# Patient Record
Sex: FEMALE | Race: BLACK OR AFRICAN AMERICAN | NOT HISPANIC OR LATINO | URBAN - METROPOLITAN AREA
[De-identification: names, ages, dates, MRNs, and addresses within clinical notes are randomized per-mention and may not be internally consistent; named-entity substitution may affect disease eponyms.]

---

## 2017-01-13 ENCOUNTER — OUTPATIENT (OUTPATIENT)
Dept: OUTPATIENT SERVICES | Facility: HOSPITAL | Age: 52
LOS: 1 days | End: 2017-01-13
Payer: COMMERCIAL

## 2017-01-13 DIAGNOSIS — M25.521 PAIN IN RIGHT ELBOW: ICD-10-CM

## 2017-01-13 DIAGNOSIS — Z90.722 ACQUIRED ABSENCE OF OVARIES, BILATERAL: Chronic | ICD-10-CM

## 2017-01-13 DIAGNOSIS — Z51.89 ENCOUNTER FOR OTHER SPECIFIED AFTERCARE: ICD-10-CM

## 2017-01-13 DIAGNOSIS — S93.401D SPRAIN OF UNSPECIFIED LIGAMENT OF RIGHT ANKLE, SUBSEQUENT ENCOUNTER: ICD-10-CM

## 2017-01-24 ENCOUNTER — APPOINTMENT (OUTPATIENT)
Dept: ORTHOPEDIC SURGERY | Facility: CLINIC | Age: 52
End: 2017-01-24

## 2017-01-24 DIAGNOSIS — M54.5 LOW BACK PAIN: ICD-10-CM

## 2017-02-02 ENCOUNTER — APPOINTMENT (OUTPATIENT)
Dept: CARDIOLOGY | Facility: CLINIC | Age: 52
End: 2017-02-02

## 2017-02-06 ENCOUNTER — OTHER (OUTPATIENT)
Age: 52
End: 2017-02-06

## 2017-02-06 ENCOUNTER — APPOINTMENT (OUTPATIENT)
Dept: PULMONOLOGY | Facility: CLINIC | Age: 52
End: 2017-02-06

## 2017-02-06 VITALS — OXYGEN SATURATION: 99 % | HEART RATE: 84 BPM | SYSTOLIC BLOOD PRESSURE: 130 MMHG | DIASTOLIC BLOOD PRESSURE: 88 MMHG

## 2017-02-06 DIAGNOSIS — Z01.811 ENCOUNTER FOR PREPROCEDURAL RESPIRATORY EXAMINATION: ICD-10-CM

## 2017-02-06 RX ORDER — SULFAMETHOXAZOLE AND TRIMETHOPRIM 800; 160 MG/1; MG/1
800-160 TABLET ORAL
Qty: 28 | Refills: 0 | Status: DISCONTINUED | COMMUNITY
Start: 2016-11-15 | End: 2017-02-06

## 2017-02-06 RX ORDER — OXYCODONE AND ACETAMINOPHEN 5; 325 MG/1; MG/1
5-325 TABLET ORAL EVERY 6 HOURS
Qty: 8 | Refills: 0 | Status: DISCONTINUED | COMMUNITY
Start: 2016-12-22 | End: 2017-02-06

## 2017-02-06 RX ORDER — BACITRACIN 500 [USP'U]/G
500 OINTMENT OPHTHALMIC
Qty: 4 | Refills: 0 | Status: DISCONTINUED | COMMUNITY
Start: 2017-01-09 | End: 2017-02-06

## 2017-02-06 RX ORDER — BACITRACIN ZINC 500 [USP'U]/G
500 OINTMENT TOPICAL
Qty: 28 | Refills: 0 | Status: DISCONTINUED | COMMUNITY
Start: 2016-11-15 | End: 2017-02-06

## 2017-02-06 RX ORDER — AMITRIPTYLINE HYDROCHLORIDE 10 MG/1
10 TABLET, FILM COATED ORAL
Qty: 30 | Refills: 0 | Status: DISCONTINUED | COMMUNITY
Start: 2016-10-07 | End: 2017-02-06

## 2017-02-06 RX ORDER — DILTIAZEM HYDROCHLORIDE 240 MG/1
240 CAPSULE, EXTENDED RELEASE ORAL
Qty: 30 | Refills: 0 | Status: DISCONTINUED | COMMUNITY
Start: 2016-06-10 | End: 2017-02-06

## 2017-02-14 ENCOUNTER — APPOINTMENT (OUTPATIENT)
Dept: PULMONOLOGY | Facility: CLINIC | Age: 52
End: 2017-02-14

## 2017-03-02 ENCOUNTER — LABORATORY RESULT (OUTPATIENT)
Age: 52
End: 2017-03-02

## 2017-03-06 ENCOUNTER — APPOINTMENT (OUTPATIENT)
Dept: MRI IMAGING | Facility: CLINIC | Age: 52
End: 2017-03-06

## 2017-03-06 ENCOUNTER — OUTPATIENT (OUTPATIENT)
Dept: OUTPATIENT SERVICES | Facility: HOSPITAL | Age: 52
LOS: 1 days | End: 2017-03-06
Payer: MEDICAID

## 2017-03-06 DIAGNOSIS — Z00.8 ENCOUNTER FOR OTHER GENERAL EXAMINATION: ICD-10-CM

## 2017-03-06 DIAGNOSIS — Z90.722 ACQUIRED ABSENCE OF OVARIES, BILATERAL: Chronic | ICD-10-CM

## 2017-03-06 PROCEDURE — 82565 ASSAY OF CREATININE: CPT

## 2017-03-06 PROCEDURE — 74183 MRI ABD W/O CNTR FLWD CNTR: CPT

## 2017-03-06 PROCEDURE — A9585: CPT

## 2017-03-09 ENCOUNTER — EMERGENCY (EMERGENCY)
Facility: HOSPITAL | Age: 52
LOS: 1 days | Discharge: HOME | End: 2017-03-09
Attending: EMERGENCY MEDICINE | Admitting: EMERGENCY MEDICINE
Payer: COMMERCIAL

## 2017-03-09 VITALS
HEART RATE: 90 BPM | TEMPERATURE: 98 F | SYSTOLIC BLOOD PRESSURE: 134 MMHG | HEIGHT: 66 IN | OXYGEN SATURATION: 98 % | WEIGHT: 190.04 LBS | RESPIRATION RATE: 18 BRPM | DIASTOLIC BLOOD PRESSURE: 83 MMHG

## 2017-03-09 DIAGNOSIS — Z90.722 ACQUIRED ABSENCE OF OVARIES, BILATERAL: Chronic | ICD-10-CM

## 2017-03-09 PROCEDURE — 99283 EMERGENCY DEPT VISIT LOW MDM: CPT | Mod: 25

## 2017-03-09 PROCEDURE — 10061 I&D ABSCESS COMP/MULTIPLE: CPT

## 2017-03-09 RX ORDER — IBUPROFEN 200 MG
1 TABLET ORAL
Qty: 16 | Refills: 0 | OUTPATIENT
Start: 2017-03-09 | End: 2017-03-13

## 2017-03-09 NOTE — ED STATDOCS - ATTENDING CONTRIBUTION TO CARE
I personally saw the patient with the PA, and completed the key components of the history and physical exam. I then discussed the management plan with the PA.   gen n nad resp clear cardiac nml resp clear abd/skin: soft  right inguinal abscess no cellulitius no sigsn nec fasc I and d warm compresses wound check 48 hrs without fail

## 2017-03-09 NOTE — ED STATDOCS - PROGRESS NOTE DETAILS
PA NOTE: Pt seen by intake physician and hpi/orders/plan reviewed. PT presenting to ED with complaints of abscess on groin x 1 week  PE: GEN: Awake, alert,  NAD,  EYES: PERRL  NEURO: AOx3, no focal deficits   Skin: 5cm x 5cm abscess on right groin with fluctuance, 3cm x 3cm tender mass on left thigh, non-fluctuant  PLAN: I&D and antibiotics

## 2017-03-14 ENCOUNTER — APPOINTMENT (OUTPATIENT)
Dept: CARDIOLOGY | Facility: CLINIC | Age: 52
End: 2017-03-14

## 2017-03-24 ENCOUNTER — APPOINTMENT (OUTPATIENT)
Dept: ULTRASOUND IMAGING | Facility: CLINIC | Age: 52
End: 2017-03-24

## 2017-03-28 ENCOUNTER — RX RENEWAL (OUTPATIENT)
Age: 52
End: 2017-03-28

## 2017-04-01 ENCOUNTER — OUTPATIENT (OUTPATIENT)
Dept: OUTPATIENT SERVICES | Facility: HOSPITAL | Age: 52
LOS: 1 days | End: 2017-04-01
Payer: MEDICAID

## 2017-04-01 ENCOUNTER — APPOINTMENT (OUTPATIENT)
Dept: MAMMOGRAPHY | Facility: CLINIC | Age: 52
End: 2017-04-01

## 2017-04-01 ENCOUNTER — APPOINTMENT (OUTPATIENT)
Dept: ULTRASOUND IMAGING | Facility: CLINIC | Age: 52
End: 2017-04-01

## 2017-04-01 DIAGNOSIS — Z90.722 ACQUIRED ABSENCE OF OVARIES, BILATERAL: Chronic | ICD-10-CM

## 2017-04-01 DIAGNOSIS — Z12.31 ENCOUNTER FOR SCREENING MAMMOGRAM FOR MALIGNANT NEOPLASM OF BREAST: ICD-10-CM

## 2017-04-01 PROCEDURE — 77067 SCR MAMMO BI INCL CAD: CPT

## 2017-04-01 PROCEDURE — 76642 ULTRASOUND BREAST LIMITED: CPT

## 2017-04-01 PROCEDURE — 77063 BREAST TOMOSYNTHESIS BI: CPT

## 2017-04-01 PROCEDURE — 77066 DX MAMMO INCL CAD BI: CPT

## 2017-04-01 PROCEDURE — G0279: CPT

## 2017-04-05 ENCOUNTER — RX RENEWAL (OUTPATIENT)
Age: 52
End: 2017-04-05

## 2017-04-28 ENCOUNTER — EMERGENCY (EMERGENCY)
Facility: HOSPITAL | Age: 52
LOS: 1 days | Discharge: HOME | End: 2017-04-28
Attending: EMERGENCY MEDICINE
Payer: COMMERCIAL

## 2017-04-28 VITALS
WEIGHT: 190.04 LBS | TEMPERATURE: 98 F | RESPIRATION RATE: 18 BRPM | HEART RATE: 82 BPM | DIASTOLIC BLOOD PRESSURE: 82 MMHG | OXYGEN SATURATION: 98 % | HEIGHT: 66 IN | SYSTOLIC BLOOD PRESSURE: 138 MMHG

## 2017-04-28 DIAGNOSIS — I10 ESSENTIAL (PRIMARY) HYPERTENSION: ICD-10-CM

## 2017-04-28 DIAGNOSIS — Z90.722 ACQUIRED ABSENCE OF OVARIES, BILATERAL: Chronic | ICD-10-CM

## 2017-04-28 DIAGNOSIS — K21.9 GASTRO-ESOPHAGEAL REFLUX DISEASE WITHOUT ESOPHAGITIS: ICD-10-CM

## 2017-04-28 DIAGNOSIS — M54.6 PAIN IN THORACIC SPINE: ICD-10-CM

## 2017-04-28 DIAGNOSIS — Z98.890 OTHER SPECIFIED POSTPROCEDURAL STATES: ICD-10-CM

## 2017-04-28 PROCEDURE — 71020: CPT | Mod: 26

## 2017-04-28 PROCEDURE — 71046 X-RAY EXAM CHEST 2 VIEWS: CPT

## 2017-04-28 PROCEDURE — 96372 THER/PROPH/DIAG INJ SC/IM: CPT

## 2017-04-28 PROCEDURE — 99284 EMERGENCY DEPT VISIT MOD MDM: CPT

## 2017-04-28 PROCEDURE — 94640 AIRWAY INHALATION TREATMENT: CPT

## 2017-04-28 PROCEDURE — 99284 EMERGENCY DEPT VISIT MOD MDM: CPT | Mod: 25

## 2017-04-28 RX ORDER — KETOROLAC TROMETHAMINE 30 MG/ML
60 SYRINGE (ML) INJECTION ONCE
Qty: 0 | Refills: 0 | Status: DISCONTINUED | OUTPATIENT
Start: 2017-04-28 | End: 2017-04-28

## 2017-04-28 RX ORDER — IPRATROPIUM/ALBUTEROL SULFATE 18-103MCG
3 AEROSOL WITH ADAPTER (GRAM) INHALATION ONCE
Qty: 0 | Refills: 0 | Status: COMPLETED | OUTPATIENT
Start: 2017-04-28 | End: 2017-04-28

## 2017-04-28 RX ORDER — ALBUTEROL 90 UG/1
2.5 AEROSOL, METERED ORAL ONCE
Qty: 0 | Refills: 0 | Status: COMPLETED | OUTPATIENT
Start: 2017-04-28 | End: 2017-04-28

## 2017-04-28 RX ADMIN — ALBUTEROL 2.5 MILLIGRAM(S): 90 AEROSOL, METERED ORAL at 12:54

## 2017-04-28 RX ADMIN — Medication 3 MILLILITER(S): at 12:54

## 2017-04-28 RX ADMIN — Medication 60 MILLIGRAM(S): at 12:53

## 2017-04-28 NOTE — ED STATDOCS - OBJECTIVE STATEMENT
50 y/o female presents to ED c/o gradual onset upper back pain that began this morning. She reports that her pain feels "more internal than a usual backache", and is worsened with positional changes and deep inspiration. Associated SOB. No recent injury or trauma to her upper back. Secondarily, pt also notes generalized abd pain that began PTA, now subsided. Denies CP, fever, vomiting, dysuria, hematuria. Pt was evaluated by her PMD 3 days ago for routine appointment, though she was experiencing productive cough with yellow sputum, sneeze, rhinorrhea, and eye tearing x 2 days prior, Rx Zithromax, though pt still reports continued cough and chest congestion. No back pain at time of PMD visit. +recent sick contact at home with similar symptoms, now resolved. PMHx = HTN, asthma (treated with Asthmanex pump QD). Non-smoker. No further complaints at this time.

## 2017-04-28 NOTE — ED ADULT TRIAGE NOTE - CHIEF COMPLAINT QUOTE
pain in upper back worse with breathing, abd. pain pain in upper back worse with breathing, abd. pain, christina tto pmd 3 days ago was given zithromax for uri . not  better

## 2017-04-28 NOTE — ED ADULT NURSE NOTE - CHIEF COMPLAINT QUOTE
pain in upper back worse with breathing, abd. pain, christina tto pmd 3 days ago was given zithromax for uri . not  better

## 2017-04-28 NOTE — ED STATDOCS - DETAILS:
I, Martir Parrish, performed the initial face to face bedside interview with this patient regarding history of present illness, review of symptoms and relevant past medical, social and family history.  I completed an independent physical examination.  I was the initial provider who evaluated this patient. I have signed out the follow up of any pending tests (i.e. labs, radiological studies) to the ACP.  I have communicated the patient’s plan of care and disposition with the ACP.  The history, relevant review of systems, past medical and surgical history, medical decision making, and physical examination was documented by the scribe in my presence and I attest to the accuracy of the documentation.

## 2017-04-28 NOTE — ED STATDOCS - PMH
Anemia    Ectopic pregnancy    Hypertension    Palpitations    Reflux    Seasonal allergies    SOB (shortness of breath)    Vitamin B 12 deficiency

## 2017-04-28 NOTE — ED STATDOCS - PROGRESS NOTE DETAILS
Pt reports significant relief of presenting symptoms. Pt states that she no longer has pain with breathing. Lungs CTA b/l with no WRR on re-examination. Pt PERC=0. Pt stable for d/c as per intake doc care plan.

## 2017-04-28 NOTE — ED STATDOCS - NS ED MD SCRIBE ATTENDING SCRIBE SECTIONS
REVIEW OF SYSTEMS/HIV/PAST MEDICAL/SURGICAL/SOCIAL HISTORY/VITAL SIGNS( Pullset)/DISPOSITION/PHYSICAL EXAM/HISTORY OF PRESENT ILLNESS

## 2017-05-25 ENCOUNTER — APPOINTMENT (OUTPATIENT)
Dept: CARDIOLOGY | Facility: CLINIC | Age: 52
End: 2017-05-25

## 2017-05-25 ENCOUNTER — NON-APPOINTMENT (OUTPATIENT)
Age: 52
End: 2017-05-25

## 2017-05-25 VITALS
HEIGHT: 66 IN | SYSTOLIC BLOOD PRESSURE: 122 MMHG | HEART RATE: 99 BPM | BODY MASS INDEX: 30.53 KG/M2 | DIASTOLIC BLOOD PRESSURE: 82 MMHG | OXYGEN SATURATION: 98 % | WEIGHT: 190 LBS

## 2017-05-25 DIAGNOSIS — R00.2 PALPITATIONS: ICD-10-CM

## 2017-06-07 ENCOUNTER — APPOINTMENT (OUTPATIENT)
Dept: GASTROENTEROLOGY | Facility: GI CENTER | Age: 52
End: 2017-06-07

## 2017-06-12 ENCOUNTER — APPOINTMENT (OUTPATIENT)
Dept: PULMONOLOGY | Facility: CLINIC | Age: 52
End: 2017-06-12

## 2017-06-16 ENCOUNTER — OTHER (OUTPATIENT)
Age: 52
End: 2017-06-16

## 2017-06-16 ENCOUNTER — APPOINTMENT (OUTPATIENT)
Dept: PULMONOLOGY | Facility: CLINIC | Age: 52
End: 2017-06-16

## 2017-06-21 ENCOUNTER — APPOINTMENT (OUTPATIENT)
Dept: ELECTROPHYSIOLOGY | Facility: CLINIC | Age: 52
End: 2017-06-21

## 2017-06-21 VITALS
HEART RATE: 89 BPM | DIASTOLIC BLOOD PRESSURE: 80 MMHG | OXYGEN SATURATION: 100 % | HEIGHT: 66 IN | BODY MASS INDEX: 29.41 KG/M2 | SYSTOLIC BLOOD PRESSURE: 138 MMHG | WEIGHT: 183 LBS

## 2017-06-27 ENCOUNTER — APPOINTMENT (OUTPATIENT)
Dept: PLASTIC SURGERY | Facility: CLINIC | Age: 52
End: 2017-06-27

## 2017-06-27 VITALS
OXYGEN SATURATION: 98 % | HEART RATE: 85 BPM | BODY MASS INDEX: 29.57 KG/M2 | RESPIRATION RATE: 16 BRPM | WEIGHT: 184 LBS | HEIGHT: 66 IN | DIASTOLIC BLOOD PRESSURE: 83 MMHG | SYSTOLIC BLOOD PRESSURE: 141 MMHG | TEMPERATURE: 97.8 F

## 2017-07-03 ENCOUNTER — APPOINTMENT (OUTPATIENT)
Dept: PULMONOLOGY | Facility: CLINIC | Age: 52
End: 2017-07-03

## 2017-07-03 RX ORDER — POLYETHYLENE GLYCOL 3350, SODIUM CHLORIDE, SODIUM BICARBONATE AND POTASSIUM CHLORIDE WITH LEMON FLAVOR 420; 11.2; 5.72; 1.48 G/4L; G/4L; G/4L; G/4L
420 POWDER, FOR SOLUTION ORAL
Qty: 4000 | Refills: 0 | Status: DISCONTINUED | COMMUNITY
Start: 2017-01-20 | End: 2017-07-03

## 2017-07-03 RX ORDER — DOXYCYCLINE HYCLATE 100 MG/1
100 CAPSULE ORAL
Qty: 20 | Refills: 0 | Status: DISCONTINUED | COMMUNITY
Start: 2017-03-09 | End: 2017-07-03

## 2017-07-03 RX ORDER — HYDROCODONE BITARTRATE AND ACETAMINOPHEN 5; 325 MG/1; MG/1
5-325 TABLET ORAL
Qty: 7 | Refills: 0 | Status: DISCONTINUED | COMMUNITY
Start: 2016-11-21 | End: 2017-07-03

## 2017-07-03 RX ORDER — AMITRIPTYLINE HYDROCHLORIDE 25 MG/1
25 TABLET, FILM COATED ORAL
Qty: 30 | Refills: 0 | Status: DISCONTINUED | COMMUNITY
Start: 2016-12-05 | End: 2017-07-03

## 2017-07-03 RX ORDER — AZITHROMYCIN 250 MG/1
250 TABLET, FILM COATED ORAL
Qty: 6 | Refills: 0 | Status: DISCONTINUED | COMMUNITY
Start: 2017-04-26 | End: 2017-07-03

## 2017-07-03 RX ORDER — CLOTRIMAZOLE AND BETAMETHASONE DIPROPIONATE 10; .5 MG/G; MG/G
1-0.05 CREAM TOPICAL
Qty: 45 | Refills: 0 | Status: DISCONTINUED | COMMUNITY
Start: 2017-02-28 | End: 2017-07-03

## 2017-07-03 RX ORDER — BENZONATATE 100 MG/1
100 CAPSULE ORAL
Qty: 30 | Refills: 0 | Status: DISCONTINUED | COMMUNITY
Start: 2017-04-26 | End: 2017-07-03

## 2017-07-06 PROCEDURE — 97110 THERAPEUTIC EXERCISES: CPT

## 2017-07-06 PROCEDURE — 97010 HOT OR COLD PACKS THERAPY: CPT

## 2017-07-06 PROCEDURE — 97140 MANUAL THERAPY 1/> REGIONS: CPT

## 2017-07-06 PROCEDURE — 97163 PT EVAL HIGH COMPLEX 45 MIN: CPT

## 2017-07-06 PROCEDURE — 97750 PHYSICAL PERFORMANCE TEST: CPT

## 2017-07-10 ENCOUNTER — OUTPATIENT (OUTPATIENT)
Dept: OUTPATIENT SERVICES | Facility: HOSPITAL | Age: 52
LOS: 1 days | End: 2017-07-10
Payer: COMMERCIAL

## 2017-07-10 DIAGNOSIS — Z90.722 ACQUIRED ABSENCE OF OVARIES, BILATERAL: Chronic | ICD-10-CM

## 2017-07-11 ENCOUNTER — OUTPATIENT (OUTPATIENT)
Dept: OUTPATIENT SERVICES | Facility: HOSPITAL | Age: 52
LOS: 1 days | Discharge: HOME | End: 2017-07-11

## 2017-07-11 ENCOUNTER — APPOINTMENT (OUTPATIENT)
Dept: RADIATION ONCOLOGY | Facility: CLINIC | Age: 52
End: 2017-07-11

## 2017-07-11 VITALS
WEIGHT: 190.4 LBS | OXYGEN SATURATION: 91 % | BODY MASS INDEX: 30.6 KG/M2 | HEART RATE: 74 BPM | TEMPERATURE: 98.1 F | RESPIRATION RATE: 16 BRPM | DIASTOLIC BLOOD PRESSURE: 82 MMHG | SYSTOLIC BLOOD PRESSURE: 139 MMHG | HEIGHT: 66 IN

## 2017-07-11 DIAGNOSIS — Z87.09 PERSONAL HISTORY OF OTHER DISEASES OF THE RESPIRATORY SYSTEM: ICD-10-CM

## 2017-07-11 DIAGNOSIS — F41.9 ANXIETY DISORDER, UNSPECIFIED: ICD-10-CM

## 2017-07-11 DIAGNOSIS — Z90.722 ACQUIRED ABSENCE OF OVARIES, BILATERAL: Chronic | ICD-10-CM

## 2017-07-19 DIAGNOSIS — M25.521 PAIN IN RIGHT ELBOW: ICD-10-CM

## 2017-07-19 DIAGNOSIS — S93.401D SPRAIN OF UNSPECIFIED LIGAMENT OF RIGHT ANKLE, SUBSEQUENT ENCOUNTER: ICD-10-CM

## 2017-07-19 DIAGNOSIS — M54.5 LOW BACK PAIN: ICD-10-CM

## 2017-07-25 ENCOUNTER — LABORATORY RESULT (OUTPATIENT)
Age: 52
End: 2017-07-25

## 2017-07-25 ENCOUNTER — APPOINTMENT (OUTPATIENT)
Dept: PLASTIC SURGERY | Facility: CLINIC | Age: 52
End: 2017-07-25

## 2017-07-25 ENCOUNTER — RESULT REVIEW (OUTPATIENT)
Age: 52
End: 2017-07-25

## 2017-07-25 VITALS
TEMPERATURE: 97.6 F | SYSTOLIC BLOOD PRESSURE: 122 MMHG | HEIGHT: 66 IN | WEIGHT: 182.2 LBS | HEART RATE: 92 BPM | DIASTOLIC BLOOD PRESSURE: 81 MMHG | OXYGEN SATURATION: 96 % | BODY MASS INDEX: 29.28 KG/M2 | RESPIRATION RATE: 16 BRPM

## 2017-07-25 VITALS
RESPIRATION RATE: 15 BRPM | WEIGHT: 190 LBS | DIASTOLIC BLOOD PRESSURE: 89 MMHG | HEIGHT: 66 IN | BODY MASS INDEX: 30.53 KG/M2 | OXYGEN SATURATION: 91 % | TEMPERATURE: 97.7 F | SYSTOLIC BLOOD PRESSURE: 143 MMHG | HEART RATE: 81 BPM

## 2017-08-01 ENCOUNTER — APPOINTMENT (OUTPATIENT)
Dept: PULMONOLOGY | Facility: CLINIC | Age: 52
End: 2017-08-01
Payer: MEDICAID

## 2017-08-01 VITALS
HEART RATE: 102 BPM | DIASTOLIC BLOOD PRESSURE: 70 MMHG | HEIGHT: 66 IN | WEIGHT: 180 LBS | BODY MASS INDEX: 28.93 KG/M2 | SYSTOLIC BLOOD PRESSURE: 110 MMHG | OXYGEN SATURATION: 98 %

## 2017-08-01 PROCEDURE — 99214 OFFICE O/P EST MOD 30 MIN: CPT

## 2017-08-01 RX ORDER — OXYCODONE AND ACETAMINOPHEN 10; 325 MG/1; MG/1
10-325 TABLET ORAL EVERY 6 HOURS
Qty: 20 | Refills: 0 | Status: DISCONTINUED | COMMUNITY
Start: 2017-05-23 | End: 2017-08-01

## 2017-08-01 RX ORDER — IBUPROFEN 600 MG/1
600 TABLET, FILM COATED ORAL
Qty: 15 | Refills: 0 | Status: DISCONTINUED | COMMUNITY
Start: 2016-12-22 | End: 2017-08-01

## 2017-08-09 ENCOUNTER — RECORD ABSTRACTING (OUTPATIENT)
Age: 52
End: 2017-08-09

## 2017-08-09 DIAGNOSIS — Z92.89 PERSONAL HISTORY OF OTHER MEDICAL TREATMENT: ICD-10-CM

## 2017-08-21 ENCOUNTER — APPOINTMENT (OUTPATIENT)
Dept: NEUROLOGY | Facility: CLINIC | Age: 52
End: 2017-08-21

## 2017-09-05 ENCOUNTER — APPOINTMENT (OUTPATIENT)
Dept: GASTROENTEROLOGY | Facility: GI CENTER | Age: 52
End: 2017-09-05

## 2017-09-08 ENCOUNTER — APPOINTMENT (OUTPATIENT)
Dept: RADIATION ONCOLOGY | Facility: CLINIC | Age: 52
End: 2017-09-08
Payer: MEDICAID

## 2017-09-08 VITALS
WEIGHT: 183.4 LBS | HEIGHT: 66 IN | DIASTOLIC BLOOD PRESSURE: 86 MMHG | TEMPERATURE: 99 F | BODY MASS INDEX: 29.47 KG/M2 | HEART RATE: 93 BPM | SYSTOLIC BLOOD PRESSURE: 129 MMHG | OXYGEN SATURATION: 92 % | RESPIRATION RATE: 16 BRPM

## 2017-09-08 DIAGNOSIS — L91.0 HYPERTROPHIC SCAR: ICD-10-CM

## 2017-09-08 PROCEDURE — 99024 POSTOP FOLLOW-UP VISIT: CPT

## 2017-09-08 RX ORDER — ATORVASTATIN CALCIUM 10 MG/1
10 TABLET, FILM COATED ORAL
Refills: 0 | Status: DISCONTINUED | COMMUNITY
End: 2017-09-08

## 2017-09-08 RX ORDER — MOMETASONE 50 UG/1
50 SPRAY, METERED NASAL
Refills: 0 | Status: DISCONTINUED | COMMUNITY
End: 2017-09-08

## 2017-09-08 RX ORDER — GABAPENTIN 300 MG/1
300 CAPSULE ORAL
Qty: 60 | Refills: 0 | Status: DISCONTINUED | COMMUNITY
Start: 2017-02-13 | End: 2017-09-08

## 2017-09-08 RX ORDER — IBUPROFEN 800 MG/1
800 TABLET, FILM COATED ORAL
Qty: 12 | Refills: 0 | Status: DISCONTINUED | COMMUNITY
Start: 2017-07-25 | End: 2017-09-08

## 2017-09-08 RX ORDER — MOMETASONE FUROATE 220 UG/1
220 INHALANT RESPIRATORY (INHALATION) DAILY
Qty: 1 | Refills: 3 | Status: DISCONTINUED | COMMUNITY
Start: 2017-03-28 | End: 2017-09-08

## 2017-09-08 RX ORDER — ACETAMINOPHEN AND CODEINE 300; 30 MG/1; MG/1
300-30 TABLET ORAL
Qty: 20 | Refills: 0 | Status: DISCONTINUED | COMMUNITY
Start: 2017-05-18 | End: 2017-09-08

## 2017-09-08 RX ORDER — AMITRIPTYLINE HYDROCHLORIDE 50 MG/1
50 TABLET, FILM COATED ORAL
Qty: 30 | Refills: 0 | Status: DISCONTINUED | COMMUNITY
Start: 2017-05-16 | End: 2017-09-08

## 2017-09-12 ENCOUNTER — APPOINTMENT (OUTPATIENT)
Dept: PLASTIC SURGERY | Facility: CLINIC | Age: 52
End: 2017-09-12

## 2017-09-27 ENCOUNTER — APPOINTMENT (OUTPATIENT)
Dept: ELECTROPHYSIOLOGY | Facility: CLINIC | Age: 52
End: 2017-09-27

## 2017-10-03 ENCOUNTER — APPOINTMENT (OUTPATIENT)
Dept: ULTRASOUND IMAGING | Facility: CLINIC | Age: 52
End: 2017-10-03
Payer: MEDICAID

## 2017-10-03 ENCOUNTER — OUTPATIENT (OUTPATIENT)
Dept: OUTPATIENT SERVICES | Facility: HOSPITAL | Age: 52
LOS: 1 days | End: 2017-10-03
Payer: MEDICAID

## 2017-10-03 DIAGNOSIS — Z00.00 ENCOUNTER FOR GENERAL ADULT MEDICAL EXAMINATION WITHOUT ABNORMAL FINDINGS: ICD-10-CM

## 2017-10-03 DIAGNOSIS — Z90.722 ACQUIRED ABSENCE OF OVARIES, BILATERAL: Chronic | ICD-10-CM

## 2017-10-03 PROCEDURE — 76642 ULTRASOUND BREAST LIMITED: CPT

## 2017-10-03 PROCEDURE — 76642 ULTRASOUND BREAST LIMITED: CPT | Mod: 26,RT

## 2017-10-10 ENCOUNTER — APPOINTMENT (OUTPATIENT)
Dept: PLASTIC SURGERY | Facility: CLINIC | Age: 52
End: 2017-10-10

## 2017-10-29 ENCOUNTER — RX RENEWAL (OUTPATIENT)
Age: 52
End: 2017-10-29

## 2017-11-01 ENCOUNTER — APPOINTMENT (OUTPATIENT)
Dept: GASTROENTEROLOGY | Facility: CLINIC | Age: 52
End: 2017-11-01
Payer: MEDICAID

## 2017-11-01 VITALS
WEIGHT: 183 LBS | BODY MASS INDEX: 29.41 KG/M2 | HEART RATE: 84 BPM | OXYGEN SATURATION: 98 % | RESPIRATION RATE: 16 BRPM | SYSTOLIC BLOOD PRESSURE: 130 MMHG | HEIGHT: 66 IN | DIASTOLIC BLOOD PRESSURE: 84 MMHG

## 2017-11-01 DIAGNOSIS — R93.8 ABNORMAL FINDINGS ON DIAGNOSTIC IMAGING OF OTHER SPECIFIED BODY STRUCTURES: ICD-10-CM

## 2017-11-01 PROCEDURE — 99214 OFFICE O/P EST MOD 30 MIN: CPT

## 2017-11-01 RX ORDER — MOMETASONE FUROATE 220 UG/1
220 INHALANT RESPIRATORY (INHALATION)
Refills: 0 | Status: DISCONTINUED | COMMUNITY

## 2017-11-01 RX ORDER — NABUMETONE 500 MG/1
500 TABLET, FILM COATED ORAL
Refills: 0 | Status: COMPLETED | COMMUNITY
End: 2017-11-01

## 2017-11-03 PROCEDURE — 97140 MANUAL THERAPY 1/> REGIONS: CPT

## 2017-11-03 PROCEDURE — 97110 THERAPEUTIC EXERCISES: CPT

## 2017-11-03 PROCEDURE — 97010 HOT OR COLD PACKS THERAPY: CPT

## 2017-11-16 ENCOUNTER — RX RENEWAL (OUTPATIENT)
Age: 52
End: 2017-11-16

## 2017-12-20 ENCOUNTER — APPOINTMENT (OUTPATIENT)
Dept: ELECTROPHYSIOLOGY | Facility: CLINIC | Age: 52
End: 2017-12-20

## 2018-01-12 ENCOUNTER — APPOINTMENT (OUTPATIENT)
Dept: CARDIOLOGY | Facility: CLINIC | Age: 53
End: 2018-01-12

## 2018-02-06 ENCOUNTER — RX RENEWAL (OUTPATIENT)
Age: 53
End: 2018-02-06

## 2018-03-01 RX ORDER — POLYETHYLENE GLYOCOL 3350, SODIUM CHLORIDE, SODIUM BICARBONATE AND POTASSIUM CHLORIDE 420; 11.2; 5.72; 1.48 G/4L; G/4L; G/4L; G/4L
420 POWDER, FOR SOLUTION NASOGASTRIC; ORAL
Qty: 1 | Refills: 0 | Status: DISCONTINUED | COMMUNITY
Start: 2018-02-06 | End: 2018-03-01

## 2018-03-01 RX ORDER — HYDROCORTISONE 25 MG/G
2.5 CREAM TOPICAL
Qty: 30 | Refills: 5 | Status: DISCONTINUED | COMMUNITY
Start: 2017-11-01 | End: 2018-03-01

## 2018-04-02 ENCOUNTER — APPOINTMENT (OUTPATIENT)
Dept: MAMMOGRAPHY | Facility: CLINIC | Age: 53
End: 2018-04-02
Payer: MEDICAID

## 2018-04-02 ENCOUNTER — OUTPATIENT (OUTPATIENT)
Dept: OUTPATIENT SERVICES | Facility: HOSPITAL | Age: 53
LOS: 1 days | End: 2018-04-02
Payer: MEDICAID

## 2018-04-02 DIAGNOSIS — Z90.722 ACQUIRED ABSENCE OF OVARIES, BILATERAL: Chronic | ICD-10-CM

## 2018-04-02 DIAGNOSIS — Z00.00 ENCOUNTER FOR GENERAL ADULT MEDICAL EXAMINATION WITHOUT ABNORMAL FINDINGS: ICD-10-CM

## 2018-04-02 PROCEDURE — 76641 ULTRASOUND BREAST COMPLETE: CPT | Mod: 26,50

## 2018-04-02 PROCEDURE — 77067 SCR MAMMO BI INCL CAD: CPT

## 2018-04-02 PROCEDURE — 77063 BREAST TOMOSYNTHESIS BI: CPT

## 2018-04-02 PROCEDURE — 77063 BREAST TOMOSYNTHESIS BI: CPT | Mod: 26

## 2018-04-02 PROCEDURE — 77067 SCR MAMMO BI INCL CAD: CPT | Mod: 26

## 2018-04-02 PROCEDURE — 76641 ULTRASOUND BREAST COMPLETE: CPT

## 2018-04-06 LAB
ALBUMIN SERPL ELPH-MCNC: 4 G/DL
ALP BLD-CCNC: 87 U/L
ALT SERPL-CCNC: 37 U/L
ANION GAP SERPL CALC-SCNC: 16 MMOL/L
AST SERPL-CCNC: 25 U/L
BASOPHILS # BLD AUTO: 0.03 K/UL
BASOPHILS NFR BLD AUTO: 0.2 %
BILIRUB SERPL-MCNC: 0.3 MG/DL
BUN SERPL-MCNC: 8 MG/DL
CALCIUM SERPL-MCNC: 9.2 MG/DL
CHLORIDE SERPL-SCNC: 104 MMOL/L
CO2 SERPL-SCNC: 23 MMOL/L
CREAT SERPL-MCNC: 0.57 MG/DL
EOSINOPHIL # BLD AUTO: 0.19 K/UL
EOSINOPHIL NFR BLD AUTO: 1.2 %
GLUCOSE SERPL-MCNC: 109 MG/DL
HCT VFR BLD CALC: 37.9 %
HGB BLD-MCNC: 12.2 G/DL
IMM GRANULOCYTES NFR BLD AUTO: 0.3 %
INR PPP: 0.94 RATIO
LYMPHOCYTES # BLD AUTO: 2.11 K/UL
LYMPHOCYTES NFR BLD AUTO: 13.6 %
MAN DIFF?: NORMAL
MCHC RBC-ENTMCNC: 31.9 PG
MCHC RBC-ENTMCNC: 32.2 GM/DL
MCV RBC AUTO: 99.2 FL
MONOCYTES # BLD AUTO: 0.68 K/UL
MONOCYTES NFR BLD AUTO: 4.4 %
NEUTROPHILS # BLD AUTO: 12.46 K/UL
NEUTROPHILS NFR BLD AUTO: 80.3 %
PLATELET # BLD AUTO: 283 K/UL
POTASSIUM SERPL-SCNC: 3.7 MMOL/L
PROT SERPL-MCNC: 6.9 G/DL
PT BLD: 10.6 SEC
RBC # BLD: 3.82 M/UL
RBC # FLD: 14 %
SODIUM SERPL-SCNC: 143 MMOL/L
WBC # FLD AUTO: 15.52 K/UL

## 2018-04-20 ENCOUNTER — APPOINTMENT (OUTPATIENT)
Dept: PULMONOLOGY | Facility: CLINIC | Age: 53
End: 2018-04-20

## 2018-04-20 RX ORDER — OMEPRAZOLE 40 MG/1
40 CAPSULE, DELAYED RELEASE ORAL
Qty: 90 | Refills: 1 | Status: DISCONTINUED | COMMUNITY
Start: 2017-11-16 | End: 2018-04-20

## 2018-04-20 RX ORDER — OMEPRAZOLE 40 MG/1
40 CAPSULE, DELAYED RELEASE ORAL
Refills: 0 | Status: DISCONTINUED | COMMUNITY
End: 2018-04-20

## 2018-04-20 RX ORDER — HYDROCORTISONE 25 MG/G
2.5 CREAM TOPICAL
Refills: 0 | Status: DISCONTINUED | COMMUNITY
End: 2018-04-20

## 2018-04-25 ENCOUNTER — APPOINTMENT (OUTPATIENT)
Dept: GASTROENTEROLOGY | Facility: GI CENTER | Age: 53
End: 2018-04-25

## 2018-05-10 ENCOUNTER — APPOINTMENT (OUTPATIENT)
Dept: PULMONOLOGY | Facility: CLINIC | Age: 53
End: 2018-05-10
Payer: MEDICAID

## 2018-05-10 VITALS
DIASTOLIC BLOOD PRESSURE: 84 MMHG | SYSTOLIC BLOOD PRESSURE: 120 MMHG | BODY MASS INDEX: 29.21 KG/M2 | WEIGHT: 181 LBS | OXYGEN SATURATION: 99 % | HEART RATE: 92 BPM

## 2018-05-10 PROCEDURE — 99214 OFFICE O/P EST MOD 30 MIN: CPT

## 2018-05-10 RX ORDER — FERROUS SULFATE TAB EC 324 MG (65 MG FE EQUIVALENT) 324 (65 FE) MG
324 (65 FE) TABLET DELAYED RESPONSE ORAL
Qty: 60 | Refills: 0 | Status: DISCONTINUED | COMMUNITY
Start: 2016-11-15 | End: 2018-05-10

## 2018-05-10 RX ORDER — HYDROCODONE BITARTRATE AND ACETAMINOPHEN 5; 325 MG/1; MG/1
5-325 TABLET ORAL
Refills: 0 | Status: DISCONTINUED | COMMUNITY
End: 2018-05-10

## 2018-05-14 ENCOUNTER — EMERGENCY (EMERGENCY)
Facility: HOSPITAL | Age: 53
LOS: 1 days | Discharge: HOME | End: 2018-05-14
Payer: COMMERCIAL

## 2018-05-14 ENCOUNTER — INPATIENT (INPATIENT)
Facility: HOSPITAL | Age: 53
LOS: 0 days | Discharge: HOME | DRG: 313 | End: 2018-05-14
Attending: EMERGENCY MEDICINE | Admitting: EMERGENCY MEDICINE
Payer: COMMERCIAL

## 2018-05-14 VITALS
SYSTOLIC BLOOD PRESSURE: 122 MMHG | HEART RATE: 91 BPM | OXYGEN SATURATION: 96 % | TEMPERATURE: 97 F | HEIGHT: 63 IN | RESPIRATION RATE: 20 BRPM | WEIGHT: 179.9 LBS | DIASTOLIC BLOOD PRESSURE: 80 MMHG

## 2018-05-14 VITALS
OXYGEN SATURATION: 100 % | HEART RATE: 89 BPM | SYSTOLIC BLOOD PRESSURE: 117 MMHG | RESPIRATION RATE: 19 BRPM | DIASTOLIC BLOOD PRESSURE: 62 MMHG

## 2018-05-14 DIAGNOSIS — Z90.722 ACQUIRED ABSENCE OF OVARIES, BILATERAL: Chronic | ICD-10-CM

## 2018-05-14 DIAGNOSIS — I10 ESSENTIAL (PRIMARY) HYPERTENSION: ICD-10-CM

## 2018-05-14 DIAGNOSIS — R07.9 CHEST PAIN, UNSPECIFIED: ICD-10-CM

## 2018-05-14 DIAGNOSIS — R06.02 SHORTNESS OF BREATH: ICD-10-CM

## 2018-05-14 LAB
ALBUMIN SERPL ELPH-MCNC: 3.8 G/DL — SIGNIFICANT CHANGE UP (ref 3.3–5.2)
ALP SERPL-CCNC: 81 U/L — SIGNIFICANT CHANGE UP (ref 40–120)
ALT FLD-CCNC: 51 U/L — HIGH
ANION GAP SERPL CALC-SCNC: 14 MMOL/L — SIGNIFICANT CHANGE UP (ref 5–17)
AST SERPL-CCNC: 38 U/L — HIGH
BASOPHILS # BLD AUTO: 0 K/UL — SIGNIFICANT CHANGE UP (ref 0–0.2)
BASOPHILS NFR BLD AUTO: 0.2 % — SIGNIFICANT CHANGE UP (ref 0–2)
BILIRUB SERPL-MCNC: 0.5 MG/DL — SIGNIFICANT CHANGE UP (ref 0.4–2)
BUN SERPL-MCNC: 4 MG/DL — LOW (ref 8–20)
CALCIUM SERPL-MCNC: 9.1 MG/DL — SIGNIFICANT CHANGE UP (ref 8.6–10.2)
CHLORIDE SERPL-SCNC: 98 MMOL/L — SIGNIFICANT CHANGE UP (ref 98–107)
CK SERPL-CCNC: 33 U/L — SIGNIFICANT CHANGE UP (ref 25–170)
CO2 SERPL-SCNC: 24 MMOL/L — SIGNIFICANT CHANGE UP (ref 22–29)
CREAT SERPL-MCNC: 0.43 MG/DL — LOW (ref 0.5–1.3)
EOSINOPHIL # BLD AUTO: 0.2 K/UL — SIGNIFICANT CHANGE UP (ref 0–0.5)
EOSINOPHIL NFR BLD AUTO: 1.8 % — SIGNIFICANT CHANGE UP (ref 0–6)
GLUCOSE SERPL-MCNC: 100 MG/DL — SIGNIFICANT CHANGE UP (ref 70–115)
HCT VFR BLD CALC: 40.8 % — SIGNIFICANT CHANGE UP (ref 37–47)
HGB BLD-MCNC: 13.4 G/DL — SIGNIFICANT CHANGE UP (ref 12–16)
INR BLD: 1.09 RATIO — SIGNIFICANT CHANGE UP (ref 0.88–1.16)
LYMPHOCYTES # BLD AUTO: 17 % — LOW (ref 20–55)
LYMPHOCYTES # BLD AUTO: 2.2 K/UL — SIGNIFICANT CHANGE UP (ref 1–4.8)
MCHC RBC-ENTMCNC: 31.3 PG — HIGH (ref 27–31)
MCHC RBC-ENTMCNC: 32.8 G/DL — SIGNIFICANT CHANGE UP (ref 32–36)
MCV RBC AUTO: 95.3 FL — SIGNIFICANT CHANGE UP (ref 81–99)
MONOCYTES # BLD AUTO: 0.5 K/UL — SIGNIFICANT CHANGE UP (ref 0–0.8)
MONOCYTES NFR BLD AUTO: 4 % — SIGNIFICANT CHANGE UP (ref 3–10)
NEUTROPHILS # BLD AUTO: 9.7 K/UL — HIGH (ref 1.8–8)
NEUTROPHILS NFR BLD AUTO: 76.8 % — HIGH (ref 37–73)
NT-PROBNP SERPL-SCNC: 23 PG/ML — SIGNIFICANT CHANGE UP (ref 0–300)
PLATELET # BLD AUTO: 310 K/UL — SIGNIFICANT CHANGE UP (ref 150–400)
POTASSIUM SERPL-MCNC: 3.9 MMOL/L — SIGNIFICANT CHANGE UP (ref 3.5–5.3)
POTASSIUM SERPL-SCNC: 3.9 MMOL/L — SIGNIFICANT CHANGE UP (ref 3.5–5.3)
PROT SERPL-MCNC: 7.2 G/DL — SIGNIFICANT CHANGE UP (ref 6.6–8.7)
PROTHROM AB SERPL-ACNC: 12 SEC — SIGNIFICANT CHANGE UP (ref 9.8–12.7)
RBC # BLD: 4.28 M/UL — LOW (ref 4.4–5.2)
RBC # FLD: 13.9 % — SIGNIFICANT CHANGE UP (ref 11–15.6)
SODIUM SERPL-SCNC: 136 MMOL/L — SIGNIFICANT CHANGE UP (ref 135–145)
TROPONIN T SERPL-MCNC: <0.01 NG/ML — SIGNIFICANT CHANGE UP (ref 0–0.06)
TROPONIN T SERPL-MCNC: <0.01 NG/ML — SIGNIFICANT CHANGE UP (ref 0–0.06)
WBC # BLD: 12.6 K/UL — HIGH (ref 4.8–10.8)
WBC # FLD AUTO: 12.6 K/UL — HIGH (ref 4.8–10.8)

## 2018-05-14 PROCEDURE — 80053 COMPREHEN METABOLIC PANEL: CPT

## 2018-05-14 PROCEDURE — 84484 ASSAY OF TROPONIN QUANT: CPT

## 2018-05-14 PROCEDURE — 99283 EMERGENCY DEPT VISIT LOW MDM: CPT | Mod: 25

## 2018-05-14 PROCEDURE — 85610 PROTHROMBIN TIME: CPT

## 2018-05-14 PROCEDURE — 85027 COMPLETE CBC AUTOMATED: CPT

## 2018-05-14 PROCEDURE — 99220: CPT

## 2018-05-14 PROCEDURE — 93005 ELECTROCARDIOGRAM TRACING: CPT

## 2018-05-14 PROCEDURE — 99223 1ST HOSP IP/OBS HIGH 75: CPT

## 2018-05-14 PROCEDURE — 36415 COLL VENOUS BLD VENIPUNCTURE: CPT

## 2018-05-14 PROCEDURE — G0378: CPT

## 2018-05-14 PROCEDURE — 82550 ASSAY OF CK (CPK): CPT

## 2018-05-14 PROCEDURE — 93010 ELECTROCARDIOGRAM REPORT: CPT

## 2018-05-14 PROCEDURE — 71045 X-RAY EXAM CHEST 1 VIEW: CPT

## 2018-05-14 PROCEDURE — 71045 X-RAY EXAM CHEST 1 VIEW: CPT | Mod: 26

## 2018-05-14 PROCEDURE — 83880 ASSAY OF NATRIURETIC PEPTIDE: CPT

## 2018-05-14 RX ORDER — ASPIRIN/CALCIUM CARB/MAGNESIUM 324 MG
325 TABLET ORAL ONCE
Qty: 0 | Refills: 0 | Status: COMPLETED | OUTPATIENT
Start: 2018-05-14 | End: 2018-05-14

## 2018-05-14 RX ORDER — ATORVASTATIN CALCIUM 80 MG/1
1 TABLET, FILM COATED ORAL
Qty: 0 | Refills: 0 | COMMUNITY

## 2018-05-14 RX ORDER — SODIUM CHLORIDE 9 MG/ML
3 INJECTION INTRAMUSCULAR; INTRAVENOUS; SUBCUTANEOUS ONCE
Qty: 0 | Refills: 0 | Status: COMPLETED | OUTPATIENT
Start: 2018-05-14 | End: 2018-05-14

## 2018-05-14 RX ORDER — METHOCARBAMOL 500 MG/1
500 TABLET, FILM COATED ORAL
Qty: 0 | Refills: 0 | COMMUNITY

## 2018-05-14 RX ORDER — HYDROXYZINE HCL 10 MG
1 TABLET ORAL
Qty: 0 | Refills: 0 | COMMUNITY

## 2018-05-14 RX ORDER — METOPROLOL TARTRATE 50 MG
50 TABLET ORAL ONCE
Qty: 0 | Refills: 0 | Status: DISCONTINUED | OUTPATIENT
Start: 2018-05-14 | End: 2018-05-14

## 2018-05-14 RX ORDER — GABAPENTIN 400 MG/1
1 CAPSULE ORAL
Qty: 0 | Refills: 0 | COMMUNITY

## 2018-05-14 RX ORDER — HYDROXYZINE HCL 10 MG
25 TABLET ORAL
Qty: 0 | Refills: 0 | COMMUNITY

## 2018-05-14 RX ORDER — DULOXETINE HYDROCHLORIDE 30 MG/1
30 CAPSULE, DELAYED RELEASE ORAL
Qty: 0 | Refills: 0 | COMMUNITY

## 2018-05-14 RX ADMIN — SODIUM CHLORIDE 3 MILLILITER(S): 9 INJECTION INTRAMUSCULAR; INTRAVENOUS; SUBCUTANEOUS at 13:06

## 2018-05-14 RX ADMIN — Medication 325 MILLIGRAM(S): at 13:12

## 2018-05-14 NOTE — CONSULT NOTE ADULT - SUBJECTIVE AND OBJECTIVE BOX
[place] [facility]                                                        Office: [address] [address]                                                       Telephone: [phone]. Fax:[phone]                                                              CARDIOLOGY CONSULTATION NOTE                                                                                             Consult requested by:  Javid Vallejo MD (ER physician)     Reason for Consultation: chest pain     History obtained by: Patient and medical record     obtained: No    Chief complaint:    Patient is a 52y old  Female who presents with a chief complaint of chest pain     HPI:  This is a 52 year old woman with history of asthma, Hypertension and dyslipidemia and obesity with chest pain . Onset:   1 days;  Type: Pressure;  Intensity: 6/10 ; Duration:   1 days; each episode last:  1 Radiation:   no ,Associated symptoms: Dyspnea.   Patient denies any dyspnea on exertion.       REVIEW OF SYMPTOMS: Cardiovascular:  See HPI. + chest pain,  +  dyspnea,  No syncope,  No palpitations, No dizziness, No Orthopnea,      No Paroxsymal nocturnal dyspnea;  Respiratory:  No Dyspnea, No cough,     Genitourinary:  No dysuria, no hematuria; Gastrointestinal:  No nausea, no vomiting. No diarrhea.  No abdominal pain. No dark color stool, no melena ; Neurological: No headache, no dizziness, no slurred speech;  Psychiatric: No agitation, no anxiety.  ALL OTHER REVIEW OF SYSTEMS ARE NEGATIVE.    ALLERGIES: No Known Allergies    Intolerances          CURRENT MEDICATIONS:         HOME MEDICATIONS:    PAST MEDICAL HISTORY  Reflux  Palpitations  SOB (shortness of breath)  Vitamin B 12 deficiency  Seasonal allergies  Anemia  Ectopic pregnancy  Hypertension      PAST SURGICAL HISTORY  History of bilateral oophorectomies      FAMILY HISTORY:  Family history of CABG  Hypertension  Family history of malignant neoplasm of kidney  CAD (coronary artery disease)      SOCIAL HISTORY:  Denies smoking/alcohol/drugs    Vital Signs Last 24 Hrs  T(C): 36.2 (14 May 2018 11:38), Max: 36.2 (14 May 2018 11:38)  T(F): 97.1 (14 May 2018 11:38), Max: 97.1 (14 May 2018 11:38)  HR: 91 (14 May 2018 11:38) (91 - 91)  BP: 122/80 (14 May 2018 11:38) (122/80 - 122/80)  BP(mean): --  RR: 20 (14 May 2018 11:38) (20 - 20)  SpO2: 96% (14 May 2018 11:38) (96% - 96%)      PHYSICAL EXAM:  Constitutional: Comfortable . No acute distress.   HEENT: Atraumatic and normcephalic , neck is supple . no JVD. No carotid bruit. PEERL   CNS: A&Ox3. No focal deficits. EOMI. Cranial nerves II-IX are intact.   Lymph Nodes: Cervical : Not palpable.  Respiratory: CTAB  Cardiovascular: S1S2 RRR. No murmur/rubs or gallop.  Gastrointestinal: Soft non-tender and non distended . +Bowel sounds. negative Al's sign.  Extremities: No edema.   Psychiatric: Calm . no agitation.  Skin: No skin rash/ulcers visualized to face, hands or feet.    Intake and output:     LABS:                        13.4   12.6  )-----------( 310      ( 14 May 2018 13:10 )             40.8     05-14    136  |  98  |  4.0<L>  ----------------------------<  100  3.9   |  24.0  |  0.43<L>    Ca    9.1      14 May 2018 13:10    TPro  7.2  /  Alb  3.8  /  TBili  0.5  /  DBili  x   /  AST  38<H>  /  ALT  51<H>  /  AlkPhos  81  05-14    CARDIAC MARKERS ( 14 May 2018 13:10 )  x     / <0.01 ng/mL / x     / x     / x        ;p-BNP=Serum Pro-Brain Natriuretic Peptide: 23 pg/mL (05-14 @ 13:10)    PT/INR - ( 14 May 2018 13:10 )   PT: 12.0 sec;   INR: 1.09 ratio               INTERPRETATION OF TELEMETRY: Reviewed by me.   ECG: Reviewed by me.  Sinus rhythm. Non-specific ST- T changes      RADIOLOGY & ADDITIONAL STUDIES:   X-ray:  reviewed by me: unremarkable

## 2018-05-14 NOTE — ED ADULT NURSE NOTE - FAMILY HISTORY
Family history of CABG     Father  Still living? Unknown  CAD (coronary artery disease), Age at diagnosis: Age Unknown  Hypertension, Age at diagnosis: Age Unknown     Mother  Still living? Unknown  CAD (coronary artery disease), Age at diagnosis: Age Unknown  Family history of malignant neoplasm of kidney, Age at diagnosis: Age Unknown  Hypertension, Age at diagnosis: Age Unknown

## 2018-05-14 NOTE — ED PROVIDER NOTE - OBJECTIVE STATEMENT
51 y/o F pt with hx of depression, anxiety, HTN, high cholesterol presents to ED c/o central chest pressure radiating to left arm x 3 days associated with intermittent SOB and palpitations. No alleviating or exacerbating factors. Pt is former smoker.

## 2018-05-14 NOTE — ED CDU PROVIDER INITIAL DAY NOTE - OBJECTIVE STATEMENT
53 y/o F pt with hx of depression, anxiety, HTN, high cholesterol presents to ED c/o central chest pressure radiating to left arm x 3 days associated with intermittent SOB and palpitations. No alleviating or exacerbating factors. Pt is former smoker.

## 2018-05-14 NOTE — ED CDU PROVIDER DISPOSITION NOTE - CLINICAL COURSE
signing out AMA, does not want to wait for stress test, has appt with Remedios in 3 days.    Pt understands and recalls risks of leaving against medical advice, including death and disability from heart attack. Pt states that pt will see PMD. Pt advised to return to the ED if pt feels worse.

## 2018-05-14 NOTE — ED ADULT NURSE REASSESSMENT NOTE - NS ED NURSE REASSESS COMMENT FT1
Pt assessed by cardio, if 2nd trop negative then patient is to complete stress echo. Pt stating she does not want to stay here for a stress test because she is hungry. Md donaldson made aware, pt will sign out AMA after result of 2nd trop.

## 2018-05-14 NOTE — ED PROVIDER NOTE - PROGRESS NOTE DETAILS
seen by Henry cards, will put on obs, plan for stress test this afternoon; may d/c vs admit pending results of stress and 2 sets enzymes

## 2018-05-14 NOTE — ED ADULT NURSE NOTE - CAS ED AMA REASON YN
she is hungry ( food given by md donaldson) does not want to wait for stress test, states she has an appt.  in x 1 week

## 2018-05-14 NOTE — ED ADULT NURSE NOTE - OBJECTIVE STATEMENT
Chest pain x2 days, substernal non radiating tightness and pressure, c/o SOB (states she always gets that), Wears O2 PRN, a&OX3, states she had a previous ER visit thinking she was having a heart attack and turned out to be GERD

## 2018-05-14 NOTE — ED PROVIDER NOTE - CARDIOVASCULAR [+], MLM
CHEST PAIN/PALPITATIONS
I, Jose Elias, performed the initial face to face bedside interview with this patient regarding history of present illness, review of symptoms and relevant past medical, social and family history.  I completed an independent physical examination.  I was the initial provider who evaluated this patient. I have signed out the follow up of any pending tests (i.e. labs, radiological studies) to the ACP.  I have communicated the patient’s plan of care and disposition with the ACP.  The history, relevant review of systems, past medical and surgical history, medical decision making, and physical examination was documented by the scribe in my presence and I attest to the accuracy of the documentation.

## 2018-05-14 NOTE — CONSULT NOTE ADULT - PROBLEM SELECTOR RECOMMENDATION 9
second set of enzymes. If negative, then stress echo,.   . if negative then discharge with outpatient follow up

## 2018-05-17 ENCOUNTER — APPOINTMENT (OUTPATIENT)
Dept: CARDIOLOGY | Facility: CLINIC | Age: 53
End: 2018-05-17
Payer: MEDICAID

## 2018-05-17 ENCOUNTER — NON-APPOINTMENT (OUTPATIENT)
Age: 53
End: 2018-05-17

## 2018-05-17 VITALS
DIASTOLIC BLOOD PRESSURE: 82 MMHG | WEIGHT: 182 LBS | HEART RATE: 93 BPM | OXYGEN SATURATION: 98 % | SYSTOLIC BLOOD PRESSURE: 122 MMHG | BODY MASS INDEX: 29.25 KG/M2 | HEIGHT: 66 IN

## 2018-05-17 PROCEDURE — 93000 ELECTROCARDIOGRAM COMPLETE: CPT

## 2018-05-17 PROCEDURE — 99215 OFFICE O/P EST HI 40 MIN: CPT

## 2018-05-23 PROCEDURE — 93005 ELECTROCARDIOGRAM TRACING: CPT

## 2018-05-23 PROCEDURE — 82550 ASSAY OF CK (CPK): CPT

## 2018-05-23 PROCEDURE — 83880 ASSAY OF NATRIURETIC PEPTIDE: CPT

## 2018-05-23 PROCEDURE — 80053 COMPREHEN METABOLIC PANEL: CPT

## 2018-05-23 PROCEDURE — G0378: CPT

## 2018-05-23 PROCEDURE — 99283 EMERGENCY DEPT VISIT LOW MDM: CPT | Mod: 25

## 2018-05-23 PROCEDURE — 84484 ASSAY OF TROPONIN QUANT: CPT

## 2018-05-23 PROCEDURE — 85027 COMPLETE CBC AUTOMATED: CPT

## 2018-05-23 PROCEDURE — 36415 COLL VENOUS BLD VENIPUNCTURE: CPT

## 2018-05-23 PROCEDURE — 71045 X-RAY EXAM CHEST 1 VIEW: CPT

## 2018-05-23 PROCEDURE — 85610 PROTHROMBIN TIME: CPT

## 2018-06-11 ENCOUNTER — FORM ENCOUNTER (OUTPATIENT)
Age: 53
End: 2018-06-11

## 2018-06-12 ENCOUNTER — OUTPATIENT (OUTPATIENT)
Dept: OUTPATIENT SERVICES | Facility: HOSPITAL | Age: 53
LOS: 1 days | End: 2018-06-12
Payer: COMMERCIAL

## 2018-06-12 DIAGNOSIS — Z90.722 ACQUIRED ABSENCE OF OVARIES, BILATERAL: Chronic | ICD-10-CM

## 2018-06-12 DIAGNOSIS — R06.02 SHORTNESS OF BREATH: ICD-10-CM

## 2018-06-12 PROCEDURE — 71275 CT ANGIOGRAPHY CHEST: CPT | Mod: 26

## 2018-06-12 PROCEDURE — 71275 CT ANGIOGRAPHY CHEST: CPT

## 2018-06-21 ENCOUNTER — APPOINTMENT (OUTPATIENT)
Dept: CARDIOLOGY | Facility: CLINIC | Age: 53
End: 2018-06-21
Payer: MEDICAID

## 2018-06-21 PROCEDURE — 78452 HT MUSCLE IMAGE SPECT MULT: CPT

## 2018-06-21 PROCEDURE — 93306 TTE W/DOPPLER COMPLETE: CPT

## 2018-06-21 PROCEDURE — 93015 CV STRESS TEST SUPVJ I&R: CPT

## 2018-06-21 PROCEDURE — A9500: CPT

## 2018-06-28 ENCOUNTER — APPOINTMENT (OUTPATIENT)
Dept: CARDIOLOGY | Facility: CLINIC | Age: 53
End: 2018-06-28
Payer: MEDICAID

## 2018-06-28 ENCOUNTER — NON-APPOINTMENT (OUTPATIENT)
Age: 53
End: 2018-06-28

## 2018-06-28 VITALS
HEART RATE: 92 BPM | DIASTOLIC BLOOD PRESSURE: 80 MMHG | SYSTOLIC BLOOD PRESSURE: 124 MMHG | HEIGHT: 66 IN | WEIGHT: 186 LBS | BODY MASS INDEX: 29.89 KG/M2 | OXYGEN SATURATION: 99 %

## 2018-06-28 PROCEDURE — 99214 OFFICE O/P EST MOD 30 MIN: CPT

## 2018-07-04 ENCOUNTER — FORM ENCOUNTER (OUTPATIENT)
Age: 53
End: 2018-07-04

## 2018-07-10 ENCOUNTER — APPOINTMENT (OUTPATIENT)
Dept: PULMONOLOGY | Facility: CLINIC | Age: 53
End: 2018-07-10
Payer: MEDICAID

## 2018-07-10 VITALS
HEART RATE: 78 BPM | WEIGHT: 186 LBS | OXYGEN SATURATION: 98 % | SYSTOLIC BLOOD PRESSURE: 130 MMHG | DIASTOLIC BLOOD PRESSURE: 80 MMHG | BODY MASS INDEX: 29.89 KG/M2 | HEIGHT: 66 IN

## 2018-07-10 PROCEDURE — 99214 OFFICE O/P EST MOD 30 MIN: CPT | Mod: 25

## 2018-07-10 PROCEDURE — 85018 HEMOGLOBIN: CPT | Mod: QW

## 2018-07-10 PROCEDURE — 94010 BREATHING CAPACITY TEST: CPT

## 2018-07-10 PROCEDURE — 94729 DIFFUSING CAPACITY: CPT

## 2018-07-10 PROCEDURE — 94727 GAS DIL/WSHOT DETER LNG VOL: CPT

## 2018-07-16 ENCOUNTER — OUTPATIENT (OUTPATIENT)
Dept: OUTPATIENT SERVICES | Facility: HOSPITAL | Age: 53
LOS: 1 days | End: 2018-07-16
Payer: COMMERCIAL

## 2018-07-16 VITALS
DIASTOLIC BLOOD PRESSURE: 90 MMHG | HEART RATE: 78 BPM | TEMPERATURE: 97 F | WEIGHT: 183.2 LBS | HEIGHT: 66 IN | RESPIRATION RATE: 16 BRPM | SYSTOLIC BLOOD PRESSURE: 144 MMHG

## 2018-07-16 DIAGNOSIS — Z29.9 ENCOUNTER FOR PROPHYLACTIC MEASURES, UNSPECIFIED: ICD-10-CM

## 2018-07-16 DIAGNOSIS — Z01.818 ENCOUNTER FOR OTHER PREPROCEDURAL EXAMINATION: ICD-10-CM

## 2018-07-16 DIAGNOSIS — I10 ESSENTIAL (PRIMARY) HYPERTENSION: ICD-10-CM

## 2018-07-16 DIAGNOSIS — Z87.59 PERSONAL HISTORY OF OTHER COMPLICATIONS OF PREGNANCY, CHILDBIRTH AND THE PUERPERIUM: Chronic | ICD-10-CM

## 2018-07-16 DIAGNOSIS — Z90.722 ACQUIRED ABSENCE OF OVARIES, BILATERAL: Chronic | ICD-10-CM

## 2018-07-16 DIAGNOSIS — N93.8 OTHER SPECIFIED ABNORMAL UTERINE AND VAGINAL BLEEDING: ICD-10-CM

## 2018-07-16 DIAGNOSIS — R00.2 PALPITATIONS: ICD-10-CM

## 2018-07-16 DIAGNOSIS — J45.909 UNSPECIFIED ASTHMA, UNCOMPLICATED: ICD-10-CM

## 2018-07-16 LAB
ANION GAP SERPL CALC-SCNC: 15 MMOL/L — SIGNIFICANT CHANGE UP (ref 5–17)
APTT BLD: 30.3 SEC — SIGNIFICANT CHANGE UP (ref 27.5–37.4)
BASOPHILS # BLD AUTO: 0 K/UL — SIGNIFICANT CHANGE UP (ref 0–0.2)
BASOPHILS NFR BLD AUTO: 0.3 % — SIGNIFICANT CHANGE UP (ref 0–2)
BLD GP AB SCN SERPL QL: SIGNIFICANT CHANGE UP
BUN SERPL-MCNC: 8 MG/DL — SIGNIFICANT CHANGE UP (ref 8–20)
CALCIUM SERPL-MCNC: 8.9 MG/DL — SIGNIFICANT CHANGE UP (ref 8.6–10.2)
CHLORIDE SERPL-SCNC: 103 MMOL/L — SIGNIFICANT CHANGE UP (ref 98–107)
CO2 SERPL-SCNC: 21 MMOL/L — LOW (ref 22–29)
CREAT SERPL-MCNC: 0.44 MG/DL — LOW (ref 0.5–1.3)
EOSINOPHIL # BLD AUTO: 0.2 K/UL — SIGNIFICANT CHANGE UP (ref 0–0.5)
EOSINOPHIL NFR BLD AUTO: 1.9 % — SIGNIFICANT CHANGE UP (ref 0–6)
GLUCOSE SERPL-MCNC: 107 MG/DL — SIGNIFICANT CHANGE UP (ref 70–115)
HCT VFR BLD CALC: 37 % — SIGNIFICANT CHANGE UP (ref 37–47)
HGB BLD-MCNC: 11.8 G/DL — LOW (ref 12–16)
INR BLD: 1 RATIO — SIGNIFICANT CHANGE UP (ref 0.88–1.16)
LYMPHOCYTES # BLD AUTO: 1.7 K/UL — SIGNIFICANT CHANGE UP (ref 1–4.8)
LYMPHOCYTES # BLD AUTO: 15.6 % — LOW (ref 20–55)
MCHC RBC-ENTMCNC: 30.5 PG — SIGNIFICANT CHANGE UP (ref 27–31)
MCHC RBC-ENTMCNC: 31.9 G/DL — LOW (ref 32–36)
MCV RBC AUTO: 95.6 FL — SIGNIFICANT CHANGE UP (ref 81–99)
MONOCYTES # BLD AUTO: 0.5 K/UL — SIGNIFICANT CHANGE UP (ref 0–0.8)
MONOCYTES NFR BLD AUTO: 4.3 % — SIGNIFICANT CHANGE UP (ref 3–10)
NEUTROPHILS # BLD AUTO: 8.4 K/UL — HIGH (ref 1.8–8)
NEUTROPHILS NFR BLD AUTO: 77.7 % — HIGH (ref 37–73)
PLATELET # BLD AUTO: 255 K/UL — SIGNIFICANT CHANGE UP (ref 150–400)
POTASSIUM SERPL-MCNC: 3.4 MMOL/L — LOW (ref 3.5–5.3)
POTASSIUM SERPL-SCNC: 3.4 MMOL/L — LOW (ref 3.5–5.3)
PROTHROM AB SERPL-ACNC: 11 SEC — SIGNIFICANT CHANGE UP (ref 9.8–12.7)
RBC # BLD: 3.87 M/UL — LOW (ref 4.4–5.2)
RBC # FLD: 13.7 % — SIGNIFICANT CHANGE UP (ref 11–15.6)
SODIUM SERPL-SCNC: 139 MMOL/L — SIGNIFICANT CHANGE UP (ref 135–145)
TYPE + AB SCN PNL BLD: SIGNIFICANT CHANGE UP
WBC # BLD: 10.9 K/UL — HIGH (ref 4.8–10.8)
WBC # FLD AUTO: 10.9 K/UL — HIGH (ref 4.8–10.8)

## 2018-07-16 PROCEDURE — 80048 BASIC METABOLIC PNL TOTAL CA: CPT

## 2018-07-16 PROCEDURE — G0463: CPT

## 2018-07-16 PROCEDURE — 86901 BLOOD TYPING SEROLOGIC RH(D): CPT

## 2018-07-16 PROCEDURE — 86850 RBC ANTIBODY SCREEN: CPT

## 2018-07-16 PROCEDURE — 85027 COMPLETE CBC AUTOMATED: CPT

## 2018-07-16 PROCEDURE — 85730 THROMBOPLASTIN TIME PARTIAL: CPT

## 2018-07-16 PROCEDURE — 85610 PROTHROMBIN TIME: CPT

## 2018-07-16 PROCEDURE — 86900 BLOOD TYPING SEROLOGIC ABO: CPT

## 2018-07-16 PROCEDURE — 36415 COLL VENOUS BLD VENIPUNCTURE: CPT

## 2018-07-16 RX ORDER — GABAPENTIN 400 MG/1
300 CAPSULE ORAL
Qty: 0 | Refills: 0 | COMMUNITY

## 2018-07-16 RX ORDER — HYDROCORTISONE 1 %
1 OINTMENT (GRAM) TOPICAL
Qty: 0 | Refills: 0 | COMMUNITY

## 2018-07-16 RX ORDER — HYDROXYZINE HCL 10 MG
1 TABLET ORAL
Qty: 0 | Refills: 0 | COMMUNITY

## 2018-07-16 RX ORDER — METHOCARBAMOL 500 MG/1
1 TABLET, FILM COATED ORAL
Qty: 0 | Refills: 0 | COMMUNITY

## 2018-07-16 NOTE — H&P PST ADULT - PSH
History of bilateral oophorectomies History of bilateral oophorectomies    History of ectopic pregnancy

## 2018-07-16 NOTE — H&P PST ADULT - PROBLEM SELECTOR PLAN 1
Robotic assisted total laparoscopic hysterectomy possible bilateral oophorectomy, cystoscopy. Medical and cardiac clearance pending, Pulmonary clearance pending
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
## 2018-07-16 NOTE — H&P PST ADULT - PMH
Anemia    Ectopic pregnancy    Hypertension    Palpitations    Reflux    Seasonal allergies    SOB (shortness of breath)    Vitamin B 12 deficiency Asthma  controlled on meds  Ectopic pregnancy    Hypertension    Palpitations    Reflux    Seasonal allergies    Sleep apnea  does not use the CPAP but use O2 prn  SOB (shortness of breath)    Vitamin B 12 deficiency

## 2018-07-16 NOTE — ASU PATIENT PROFILE, ADULT - PMH
Asthma  controlled on meds  Ectopic pregnancy    Hypertension    Palpitations    Reflux    Seasonal allergies    Sleep apnea  does not use the CPAP but use O2 prn  SOB (shortness of breath)    Vitamin B 12 deficiency

## 2018-07-16 NOTE — H&P PST ADULT - FAMILY HISTORY
Father  Still living? Yes, Estimated age: Age Unknown  CAD (coronary artery disease), Age at diagnosis: Age Unknown     Mother  Still living? Yes, Estimated age: Age Unknown  Family history of malignant neoplasm of kidney, Age at diagnosis: Age Unknown  Hypertension, Age at diagnosis: Age Unknown  Family history of CABG, Age at diagnosis: Age Unknown

## 2018-07-16 NOTE — H&P PST ADULT - ASSESSMENT
medications reviewed, instructions given on what medications to take and what not to take. Asked the patient to take the Blood pressure medication/ heart medication on DOP.   CAPRINI SCORE [CLOT]    AGE RELATED RISK FACTORS                                                       MOBILITY RELATED FACTORS  [ x] Age 41-60 years                                            (1 Point)                  [ ] Bed rest                                                        (1 Point)  [ ] Age: 61-74 years                                           (2 Points)                 [ ] Plaster cast                                                   (2 Points)  [ ] Age= 75 years                                              (3 Points)                 [ ] Bed bound for more than 72 hours                 (2 Points)    DISEASE RELATED RISK FACTORS                                               GENDER SPECIFIC FACTORS  [ ] Edema in the lower extremities                       (1 Point)                  [ ] Pregnancy                                                     (1 Point)  [ ] Varicose veins                                               (1 Point)                  [ ] Post-partum < 6 weeks                                   (1 Point)             [ x] BMI > 25 Kg/m2                                            (1 Point)                  [ ] Hormonal therapy  or oral contraception          (1 Point)                 [ ] Sepsis (in the previous month)                        (1 Point)                  [ ] History of pregnancy complications                 (1 point)  [ ] Pneumonia or serious lung disease                                               [ ] Unexplained or recurrent                     (1 Point)           (in the previous month)                               (1 Point)  [ ] Abnormal pulmonary function test                     (1 Point)                 SURGERY RELATED RISK FACTORS  [ ] Acute myocardial infarction                              (1 Point)                 [ ]  Section                                             (1 Point)  [ ] Congestive heart failure (in the previous month)  (1 Point)               [ ] Minor surgery                                                  (1 Point)   [ ] Inflammatory bowel disease                             (1 Point)                 [ ] Arthroscopic surgery                                        (2 Points)  [ ] Central venous access                                      (2 Points)                [x ] General surgery lasting more than 45 minutes   (2 Points)       [ ] Stroke (in the previous month)                          (5 Points)               [ ] Elective arthroplasty                                         (5 Points)                                                                                                                                               HEMATOLOGY RELATED FACTORS                                                 TRAUMA RELATED RISK FACTORS  [ ] Prior episodes of VTE                                     (3 Points)                 [ ] Fracture of the hip, pelvis, or leg                       (5 Points)  [ ] Positive family history for VTE                         (3 Points)                 [ ] Acute spinal cord injury (in the previous month)  (5 Points)  [ ] Prothrombin 43805 A                                     (3 Points)                 [ ] Paralysis  (less than 1 month)                             (5 Points)  [ ] Factor V Leiden                                             (3 Points)                  [ ] Multiple Trauma within 1 month                        (5 Points)  [ ] Lupus anticoagulants                                     (3 Points)                                                           [ ] Anticardiolipin antibodies                               (3 Points)                                                       [ ] High homocysteine in the blood                      (3 Points)                                             [ ] Other congenital or acquired thrombophilia      (3 Points)                                                [ ] Heparin induced thrombocytopenia                  (3 Points)                                          Total Score [  4        ]

## 2018-07-17 ENCOUNTER — APPOINTMENT (OUTPATIENT)
Dept: CARDIOLOGY | Facility: CLINIC | Age: 53
End: 2018-07-17

## 2018-07-17 VITALS
BODY MASS INDEX: 29.25 KG/M2 | HEART RATE: 86 BPM | DIASTOLIC BLOOD PRESSURE: 88 MMHG | OXYGEN SATURATION: 100 % | HEIGHT: 66 IN | SYSTOLIC BLOOD PRESSURE: 132 MMHG | WEIGHT: 182 LBS

## 2018-07-23 ENCOUNTER — FORM ENCOUNTER (OUTPATIENT)
Age: 53
End: 2018-07-23

## 2018-07-24 ENCOUNTER — INPATIENT (INPATIENT)
Facility: HOSPITAL | Age: 53
LOS: 1 days | Discharge: HOME | DRG: 743 | End: 2018-07-26
Attending: OBSTETRICS & GYNECOLOGY | Admitting: OBSTETRICS & GYNECOLOGY
Payer: COMMERCIAL

## 2018-07-24 ENCOUNTER — RESULT REVIEW (OUTPATIENT)
Age: 53
End: 2018-07-24

## 2018-07-24 VITALS
HEART RATE: 79 BPM | RESPIRATION RATE: 16 BRPM | HEIGHT: 66 IN | TEMPERATURE: 97 F | WEIGHT: 183.2 LBS | OXYGEN SATURATION: 100 % | DIASTOLIC BLOOD PRESSURE: 71 MMHG | SYSTOLIC BLOOD PRESSURE: 126 MMHG

## 2018-07-24 DIAGNOSIS — Z87.59 PERSONAL HISTORY OF OTHER COMPLICATIONS OF PREGNANCY, CHILDBIRTH AND THE PUERPERIUM: Chronic | ICD-10-CM

## 2018-07-24 DIAGNOSIS — Z90.722 ACQUIRED ABSENCE OF OVARIES, BILATERAL: Chronic | ICD-10-CM

## 2018-07-24 DIAGNOSIS — N93.9 ABNORMAL UTERINE AND VAGINAL BLEEDING, UNSPECIFIED: ICD-10-CM

## 2018-07-24 DIAGNOSIS — G47.30 SLEEP APNEA, UNSPECIFIED: ICD-10-CM

## 2018-07-24 DIAGNOSIS — N93.8 OTHER SPECIFIED ABNORMAL UTERINE AND VAGINAL BLEEDING: ICD-10-CM

## 2018-07-24 DIAGNOSIS — J45.909 UNSPECIFIED ASTHMA, UNCOMPLICATED: ICD-10-CM

## 2018-07-24 DIAGNOSIS — I10 ESSENTIAL (PRIMARY) HYPERTENSION: ICD-10-CM

## 2018-07-24 LAB
BLD GP AB SCN SERPL QL: SIGNIFICANT CHANGE UP
GLUCOSE BLDC GLUCOMTR-MCNC: 112 MG/DL — HIGH (ref 70–99)
GLUCOSE BLDC GLUCOMTR-MCNC: 138 MG/DL — HIGH (ref 70–99)
GLUCOSE BLDC GLUCOMTR-MCNC: 139 MG/DL — HIGH (ref 70–99)
POTASSIUM SERPL-MCNC: 3.3 MMOL/L — LOW (ref 3.5–5.3)
POTASSIUM SERPL-SCNC: 3.3 MMOL/L — LOW (ref 3.5–5.3)
TYPE + AB SCN PNL BLD: SIGNIFICANT CHANGE UP

## 2018-07-24 PROCEDURE — 88307 TISSUE EXAM BY PATHOLOGIST: CPT | Mod: 26

## 2018-07-24 RX ORDER — IPRATROPIUM/ALBUTEROL SULFATE 18-103MCG
3 AEROSOL WITH ADAPTER (GRAM) INHALATION EVERY 6 HOURS
Qty: 0 | Refills: 0 | Status: DISCONTINUED | OUTPATIENT
Start: 2018-07-24 | End: 2018-07-26

## 2018-07-24 RX ORDER — DILTIAZEM HCL 120 MG
360 CAPSULE, EXT RELEASE 24 HR ORAL DAILY
Qty: 0 | Refills: 0 | Status: DISCONTINUED | OUTPATIENT
Start: 2018-07-25 | End: 2018-07-26

## 2018-07-24 RX ORDER — DOCUSATE SODIUM 100 MG
100 CAPSULE ORAL THREE TIMES A DAY
Qty: 0 | Refills: 0 | Status: DISCONTINUED | OUTPATIENT
Start: 2018-07-24 | End: 2018-07-26

## 2018-07-24 RX ORDER — ATORVASTATIN CALCIUM 80 MG/1
20 TABLET, FILM COATED ORAL AT BEDTIME
Qty: 0 | Refills: 0 | Status: DISCONTINUED | OUTPATIENT
Start: 2018-07-24 | End: 2018-07-26

## 2018-07-24 RX ORDER — FENTANYL CITRATE 50 UG/ML
25 INJECTION INTRAVENOUS
Qty: 0 | Refills: 0 | Status: DISCONTINUED | OUTPATIENT
Start: 2018-07-24 | End: 2018-07-24

## 2018-07-24 RX ORDER — SODIUM CHLORIDE 9 MG/ML
3 INJECTION INTRAMUSCULAR; INTRAVENOUS; SUBCUTANEOUS ONCE
Qty: 0 | Refills: 0 | Status: DISCONTINUED | OUTPATIENT
Start: 2018-07-24 | End: 2018-07-24

## 2018-07-24 RX ORDER — CEFOTETAN DISODIUM 1 G
2 VIAL (EA) INJECTION ONCE
Qty: 0 | Refills: 0 | Status: COMPLETED | OUTPATIENT
Start: 2018-07-24 | End: 2018-07-24

## 2018-07-24 RX ORDER — DULOXETINE HYDROCHLORIDE 30 MG/1
40 CAPSULE, DELAYED RELEASE ORAL DAILY
Qty: 0 | Refills: 0 | Status: DISCONTINUED | OUTPATIENT
Start: 2018-07-24 | End: 2018-07-26

## 2018-07-24 RX ORDER — SODIUM CHLORIDE 9 MG/ML
1000 INJECTION, SOLUTION INTRAVENOUS
Qty: 0 | Refills: 0 | Status: DISCONTINUED | OUTPATIENT
Start: 2018-07-24 | End: 2018-07-24

## 2018-07-24 RX ORDER — KETOROLAC TROMETHAMINE 30 MG/ML
30 SYRINGE (ML) INJECTION EVERY 6 HOURS
Qty: 0 | Refills: 0 | Status: DISCONTINUED | OUTPATIENT
Start: 2018-07-24 | End: 2018-07-26

## 2018-07-24 RX ORDER — DULOXETINE HYDROCHLORIDE 30 MG/1
20 CAPSULE, DELAYED RELEASE ORAL
Qty: 0 | Refills: 0 | Status: DISCONTINUED | OUTPATIENT
Start: 2018-07-24 | End: 2018-07-24

## 2018-07-24 RX ORDER — OXYCODONE AND ACETAMINOPHEN 5; 325 MG/1; MG/1
2 TABLET ORAL EVERY 4 HOURS
Qty: 0 | Refills: 0 | Status: DISCONTINUED | OUTPATIENT
Start: 2018-07-24 | End: 2018-07-26

## 2018-07-24 RX ORDER — SODIUM CHLORIDE 9 MG/ML
1000 INJECTION, SOLUTION INTRAVENOUS
Qty: 0 | Refills: 0 | Status: DISCONTINUED | OUTPATIENT
Start: 2018-07-24 | End: 2018-07-25

## 2018-07-24 RX ORDER — OXYCODONE AND ACETAMINOPHEN 5; 325 MG/1; MG/1
2 TABLET ORAL EVERY 6 HOURS
Qty: 0 | Refills: 0 | Status: DISCONTINUED | OUTPATIENT
Start: 2018-07-24 | End: 2018-07-24

## 2018-07-24 RX ORDER — ONDANSETRON 8 MG/1
4 TABLET, FILM COATED ORAL ONCE
Qty: 0 | Refills: 0 | Status: DISCONTINUED | OUTPATIENT
Start: 2018-07-24 | End: 2018-07-24

## 2018-07-24 RX ORDER — OXYCODONE AND ACETAMINOPHEN 5; 325 MG/1; MG/1
1 TABLET ORAL EVERY 4 HOURS
Qty: 0 | Refills: 0 | Status: DISCONTINUED | OUTPATIENT
Start: 2018-07-24 | End: 2018-07-26

## 2018-07-24 RX ORDER — DULOXETINE HYDROCHLORIDE 30 MG/1
20 CAPSULE, DELAYED RELEASE ORAL DAILY
Qty: 0 | Refills: 0 | Status: DISCONTINUED | OUTPATIENT
Start: 2018-07-24 | End: 2018-07-24

## 2018-07-24 RX ADMIN — OXYCODONE AND ACETAMINOPHEN 2 TABLET(S): 5; 325 TABLET ORAL at 21:42

## 2018-07-24 RX ADMIN — OXYCODONE AND ACETAMINOPHEN 2 TABLET(S): 5; 325 TABLET ORAL at 16:23

## 2018-07-24 RX ADMIN — FENTANYL CITRATE 25 MICROGRAM(S): 50 INJECTION INTRAVENOUS at 14:15

## 2018-07-24 RX ADMIN — OXYCODONE AND ACETAMINOPHEN 2 TABLET(S): 5; 325 TABLET ORAL at 20:42

## 2018-07-24 RX ADMIN — Medication 100 GRAM(S): at 12:10

## 2018-07-24 RX ADMIN — FENTANYL CITRATE 25 MICROGRAM(S): 50 INJECTION INTRAVENOUS at 14:35

## 2018-07-24 RX ADMIN — Medication 100 MILLIGRAM(S): at 20:43

## 2018-07-24 RX ADMIN — OXYCODONE AND ACETAMINOPHEN 2 TABLET(S): 5; 325 TABLET ORAL at 17:08

## 2018-07-24 RX ADMIN — FENTANYL CITRATE 25 MICROGRAM(S): 50 INJECTION INTRAVENOUS at 15:00

## 2018-07-24 RX ADMIN — FENTANYL CITRATE 25 MICROGRAM(S): 50 INJECTION INTRAVENOUS at 14:45

## 2018-07-24 RX ADMIN — ATORVASTATIN CALCIUM 20 MILLIGRAM(S): 80 TABLET, FILM COATED ORAL at 20:43

## 2018-07-24 RX ADMIN — FENTANYL CITRATE 25 MICROGRAM(S): 50 INJECTION INTRAVENOUS at 14:25

## 2018-07-24 RX ADMIN — DULOXETINE HYDROCHLORIDE 40 MILLIGRAM(S): 30 CAPSULE, DELAYED RELEASE ORAL at 20:42

## 2018-07-24 NOTE — CONSULT NOTE ADULT - SUBJECTIVE AND OBJECTIVE BOX
53 year old female who states that she has fibroids and has heavy prolonged menstrual periods due to it and the last period lasted for more than a month, also complains of pain and pressure in her lower abdomen.        Home Medications:   · 	 mg oral tablet: Last Dose Taken:  , 1 tab(s) orally every 6 hours  · 	DULoxetine 20 mg oral delayed release capsule: Last Dose Taken:  , 1 cap(s) orally 2 times a day  · 	omeprazole 40 mg oral delayed release capsule: Last Dose Taken:  , 1 cap(s) orally once a day  · 	raNITIdine 300 mg oral tablet: Last Dose Taken:  , 1 tab(s) orally once a day (at bedtime)  · 	atorvastatin 20 mg oral tablet: Last Dose Taken:  , 1 tab(s) orally once a day  · 	DilTIAZem Hydrochloride  mg/24 hours oral capsule, extended release: Last Dose Taken:  , 1 cap(s) orally once a day  · 	Vistaril 25 mg oral capsule: 1 cap(s) orally 4 times a day, As Needed  · 	Vitamin D2 50,000 intl units (1.25 mg) oral capsule: 1 cap(s) orally once a week        PAST MEDICAL & SURGICAL HISTORY:  Sleep apnea: does not use the CPAP but use O2 prn  Asthma: controlled on meds  Reflux  Palpitations  SOB (shortness of breath)  Vitamin B 12 deficiency  Seasonal allergies  Ectopic pregnancy  Hypertension  History of ectopic pregnancy  History of bilateral oophorectomies    atorvastatin 20 milliGRAM(s) Oral at bedtime  docusate sodium 100 milliGRAM(s) Oral three times a day PRN  DULoxetine 20 milliGRAM(s) Oral daily  ketorolac   Injectable 30 milliGRAM(s) IV Push every 6 hours PRN  lactated ringers. 1000 milliLiter(s) IV Continuous <Continuous>  oxyCODONE    5 mG/acetaminophen 325 mG 1 Tablet(s) Oral every 4 hours PRN  oxyCODONE    5 mG/acetaminophen 325 mG 2 Tablet(s) Oral every 4 hours PRN    MEDICATIONS  (STANDING):  atorvastatin 20 milliGRAM(s) Oral at bedtime  DULoxetine 20 milliGRAM(s) Oral daily  lactated ringers. 1000 milliLiter(s) (125 mL/Hr) IV Continuous <Continuous>    MEDICATIONS  (PRN):  docusate sodium 100 milliGRAM(s) Oral three times a day PRN Stool Softening  ketorolac   Injectable 30 milliGRAM(s) IV Push every 6 hours PRN Moderate Pain  oxyCODONE    5 mG/acetaminophen 325 mG 1 Tablet(s) Oral every 4 hours PRN Moderate Pain  oxyCODONE    5 mG/acetaminophen 325 mG 2 Tablet(s) Oral every 4 hours PRN Severe Pain (7 - 10)      Allergies    No Known Allergies    Intolerances        SOCIAL HISTORY:  No S/D/IVDU    FAMILY HISTORY:  Family history of CABG  Hypertension  Family history of malignant neoplasm of kidney  CAD (coronary artery disease)      LABS:    07-24    x   |  x   |  x   ----------------------------<  x   3.3<L>   |  x   |  x                 ROS  - Headache  - Neck Stiffness  - Chest Pain  - SOB  Mild Abd pain  - Pelvic Pain  - Leg Pain        Vital Signs Last 24 Hrs  T(C): 36.6 (24 Jul 2018 14:45), Max: 36.6 (24 Jul 2018 14:12)  T(F): 97.8 (24 Jul 2018 14:45), Max: 97.9 (24 Jul 2018 14:12)  HR: 86 (24 Jul 2018 15:30) (70 - 89)  BP: 131/89 (24 Jul 2018 15:30) (124/72 - 161/86)  BP(mean): --  RR: 15 (24 Jul 2018 15:30) (15 - 18)  SpO2: 100% (24 Jul 2018 15:30) (100% - 100%)    HEENT: PEARLA  Neck: Supple  Cardio: S1 S2 No Murmur  Pulm:  No Rales Occ Ronchi  Abd: Soft NT ND BS+ Incisions clean  Rectal Pelvic Breast- refused  Ext: No DCT  Skin: No Rash  Neuro: Awake Pleasant    Fibroids -postop pain control  Sleep apnea - suggest Night time O2  Asthma - Nebs   Hypertension / Palpitations- Diltiazem

## 2018-07-24 NOTE — PROGRESS NOTE ADULT - PROBLEM SELECTOR PLAN 1
s/p prcedure above  continue routine post-op care  SCD's for DVT prophylaxis  IV toradol and PO percocet prn for pain control  IVF  Encourage incentive spirometer use, ambulate as tolerated.  F/u void  F/u AM labs

## 2018-07-25 ENCOUNTER — TRANSCRIPTION ENCOUNTER (OUTPATIENT)
Age: 53
End: 2018-07-25

## 2018-07-25 LAB
ANION GAP SERPL CALC-SCNC: 16 MMOL/L — SIGNIFICANT CHANGE UP (ref 5–17)
BASOPHILS # BLD AUTO: 0 K/UL — SIGNIFICANT CHANGE UP (ref 0–0.2)
BASOPHILS # BLD AUTO: 0 K/UL — SIGNIFICANT CHANGE UP (ref 0–0.2)
BUN SERPL-MCNC: <3 MG/DL — LOW (ref 8–20)
CALCIUM SERPL-MCNC: 9.2 MG/DL — SIGNIFICANT CHANGE UP (ref 8.6–10.2)
CHLORIDE SERPL-SCNC: 100 MMOL/L — SIGNIFICANT CHANGE UP (ref 98–107)
CO2 SERPL-SCNC: 21 MMOL/L — LOW (ref 22–29)
CREAT SERPL-MCNC: 0.45 MG/DL — LOW (ref 0.5–1.3)
EOSINOPHIL # BLD AUTO: 0 K/UL — SIGNIFICANT CHANGE UP (ref 0–0.5)
EOSINOPHIL # BLD AUTO: 0.1 K/UL — SIGNIFICANT CHANGE UP (ref 0–0.5)
EOSINOPHIL NFR BLD AUTO: 0.1 % — SIGNIFICANT CHANGE UP (ref 0–6)
EOSINOPHIL NFR BLD AUTO: 0.5 % — SIGNIFICANT CHANGE UP (ref 0–6)
GLUCOSE SERPL-MCNC: 130 MG/DL — HIGH (ref 70–115)
HCT VFR BLD CALC: 35 % — LOW (ref 37–47)
HCT VFR BLD CALC: 36.7 % — LOW (ref 37–47)
HGB BLD-MCNC: 11.1 G/DL — LOW (ref 12–16)
HGB BLD-MCNC: 11.8 G/DL — LOW (ref 12–16)
LYMPHOCYTES # BLD AUTO: 1.5 K/UL — SIGNIFICANT CHANGE UP (ref 1–4.8)
LYMPHOCYTES # BLD AUTO: 1.9 K/UL — SIGNIFICANT CHANGE UP (ref 1–4.8)
LYMPHOCYTES # BLD AUTO: 6.8 % — LOW (ref 20–55)
LYMPHOCYTES # BLD AUTO: 9.1 % — LOW (ref 20–55)
MCHC RBC-ENTMCNC: 30.4 PG — SIGNIFICANT CHANGE UP (ref 27–31)
MCHC RBC-ENTMCNC: 30.7 PG — SIGNIFICANT CHANGE UP (ref 27–31)
MCHC RBC-ENTMCNC: 31.7 G/DL — LOW (ref 32–36)
MCHC RBC-ENTMCNC: 32.2 G/DL — SIGNIFICANT CHANGE UP (ref 32–36)
MCV RBC AUTO: 95.6 FL — SIGNIFICANT CHANGE UP (ref 81–99)
MCV RBC AUTO: 95.9 FL — SIGNIFICANT CHANGE UP (ref 81–99)
MONOCYTES # BLD AUTO: 1 K/UL — HIGH (ref 0–0.8)
MONOCYTES # BLD AUTO: 1.1 K/UL — HIGH (ref 0–0.8)
MONOCYTES NFR BLD AUTO: 4.4 % — SIGNIFICANT CHANGE UP (ref 3–10)
MONOCYTES NFR BLD AUTO: 5.2 % — SIGNIFICANT CHANGE UP (ref 3–10)
NEUTROPHILS # BLD AUTO: 17.7 K/UL — HIGH (ref 1.8–8)
NEUTROPHILS # BLD AUTO: 19.8 K/UL — HIGH (ref 1.8–8)
NEUTROPHILS NFR BLD AUTO: 84.9 % — HIGH (ref 37–73)
NEUTROPHILS NFR BLD AUTO: 88.3 % — HIGH (ref 37–73)
PLATELET # BLD AUTO: 278 K/UL — SIGNIFICANT CHANGE UP (ref 150–400)
PLATELET # BLD AUTO: 313 K/UL — SIGNIFICANT CHANGE UP (ref 150–400)
POTASSIUM SERPL-MCNC: 4.1 MMOL/L — SIGNIFICANT CHANGE UP (ref 3.5–5.3)
POTASSIUM SERPL-SCNC: 4.1 MMOL/L — SIGNIFICANT CHANGE UP (ref 3.5–5.3)
RBC # BLD: 3.65 M/UL — LOW (ref 4.4–5.2)
RBC # BLD: 3.84 M/UL — LOW (ref 4.4–5.2)
RBC # FLD: 13.5 % — SIGNIFICANT CHANGE UP (ref 11–15.6)
RBC # FLD: 13.5 % — SIGNIFICANT CHANGE UP (ref 11–15.6)
SODIUM SERPL-SCNC: 137 MMOL/L — SIGNIFICANT CHANGE UP (ref 135–145)
WBC # BLD: 20.9 K/UL — HIGH (ref 4.8–10.8)
WBC # BLD: 22.4 K/UL — HIGH (ref 4.8–10.8)
WBC # FLD AUTO: 20.9 K/UL — HIGH (ref 4.8–10.8)
WBC # FLD AUTO: 22.4 K/UL — HIGH (ref 4.8–10.8)

## 2018-07-25 RX ORDER — DOCUSATE SODIUM 100 MG
1 CAPSULE ORAL
Qty: 10 | Refills: 0
Start: 2018-07-25 | End: 2018-07-29

## 2018-07-25 RX ORDER — SODIUM CHLORIDE 9 MG/ML
1000 INJECTION, SOLUTION INTRAVENOUS
Qty: 0 | Refills: 0 | Status: DISCONTINUED | OUTPATIENT
Start: 2018-07-25 | End: 2018-07-25

## 2018-07-25 RX ORDER — OXYCODONE AND ACETAMINOPHEN 5; 325 MG/1; MG/1
1 TABLET ORAL
Qty: 20 | Refills: 0
Start: 2018-07-25 | End: 2018-07-29

## 2018-07-25 RX ADMIN — OXYCODONE AND ACETAMINOPHEN 2 TABLET(S): 5; 325 TABLET ORAL at 18:00

## 2018-07-25 RX ADMIN — DULOXETINE HYDROCHLORIDE 40 MILLIGRAM(S): 30 CAPSULE, DELAYED RELEASE ORAL at 12:09

## 2018-07-25 RX ADMIN — OXYCODONE AND ACETAMINOPHEN 2 TABLET(S): 5; 325 TABLET ORAL at 05:17

## 2018-07-25 RX ADMIN — OXYCODONE AND ACETAMINOPHEN 2 TABLET(S): 5; 325 TABLET ORAL at 06:11

## 2018-07-25 RX ADMIN — OXYCODONE AND ACETAMINOPHEN 2 TABLET(S): 5; 325 TABLET ORAL at 01:51

## 2018-07-25 RX ADMIN — OXYCODONE AND ACETAMINOPHEN 2 TABLET(S): 5; 325 TABLET ORAL at 09:32

## 2018-07-25 RX ADMIN — Medication 360 MILLIGRAM(S): at 05:17

## 2018-07-25 RX ADMIN — OXYCODONE AND ACETAMINOPHEN 2 TABLET(S): 5; 325 TABLET ORAL at 10:30

## 2018-07-25 RX ADMIN — OXYCODONE AND ACETAMINOPHEN 2 TABLET(S): 5; 325 TABLET ORAL at 17:19

## 2018-07-25 RX ADMIN — ATORVASTATIN CALCIUM 20 MILLIGRAM(S): 80 TABLET, FILM COATED ORAL at 21:22

## 2018-07-25 RX ADMIN — OXYCODONE AND ACETAMINOPHEN 2 TABLET(S): 5; 325 TABLET ORAL at 00:51

## 2018-07-25 RX ADMIN — OXYCODONE AND ACETAMINOPHEN 2 TABLET(S): 5; 325 TABLET ORAL at 23:21

## 2018-07-25 RX ADMIN — OXYCODONE AND ACETAMINOPHEN 2 TABLET(S): 5; 325 TABLET ORAL at 21:39

## 2018-07-25 RX ADMIN — Medication 100 MILLIGRAM(S): at 05:18

## 2018-07-25 NOTE — DISCHARGE NOTE ADULT - CARE PLAN
Principal Discharge DX:	Abnormal uterine bleeding  Goal:	improve condition  Assessment and plan of treatment:	Further plan to be discussed in office.

## 2018-07-25 NOTE — DISCHARGE NOTE ADULT - HOSPITAL COURSE
54 yo s/p RA [facility] cystoscopy. Patient post-operatively had an uncomplicated hospital course. Her pain was well controlled. She is tolerating a regular diet. She is ambulating independently. She was able to void after removal of caldwell. Labs and Vitals WNL upon discharge. 52 yo s/p RA [facility] cystoscopy. Patient post-operatively had an uncomplicated hospital course. Her pain was well controlled. She is tolerating a regular diet. She is ambulating independently. She was able to void after removal of caldwell. Labs and Vitals stable  upon discharge.

## 2018-07-25 NOTE — PROGRESS NOTE ADULT - ASSESSMENT
54yo POD#1 s/p robotic assisted total laparoscopic hysterectomy with cystoscopy. Patient recovering well. Is still having some post-operative pain.

## 2018-07-25 NOTE — DISCHARGE NOTE ADULT - PATIENT PORTAL LINK FT
You can access the [facility] Patient Portal, offered by [facility], by registering with the following website: [email or web address]

## 2018-07-25 NOTE — PROGRESS NOTE ADULT - PROBLEM SELECTOR PLAN 1
S/P procedure above  Continue post operative care  Encourage incentive spirometry  Continue PO pain medication  Continue regular diet  D/C IVF today since patient tolerating diet  Continue home meds  Anticipate d/c today or tomorrow depending on patient's pain control

## 2018-07-25 NOTE — DISCHARGE NOTE ADULT - MEDICATION SUMMARY - MEDICATIONS TO STOP TAKING
I will STOP taking the medications listed below when I get home from the hospital:     mg oral tablet  -- 1 tab(s) by mouth every 6 hours  -- Do not take this drug if you are pregnant.  It is very important that you take or use this exactly as directed.  Do not skip doses or discontinue unless directed by your doctor.  May cause drowsiness or dizziness.  Obtain medical advice before taking any non-prescription drugs as some may affect the action of this medication.  Take with food or milk.

## 2018-07-25 NOTE — DISCHARGE NOTE ADULT - ADDITIONAL INSTRUCTIONS
Follow-up with Dr. King in two week.    Please contact your provider for any pain uncontrolled by medication, excessive bleeding or Fever>100.4  Please take naproxen-1 tablet every 12 hours x 3 days, may take percocet as prescribed for breakthrough pain. Follow-up with Dr. King in two weeks.    Please contact your provider for any pain uncontrolled by medication, excessive bleeding or Fever>100.4  Please take naproxen-1 tablet every 12 hours x 3 days, may take percocet as prescribed for breakthrough pain.

## 2018-07-25 NOTE — DISCHARGE NOTE ADULT - INSTRUCTIONS
low sodium    May walk and climb stairs as often as youd like, no vigorous activity, do not lift anything greater than 10lbs, nothing per vagina x 6 weeks, do not drive while on pain medication.

## 2018-07-25 NOTE — DISCHARGE NOTE ADULT - CARE PROVIDER_API CALL
Malik King), [facility]  [address] [place] [address]  Phone: [phone]  Fax: [phone]

## 2018-07-25 NOTE — DISCHARGE NOTE ADULT - MEDICATION SUMMARY - MEDICATIONS TO TAKE
I will START or STAY ON the medications listed below when I get home from the hospital:    acetaminophen-oxycodone 325 mg-5 mg oral tablet  -- 1 tab(s) by mouth every 6 hours, As Needed -for severe pain MDD:4 tablets  -- Caution federal law prohibits the transfer of this drug to any person other  than the person for whom it was prescribed.  May cause drowsiness.  Alcohol may intensify this effect.  Use care when operating dangerous machinery.  This prescription cannot be refilled.  This product contains acetaminophen.  Do not use  with any other product containing acetaminophen to prevent possible liver damage.  Using more of this medication than prescribed may cause serious breathing problems.    -- Indication: For pain    Naprosyn 500 mg oral tablet  -- 1 tab(s) by mouth 2 times a day   -- Check with your doctor before becoming pregnant.  May cause drowsiness or dizziness.  Obtain medical advice before taking any non-prescription drugs as some may affect the action of this medication.  Take with food or milk.    -- Indication: For inflammation/pain    DilTIAZem Hydrochloride  mg/24 hours oral capsule, extended release  -- 1 cap(s) by mouth once a day  -- Indication: For per PCP    Cymbalta  -- 40 mg/24h by mouth once a day  -- Indication: For per PCP    DULoxetine 20 mg oral delayed release capsule  -- 1 cap(s) by mouth 2 times a day  -- Indication: For per PCP    atorvastatin 20 mg oral tablet  -- 1 tab(s) by mouth once a day  -- Indication: For per PCP    Vistaril 25 mg oral capsule  -- 1 cap(s) by mouth 4 times a day, As Needed  -- Indication: For per PCP    raNITIdine 300 mg oral tablet  -- 1 tab(s) by mouth once a day (at bedtime)  -- Indication: For per PCP    Colace 100 mg oral capsule  -- 1 cap(s) by mouth 2 times a day   -- Medication should be taken with plenty of water.    -- Indication: For per PCP    omeprazole 40 mg oral delayed release capsule  -- 1 cap(s) by mouth once a day  -- Indication: For per PCP    Vitamin D2 50,000 intl units (1.25 mg) oral capsule  -- 1 cap(s) by mouth once a week  -- Indication: For per PCP I will START or STAY ON the medications listed below when I get home from the hospital:    acetaminophen-oxycodone 325 mg-5 mg oral tablet  -- 1 tab(s) by mouth every 6 hours, As Needed -for severe pain MDD:4 tablets  -- Caution federal law prohibits the transfer of this drug to any person other  than the person for whom it was prescribed.  May cause drowsiness.  Alcohol may intensify this effect.  Use care when operating dangerous machinery.  This prescription cannot be refilled.  This product contains acetaminophen.  Do not use  with any other product containing acetaminophen to prevent possible liver damage.  Using more of this medication than prescribed may cause serious breathing problems.    -- Indication: For pain    Naprosyn 500 mg oral tablet  -- 1 tab(s) by mouth 2 times a day   -- Check with your doctor before becoming pregnant.  May cause drowsiness or dizziness.  Obtain medical advice before taking any non-prescription drugs as some may affect the action of this medication.  Take with food or milk.    -- Indication: For inflammation/pain    DilTIAZem Hydrochloride  mg/24 hours oral capsule, extended release  -- 1 cap(s) by mouth once a day  -- Indication: For per PCP    Cymbalta  -- 40 mg/24h by mouth once a day  -- Indication: For per PCP    DULoxetine 20 mg oral delayed release capsule  -- 1 cap(s) by mouth 2 times a day  -- Indication: For per PCP    atorvastatin 20 mg oral tablet  -- 1 tab(s) by mouth once a day  -- Indication: For per PCP    Vistaril 25 mg oral capsule  -- 1 cap(s) by mouth 4 times a day, As Needed  -- Indication: For per PCP    raNITIdine 300 mg oral tablet  -- 1 tab(s) by mouth once a day (at bedtime)  -- Indication: For per PCP    Colace 100 mg oral capsule  -- 1 cap(s) by mouth 2 times a day   -- Medication should be taken with plenty of water.    -- Indication: For Constipation     omeprazole 40 mg oral delayed release capsule  -- 1 cap(s) by mouth once a day  -- Indication: For per PCP    Vitamin D2 50,000 intl units (1.25 mg) oral capsule  -- 1 cap(s) by mouth once a week  -- Indication: For per PCP

## 2018-07-26 VITALS
OXYGEN SATURATION: 96 % | HEART RATE: 75 BPM | SYSTOLIC BLOOD PRESSURE: 128 MMHG | TEMPERATURE: 98 F | DIASTOLIC BLOOD PRESSURE: 60 MMHG | RESPIRATION RATE: 18 BRPM

## 2018-07-26 DIAGNOSIS — D25.9 LEIOMYOMA OF UTERUS, UNSPECIFIED: ICD-10-CM

## 2018-07-26 LAB
ANION GAP SERPL CALC-SCNC: 12 MMOL/L — SIGNIFICANT CHANGE UP (ref 5–17)
BASOPHILS # BLD AUTO: 0 K/UL — SIGNIFICANT CHANGE UP (ref 0–0.2)
BASOPHILS NFR BLD AUTO: 0.1 % — SIGNIFICANT CHANGE UP (ref 0–2)
BUN SERPL-MCNC: 7 MG/DL — LOW (ref 8–20)
CALCIUM SERPL-MCNC: 8.8 MG/DL — SIGNIFICANT CHANGE UP (ref 8.6–10.2)
CHLORIDE SERPL-SCNC: 99 MMOL/L — SIGNIFICANT CHANGE UP (ref 98–107)
CO2 SERPL-SCNC: 28 MMOL/L — SIGNIFICANT CHANGE UP (ref 22–29)
CREAT SERPL-MCNC: 0.54 MG/DL — SIGNIFICANT CHANGE UP (ref 0.5–1.3)
EOSINOPHIL # BLD AUTO: 0.2 K/UL — SIGNIFICANT CHANGE UP (ref 0–0.5)
EOSINOPHIL NFR BLD AUTO: 1.4 % — SIGNIFICANT CHANGE UP (ref 0–6)
GLUCOSE SERPL-MCNC: 107 MG/DL — SIGNIFICANT CHANGE UP (ref 70–115)
HCT VFR BLD CALC: 34.7 % — LOW (ref 37–47)
HGB BLD-MCNC: 10.8 G/DL — LOW (ref 12–16)
LYMPHOCYTES # BLD AUTO: 18 % — LOW (ref 20–55)
LYMPHOCYTES # BLD AUTO: 2.9 K/UL — SIGNIFICANT CHANGE UP (ref 1–4.8)
MCHC RBC-ENTMCNC: 30.2 PG — SIGNIFICANT CHANGE UP (ref 27–31)
MCHC RBC-ENTMCNC: 31.1 G/DL — LOW (ref 32–36)
MCV RBC AUTO: 96.9 FL — SIGNIFICANT CHANGE UP (ref 81–99)
MONOCYTES # BLD AUTO: 0.8 K/UL — SIGNIFICANT CHANGE UP (ref 0–0.8)
MONOCYTES NFR BLD AUTO: 4.7 % — SIGNIFICANT CHANGE UP (ref 3–10)
NEUTROPHILS # BLD AUTO: 12 K/UL — HIGH (ref 1.8–8)
NEUTROPHILS NFR BLD AUTO: 75.5 % — HIGH (ref 37–73)
PLATELET # BLD AUTO: 294 K/UL — SIGNIFICANT CHANGE UP (ref 150–400)
POTASSIUM SERPL-MCNC: 3.7 MMOL/L — SIGNIFICANT CHANGE UP (ref 3.5–5.3)
POTASSIUM SERPL-SCNC: 3.7 MMOL/L — SIGNIFICANT CHANGE UP (ref 3.5–5.3)
RBC # BLD: 3.58 M/UL — LOW (ref 4.4–5.2)
RBC # FLD: 13.7 % — SIGNIFICANT CHANGE UP (ref 11–15.6)
SODIUM SERPL-SCNC: 139 MMOL/L — SIGNIFICANT CHANGE UP (ref 135–145)
WBC # BLD: 16 K/UL — HIGH (ref 4.8–10.8)
WBC # FLD AUTO: 16 K/UL — HIGH (ref 4.8–10.8)

## 2018-07-26 PROCEDURE — 88307 TISSUE EXAM BY PATHOLOGIST: CPT

## 2018-07-26 PROCEDURE — 86900 BLOOD TYPING SEROLOGIC ABO: CPT

## 2018-07-26 PROCEDURE — 86850 RBC ANTIBODY SCREEN: CPT

## 2018-07-26 PROCEDURE — S2900: CPT

## 2018-07-26 PROCEDURE — 86901 BLOOD TYPING SEROLOGIC RH(D): CPT

## 2018-07-26 PROCEDURE — 84132 ASSAY OF SERUM POTASSIUM: CPT

## 2018-07-26 PROCEDURE — 85027 COMPLETE CBC AUTOMATED: CPT

## 2018-07-26 PROCEDURE — 82962 GLUCOSE BLOOD TEST: CPT

## 2018-07-26 PROCEDURE — 80048 BASIC METABOLIC PNL TOTAL CA: CPT

## 2018-07-26 PROCEDURE — 36415 COLL VENOUS BLD VENIPUNCTURE: CPT

## 2018-07-26 RX ORDER — PANTOPRAZOLE SODIUM 20 MG/1
40 TABLET, DELAYED RELEASE ORAL
Qty: 0 | Refills: 0 | Status: DISCONTINUED | OUTPATIENT
Start: 2018-07-26 | End: 2018-07-26

## 2018-07-26 RX ADMIN — PANTOPRAZOLE SODIUM 40 MILLIGRAM(S): 20 TABLET, DELAYED RELEASE ORAL at 08:11

## 2018-07-26 RX ADMIN — OXYCODONE AND ACETAMINOPHEN 2 TABLET(S): 5; 325 TABLET ORAL at 05:30

## 2018-07-26 RX ADMIN — OXYCODONE AND ACETAMINOPHEN 2 TABLET(S): 5; 325 TABLET ORAL at 03:19

## 2018-07-26 NOTE — PROGRESS NOTE ADULT - SUBJECTIVE AND OBJECTIVE BOX
HPI:     Allergies    No Known Allergies    Intolerances      Sleep apnea  Asthma  Reflux  Palpitations  SOB (shortness of breath)  Vitamin B 12 deficiency  Seasonal allergies  Anemia  Ectopic pregnancy  Hypertension    MEDICATIONS  (STANDING):  atorvastatin 20 milliGRAM(s) Oral at bedtime  diltiazem    milliGRAM(s) Oral daily  DULoxetine 40 milliGRAM(s) Oral daily    MEDICATIONS  (PRN):  ALBUTerol/ipratropium for Nebulization 3 milliLiter(s) Nebulizer every 6 hours PRN Bronchospasm  docusate sodium 100 milliGRAM(s) Oral three times a day PRN Stool Softening  ketorolac   Injectable 30 milliGRAM(s) IV Push every 6 hours PRN Moderate Pain  oxyCODONE    5 mG/acetaminophen 325 mG 1 Tablet(s) Oral every 4 hours PRN Moderate Pain  oxyCODONE    5 mG/acetaminophen 325 mG 2 Tablet(s) Oral every 4 hours PRN Severe Pain (7 - 10)                           11.1   20.9  )-----------( 278      ( 25 Jul 2018 13:03 )             35.0     07-25    137  |  100  |  <3.0<L>  ----------------------------<  130<H>  4.1   |  21.0<L>  |  0.45<L>    Ca    9.2      25 Jul 2018 06:15        ;  Vital Signs Last 24 Hrs  T(C): 36.7 (25 Jul 2018 16:01), Max: 36.8 (25 Jul 2018 00:30)  T(F): 98.1 (25 Jul 2018 16:01), Max: 98.2 (25 Jul 2018 00:30)  HR: 71 (25 Jul 2018 16:01) (71 - 82)  BP: 110/73 (25 Jul 2018 16:01) (110/73 - 160/85)  BP(mean): --  RR: 18 (25 Jul 2018 16:01) (18 - 19)  SpO2: 97% (25 Jul 2018 16:01) (97% - 99%)  CAPILLARY BLOOD GLUCOSE      07-24 @ 07:01  -  07-25 @ 07:00  --------------------------------------------------------  IN: 3245 mL / OUT: 820 mL / NET: 2425 mL    07-25 @ 07:01 - 07-25 @ 20:33  --------------------------------------------------------  IN: 0 mL / OUT: 250 mL / NET: -250 mL      Patient feeling better No CP, No SOB, No N/V mild pain  HEENT: PEARLA  Neck: Supple  Cardio: S1 S2 No Murmur  Pulm:  No Rales Occ Ronchi  Abd: Soft NT ND BS+ Incisions clean  Rectal Pelvic Breast- refused  Ext: No DCT  Skin: No Rash  Neuro: Awake Pleasant    Fibroids -postop pain control  Sleep apnea - suggest Night time O2  Asthma - Nebs   Hypertension / Palpitations- Diltiazem
GYNECOLOGIC ONCOLOGY PROGRESS NOTE    POD# 1    PROBLEMS:  Hypertension  Asthma  Sleep apnea  Abnormal uterine bleeding    Pt seen and examined at bedside.     SUBJECTIVE:    Patient is complaining of pain.  Flatus: present.  Denies Nausea, Vomiting or Diarrhea.  Denies shortness of breath, chest pain or dyspnea on exertion.  Tolerating diet.    OBJECTIVE:     VITALS:  T(F): 97.5 (07-25-18 @ 04:52), Max: 98.2 (07-25-18 @ 00:30)  HR: 71 (07-25-18 @ 04:52) (70 - 89)  BP: 142/78 (07-25-18 @ 04:52) (120/65 - 161/86)  RR: 18 (07-25-18 @ 04:52) (15 - 19)  SpO2: 98% (07-25-18 @ 04:52) (97% - 100%)    I&O's Summary    24 Jul 2018 07:01  -  25 Jul 2018 06:52  --------------------------------------------------------  IN: 3245 mL / OUT: 820 mL / NET: 2425 mL    MEDICATIONS  (STANDING):  atorvastatin 20 milliGRAM(s) Oral at bedtime  diltiazem    milliGRAM(s) Oral daily  DULoxetine 40 milliGRAM(s) Oral daily  lactated ringers. 1000 milliLiter(s) (125 mL/Hr) IV Continuous <Continuous>    MEDICATIONS  (PRN):  ALBUTerol/ipratropium for Nebulization 3 milliLiter(s) Nebulizer every 6 hours PRN Bronchospasm  docusate sodium 100 milliGRAM(s) Oral three times a day PRN Stool Softening  ketorolac   Injectable 30 milliGRAM(s) IV Push every 6 hours PRN Moderate Pain  oxyCODONE    5 mG/acetaminophen 325 mG 1 Tablet(s) Oral every 4 hours PRN Moderate Pain  oxyCODONE    5 mG/acetaminophen 325 mG 2 Tablet(s) Oral every 4 hours PRN Severe Pain (7 - 10)      Physical Exam:  Constitutional: NAD  Pulmonary: clear to auscultation bilaterally   Cardiovascular: Regular rate and rhythm   Abdomen: soft, non-tender, non-distended, normal bowel sounds  Extremities: no lower extremity edema or calve tenderness, Jasper's sign negative.  Incisions: Clean, dry, intact.  Without signs of infection or hernia.
GYNECOLOGIC ONCOLOGY PROGRESS NOTE    POD# 2    PROBLEMS:  Hypertension  Asthma  Sleep apnea  Abnormal uterine bleeding      Pt seen and examined at bedside with Dr. Pena.     SUBJECTIVE:    Patient is without complaints.  Pain well-controlled.  Flatus: Yes, having bowel movements   Denies Nausea, Vomiting or Diarrhea.  Denies shortness of breath, chest pain or dyspnea on exertion.  Tolerating diet, voiding.     OBJECTIVE:     VITALS:  T(F): 97.5 (07-26-18 @ 05:00), Max: 98.3 (07-26-18 @ 00:40)  HR: 72 (07-26-18 @ 05:00) (70 - 75)  BP: 107/73 (07-26-18 @ 05:00) (107/73 - 120/70)  RR: 19 (07-26-18 @ 05:00) (18 - 19)  SpO2: 95% (07-26-18 @ 05:00) (95% - 98%)  Wt(kg): --    I&O's Summary    25 Jul 2018 07:01  -  26 Jul 2018 07:00  --------------------------------------------------------  IN: 0 mL / OUT: 250 mL / NET: -250 mL        MEDICATIONS  (STANDING):  atorvastatin 20 milliGRAM(s) Oral at bedtime  diltiazem    milliGRAM(s) Oral daily  DULoxetine 40 milliGRAM(s) Oral daily  pantoprazole    Tablet 40 milliGRAM(s) Oral before breakfast    MEDICATIONS  (PRN):  ALBUTerol/ipratropium for Nebulization 3 milliLiter(s) Nebulizer every 6 hours PRN Bronchospasm  docusate sodium 100 milliGRAM(s) Oral three times a day PRN Stool Softening  ketorolac   Injectable 30 milliGRAM(s) IV Push every 6 hours PRN Moderate Pain  oxyCODONE    5 mG/acetaminophen 325 mG 1 Tablet(s) Oral every 4 hours PRN Moderate Pain  oxyCODONE    5 mG/acetaminophen 325 mG 2 Tablet(s) Oral every 4 hours PRN Severe Pain (7 - 10)      Physical Exam:  Constitutional: NAD  Pulmonary: clear to auscultation bilaterally   Cardiovascular: Regular rate and rhythm   Abdomen: soft, non-tender, non-distended, normal bowel sounds   Extremities: no lower extremity edema or calve tenderness, Jasper's sign negative.  Incisions: Clean, dry, intact.  Without signs of infection or hernia.      LABS:                        10.8   16.0  )-----------( 294      ( 26 Jul 2018 05:57 )             34.7     07-26    139  |  99  |  7.0<L>  ----------------------------<  107  3.7   |  28.0  |  0.54    Ca    8.8      26 Jul 2018 05:57
GYNECOLOGIC ONCOLOGY PROGRESS NOTE    POD#0    PROBLEMS: AUB, sleep apnea, Reflux, HTN, hx palpitations. Pt. reports using concentrated oxygen at home for sleep apnea.      Pt seen and examined at bedside.     SUBJECTIVE:    Patient is without complaints.  Pain well-controlled.  Denies Nausea, Vomiting or Diarrhea.  Denies shortness of breath, chest pain or dyspnea on exertion.  Tolerating diet.    OBJECTIVE:     VITALS:  T(F): 97.8 (07-24-18 @ 14:45), Max: 97.9 (07-24-18 @ 14:12)  HR: 86 (07-24-18 @ 15:30) (70 - 89)  BP: 131/89 (07-24-18 @ 15:30) (124/72 - 161/86)  RR: 15 (07-24-18 @ 15:30) (15 - 18)  SpO2: 100% (07-24-18 @ 15:30) (100% - 100%)      I&O's Summary  s/p removal of caldwell intra-op, pending void.    MEDICATIONS  (STANDING):  atorvastatin 20 milliGRAM(s) Oral at bedtime  DULoxetine 20 milliGRAM(s) Oral daily  lactated ringers. 1000 milliLiter(s) (125 mL/Hr) IV Continuous <Continuous>    MEDICATIONS  (PRN):  docusate sodium 100 milliGRAM(s) Oral three times a day PRN Stool Softening  ketorolac   Injectable 30 milliGRAM(s) IV Push every 6 hours PRN Moderate Pain  oxyCODONE    5 mG/acetaminophen 325 mG 1 Tablet(s) Oral every 4 hours PRN Moderate Pain  oxyCODONE    5 mG/acetaminophen 325 mG 2 Tablet(s) Oral every 4 hours PRN Severe Pain (7 - 10)      Physical Exam:  Constitutional: NAD  Pulmonary: clear to auscultation bilaterally   Cardiovascular: Regular rate and rhythm   Abdomen: soft, non-tender, non-distended, normal bowel sounds  Extremities: no lower extremity edema or calve tenderness.  Incision: Clean, dry, intact.  Without signs of infection or hernia.      LABS:    AM labs ordered.              RADIOLOGY & ADDITIONAL TESTS:
PT. seen for afternoon rounds. Patient without complaints at this time. She admits to ambulating and IS use. She is tolerating a regular diet, pain is well controlled and with + flatus.  Patients WBC count downtrending although still above 20. Will keep overnight for observation with planned discharge in AM pending normal labs.

## 2018-07-26 NOTE — PROGRESS NOTE ADULT - PROBLEM SELECTOR PLAN 1
Continue regular, low sodium diet  OOB as tolerated  Incentive Spirometer  Lovenox and SCDs for DVT prophylaxis   Pain control with PO pain medication     DC planning for this morning, case discussed with Dr. King/Dr. Pena. DC instructions discussed with patient.

## 2018-07-28 ENCOUNTER — INPATIENT (INPATIENT)
Facility: HOSPITAL | Age: 53
LOS: 6 days | Discharge: HOME | DRG: 862 | End: 2018-08-04
Attending: OBSTETRICS & GYNECOLOGY | Admitting: OBSTETRICS & GYNECOLOGY
Payer: COMMERCIAL

## 2018-07-28 VITALS — WEIGHT: 179.9 LBS | HEIGHT: 66 IN

## 2018-07-28 DIAGNOSIS — Z87.59 PERSONAL HISTORY OF OTHER COMPLICATIONS OF PREGNANCY, CHILDBIRTH AND THE PUERPERIUM: Chronic | ICD-10-CM

## 2018-07-28 DIAGNOSIS — Z90.722 ACQUIRED ABSENCE OF OVARIES, BILATERAL: Chronic | ICD-10-CM

## 2018-07-28 LAB
ALBUMIN SERPL ELPH-MCNC: 3.9 G/DL — SIGNIFICANT CHANGE UP (ref 3.3–5.2)
ALP SERPL-CCNC: 95 U/L — SIGNIFICANT CHANGE UP (ref 40–120)
ALT FLD-CCNC: 34 U/L — HIGH
ANION GAP SERPL CALC-SCNC: 17 MMOL/L — SIGNIFICANT CHANGE UP (ref 5–17)
AST SERPL-CCNC: 37 U/L — HIGH
BASOPHILS # BLD AUTO: 0 K/UL — SIGNIFICANT CHANGE UP (ref 0–0.2)
BASOPHILS NFR BLD AUTO: 0.1 % — SIGNIFICANT CHANGE UP (ref 0–2)
BILIRUB SERPL-MCNC: 0.3 MG/DL — LOW (ref 0.4–2)
BLD GP AB SCN SERPL QL: SIGNIFICANT CHANGE UP
BUN SERPL-MCNC: 5 MG/DL — LOW (ref 8–20)
CALCIUM SERPL-MCNC: 9.5 MG/DL — SIGNIFICANT CHANGE UP (ref 8.6–10.2)
CHLORIDE SERPL-SCNC: 96 MMOL/L — LOW (ref 98–107)
CO2 SERPL-SCNC: 25 MMOL/L — SIGNIFICANT CHANGE UP (ref 22–29)
CREAT SERPL-MCNC: 0.43 MG/DL — LOW (ref 0.5–1.3)
EOSINOPHIL # BLD AUTO: 0.3 K/UL — SIGNIFICANT CHANGE UP (ref 0–0.5)
EOSINOPHIL NFR BLD AUTO: 1.1 % — SIGNIFICANT CHANGE UP (ref 0–6)
GLUCOSE SERPL-MCNC: 114 MG/DL — SIGNIFICANT CHANGE UP (ref 70–115)
HCT VFR BLD CALC: 35 % — LOW (ref 37–47)
HGB BLD-MCNC: 11.5 G/DL — LOW (ref 12–16)
LACTATE BLDV-MCNC: 2.4 MMOL/L — HIGH (ref 0.5–2)
LYMPHOCYTES # BLD AUTO: 2 K/UL — SIGNIFICANT CHANGE UP (ref 1–4.8)
LYMPHOCYTES # BLD AUTO: 8.5 % — LOW (ref 20–55)
MCHC RBC-ENTMCNC: 30.5 PG — SIGNIFICANT CHANGE UP (ref 27–31)
MCHC RBC-ENTMCNC: 32.9 G/DL — SIGNIFICANT CHANGE UP (ref 32–36)
MCV RBC AUTO: 92.8 FL — SIGNIFICANT CHANGE UP (ref 81–99)
MONOCYTES # BLD AUTO: 0.8 K/UL — SIGNIFICANT CHANGE UP (ref 0–0.8)
MONOCYTES NFR BLD AUTO: 3.7 % — SIGNIFICANT CHANGE UP (ref 3–10)
NEUTROPHILS # BLD AUTO: 19.9 K/UL — HIGH (ref 1.8–8)
NEUTROPHILS NFR BLD AUTO: 86.2 % — HIGH (ref 37–73)
PLATELET # BLD AUTO: 362 K/UL — SIGNIFICANT CHANGE UP (ref 150–400)
POTASSIUM SERPL-MCNC: 4.1 MMOL/L — SIGNIFICANT CHANGE UP (ref 3.5–5.3)
POTASSIUM SERPL-SCNC: 4.1 MMOL/L — SIGNIFICANT CHANGE UP (ref 3.5–5.3)
PROT SERPL-MCNC: 7.3 G/DL — SIGNIFICANT CHANGE UP (ref 6.6–8.7)
RBC # BLD: 3.77 M/UL — LOW (ref 4.4–5.2)
RBC # FLD: 13.8 % — SIGNIFICANT CHANGE UP (ref 11–15.6)
SODIUM SERPL-SCNC: 138 MMOL/L — SIGNIFICANT CHANGE UP (ref 135–145)
TYPE + AB SCN PNL BLD: SIGNIFICANT CHANGE UP
WBC # BLD: 23.1 K/UL — HIGH (ref 4.8–10.8)
WBC # FLD AUTO: 23.1 K/UL — HIGH (ref 4.8–10.8)

## 2018-07-28 PROCEDURE — 99285 EMERGENCY DEPT VISIT HI MDM: CPT

## 2018-07-28 RX ORDER — MORPHINE SULFATE 50 MG/1
6 CAPSULE, EXTENDED RELEASE ORAL ONCE
Qty: 0 | Refills: 0 | Status: DISCONTINUED | OUTPATIENT
Start: 2018-07-28 | End: 2018-07-28

## 2018-07-28 RX ORDER — PIPERACILLIN AND TAZOBACTAM 4; .5 G/20ML; G/20ML
3.38 INJECTION, POWDER, LYOPHILIZED, FOR SOLUTION INTRAVENOUS ONCE
Qty: 0 | Refills: 0 | Status: COMPLETED | OUTPATIENT
Start: 2018-07-28 | End: 2018-07-29

## 2018-07-28 RX ORDER — SODIUM CHLORIDE 9 MG/ML
2000 INJECTION INTRAMUSCULAR; INTRAVENOUS; SUBCUTANEOUS ONCE
Qty: 0 | Refills: 0 | Status: COMPLETED | OUTPATIENT
Start: 2018-07-28 | End: 2018-07-28

## 2018-07-28 RX ORDER — VANCOMYCIN HCL 1 G
1000 VIAL (EA) INTRAVENOUS ONCE
Qty: 0 | Refills: 0 | Status: COMPLETED | OUTPATIENT
Start: 2018-07-28 | End: 2018-07-29

## 2018-07-28 RX ADMIN — SODIUM CHLORIDE 2000 MILLILITER(S): 9 INJECTION INTRAMUSCULAR; INTRAVENOUS; SUBCUTANEOUS at 22:34

## 2018-07-28 RX ADMIN — MORPHINE SULFATE 6 MILLIGRAM(S): 50 CAPSULE, EXTENDED RELEASE ORAL at 22:45

## 2018-07-28 RX ADMIN — MORPHINE SULFATE 6 MILLIGRAM(S): 50 CAPSULE, EXTENDED RELEASE ORAL at 22:33

## 2018-07-28 NOTE — ED ADULT TRIAGE NOTE - CHIEF COMPLAINT QUOTE
patient was released 2 days ago, surgery s/p hysterectomy, complaining of abdominal pain and vaginal bleed

## 2018-07-28 NOTE — ED PROVIDER NOTE - OBJECTIVE STATEMENT
54 y/o F pt with PMHx palpations, HTN, ectopic pregnancies, presents to the ED c/o .  Denies . No further acute complaints at this time. 54 y/o F pt with PMHx palpations, HTN, ectopic pregnancies, presents to the ED c/o abdominal pain.  Pt states that one hour PTA she stood up and a lot of blood rushed out of her vagina.  She notes the pain in her abdomen feels like a pressure, that radiates to the chest.  States her abdominal pain worsened with deep breath.  She had a hysterectomy 4 days ago, and has not gone to the bathroom since 2 days ago.  She rectook a stool softener but it has not helped.   Denies CP, N/V, fevers, chills, numbness, tingling. No further acute complaints at this time. 52 y/o F pt with PMHx palpations, HTN, ectopic pregnancies, presents to the ED c/o abdominal pain.  Pt states that one hour PTA she stood up and a lot of blood rushed out of her vagina.  She notes the pain in her abdomen feels like a pressure, that radiates to the chest.  States her abdominal pain worsened with deep breath.  She had a hysterectomy 4 days ago, and has not gone to the bathroom since 2 days ago.  She took a stool softener but it has not helped.   Denies CP, N/V, fevers, chills, numbness, tingling. No further acute complaints at this time.

## 2018-07-28 NOTE — ED ADULT NURSE NOTE - OBJECTIVE STATEMENT
pt came in c/o abdomen pain 10/10 generalized. pt stated she had hysterectomy on tuesday and hasn't had BM since thursday. pt abdomen distended and tender with 5 surgical sites.  pt stated when she stood up today bright red blood came out of her vagina. VSS, afebrile.

## 2018-07-29 DIAGNOSIS — R10.9 UNSPECIFIED ABDOMINAL PAIN: ICD-10-CM

## 2018-07-29 DIAGNOSIS — R00.0 TACHYCARDIA, UNSPECIFIED: ICD-10-CM

## 2018-07-29 DIAGNOSIS — T81.4XXA INFECTION FOLLOWING A PROCEDURE, INITIAL ENCOUNTER: ICD-10-CM

## 2018-07-29 DIAGNOSIS — A41.9 SEPSIS, UNSPECIFIED ORGANISM: ICD-10-CM

## 2018-07-29 DIAGNOSIS — N93.9 ABNORMAL UTERINE AND VAGINAL BLEEDING, UNSPECIFIED: ICD-10-CM

## 2018-07-29 DIAGNOSIS — D72.829 ELEVATED WHITE BLOOD CELL COUNT, UNSPECIFIED: ICD-10-CM

## 2018-07-29 PROBLEM — G47.30 SLEEP APNEA, UNSPECIFIED: Chronic | Status: ACTIVE | Noted: 2018-07-16

## 2018-07-29 PROBLEM — J45.909 UNSPECIFIED ASTHMA, UNCOMPLICATED: Chronic | Status: ACTIVE | Noted: 2018-07-16

## 2018-07-29 LAB
AMPHET UR-MCNC: NEGATIVE — SIGNIFICANT CHANGE UP
ANION GAP SERPL CALC-SCNC: 13 MMOL/L — SIGNIFICANT CHANGE UP (ref 5–17)
APPEARANCE UR: CLEAR — SIGNIFICANT CHANGE UP
APPEARANCE UR: CLEAR — SIGNIFICANT CHANGE UP
BARBITURATES UR SCN-MCNC: NEGATIVE — SIGNIFICANT CHANGE UP
BASOPHILS # BLD AUTO: 0 K/UL — SIGNIFICANT CHANGE UP (ref 0–0.2)
BASOPHILS NFR BLD AUTO: 0.1 % — SIGNIFICANT CHANGE UP (ref 0–2)
BENZODIAZ UR-MCNC: NEGATIVE — SIGNIFICANT CHANGE UP
BILIRUB UR-MCNC: NEGATIVE — SIGNIFICANT CHANGE UP
BILIRUB UR-MCNC: NEGATIVE — SIGNIFICANT CHANGE UP
BUN SERPL-MCNC: 6 MG/DL — LOW (ref 8–20)
CALCIUM SERPL-MCNC: 8.4 MG/DL — LOW (ref 8.6–10.2)
CHLORIDE SERPL-SCNC: 101 MMOL/L — SIGNIFICANT CHANGE UP (ref 98–107)
CO2 SERPL-SCNC: 25 MMOL/L — SIGNIFICANT CHANGE UP (ref 22–29)
COCAINE METAB.OTHER UR-MCNC: POSITIVE
COLOR SPEC: YELLOW — SIGNIFICANT CHANGE UP
COLOR SPEC: YELLOW — SIGNIFICANT CHANGE UP
CREAT SERPL-MCNC: 0.47 MG/DL — LOW (ref 0.5–1.3)
DIFF PNL FLD: ABNORMAL
DIFF PNL FLD: ABNORMAL
EOSINOPHIL # BLD AUTO: 0.4 K/UL — SIGNIFICANT CHANGE UP (ref 0–0.5)
EOSINOPHIL NFR BLD AUTO: 2.1 % — SIGNIFICANT CHANGE UP (ref 0–6)
EPI CELLS # UR: SIGNIFICANT CHANGE UP
EPI CELLS # UR: SIGNIFICANT CHANGE UP
GLUCOSE SERPL-MCNC: 107 MG/DL — SIGNIFICANT CHANGE UP (ref 70–115)
GLUCOSE UR QL: NEGATIVE MG/DL — SIGNIFICANT CHANGE UP
GLUCOSE UR QL: NEGATIVE MG/DL — SIGNIFICANT CHANGE UP
HCT VFR BLD CALC: 33.4 % — LOW (ref 37–47)
HGB BLD-MCNC: 11 G/DL — LOW (ref 12–16)
KETONES UR-MCNC: NEGATIVE — SIGNIFICANT CHANGE UP
KETONES UR-MCNC: NEGATIVE — SIGNIFICANT CHANGE UP
LEUKOCYTE ESTERASE UR-ACNC: ABNORMAL
LEUKOCYTE ESTERASE UR-ACNC: NEGATIVE — SIGNIFICANT CHANGE UP
LYMPHOCYTES # BLD AUTO: 1.9 K/UL — SIGNIFICANT CHANGE UP (ref 1–4.8)
LYMPHOCYTES # BLD AUTO: 11.8 % — LOW (ref 20–55)
MCHC RBC-ENTMCNC: 31.7 PG — HIGH (ref 27–31)
MCHC RBC-ENTMCNC: 32.9 G/DL — SIGNIFICANT CHANGE UP (ref 32–36)
MCV RBC AUTO: 96.3 FL — SIGNIFICANT CHANGE UP (ref 81–99)
METHADONE UR-MCNC: NEGATIVE — SIGNIFICANT CHANGE UP
MONOCYTES # BLD AUTO: 0.8 K/UL — SIGNIFICANT CHANGE UP (ref 0–0.8)
MONOCYTES NFR BLD AUTO: 5 % — SIGNIFICANT CHANGE UP (ref 3–10)
NEUTROPHILS # BLD AUTO: 13.1 K/UL — HIGH (ref 1.8–8)
NEUTROPHILS NFR BLD AUTO: 80.6 % — HIGH (ref 37–73)
NITRITE UR-MCNC: NEGATIVE — SIGNIFICANT CHANGE UP
NITRITE UR-MCNC: NEGATIVE — SIGNIFICANT CHANGE UP
OPIATES UR-MCNC: POSITIVE
PCP SPEC-MCNC: SIGNIFICANT CHANGE UP
PCP UR-MCNC: NEGATIVE — SIGNIFICANT CHANGE UP
PH UR: 6.5 — SIGNIFICANT CHANGE UP (ref 5–8)
PH UR: 6.5 — SIGNIFICANT CHANGE UP (ref 5–8)
PLATELET # BLD AUTO: 321 K/UL — SIGNIFICANT CHANGE UP (ref 150–400)
POTASSIUM SERPL-MCNC: 3.4 MMOL/L — LOW (ref 3.5–5.3)
POTASSIUM SERPL-SCNC: 3.4 MMOL/L — LOW (ref 3.5–5.3)
PROT UR-MCNC: NEGATIVE MG/DL — SIGNIFICANT CHANGE UP
PROT UR-MCNC: NEGATIVE MG/DL — SIGNIFICANT CHANGE UP
RBC # BLD: 3.47 M/UL — LOW (ref 4.4–5.2)
RBC # FLD: 13.9 % — SIGNIFICANT CHANGE UP (ref 11–15.6)
RBC CASTS # UR COMP ASSIST: ABNORMAL /HPF (ref 0–4)
RBC CASTS # UR COMP ASSIST: ABNORMAL /HPF (ref 0–4)
SODIUM SERPL-SCNC: 139 MMOL/L — SIGNIFICANT CHANGE UP (ref 135–145)
SP GR SPEC: 1 — LOW (ref 1.01–1.02)
SP GR SPEC: 1.01 — SIGNIFICANT CHANGE UP (ref 1.01–1.02)
THC UR QL: NEGATIVE — SIGNIFICANT CHANGE UP
UROBILINOGEN FLD QL: NEGATIVE MG/DL — SIGNIFICANT CHANGE UP
UROBILINOGEN FLD QL: NEGATIVE MG/DL — SIGNIFICANT CHANGE UP
WBC # BLD: 16.3 K/UL — HIGH (ref 4.8–10.8)
WBC # FLD AUTO: 16.3 K/UL — HIGH (ref 4.8–10.8)
WBC UR QL: NEGATIVE — SIGNIFICANT CHANGE UP
WBC UR QL: SIGNIFICANT CHANGE UP

## 2018-07-29 PROCEDURE — 74177 CT ABD & PELVIS W/CONTRAST: CPT | Mod: 26

## 2018-07-29 PROCEDURE — 71275 CT ANGIOGRAPHY CHEST: CPT | Mod: 26

## 2018-07-29 PROCEDURE — 99223 1ST HOSP IP/OBS HIGH 75: CPT

## 2018-07-29 RX ORDER — KETOROLAC TROMETHAMINE 30 MG/ML
30 SYRINGE (ML) INJECTION EVERY 6 HOURS
Qty: 0 | Refills: 0 | Status: DISCONTINUED | OUTPATIENT
Start: 2018-07-29 | End: 2018-07-30

## 2018-07-29 RX ORDER — HYDROMORPHONE HYDROCHLORIDE 2 MG/ML
1 INJECTION INTRAMUSCULAR; INTRAVENOUS; SUBCUTANEOUS EVERY 4 HOURS
Qty: 0 | Refills: 0 | Status: DISCONTINUED | OUTPATIENT
Start: 2018-07-29 | End: 2018-07-29

## 2018-07-29 RX ORDER — DILTIAZEM HCL 120 MG
360 CAPSULE, EXT RELEASE 24 HR ORAL DAILY
Qty: 0 | Refills: 0 | Status: DISCONTINUED | OUTPATIENT
Start: 2018-07-29 | End: 2018-08-03

## 2018-07-29 RX ORDER — KETOROLAC TROMETHAMINE 30 MG/ML
15 SYRINGE (ML) INJECTION EVERY 6 HOURS
Qty: 0 | Refills: 0 | Status: DISCONTINUED | OUTPATIENT
Start: 2018-07-29 | End: 2018-07-29

## 2018-07-29 RX ORDER — PANTOPRAZOLE SODIUM 20 MG/1
40 TABLET, DELAYED RELEASE ORAL
Qty: 0 | Refills: 0 | Status: DISCONTINUED | OUTPATIENT
Start: 2018-07-29 | End: 2018-08-03

## 2018-07-29 RX ORDER — HYDROMORPHONE HYDROCHLORIDE 2 MG/ML
4 INJECTION INTRAMUSCULAR; INTRAVENOUS; SUBCUTANEOUS EVERY 4 HOURS
Qty: 0 | Refills: 0 | Status: DISCONTINUED | OUTPATIENT
Start: 2018-07-29 | End: 2018-08-03

## 2018-07-29 RX ORDER — POTASSIUM CHLORIDE 20 MEQ
10 PACKET (EA) ORAL ONCE
Qty: 0 | Refills: 0 | Status: COMPLETED | OUTPATIENT
Start: 2018-07-29 | End: 2018-07-29

## 2018-07-29 RX ORDER — SODIUM CHLORIDE 9 MG/ML
1000 INJECTION, SOLUTION INTRAVENOUS
Qty: 0 | Refills: 0 | Status: DISCONTINUED | OUTPATIENT
Start: 2018-07-29 | End: 2018-07-29

## 2018-07-29 RX ORDER — ENOXAPARIN SODIUM 100 MG/ML
40 INJECTION SUBCUTANEOUS ONCE
Qty: 0 | Refills: 0 | Status: COMPLETED | OUTPATIENT
Start: 2018-07-29 | End: 2018-07-29

## 2018-07-29 RX ORDER — PIPERACILLIN AND TAZOBACTAM 4; .5 G/20ML; G/20ML
3.38 INJECTION, POWDER, LYOPHILIZED, FOR SOLUTION INTRAVENOUS EVERY 8 HOURS
Qty: 0 | Refills: 0 | Status: DISCONTINUED | OUTPATIENT
Start: 2018-07-29 | End: 2018-08-03

## 2018-07-29 RX ORDER — ACETAMINOPHEN 500 MG
1000 TABLET ORAL ONCE
Qty: 0 | Refills: 0 | Status: DISCONTINUED | OUTPATIENT
Start: 2018-07-29 | End: 2018-07-29

## 2018-07-29 RX ORDER — HYDROMORPHONE HYDROCHLORIDE 2 MG/ML
1 INJECTION INTRAMUSCULAR; INTRAVENOUS; SUBCUTANEOUS ONCE
Qty: 0 | Refills: 0 | Status: DISCONTINUED | OUTPATIENT
Start: 2018-07-29 | End: 2018-07-29

## 2018-07-29 RX ORDER — SODIUM CHLORIDE 9 MG/ML
1000 INJECTION, SOLUTION INTRAVENOUS
Qty: 0 | Refills: 0 | Status: COMPLETED | OUTPATIENT
Start: 2018-07-29 | End: 2018-07-29

## 2018-07-29 RX ORDER — ATORVASTATIN CALCIUM 80 MG/1
20 TABLET, FILM COATED ORAL AT BEDTIME
Qty: 0 | Refills: 0 | Status: DISCONTINUED | OUTPATIENT
Start: 2018-07-29 | End: 2018-08-03

## 2018-07-29 RX ORDER — HYDROMORPHONE HYDROCHLORIDE 2 MG/ML
2 INJECTION INTRAMUSCULAR; INTRAVENOUS; SUBCUTANEOUS EVERY 4 HOURS
Qty: 0 | Refills: 0 | Status: DISCONTINUED | OUTPATIENT
Start: 2018-07-29 | End: 2018-08-03

## 2018-07-29 RX ORDER — KETOROLAC TROMETHAMINE 30 MG/ML
30 SYRINGE (ML) INJECTION EVERY 4 HOURS
Qty: 0 | Refills: 0 | Status: DISCONTINUED | OUTPATIENT
Start: 2018-07-29 | End: 2018-07-29

## 2018-07-29 RX ORDER — DOCUSATE SODIUM 100 MG
100 CAPSULE ORAL THREE TIMES A DAY
Qty: 0 | Refills: 0 | Status: DISCONTINUED | OUTPATIENT
Start: 2018-07-29 | End: 2018-08-03

## 2018-07-29 RX ORDER — DULOXETINE HYDROCHLORIDE 30 MG/1
40 CAPSULE, DELAYED RELEASE ORAL DAILY
Qty: 0 | Refills: 0 | Status: DISCONTINUED | OUTPATIENT
Start: 2018-07-29 | End: 2018-08-03

## 2018-07-29 RX ORDER — SODIUM CHLORIDE 9 MG/ML
1000 INJECTION INTRAMUSCULAR; INTRAVENOUS; SUBCUTANEOUS
Qty: 0 | Refills: 0 | Status: DISCONTINUED | OUTPATIENT
Start: 2018-07-29 | End: 2018-08-03

## 2018-07-29 RX ORDER — ENOXAPARIN SODIUM 100 MG/ML
40 INJECTION SUBCUTANEOUS DAILY
Qty: 0 | Refills: 0 | Status: DISCONTINUED | OUTPATIENT
Start: 2018-07-29 | End: 2018-07-29

## 2018-07-29 RX ORDER — SODIUM CHLORIDE 9 MG/ML
1000 INJECTION INTRAMUSCULAR; INTRAVENOUS; SUBCUTANEOUS ONCE
Qty: 0 | Refills: 0 | Status: COMPLETED | OUTPATIENT
Start: 2018-07-29 | End: 2018-07-29

## 2018-07-29 RX ORDER — IPRATROPIUM/ALBUTEROL SULFATE 18-103MCG
3 AEROSOL WITH ADAPTER (GRAM) INHALATION EVERY 6 HOURS
Qty: 0 | Refills: 0 | Status: DISCONTINUED | OUTPATIENT
Start: 2018-07-29 | End: 2018-08-03

## 2018-07-29 RX ORDER — ACETAMINOPHEN 500 MG
1000 TABLET ORAL ONCE
Qty: 0 | Refills: 0 | Status: COMPLETED | OUTPATIENT
Start: 2018-07-29 | End: 2018-08-03

## 2018-07-29 RX ADMIN — Medication 1000 MILLIGRAM(S): at 02:30

## 2018-07-29 RX ADMIN — Medication 100 MILLIGRAM(S): at 16:28

## 2018-07-29 RX ADMIN — Medication 250 MILLIGRAM(S): at 01:54

## 2018-07-29 RX ADMIN — PIPERACILLIN AND TAZOBACTAM 25 GRAM(S): 4; .5 INJECTION, POWDER, LYOPHILIZED, FOR SOLUTION INTRAVENOUS at 18:11

## 2018-07-29 RX ADMIN — Medication 30 MILLIGRAM(S): at 23:30

## 2018-07-29 RX ADMIN — HYDROMORPHONE HYDROCHLORIDE 1 MILLIGRAM(S): 2 INJECTION INTRAMUSCULAR; INTRAVENOUS; SUBCUTANEOUS at 03:06

## 2018-07-29 RX ADMIN — SODIUM CHLORIDE 2000 MILLILITER(S): 9 INJECTION INTRAMUSCULAR; INTRAVENOUS; SUBCUTANEOUS at 01:00

## 2018-07-29 RX ADMIN — Medication 30 MILLIGRAM(S): at 23:21

## 2018-07-29 RX ADMIN — Medication 15 MILLIGRAM(S): at 16:27

## 2018-07-29 RX ADMIN — Medication 100 MILLIGRAM(S): at 21:56

## 2018-07-29 RX ADMIN — Medication 10 MILLIEQUIVALENT(S): at 21:55

## 2018-07-29 RX ADMIN — SODIUM CHLORIDE 60 MILLILITER(S): 9 INJECTION, SOLUTION INTRAVENOUS at 05:49

## 2018-07-29 RX ADMIN — ATORVASTATIN CALCIUM 20 MILLIGRAM(S): 80 TABLET, FILM COATED ORAL at 21:56

## 2018-07-29 RX ADMIN — DULOXETINE HYDROCHLORIDE 40 MILLIGRAM(S): 30 CAPSULE, DELAYED RELEASE ORAL at 16:28

## 2018-07-29 RX ADMIN — Medication 0.5 MILLIGRAM(S): at 16:27

## 2018-07-29 RX ADMIN — Medication 0.5 MILLIGRAM(S): at 21:55

## 2018-07-29 RX ADMIN — HYDROMORPHONE HYDROCHLORIDE 1 MILLIGRAM(S): 2 INJECTION INTRAMUSCULAR; INTRAVENOUS; SUBCUTANEOUS at 04:00

## 2018-07-29 RX ADMIN — PIPERACILLIN AND TAZOBACTAM 3.38 GRAM(S): 4; .5 INJECTION, POWDER, LYOPHILIZED, FOR SOLUTION INTRAVENOUS at 00:45

## 2018-07-29 RX ADMIN — SODIUM CHLORIDE 1000 MILLILITER(S): 9 INJECTION INTRAMUSCULAR; INTRAVENOUS; SUBCUTANEOUS at 03:07

## 2018-07-29 RX ADMIN — PIPERACILLIN AND TAZOBACTAM 200 GRAM(S): 4; .5 INJECTION, POWDER, LYOPHILIZED, FOR SOLUTION INTRAVENOUS at 00:16

## 2018-07-29 RX ADMIN — SODIUM CHLORIDE 1000 MILLILITER(S): 9 INJECTION INTRAMUSCULAR; INTRAVENOUS; SUBCUTANEOUS at 04:00

## 2018-07-29 RX ADMIN — HYDROMORPHONE HYDROCHLORIDE 4 MILLIGRAM(S): 2 INJECTION INTRAMUSCULAR; INTRAVENOUS; SUBCUTANEOUS at 08:45

## 2018-07-29 RX ADMIN — HYDROMORPHONE HYDROCHLORIDE 4 MILLIGRAM(S): 2 INJECTION INTRAMUSCULAR; INTRAVENOUS; SUBCUTANEOUS at 18:47

## 2018-07-29 RX ADMIN — HYDROMORPHONE HYDROCHLORIDE 4 MILLIGRAM(S): 2 INJECTION INTRAMUSCULAR; INTRAVENOUS; SUBCUTANEOUS at 19:01

## 2018-07-29 RX ADMIN — SODIUM CHLORIDE 60 MILLILITER(S): 9 INJECTION, SOLUTION INTRAVENOUS at 12:32

## 2018-07-29 RX ADMIN — ENOXAPARIN SODIUM 40 MILLIGRAM(S): 100 INJECTION SUBCUTANEOUS at 21:56

## 2018-07-29 NOTE — PROGRESS NOTE ADULT - SUBJECTIVE AND OBJECTIVE BOX
Pt seen and examined at [facility].  Pt is a 53F with recent RAH 7/24/18 presents with vaginal bleeding, abdominal pain and constipation. Patient reports she was discharged after her surgery on 7/26, was fine, and had a BM on Thursday. After that patient started to abdominal pain, chills and sweats. This continued into Friday and yesterday patient noted vaginal bleeding so she came to the ER. CT A/P showed possible abscess and was started on Vanco and Zosyn.  She is reporting diffuse abdominal pain; however, worse in the RUQ; currently 4/10 on VNRS. Pain is sharp and stabbing in nature.  She was prescribed Percocet 7.5/325mg QID PRN which she states was ineffective. She was started on IV Ketorolac and PO Hydromorphone which has been effective in reducing her pain by at least 50%. No side effects. She does have a history of illicit substance use. Toxicology screening was completed upon admission which + for cocaine and opiates. Pt states that she had one can of Budweiser on Friday 8/27/18 and used Cocaine on Thursday 8/26. She is currently in court mandated outpatient substance abuse treatment in [place]. She would not divulge the name of the rehab center to me.  She denies any other illicit substance  use.     VSS; afebrile  NAD; resting comfortably in bed  A&Ox3; following commands, speech clear and appropriate  S1S2, RRR  CTAB  +RUQ tenderness  HARRELL, 5/5  +2 pulses x4; warm, acyanotic   Surgical site WDL     A/P: 53F with s/p RAH 7/24;  1. Abdominal abscess, 2. Substance abuse  - Reviewed. Pt receiving intermittent prescriptions for Percocet from Dr. Soares  -Cont. Dilaudid 2/4mg PO Q4h PRN   -Ofirmev 1gm   -Ketorolac 30mg IVP Q6h x4 doses -scheduled   -Will f/u   -[phone]

## 2018-07-29 NOTE — CONSULT NOTE ADULT - PROBLEM SELECTOR RECOMMENDATION 9
Patient is hemodynamically stable. Tachycardia noted, however pulse regular on exam. H/H stable. WBC elevated, previously elevated on preop admission. Lactate 2.4 likely 2/2 to inflammatory postoperative state. No evidence of UTI. Started on IV abx by ED physician. Will hold vancomycin as infection is unlikely based on physical exam and history. Discussed symptoms may be 2/2 to constipation and that pain and vaginal bleeding are to be expected postoperatively. Recommend high fiber diet and continued use of colace, could increase to TID. Will send script. CT abdomen pending. If normal will d/c home. Counselled on avoidance of alcohol, tobacco, and recreational drugs. Instructed to continue taking pain meds as prescribed and to call office Monday to schedule postop appointment next week.

## 2018-07-29 NOTE — H&P ADULT - NSHPLABSRESULTS_GEN_ALL_CORE
LABS:                        11.5   23.1  )-----------( 362      ( 2018 22:41 )             35.0     -    138  |  96<L>  |  5.0<L>  ----------------------------<  114  4.1   |  25.0  |  0.43<L>    Ca    9.5      2018 22:41    TPro  7.3  /  Alb  3.9  /  TBili  0.3<L>  /  DBili  x   /  AST  37<H>  /  ALT  34<H>  /  AlkPhos  95        Urinalysis Basic - ( 2018 00:28 )    Color: Yellow / Appearance: Clear / S.005 / pH: x  Gluc: x / Ketone: Negative  / Bili: Negative / Urobili: Negative mg/dL   Blood: x / Protein: Negative mg/dL / Nitrite: Negative   Leuk Esterase: Trace / RBC: 3-5 /HPF / WBC 0-2   Sq Epi: x / Non Sq Epi: Few / Bacteria: x    Radiology:   < from: CT Abdomen and Pelvis w/ IV Cont (18 @ 01:27) >    EXAM:  CT ABDOMEN AND PELVIS IC                         EXAM:  CT ANGIO CHEST (W)AW IC                          PROCEDURE DATE:  2018          INTERPRETATION:  CT ANGIO CHEST (W)AW IC, CT ABDOMEN AND PELVIS IC     INDICATION: Postoperative. Chest pain and tachycardia.    TECHNIQUE: Contrast enhanced CT imaging of the chest, abdomen and pelvis.   Timing optimized for the pulmonary arteries. Postprocessed MIP   reformatted images were created and reviewed.    95 mL of Omnipaque 350 contrast material was injected IV.    COMPARISON: None.    FINDINGS:      Pulmonary arteries: No filling defects to suggest pulmonary emboli.  Aorta: No aneurysm.    Lines and tubes: None.  Airways: Tracheobronchial tree is patent.  Lungs and Pleura: No pneumonia, pulmonary edema, or dominant masses.   Peripheral ground glass opacities in the lungs likely relate to   atelectasis. There is bibasilar dependent atelectasis.    Lymph nodes: No mediastinal adenopathy. No hilar or axillary adenopathy.  Heart: Normal size. No pericardial effusion.    Liver: No suspicious lesions. There is wedge-shaped hyperenhancement in   the peripheral right hepatic lobe which is likely due to transient   perfusion anomaly. There is diffuse steatosis.  Biliary: No dilatation.      Spleen: No suspicious lesions.      Pancreas: No inflammatory changes or ductal dilatation.      Adrenals: Normal.      Kidneys: No hydronephrosis or solid mass.      Vessels: Normal caliber.        GI tract: No evidence of small bowel obstruction. No wall thickening or   inflammatory changes.        Peritoneum/retroperitoneum and mesentery: No free air. No organized fluid   collection. No adenopathy.        Pelvic organs/Bladder: Patient is status post hysterectomy. There is a   5.3 x 4.6 x 2.6 cm fluid collection in the hysterectomy bed which   demonstrates mild rim enhancement. There are adjacent inflammatory   changes with rectal and sigmoid wall thickening which is likely reactive.   Gas in the urinary bladder likely secondary to recent instrumentation.     Abdominal wall: Unremarkable.  Bones and soft tissues: No destructive lesion.        IMPRESSION:    Negative for pulmonary emboli. No evidence of pneumonia.    Status post hysterectomy with a fluid collection in the history bed, with   mild wall thickening and reactive inflammation. Findings are suspicious   for developing abscess.        EVETTE BONNER M.D., ATTENDING RADIOLOGIST  This document has been electronically signed. 2018  1:38AM    < end of copied text >

## 2018-07-29 NOTE — CONSULT NOTE ADULT - SUBJECTIVE AND OBJECTIVE BOX
HPI:  53y   Last Menstrual Period 20 now POD5 s/p RAPATRICIO presents with vaginal bleeding, abdominal pain and constipation.   She was discharged from hospital on Thursday and had some abdominal pain at home. Percocet not working at all according to patient. She has been using colace twice daily, no bowel movement since Thursday; passing gas regularly. Denies urinary frequency, burning, urgency. She had little vaginal bleeding since d/c home but when she stood up this afternoon felt a "gush of blood run down both legs". Pt has been ambulating at home, walking up stairs though she states she has little support at home from kids and  (on disability). Daughter at bedside states that Diana has been drinking while taking percocet. Pt suffers from anxiety, recently increased Cymbalta dose. Pt does feel hungry No BM since thursday     Home Medications:   · 	 mg oral tablet: Last Dose Taken:  , 1 tab(s) orally every 6 hours  · 	DULoxetine 20 mg oral delayed release capsule: Last Dose Taken:  , 1 cap(s) orally 2 times a day  · 	omeprazole 40 mg oral delayed release capsule: Last Dose Taken:  , 1 cap(s) orally once a day  · 	raNITIdine 300 mg oral tablet: Last Dose Taken:  , 1 tab(s) orally once a day (at bedtime)  · 	atorvastatin 20 mg oral tablet: Last Dose Taken:  , 1 tab(s) orally once a day  · 	DilTIAZem Hydrochloride  mg/24 hours oral capsule, extended release: Last Dose Taken:  , 1 cap(s) orally once a day  · 	Vistaril 25 mg oral capsule: 1 cap(s) orally 4 times a day, As Needed          · 	Vitamin D2 50,000 intl units (1.25 mg) oral capsule: 1 cap(s) orally once a week    OB/GYN HISTORY:   NSVDx2   Surgical History:    History of ectopic pregnancy x2 (2018 03:01)      PAST MEDICAL & SURGICAL HISTORY:  Sleep apnea: does not use the CPAP but use O2 prn  Asthma: controlled on meds  Reflux  Palpitations  SOB (shortness of breath)  Vitamin B 12 deficiency  Seasonal allergies  Ectopic pregnancy  Hypertension  History of ectopic pregnancy  History of bilateral oophorectomies    atorvastatin 20 milliGRAM(s) Oral at bedtime  diltiazem    milliGRAM(s) Oral daily  docusate sodium 100 milliGRAM(s) Oral three times a day  DULoxetine 40 milliGRAM(s) Oral daily  HYDROmorphone   Tablet 2 milliGRAM(s) Oral every 4 hours PRN  HYDROmorphone   Tablet 4 milliGRAM(s) Oral every 4 hours PRN  ketorolac   Injectable 15 milliGRAM(s) IV Push every 6 hours  LORazepam   Injectable 0.5 milliGRAM(s) IV Push every 8 hours  pantoprazole    Tablet 40 milliGRAM(s) Oral before breakfast  potassium chloride    Tablet ER 10 milliEquivalent(s) Oral once  sodium chloride 0.9% 1000 milliLiter(s) IV Continuous <Continuous>  sodium chloride 0.9%. 1000 milliLiter(s) IV Continuous <Continuous>    MEDICATIONS  (STANDING):  atorvastatin 20 milliGRAM(s) Oral at bedtime  diltiazem    milliGRAM(s) Oral daily  docusate sodium 100 milliGRAM(s) Oral three times a day  DULoxetine 40 milliGRAM(s) Oral daily  ketorolac   Injectable 15 milliGRAM(s) IV Push every 6 hours  LORazepam   Injectable 0.5 milliGRAM(s) IV Push every 8 hours  pantoprazole    Tablet 40 milliGRAM(s) Oral before breakfast  potassium chloride    Tablet ER 10 milliEquivalent(s) Oral once  sodium chloride 0.9% 1000 milliLiter(s) (60 mL/Hr) IV Continuous <Continuous>  sodium chloride 0.9%. 1000 milliLiter(s) (60 mL/Hr) IV Continuous <Continuous>    MEDICATIONS  (PRN):  HYDROmorphone   Tablet 2 milliGRAM(s) Oral every 4 hours PRN Moderate Pain (4 - 6)  HYDROmorphone   Tablet 4 milliGRAM(s) Oral every 4 hours PRN Severe Pain (7 - 10)      Allergies    No Known Allergies    Intolerances        SOCIAL HISTORY:  + Smoking and Occ Beers  FAMILY HISTORY:  Family history of CABG  Hypertension  Family history of malignant neoplasm of kidney  CAD (coronary artery disease)      LABS:                        11.0   16.3  )-----------( 321      ( 2018 08:39 )             33.4     07-    139  |  101  |  6.0<L>  ----------------------------<  107  3.4<L>   |  25.0  |  0.47<L>    Ca    8.4<L>      2018 08:39    TPro  7.3  /  Alb  3.9  /  TBili  0.3<L>  /  DBili  x   /  AST  37<H>  /  ALT  34<H>  /  AlkPhos  95  07-      Urinalysis Basic - ( 2018 00:28 )    Color: Yellow / Appearance: Clear / S.005 / pH: x  Gluc: x / Ketone: Negative  / Bili: Negative / Urobili: Negative mg/dL   Blood: x / Protein: Negative mg/dL / Nitrite: Negative   Leuk Esterase: Trace / RBC: 3-5 /HPF / WBC 0-2   Sq Epi: x / Non Sq Epi: Few / Bacteria: x          ROS  - Headache  - Neck Stiffness  - Chest Pain  - SOB  + Abd pain  + Pelvic Pain  - Leg Pain  - Head Ache      Vital Signs Last 24 Hrs  T(C): 36.8 (2018 07:41), Max: 36.8 (2018 21:58)  T(F): 98.3 (2018 07:41), Max: 98.3 (2018 21:58)  HR: 94 (2018 07:41) (94 - 128)  BP: 114/67 (2018 07:41) (103/60 - 114/67)  BP(mean): --  RR: 20 (2018 07:41) (18 - 20)  SpO2: 99% (2018 07:41) (98% - 99%)      HEENT: PEARLA  Neck: Supple  Cardio: S1 S2 No Murmur  Pulm: CTA No Rales or Ronchi  Abd: Soft mod diffuse tenderness worse in the pelvic area, ND BS+   Rectal Pelvic Breast- refused  Ext: No DCT  Skin: No Rash  Neuro: Awake Pleasant        Pelvic Collection v Abscess - IV antibiotics, IV hydration, Gyn Onc  Asthma - duoneb  Reflux - PPI  Hypertension - meds with parameters

## 2018-07-29 NOTE — PROGRESS NOTE ADULT - ATTENDING COMMENTS
Patient seen and examined.  When I arrived in the ED to see her, she was sleeping comfortably and in no distress.  When I gently woke her up, she was in 10/10 pain and could not lay still.  She describes diffuse abdominal pains and back pains.  She has not had a BM since Thursday. She denies fever, SOB, CP or RODRIGEZ.  She reports to have consumed beer yesterday.  She denies any illicit drug use but reports to have had a history of illicit drug use "a long time ago".    On examination, she is afebrile and her tachycardia has resolved.  Her vitals are otherwise stable.  She has not had any fevers.  She is a WDWN female with a nonfocal neurological exam, no icteric sclera, normal cardiac and pulmonary exam.  Her abdomen is mildly distended but the patient wont allow you to do an abdominal exam for fear of pain.  On distraction, I was able to palpate the abdomen and there is no rebound or guarding.  Her incisions look good and are without signs of hematoma, infection, or hernia.  Her lower extremities are normal.    A pelvic exam could not be done s the patient refused.    Her CT was reviewed by me which shows a small collection in the pelvis.  The radiologist included abscess in the differential.  Her CBC from this morning is pending.  Her WBC was elevated but it was also elevated heriberto-operatively.    In summary, this patient has an inappropriate amount of pain for the routine expected post-operative course for a robotic hysterectomy and a CT which shows a collection in the pelvis (possible abscess) and an elevated WBC.  The patient does not appear to have a surgical abdomen and her CT does not demonstrate a condition warranting urgent surgical intervention.    The plan is to admit her to the hospital.  ID has been consulted for abx recommendations.  Will obtain a toxicology screen and start a "banana" bag as well as ativan.  Will keep NPO for now until her clinical picture is more clear.  Will consult pain management to address her pain.  Will also give an enema for constipation to see if that helps with her symptoms.  Will also consult medicine for her medical issues.    All of the above discussed with the patient and her nurse in detail. Patient seen and examined.  When I arrived in the ED to see her, she was sleeping comfortably and in no distress.  When I gently woke her up, she was in 10/10 pain and could not lay still.  She describes diffuse abdominal pains and back pains.  She has not had a BM since Thursday. She denies fever, SOB, CP or RODRIGEZ.  She reports to have consumed beer yesterday.  She denies any illicit drug use but reports to have had a history of illicit drug use "a long time ago".    On examination, she is afebrile and her tachycardia has resolved.  Her vitals are otherwise stable.  She has not had any fevers.  She is a WDWN female with a nonfocal neurological exam, no icteric sclera, normal cardiac and pulmonary exam.  Her abdomen is mildly distended but the patient wont allow you to do an abdominal exam for fear of pain.  On distraction, I was able to palpate the abdomen and there is no rebound or guarding.  Her incisions look good and are without signs of hematoma, infection, or hernia.  Her lower extremities are normal.    A pelvic exam could not be done as the patient refused.    Her CT was reviewed by me which shows a small collection in the pelvis.  The radiologist included abscess in the differential.  Her CBC from this morning is pending.  Her WBC was elevated but it was also elevated heriberto-operatively.  A UA shows large blood.    In summary, this patient has an inappropriate amount of pain for the routine expected post-operative course for a robotic hysterectomy and a CT which shows a collection in the pelvis (possible abscess) and an elevated WBC.  The patient does not appear to have a surgical abdomen and her CT does not demonstrate a condition warranting urgent surgical intervention.  She has hematuria on a UA but this was from a patient collected specimen and she has known vaginal bleeding.    The plan is to admit her to the hospital.  ID has been consulted for abx recommendations.  Will obtain a toxicology screen and start a "banana" bag as well as ativan.  Will keep NPO for now until her clinical picture is more clear.  Will consult pain management to address her pain.  Will also give an enema for constipation to see if that helps with her symptoms.  Will also consult medicine for her medical issues.  Will get a straight cath urinalysis given blood in her urine (I believe this is likely vaginal bleeding).    All of the above discussed with the patient and her nurse in detail.

## 2018-07-29 NOTE — CONSULT NOTE ADULT - SUBJECTIVE AND OBJECTIVE BOX
GYNECOLOGIC ONCOLOGY CONSULT NOTE    53y   Last Menstrual Period 20 now POD5 s/p MODE presents with vaginal bleeding, abdominal pain and constipation.   She was discharged from hospital on Thursday and had some abdominal pain at home. Percocet not working at all according to patient. She has been using colace twice daily, no bowel movement since Thursday; passing gas regularly. Denies urinary frequency, burning, urgency. She had little vaginal bleeding since d/c home but when she stood up this afternoon felt a "gush of blood run down both legs". Pt has been ambulating at home, walking up stairs though she states she has little support at home from kids and  (on disability). Daughter at bedside states that Diana has been drinking while taking percocet- pt admits to having 2 beers on Friday night along with smoking cigarettes. Pt suffers from anxiety, recently increased Symbalta dose.     OB/GYN HISTORY:   NSVDx2   Surgical History:    History of ectopic pregnancy  History of bilateral oophorectomies      Past Medical History:   Sleep apnea  Asthma  Reflux  Palpitations  SOB (shortness of breath)  Vitamin B 12 deficiency  Seasonal allergies  Anemia  Ectopic pregnancy  Hypertension    No Known Allergies    FAMILY HISTORY:  Family history of CABG  Hypertension  Family history of malignant neoplasm of kidney  CAD (coronary artery disease)      Social History: smokes cigarettes, drinks regularly     REVIEW OF SYSTEMS:    CONSTITUTIONAL: No fever, weight loss, or fatigue  RESPIRATORY: No cough. No shortness of breath  CARDIOVASCULAR: No chest pain, palpitations, dizziness, or leg swelling  GASTROINTESTINAL: No nausea, vomiting, or hematemesis; No diarrhea. + constipation. No melena or hematochezia.  GENITOURINARY: No dysuria, frequency, hematuria, or incontinence  PSYCHIATRIC: +anxiety    MEDICATIONS  (STANDING):  vancomycin  IVPB 1000 milliGRAM(s) IV Intermittent once        OBJECTIVE FINDINGS:    Vital Signs Last 24 Hrs  T(C): 36.8 (2018 21:58), Max: 36.8 (2018 21:58)  T(F): 98.3 (2018 21:58), Max: 98.3 (2018 21:58)  HR: 128 (2018 21:58) (128 - 128)  BP: 103/60 (2018 21:58) (103/60 - 103/60)  RR: 20 (2018 21:58) (20 - 20)  SpO2: 98% (2018 21:58) (98% - 98%)    PHYSICAL EXAM:    GENERAL: NAD, well-developed  HEAD:  Atraumatic, Normocephalic  NERVOUS SYSTEM:  Alert & Oriented X3  CHEST/LUNG: Clear to auscultation bilaterally  HEART: Regular rate and rhythm  ABDOMEN: Soft, mildly tender, Nondistended; Bowel sounds present, No rebound, No guarding  EXTREMITIES:  No edema, Jasper's sign negative    PELVIC:   Minimal dark red spotting seen on gentle speculum exam.   Hard stool felt palpated     LABS:                        11.5   23.1  )-----------( 362      ( 2018 22:41 )             35.0     07-28    138  |  96<L>  |  5.0<L>  ----------------------------<  114  4.1   |  25.0  |  0.43<L>    Ca    9.5      2018 22:41    TPro  7.3  /  Alb  3.9  /  TBili  0.3<L>  /  DBili  x   /  AST  37<H>  /  ALT  34<H>  /  AlkPhos  95  07-28      Urinalysis Basic - ( 2018 00:28 )    Color: Yellow / Appearance: Clear / S.005 / pH: x  Gluc: x / Ketone: Negative  / Bili: Negative / Urobili: Negative mg/dL   Blood: x / Protein: Negative mg/dL / Nitrite: Negative   Leuk Esterase: Trace / RBC: 3-5 /HPF / WBC 0-2   Sq Epi: x / Non Sq Epi: Few / Bacteria: x        RADIOLOGY & ADDITIONAL STUDIES:

## 2018-07-29 NOTE — H&P ADULT - PROBLEM SELECTOR PLAN 1
Patient is hemodynamically stable. Tachycardia noted, however pulse regular on exam. H/H stable. WBC elevated, however previously elevated on preop admission. Neutrophil 86%, Lactate 2.4. No evidence of UTI. Started on IV abx by ED physician, William. Will consult Infectious Disease today. Regular diet

## 2018-07-29 NOTE — CONSULT NOTE ADULT - PROBLEM SELECTOR RECOMMENDATION 9
CT with intra-abd abscess  Needs drainage  Will Do Blood cultures  Start Zosyn  Obtain culture from drainage

## 2018-07-29 NOTE — CONSULT NOTE ADULT - PROBLEM SELECTOR RECOMMENDATION 2
Patient came in with leukocytosis, tachycardia and lactic acidosis  Trend Leukocytosis  Trend Lactic acid

## 2018-07-29 NOTE — PROGRESS NOTE ADULT - SUBJECTIVE AND OBJECTIVE BOX
GYNECOLOGIC ONCOLOGY PROGRESS NOTE    POD#5, HOD #2    PROBLEMS:  Intra-abdominal abscess post-procedure, initial encounter  Abnormal uterine bleeding (AUB), Fibroids.  GERD  HTN  HLD  Depression  Sleep apnea  Asthma    Pt seen and examined at bedside.     SUBJECTIVE:    Patients reports abdominal pain still present, however controlled with IV dilaudid.  Flatus: yes BM:No  Denies Nausea, Vomiting or Diarrhea.  Denies shortness of breath, chest pain or dyspnea on exertion.  Tolerating diet.    OBJECTIVE:     VITALS:  T(F): 98.3 (07-29-18 @ 07:41), Max: 98.3 (07-28-18 @ 21:58)  HR: 94 (07-29-18 @ 07:41) (94 - 128)  BP: 114/67 (07-29-18 @ 07:41) (103/60 - 114/67)  RR: 20 (07-29-18 @ 07:41) (18 - 20)  SpO2: 99% (07-29-18 @ 07:41) (98% - 99%)        MEDICATIONS  (STANDING):  atorvastatin 20 milliGRAM(s) Oral at bedtime  diltiazem    milliGRAM(s) Oral daily  docusate sodium 100 milliGRAM(s) Oral three times a day  DULoxetine 40 milliGRAM(s) Oral daily  ketorolac   Injectable 15 milliGRAM(s) IV Push every 6 hours  lactated ringers. 1000 milliLiter(s) (60 mL/Hr) IV Continuous <Continuous>  pantoprazole    Tablet 40 milliGRAM(s) Oral before breakfast    MEDICATIONS  (PRN):  HYDROmorphone   Tablet 2 milliGRAM(s) Oral every 4 hours PRN Moderate Pain (4 - 6)  HYDROmorphone   Tablet 4 milliGRAM(s) Oral every 4 hours PRN Severe Pain (7 - 10)      Physical Exam:  Constitutional: NAD  Pulmonary: clear to auscultation bilaterally   Cardiovascular: Regular rate and rhythm   Abdomen: soft, non-tender, non-distended, normal bowel sounds  Extremities: no lower extremity edema or calve tenderness, Jasper's sign negative.  Incision: Clean, dry, intact.  Without signs of infection or hernia.      LABS:             AM labs pending

## 2018-07-29 NOTE — CONSULT NOTE ADULT - SUBJECTIVE AND OBJECTIVE BOX
[facility]  INFECTIOUS DISEASES AND INTERNAL MEDICINE at [place]  =======================================================  Aayush Peña MD  Diplomates American Board of Internal Medicine and Infectious Diseases  =======================================================      [facility]-[id number]  JAHAIRA MACKEY     CC: Patient is a 53y old  Female who presents with a chief complaint of abdominal pain (2018 03:01)    54y/o  Female with recent RAH 20 presents with vaginal bleeding, abdominal pain and constipation. Patient reports she was discharged after her surgery on , was fine, and had a BM on Thursday. After that patient started to abdominal pain, chills and sweats. This continued into Friday and yesterday patient noted vaginal bleeding so she came to the ER. In the ER patient was noted to be tachycardic, Leukocytosis to 23k, Lactic acidosis to 2.4 and CT A/P with possible abscess. GYN was consulted and patient given 1 dose of Vancomycin and Zosyn with no cultures done. Not continued on antibiotics on admission. ID input requested.       Past Medical & Surgical Hx:  Sleep apnea: does not use the CPAP but use O2 prn  Asthma: controlled on meds  Reflux  Palpitations  SOB (shortness of breath)  Vitamin B 12 deficiency  Seasonal allergies  Ectopic pregnancy  Hypertension  History of ectopic pregnancy  History of bilateral oophorectomies      Social Hx:  + smoking. Drinks ETOH       FAMILY HISTORY:  Family history of CABG  Hypertension  Family history of malignant neoplasm of kidney  CAD (coronary artery disease)      Allergies  No Known Allergies      ANTIBIOTICS:   None       REVIEW OF SYSTEMS:  CONSTITUTIONAL:  + chills + Sweats  HEENT:  No diplopia or blurred vision.  No earache, sore throat or runny nose.  CARDIOVASCULAR:  No pressure, squeezing, strangling, tightness, heaviness or aching about the chest, neck, axilla or epigastrium.  RESPIRATORY:  No cough, shortness of breath  GASTROINTESTINAL:  No nausea, vomiting or diarrhea. + Abdominal pain  GENITOURINARY:  No dysuria, frequency or urgency. + Flank pain  MUSCULOSKELETAL:  no joint aches, no muscle pain  SKIN:  No change in skin, hair or nails.  NEUROLOGIC:  No paresthesias, fasciculations  PSYCHIATRIC:  No disorder of thought or mood.  ENDOCRINE:  No heat or cold intolerance  HEMATOLOGICAL:  No easy bruising or bleeding.       Physical Exam:  Vital Signs Last 24 Hrs  T(C): 36.8 (2018 07:41), Max: 36.8 (2018 21:58)  T(F): 98.3 (2018 07:41), Max: 98.3 (2018 21:58)  HR: 94 (2018 07:41) (94 - 128)  BP: 114/67 (2018 07:41) (103/60 - 114/67)  RR: 20 (2018 07:41) (18 - 20)  SpO2: 99% (2018 07:41) (98% - 99%)  Height (cm): 167.64 ( @ 21:57)  Weight (kg): 81.6 ( @ 21:57)  BMI (kg/m2): 29 ( @ 21:57)  BSA (m2): 1.91 ( @ 21:57)      GEN: NAD, pleasant  HEENT: normocephalic and atraumatic. EOMI. PERRL.    NECK: Supple.   LUNGS: Clear to auscultation.  HEART: Regular rate and rhythm   ABDOMEN: Soft.  Positive bowel sounds.  + Tenderness to palpation  : + B/L CVA tenderness  EXTREMITIES: Without any edema.  MSK: No joint swelling  NEUROLOGIC: No focal deficits  PSYCHIATRIC: Appropriate affect .  SKIN: No rash      Labs:      139  |  101  |  6.0<L>  ----------------------------<  107  3.4<L>   |  25.0  |  0.47<L>    Ca    8.4<L>      2018 08:39    TPro  7.3  /  Alb  3.9  /  TBili  0.3<L>  /  DBili  x   /  AST  37<H>  /  ALT  34<H>  /  AlkPhos  95                            11.0   16.3  )-----------( 321      ( 2018 08:39 )             33.4     Urinalysis Basic - ( 2018 00:28 )    Color: Yellow / Appearance: Clear / S.005 / pH: x  Gluc: x / Ketone: Negative  / Bili: Negative / Urobili: Negative mg/dL   Blood: x / Protein: Negative mg/dL / Nitrite: Negative   Leuk Esterase: Trace / RBC: 3-5 /HPF / WBC 0-2   Sq Epi: x / Non Sq Epi: Few / Bacteria: x      LIVER FUNCTIONS - ( 2018 22:41 )  Alb: 3.9 g/dL / Pro: 7.3 g/dL / ALK PHOS: 95 U/L / ALT: 34 U/L / AST: 37 U/L / GGT: x                 EXAM:  CT ABDOMEN AND PELVIS IC                        EXAM:  CT ANGIO CHEST (W)AW IC                        PROCEDURE DATE:  2018    INTERPRETATION:  CT ANGIO CHEST (W)AW IC, CT ABDOMEN AND PELVIS IC   INDICATION: Postoperative. Chest pain and tachycardia.  TECHNIQUE: Contrast enhanced CT imaging of the chest, abdomen and pelvis.   Timing optimized for the pulmonary arteries. Postprocessed MIP   reformatted images were created and reviewed.  95 mL of Omnipaque 350 contrast material was injected IV.  COMPARISON: None.  FINDINGS:    Pulmonary arteries: No filling defects to suggest pulmonary emboli.  Aorta: No aneurysm.  Lines and tubes: None.  Airways: Tracheobronchial tree is patent.  Lungs and Pleura: No pneumonia, pulmonary edema, or dominant masses.   Peripheral ground glass opacities in the lungs likely relate to   atelectasis. There is bibasilar dependent atelectasis.  Lymph nodes: No mediastinal adenopathy. No hilar or axillary adenopathy.  Heart: Normal size. No pericardial effusion.  Liver: No suspicious lesions. There is wedge-shaped hyperenhancement in   the peripheral right hepatic lobe which is likely due to transient   perfusion anomaly. There is diffuse steatosis.  Biliary: No dilatation.      Spleen: No suspicious lesions.      Pancreas: No inflammatory changes or ductal dilatation.      Adrenals: Normal.      Kidneys: No hydronephrosis or solid mass.      Vessels: Normal caliber.      GI tract: No evidence of small bowel obstruction. No wall thickening or   inflammatory changes.      Peritoneum/retroperitoneum and mesentery: No free air. No organized fluid   collection. No adenopathy.      Pelvic organs/Bladder: Patient is status post hysterectomy. There is a   5.3 x 4.6 x 2.6 cm fluid collection in the hysterectomy bed which   demonstrates mild rim enhancement. There are adjacent inflammatory   changes with rectal and sigmoid wall thickening which is likely reactive.   Gas in the urinary bladder likely secondary to recent instrumentation.   Abdominal wall: Unremarkable.  Bones and soft tissues: No destructive lesion.      IMPRESSION:  Negative for pulmonary emboli. No evidence of pneumonia.  Status post hysterectomy with a fluid collection in the history bed, with   mild wall thickening and reactive inflammation. Findings are suspicious   for developing abscess.

## 2018-07-29 NOTE — ED ADULT NURSE REASSESSMENT NOTE - NS ED NURSE REASSESS COMMENT FT1
Patient recd this morning, A/Ox3, VSS, reports she is still having abdominal pain, denies vomiting or diarrhea, discussed plan of care with patient, will continue to monitor.

## 2018-07-29 NOTE — PROGRESS NOTE ADULT - PROBLEM SELECTOR PLAN 1
Received IV zosyn and Vancomycin x 1 in ED.  Awaiting ID consult.  Continue IVF  Reg low sodium diet  SCD's for DVT prophylaxis  Home meds resumed  Continue Colace to avoid straining with BM's.

## 2018-07-29 NOTE — H&P ADULT - PROBLEM SELECTOR PLAN 2
H/H stable. Postoperative constipation could be contributing to postoperative discomfort. Recommend high fiber diet and continued use of colace. Counselled on avoidance of alcohol, tobacco, and recreational drugs especially with concurrent use of opioids.

## 2018-07-29 NOTE — H&P ADULT - NSHPPHYSICALEXAM_GEN_ALL_CORE
Vital Signs Last 24 Hrs  T(C): 36.8 (28 Jul 2018 21:58), Max: 36.8 (28 Jul 2018 21:58)  T(F): 98.3 (28 Jul 2018 21:58), Max: 98.3 (28 Jul 2018 21:58)  HR: 128 (28 Jul 2018 21:58) (128 - 128)  BP: 103/60 (28 Jul 2018 21:58) (103/60 - 103/60)  RR: 20 (28 Jul 2018 21:58) (20 - 20)  SpO2: 98% (28 Jul 2018 21:58) (98% - 98%)    GENERAL: NAD, well-developed  HEAD:  Atraumatic, Normocephalic  NERVOUS SYSTEM:  Alert & Oriented X3  CHEST/LUNG: Clear to auscultation bilaterally  HEART: Regular rate and rhythm  ABDOMEN: Soft, mildly tender, Nondistended; Bowel sounds present, No rebound, No guarding  EXTREMITIES:  No edema, Jasper's sign negative    PELVIC:   Minimal dark red spotting seen on gentle speculum exam.   Hard stool felt palpated

## 2018-07-29 NOTE — H&P ADULT - HISTORY OF PRESENT ILLNESS
53y   Last Menstrual Period 20 now POD5 s/p RAPATRICIO presents with vaginal bleeding, abdominal pain and constipation.   She was discharged from hospital on Thursday and had some abdominal pain at home. Percocet not working at all according to patient. She has been using colace twice daily, no bowel movement since Thursday; passing gas regularly. Denies urinary frequency, burning, urgency. She had little vaginal bleeding since d/c home but when she stood up this afternoon felt a "gush of blood run down both legs". Pt has been ambulating at home, walking up stairs though she states she has little support at home from kids and  (on disability). Daughter at bedside states that Diana has been drinking while taking percocet- pt admits to having 2 beers on Friday night along with smoking cigarettes. Pt suffers from anxiety, recently increased Symbalta dose.     OB/GYN HISTORY:   NSVDx2   Surgical History:    History of ectopic pregnancy x2

## 2018-07-29 NOTE — CONSULT NOTE ADULT - ASSESSMENT
53y   Last Menstrual Period 20 now POD5 s/p RAH presents with vaginal bleeding, abdominal pain and constipation.

## 2018-07-29 NOTE — H&P ADULT - FAMILY HISTORY
Family history of malignant neoplasm of kidney     Father  Still living? Unknown  CAD (coronary artery disease), Age at diagnosis: Age Unknown  Hypertension, Age at diagnosis: Age Unknown  Family history of CABG, Age at diagnosis: Age Unknown

## 2018-07-29 NOTE — PATIENT PROFILE ADULT. - FUNCTIONAL SCREEN CURRENT LEVEL: AMBULATION, MLM
Chief Complaint   Patient presents with     RECHECK     Follow up hemangioma      Wt 24 lb 9.3 oz (11.1 kg)  Nishi Guy LPN    
(2) assistive person

## 2018-07-30 DIAGNOSIS — K59.00 CONSTIPATION, UNSPECIFIED: ICD-10-CM

## 2018-07-30 DIAGNOSIS — R10.9 UNSPECIFIED ABDOMINAL PAIN: ICD-10-CM

## 2018-07-30 LAB
ALBUMIN SERPL ELPH-MCNC: 2.8 G/DL — LOW (ref 3.3–5.2)
ANION GAP SERPL CALC-SCNC: 13 MMOL/L — SIGNIFICANT CHANGE UP (ref 5–17)
BASOPHILS # BLD AUTO: 0 K/UL — SIGNIFICANT CHANGE UP (ref 0–0.2)
BASOPHILS NFR BLD AUTO: 0.1 % — SIGNIFICANT CHANGE UP (ref 0–2)
BUN SERPL-MCNC: 5 MG/DL — LOW (ref 8–20)
CALCIUM SERPL-MCNC: 8.5 MG/DL — LOW (ref 8.6–10.2)
CHLORIDE SERPL-SCNC: 100 MMOL/L — SIGNIFICANT CHANGE UP (ref 98–107)
CO2 SERPL-SCNC: 24 MMOL/L — SIGNIFICANT CHANGE UP (ref 22–29)
CREAT SERPL-MCNC: 0.38 MG/DL — LOW (ref 0.5–1.3)
EOSINOPHIL # BLD AUTO: 0.3 K/UL — SIGNIFICANT CHANGE UP (ref 0–0.5)
EOSINOPHIL NFR BLD AUTO: 1.6 % — SIGNIFICANT CHANGE UP (ref 0–6)
GLUCOSE SERPL-MCNC: 97 MG/DL — SIGNIFICANT CHANGE UP (ref 70–115)
HCT VFR BLD CALC: 32.7 % — LOW (ref 37–47)
HGB BLD-MCNC: 10.4 G/DL — LOW (ref 12–16)
LYMPHOCYTES # BLD AUTO: 1.3 K/UL — SIGNIFICANT CHANGE UP (ref 1–4.8)
LYMPHOCYTES # BLD AUTO: 7.6 % — LOW (ref 20–55)
MCHC RBC-ENTMCNC: 30.1 PG — SIGNIFICANT CHANGE UP (ref 27–31)
MCHC RBC-ENTMCNC: 31.8 G/DL — LOW (ref 32–36)
MCV RBC AUTO: 94.8 FL — SIGNIFICANT CHANGE UP (ref 81–99)
MONOCYTES # BLD AUTO: 0.8 K/UL — SIGNIFICANT CHANGE UP (ref 0–0.8)
MONOCYTES NFR BLD AUTO: 4.6 % — SIGNIFICANT CHANGE UP (ref 3–10)
NEUTROPHILS # BLD AUTO: 14.3 K/UL — HIGH (ref 1.8–8)
NEUTROPHILS NFR BLD AUTO: 85.7 % — HIGH (ref 37–73)
PLATELET # BLD AUTO: 320 K/UL — SIGNIFICANT CHANGE UP (ref 150–400)
POTASSIUM SERPL-MCNC: 3.8 MMOL/L — SIGNIFICANT CHANGE UP (ref 3.5–5.3)
POTASSIUM SERPL-SCNC: 3.8 MMOL/L — SIGNIFICANT CHANGE UP (ref 3.5–5.3)
RBC # BLD: 3.45 M/UL — LOW (ref 4.4–5.2)
RBC # FLD: 13.7 % — SIGNIFICANT CHANGE UP (ref 11–15.6)
SODIUM SERPL-SCNC: 137 MMOL/L — SIGNIFICANT CHANGE UP (ref 135–145)
WBC # BLD: 16.7 K/UL — HIGH (ref 4.8–10.8)
WBC # FLD AUTO: 16.7 K/UL — HIGH (ref 4.8–10.8)

## 2018-07-30 PROCEDURE — 99232 SBSQ HOSP IP/OBS MODERATE 35: CPT

## 2018-07-30 RX ORDER — ENOXAPARIN SODIUM 100 MG/ML
40 INJECTION SUBCUTANEOUS DAILY
Qty: 0 | Refills: 0 | Status: DISCONTINUED | OUTPATIENT
Start: 2018-07-30 | End: 2018-08-03

## 2018-07-30 RX ADMIN — PIPERACILLIN AND TAZOBACTAM 25 GRAM(S): 4; .5 INJECTION, POWDER, LYOPHILIZED, FOR SOLUTION INTRAVENOUS at 21:32

## 2018-07-30 RX ADMIN — Medication 360 MILLIGRAM(S): at 05:37

## 2018-07-30 RX ADMIN — HYDROMORPHONE HYDROCHLORIDE 4 MILLIGRAM(S): 2 INJECTION INTRAMUSCULAR; INTRAVENOUS; SUBCUTANEOUS at 20:53

## 2018-07-30 RX ADMIN — Medication 100 MILLIGRAM(S): at 11:52

## 2018-07-30 RX ADMIN — Medication 30 MILLIGRAM(S): at 05:40

## 2018-07-30 RX ADMIN — ENOXAPARIN SODIUM 40 MILLIGRAM(S): 100 INJECTION SUBCUTANEOUS at 11:52

## 2018-07-30 RX ADMIN — Medication 30 MILLIGRAM(S): at 11:51

## 2018-07-30 RX ADMIN — Medication 0.5 MILLIGRAM(S): at 05:37

## 2018-07-30 RX ADMIN — SODIUM CHLORIDE 60 MILLILITER(S): 9 INJECTION INTRAMUSCULAR; INTRAVENOUS; SUBCUTANEOUS at 05:36

## 2018-07-30 RX ADMIN — Medication 30 MILLIGRAM(S): at 12:30

## 2018-07-30 RX ADMIN — PANTOPRAZOLE SODIUM 40 MILLIGRAM(S): 20 TABLET, DELAYED RELEASE ORAL at 05:37

## 2018-07-30 RX ADMIN — Medication 30 MILLIGRAM(S): at 18:55

## 2018-07-30 RX ADMIN — Medication 30 MILLIGRAM(S): at 05:37

## 2018-07-30 RX ADMIN — PIPERACILLIN AND TAZOBACTAM 25 GRAM(S): 4; .5 INJECTION, POWDER, LYOPHILIZED, FOR SOLUTION INTRAVENOUS at 15:11

## 2018-07-30 RX ADMIN — PIPERACILLIN AND TAZOBACTAM 25 GRAM(S): 4; .5 INJECTION, POWDER, LYOPHILIZED, FOR SOLUTION INTRAVENOUS at 05:37

## 2018-07-30 RX ADMIN — ATORVASTATIN CALCIUM 20 MILLIGRAM(S): 80 TABLET, FILM COATED ORAL at 20:23

## 2018-07-30 RX ADMIN — DULOXETINE HYDROCHLORIDE 40 MILLIGRAM(S): 30 CAPSULE, DELAYED RELEASE ORAL at 11:52

## 2018-07-30 RX ADMIN — HYDROMORPHONE HYDROCHLORIDE 4 MILLIGRAM(S): 2 INJECTION INTRAMUSCULAR; INTRAVENOUS; SUBCUTANEOUS at 12:30

## 2018-07-30 RX ADMIN — Medication 30 MILLIGRAM(S): at 19:10

## 2018-07-30 RX ADMIN — Medication 100 MILLIGRAM(S): at 20:23

## 2018-07-30 RX ADMIN — Medication 100 MILLIGRAM(S): at 05:37

## 2018-07-30 RX ADMIN — HYDROMORPHONE HYDROCHLORIDE 4 MILLIGRAM(S): 2 INJECTION INTRAMUSCULAR; INTRAVENOUS; SUBCUTANEOUS at 20:23

## 2018-07-30 RX ADMIN — HYDROMORPHONE HYDROCHLORIDE 4 MILLIGRAM(S): 2 INJECTION INTRAMUSCULAR; INTRAVENOUS; SUBCUTANEOUS at 11:52

## 2018-07-30 RX ADMIN — Medication 1 ENEMA: at 11:52

## 2018-07-30 NOTE — PROGRESS NOTE ADULT - SUBJECTIVE AND OBJECTIVE BOX
[facility]  INFECTIOUS DISEASES AND INTERNAL MEDICINE at [place]  =======================================================  Aayush Peña MD  Diplomates American Board of Internal Medicine and Infectious Diseases  =======================================================    JAHAIRA MACKEY [id number]    Follow up: pelvic collection    Allergies:  No Known Allergies      Medications:  acetaminophen  IVPB. 1000 milliGRAM(s) IV Intermittent once  ALBUTerol/ipratropium for Nebulization 3 milliLiter(s) Nebulizer every 6 hours PRN  atorvastatin 20 milliGRAM(s) Oral at bedtime  diltiazem    milliGRAM(s) Oral daily  docusate sodium 100 milliGRAM(s) Oral three times a day  DULoxetine 40 milliGRAM(s) Oral daily  enoxaparin Injectable 40 milliGRAM(s) SubCutaneous daily  HYDROmorphone   Tablet 2 milliGRAM(s) Oral every 4 hours PRN  HYDROmorphone   Tablet 4 milliGRAM(s) Oral every 4 hours PRN  ketorolac   Injectable 30 milliGRAM(s) IV Push every 6 hours  pantoprazole    Tablet 40 milliGRAM(s) Oral before breakfast  piperacillin/tazobactam IVPB. 3.375 Gram(s) IV Intermittent every 8 hours  sodium chloride 0.9%. 1000 milliLiter(s) IV Continuous <Continuous>    SOCIAL       FAMILY   FAMILY HISTORY:  Family history of CABG  Hypertension  Family history of malignant neoplasm of kidney  CAD (coronary artery disease)    REVIEW OF SYSTEMS:  CONSTITUTIONAL:  No Fever or chills  HEENT:   No diplopia or blurred vision.  No earache, sore throat or runny nose.  CARDIOVASCULAR:  No pressure, squeezing, strangling, tightness, heaviness or aching about the chest, neck, axilla or epigastrium.  RESPIRATORY:  No cough, shortness of breath, PND or orthopnea.  GASTROINTESTINAL:  No nausea, vomiting or diarrhea.  GENITOURINARY:  No dysuria, frequency or urgency. No Blood in urine  MUSCULOSKELETAL:   AS PER HPI  SKIN:  No change in skin, hair or nails.  NEUROLOGIC:  No paresthesias, fasciculations, seizures or weakness.  PSYCHIATRIC:  No disorder of thought or mood.  ENDOCRINE:  No heat or cold intolerance, polyuria or polydipsia.  HEMATOLOGICAL:  No easy bruising or bleeding.            Physical Exam:  ICU Vital Signs Last 24 Hrs  T(C): 36.3 (2018 08:10), Max: 37.2 (2018 16:29)  T(F): 97.4 (2018 08:10), Max: 98.9 (2018 16:29)  HR: 100 (2018 08:10) (84 - 100)  BP: 120/70 (2018 08:10) (108/69 - 131/74)  BP(mean): --  ABP: --  ABP(mean): --  RR: 18 (2018 08:10) (18 - 18)  SpO2: 98% (2018 08:10) (98% - 100%)    GEN: NAD, pleasant  HEENT: normocephalic and atraumatic. EOMI. GUCCI.    NECK: Supple. No carotid bruits.  No lymphadenopathy or thyromegaly.  LUNGS: Clear to auscultation.  HEART: Regular rate and rhythm without murmur.  ABDOMEN: Soft, nontender, and nondistended.  Positive bowel sounds.    : No CVA tenderness  EXTREMITIES: Without any cyanosis, clubbing, rash, lesions or edema.  MSK: no joint swelling  NEUROLOGIC: Cranial nerves II through XII are grossly intact.  PSYCHIATRIC: Appropriate affect .  SKIN: No ulceration or induration present.        Labs:      137  |  100  |  5.0<L>  ----------------------------<  97  3.8   |  24.0  |  0.38<L>    Ca    8.5<L>      2018 07:34    TPro  x   /  Alb  2.8<L>  /  TBili  x   /  DBili  x   /  AST  x   /  ALT  x   /  AlkPhos  x                             10.4   16.7  )-----------( 320      ( 2018 07:34 )             32.7         Urinalysis Basic - ( 2018 16:24 )    Color: Yellow / Appearance: Clear / S.010 / pH: x  Gluc: x / Ketone: Negative  / Bili: Negative / Urobili: Negative mg/dL   Blood: x / Protein: Negative mg/dL / Nitrite: Negative   Leuk Esterase: Negative / RBC: 6-10 /HPF / WBC Negative   Sq Epi: x / Non Sq Epi: Few / Bacteria: x      LIVER FUNCTIONS - ( 2018 07:34 )  Alb: 2.8 g/dL / Pro: x     / ALK PHOS: x     / ALT: x     / AST: x     / GGT: x               CAPILLARY BLOOD GLUCOSE            RECENT CULTURES:

## 2018-07-30 NOTE — PROGRESS NOTE ADULT - SUBJECTIVE AND OBJECTIVE BOX
HPI:  53y   Last Menstrual Period 18 now POD5 s/p RAPATRICIO presents with vaginal bleeding, abdominal pain and constipation.   She was discharged from hospital on Thursday and had some abdominal pain at home. Percocet not working at all according to patient. She has been using colace twice daily, no bowel movement since Thursday; passing gas regularly. Denies urinary frequency, burning, urgency. She had little vaginal bleeding since d/c home but when she stood up this afternoon felt a "gush of blood run down both legs". Pt has been ambulating at home, walking up stairs though she states she has little support at home from kids and  (on disability). Daughter at bedside states that Diana has been drinking while taking percocet- pt admits to having 2 beers on Friday night along with smoking cigarettes. Pt suffers from anxiety, recently increased Cymbalta dose.     OB/GYN HISTORY:   NSVDx2   Surgical History:    History of ectopic pregnancy x2 (2018 03:01)      PAST MEDICAL & SURGICAL HISTORY:  Sleep apnea: does not use the CPAP but use O2 prn  Asthma: controlled on meds  Reflux  Palpitations  SOB (shortness of breath)  Vitamin B 12 deficiency  Seasonal allergies  Ectopic pregnancy  Hypertension  History of ectopic pregnancy  History of bilateral oophorectomies    acetaminophen  IVPB. 1000 milliGRAM(s) IV Intermittent once  ALBUTerol/ipratropium for Nebulization 3 milliLiter(s) Nebulizer every 6 hours PRN  atorvastatin 20 milliGRAM(s) Oral at bedtime  diltiazem    milliGRAM(s) Oral daily  docusate sodium 100 milliGRAM(s) Oral three times a day  DULoxetine 40 milliGRAM(s) Oral daily  enoxaparin Injectable 40 milliGRAM(s) SubCutaneous daily  HYDROmorphone   Tablet 2 milliGRAM(s) Oral every 4 hours PRN  HYDROmorphone   Tablet 4 milliGRAM(s) Oral every 4 hours PRN  ketorolac   Injectable 30 milliGRAM(s) IV Push every 6 hours  pantoprazole    Tablet 40 milliGRAM(s) Oral before breakfast  piperacillin/tazobactam IVPB. 3.375 Gram(s) IV Intermittent every 8 hours  sodium chloride 0.9%. 1000 milliLiter(s) IV Continuous <Continuous>    MEDICATIONS  (STANDING):  acetaminophen  IVPB. 1000 milliGRAM(s) IV Intermittent once  atorvastatin 20 milliGRAM(s) Oral at bedtime  diltiazem    milliGRAM(s) Oral daily  docusate sodium 100 milliGRAM(s) Oral three times a day  DULoxetine 40 milliGRAM(s) Oral daily  enoxaparin Injectable 40 milliGRAM(s) SubCutaneous daily  ketorolac   Injectable 30 milliGRAM(s) IV Push every 6 hours  pantoprazole    Tablet 40 milliGRAM(s) Oral before breakfast  piperacillin/tazobactam IVPB. 3.375 Gram(s) IV Intermittent every 8 hours  sodium chloride 0.9%. 1000 milliLiter(s) (60 mL/Hr) IV Continuous <Continuous>    MEDICATIONS  (PRN):  ALBUTerol/ipratropium for Nebulization 3 milliLiter(s) Nebulizer every 6 hours PRN Bronchospasm  HYDROmorphone   Tablet 2 milliGRAM(s) Oral every 4 hours PRN Moderate Pain (4 - 6)  HYDROmorphone   Tablet 4 milliGRAM(s) Oral every 4 hours PRN Severe Pain (7 - 10)      Allergies    No Known Allergies    Intolerances          LABS:                        10.4   16.7  )-----------( 320      ( 2018 07:34 )             32.7     07    137  |  100  |  5.0<L>  ----------------------------<  97  3.8   |  24.0  |  0.38<L>    Ca    8.5<L>      2018 07:34    TPro  x   /  Alb  2.8<L>  /  TBili  x   /  DBili  x   /  AST  x   /  ALT  x   /  AlkPhos  x         Urinalysis Basic - ( 2018 16:24 )    Color: Yellow / Appearance: Clear / S.010 / pH: x  Gluc: x / Ketone: Negative  / Bili: Negative / Urobili: Negative mg/dL   Blood: x / Protein: Negative mg/dL / Nitrite: Negative   Leuk Esterase: Negative / RBC: 6-10 /HPF / WBC Negative   Sq Epi: x / Non Sq Epi: Few / Bacteria: x        Vital Signs Last 24 Hrs  T(C): 36.8 (2018 16:39), Max: 37.1 (2018 23:35)  T(F): 98.3 (2018 16:39), Max: 98.7 (2018 23:35)  HR: 87 (2018 16:39) (85 - 100)  BP: 107/68 (2018 16:39) (107/68 - 131/74)  BP(mean): --  RR: 18 (2018 16:39) (18 - 18)  SpO2: 92% (2018 16:39) (92% - 100%)      Patient feeling better No Cp, SOB, N/V +BM    HEENT: PEARLA  Neck: Supple  Cardio: S1 S2 No Murmur  Pulm: CTA No Rales or Ronchi  Abd: Soft mild tenderness worse in the pelvic area, ND BS+   Rectal Pelvic Breast- refused  Ext: No DCT  Skin: No Rash  Neuro: Awake Pleasant      Pelvic Collection v Abscess - IV antibiotics, IV hydration, Gyn Onc, Not aspiratable  according to IR  Asthma - Duoneb  Reflux - PPI  Hypertension - meds with parameters  Urine Tox  - +Cocaine

## 2018-07-30 NOTE — PROGRESS NOTE ADULT - SUBJECTIVE AND OBJECTIVE BOX
HPI:  Patient is a 53y old Female with a chief complaint of abdominal pain. She is s/p RA [facility] Bs, cystoscopy for AUB, Fibroids on 7/24. She presents with vaginal bleeding, constipation, and abdominal pain unrelieved by Percocet (29 Jul 2018 03:01).     Patient was found to have a post-procedure Intra-abdominal abscess, now on IV antibiotics. She does have a history of illicit substance use. Toxicology screening was completed upon admission which + for cocaine and opiates. Pt admits to having one can of Budwe[employer] on Friday 7/27/18 and used Cocaine on Thursday 7/26. She is currently in court mandated outpatient substance abuse treatment in [place].    OB/GYN HISTORY:   NSVDx2   Surgical History:    History of ectopic pregnancy x2 (29 Jul 2018 03:01)      VERBAL REPORT:  "The medications are helping. The pain isn't as bed as it was."  Patient reports being able to ambulate to restroom independently and tolerates regular food. She denies BM, but explains that she had an enema 30 minutes prior to thia encounter.  PAIN SCORE: 3/10 at rest                  SCALE USED: VNRS    Allergies  No Known Allergies      PAST MEDICAL & SURGICAL HISTORY:  Sleep apnea: does not use the CPAP but use O2 prn  Asthma: controlled on meds  Reflux  Palpitations  SOB (shortness of breath)  Vitamin B 12 deficiency  Seasonal allergies  Ectopic pregnancy  Hypertension  History of ectopic pregnancy  History of bilateral oophorectomies      MEDICATIONS  (STANDING):  acetaminophen  IVPB. 1000 milliGRAM(s) IV Intermittent once  atorvastatin 20 milliGRAM(s) Oral at bedtime  diltiazem    milliGRAM(s) Oral daily  docusate sodium 100 milliGRAM(s) Oral three times a day  DULoxetine 40 milliGRAM(s) Oral daily  enoxaparin Injectable 40 milliGRAM(s) SubCutaneous daily  ketorolac   Injectable 30 milliGRAM(s) IV Push every 6 hours  pantoprazole    Tablet 40 milliGRAM(s) Oral before breakfast  piperacillin/tazobactam IVPB. 3.375 Gram(s) IV Intermittent every 8 hours  sodium chloride 0.9%. 1000 milliLiter(s) (60 mL/Hr) IV Continuous <Continuous>    MEDICATIONS  (PRN):  ALBUTerol/ipratropium for Nebulization 3 milliLiter(s) Nebulizer every 6 hours PRN Bronchospasm  HYDROmorphone   Tablet 2 milliGRAM(s) Oral every 4 hours PRN Moderate Pain (4 - 6)  HYDROmorphone   Tablet 4 milliGRAM(s) Oral every 4 hours PRN Severe Pain (7 - 10)      PHYSICAL EXAM:  GENERAL: NAD,  well-developed  HEAD:  Atraumatic, Normocephalic  NERVOUS SYSTEM:  Alert & Oriented X3, Good concentration; Motor Strength 5/5 B/L upper and lower extremities  CHEST/LUNG: Regular, non-labored respirations. Speaks clearly, full sentences, no SOB  ABDOMEN: Nontender      Vital Signs Last 24 Hrs  T(C): 36.3 (30 Jul 2018 08:10), Max: 37.2 (29 Jul 2018 16:29)  T(F): 97.4 (30 Jul 2018 08:10), Max: 98.9 (29 Jul 2018 16:29)  HR: 100 (30 Jul 2018 08:10) (84 - 100)  BP: 120/70 (30 Jul 2018 08:10) (108/69 - 131/74)  BP(mean): --  RR: 18 (30 Jul 2018 08:10) (18 - 18)  SpO2: 98% (30 Jul 2018 08:10) (98% - 100%)                          10.4   16.7  )-----------( 320      ( 30 Jul 2018 07:34 )             32.7       LIVER FUNCTIONS - ( 30 Jul 2018 07:34 )  Alb: 2.8 g/dL / Pro: x     / ALK PHOS: x     / ALT: x     / AST: x     / GGT: x

## 2018-07-30 NOTE — PROGRESS NOTE ADULT - SUBJECTIVE AND OBJECTIVE BOX
GYNECOLOGIC ONCOLOGY PROGRESS NOTE    POD#6, HOD#3    PROBLEMS:  Sepsis, due to unspecified organism  Abdominal pain  Leukocytosis  Tachycardia  Intra-abdominal abscess post-procedure, initial encounter  Abnormal uterine bleeding (AUB)  Substance abuse.    Pt seen and examined at bedside. UA via straight cath revealed moderate blood, CT reviewed by radiologist who sees no ureteral stone. Urine tox with + cocaine. Pt. also seen by ID, pain management and Medicine, appreciate consults.    SUBJECTIVE:    Patient is without complaints. Patient reports pain is significantly better today.  Denies Nausea, Vomiting or Diarrhea.  Flatus: yes BM: No  Denies shortness of breath, chest pain or dyspnea on exertion.  NPO for procedure.    OBJECTIVE:     VITALS:  T(F): 98.6 (07-30-18 @ 04:22), Max: 98.9 (07-29-18 @ 16:29)  HR: 98 (07-30-18 @ 04:22) (84 - 98)  BP: 131/74 (07-30-18 @ 04:22) (108/69 - 131/74)  RR: 18 (07-30-18 @ 04:22) (18 - 20)  SpO2: 99% (07-30-18 @ 04:22) (98% - 100%)      MEDICATIONS  (STANDING):  acetaminophen  IVPB. 1000 milliGRAM(s) IV Intermittent once  atorvastatin 20 milliGRAM(s) Oral at bedtime  diltiazem    milliGRAM(s) Oral daily  docusate sodium 100 milliGRAM(s) Oral three times a day  DULoxetine 40 milliGRAM(s) Oral daily  ketorolac   Injectable 30 milliGRAM(s) IV Push every 6 hours  pantoprazole    Tablet 40 milliGRAM(s) Oral before breakfast  piperacillin/tazobactam IVPB. 3.375 Gram(s) IV Intermittent every 8 hours  sodium chloride 0.9%. 1000 milliLiter(s) (60 mL/Hr) IV Continuous <Continuous>    MEDICATIONS  (PRN):  ALBUTerol/ipratropium for Nebulization 3 milliLiter(s) Nebulizer every 6 hours PRN Bronchospasm  HYDROmorphone   Tablet 2 milliGRAM(s) Oral every 4 hours PRN Moderate Pain (4 - 6)  HYDROmorphone   Tablet 4 milliGRAM(s) Oral every 4 hours PRN Severe Pain (7 - 10)      Physical Exam:  Constitutional: NAD  Pulmonary: clear to auscultation bilaterally   Cardiovascular: Regular rate and rhythm   Abdomen: Soft, non-tender, non-distended, hypoactive bowel sounds.  Extremities: No lower extremity edema or calve tenderness, Jasper's sign negative.  Incision: Clean, dry, intact.  Without signs of infection or hernia.      LABS:             Pending

## 2018-07-30 NOTE — PROGRESS NOTE ADULT - PROBLEM SELECTOR PLAN 1
Appreciate ID consult  Continue IV Zosyn  Follow-up Bcx.  Plan for CT guided IR drainage of abscess today, will ask IR to send for culture.  Continue IVF  NPO   SCD's for DVT prophylaxis, Lovenox x 1 given yesterday 7/29. Will resume AC after procedure.  Appreciate Medicine input.

## 2018-07-30 NOTE — PROGRESS NOTE ADULT - SUBJECTIVE AND OBJECTIVE BOX
Pt. seen and examined for afternoon rounds. Patient without complaints. Patient received enema x 1, reports no BM as of yet. She remains with flatus. She appears to be resting comfortably and eating in bed. Diet tolerated thus far. Pain much improved. IR feels they would be unsuccessful at accessing abscess for drainage, recommending continuing with IV antibiotics for now. WBC stable, will continue to monitor.

## 2018-07-31 LAB
ANION GAP SERPL CALC-SCNC: 11 MMOL/L — SIGNIFICANT CHANGE UP (ref 5–17)
BASOPHILS # BLD AUTO: 0 K/UL — SIGNIFICANT CHANGE UP (ref 0–0.2)
BASOPHILS NFR BLD AUTO: 0.1 % — SIGNIFICANT CHANGE UP (ref 0–2)
BUN SERPL-MCNC: 4 MG/DL — LOW (ref 8–20)
CALCIUM SERPL-MCNC: 8.6 MG/DL — SIGNIFICANT CHANGE UP (ref 8.6–10.2)
CHLORIDE SERPL-SCNC: 97 MMOL/L — LOW (ref 98–107)
CO2 SERPL-SCNC: 26 MMOL/L — SIGNIFICANT CHANGE UP (ref 22–29)
CREAT SERPL-MCNC: 0.46 MG/DL — LOW (ref 0.5–1.3)
EOSINOPHIL # BLD AUTO: 0.3 K/UL — SIGNIFICANT CHANGE UP (ref 0–0.5)
EOSINOPHIL NFR BLD AUTO: 1.7 % — SIGNIFICANT CHANGE UP (ref 0–6)
GLUCOSE SERPL-MCNC: 103 MG/DL — SIGNIFICANT CHANGE UP (ref 70–115)
HCT VFR BLD CALC: 32 % — LOW (ref 37–47)
HGB BLD-MCNC: 10.1 G/DL — LOW (ref 12–16)
LYMPHOCYTES # BLD AUTO: 1.8 K/UL — SIGNIFICANT CHANGE UP (ref 1–4.8)
LYMPHOCYTES # BLD AUTO: 11.2 % — LOW (ref 20–55)
MCHC RBC-ENTMCNC: 30.1 PG — SIGNIFICANT CHANGE UP (ref 27–31)
MCHC RBC-ENTMCNC: 31.6 G/DL — LOW (ref 32–36)
MCV RBC AUTO: 95.5 FL — SIGNIFICANT CHANGE UP (ref 81–99)
MONOCYTES # BLD AUTO: 0.8 K/UL — SIGNIFICANT CHANGE UP (ref 0–0.8)
MONOCYTES NFR BLD AUTO: 5 % — SIGNIFICANT CHANGE UP (ref 3–10)
NEUTROPHILS # BLD AUTO: 12.7 K/UL — HIGH (ref 1.8–8)
NEUTROPHILS NFR BLD AUTO: 81.7 % — HIGH (ref 37–73)
PLATELET # BLD AUTO: 369 K/UL — SIGNIFICANT CHANGE UP (ref 150–400)
POTASSIUM SERPL-MCNC: 3.4 MMOL/L — LOW (ref 3.5–5.3)
POTASSIUM SERPL-SCNC: 3.4 MMOL/L — LOW (ref 3.5–5.3)
RBC # BLD: 3.35 M/UL — LOW (ref 4.4–5.2)
RBC # FLD: 13.5 % — SIGNIFICANT CHANGE UP (ref 11–15.6)
SODIUM SERPL-SCNC: 134 MMOL/L — LOW (ref 135–145)
WBC # BLD: 15.6 K/UL — HIGH (ref 4.8–10.8)
WBC # FLD AUTO: 15.6 K/UL — HIGH (ref 4.8–10.8)

## 2018-07-31 PROCEDURE — 99233 SBSQ HOSP IP/OBS HIGH 50: CPT

## 2018-07-31 RX ORDER — POTASSIUM CHLORIDE 20 MEQ
20 PACKET (EA) ORAL
Qty: 0 | Refills: 0 | Status: COMPLETED | OUTPATIENT
Start: 2018-07-31 | End: 2018-07-31

## 2018-07-31 RX ADMIN — DULOXETINE HYDROCHLORIDE 40 MILLIGRAM(S): 30 CAPSULE, DELAYED RELEASE ORAL at 13:55

## 2018-07-31 RX ADMIN — PANTOPRAZOLE SODIUM 40 MILLIGRAM(S): 20 TABLET, DELAYED RELEASE ORAL at 05:26

## 2018-07-31 RX ADMIN — PIPERACILLIN AND TAZOBACTAM 25 GRAM(S): 4; .5 INJECTION, POWDER, LYOPHILIZED, FOR SOLUTION INTRAVENOUS at 22:19

## 2018-07-31 RX ADMIN — Medication 100 MILLIGRAM(S): at 22:19

## 2018-07-31 RX ADMIN — Medication 20 MILLIEQUIVALENT(S): at 13:55

## 2018-07-31 RX ADMIN — HYDROMORPHONE HYDROCHLORIDE 4 MILLIGRAM(S): 2 INJECTION INTRAMUSCULAR; INTRAVENOUS; SUBCUTANEOUS at 02:13

## 2018-07-31 RX ADMIN — HYDROMORPHONE HYDROCHLORIDE 4 MILLIGRAM(S): 2 INJECTION INTRAMUSCULAR; INTRAVENOUS; SUBCUTANEOUS at 02:43

## 2018-07-31 RX ADMIN — HYDROMORPHONE HYDROCHLORIDE 4 MILLIGRAM(S): 2 INJECTION INTRAMUSCULAR; INTRAVENOUS; SUBCUTANEOUS at 23:41

## 2018-07-31 RX ADMIN — HYDROMORPHONE HYDROCHLORIDE 4 MILLIGRAM(S): 2 INJECTION INTRAMUSCULAR; INTRAVENOUS; SUBCUTANEOUS at 09:58

## 2018-07-31 RX ADMIN — PIPERACILLIN AND TAZOBACTAM 25 GRAM(S): 4; .5 INJECTION, POWDER, LYOPHILIZED, FOR SOLUTION INTRAVENOUS at 05:27

## 2018-07-31 RX ADMIN — PIPERACILLIN AND TAZOBACTAM 25 GRAM(S): 4; .5 INJECTION, POWDER, LYOPHILIZED, FOR SOLUTION INTRAVENOUS at 13:57

## 2018-07-31 RX ADMIN — HYDROMORPHONE HYDROCHLORIDE 4 MILLIGRAM(S): 2 INJECTION INTRAMUSCULAR; INTRAVENOUS; SUBCUTANEOUS at 19:14

## 2018-07-31 RX ADMIN — Medication 100 MILLIGRAM(S): at 13:56

## 2018-07-31 RX ADMIN — ENOXAPARIN SODIUM 40 MILLIGRAM(S): 100 INJECTION SUBCUTANEOUS at 13:56

## 2018-07-31 RX ADMIN — Medication 20 MILLIEQUIVALENT(S): at 10:21

## 2018-07-31 RX ADMIN — HYDROMORPHONE HYDROCHLORIDE 4 MILLIGRAM(S): 2 INJECTION INTRAMUSCULAR; INTRAVENOUS; SUBCUTANEOUS at 09:28

## 2018-07-31 RX ADMIN — Medication 100 MILLIGRAM(S): at 05:26

## 2018-07-31 RX ADMIN — HYDROMORPHONE HYDROCHLORIDE 4 MILLIGRAM(S): 2 INJECTION INTRAMUSCULAR; INTRAVENOUS; SUBCUTANEOUS at 19:43

## 2018-07-31 RX ADMIN — ATORVASTATIN CALCIUM 20 MILLIGRAM(S): 80 TABLET, FILM COATED ORAL at 22:19

## 2018-07-31 NOTE — PROGRESS NOTE ADULT - PROBLEM SELECTOR PLAN 1
Appreciate ID consult  Continue IV Zosyn  Follow-up Bcx.  Continue IVF  SCD's/Lovenox for DVT prophylaxis  Appreciate Medicine input.

## 2018-07-31 NOTE — PROGRESS NOTE ADULT - SUBJECTIVE AND OBJECTIVE BOX
HPI:  53y   Last Menstrual Period 18 now POD5 s/p RAPATRICIO presents with vaginal bleeding, abdominal pain and constipation.   She was discharged from hospital on Thursday and had some abdominal pain at home. Percocet not working at all according to patient. She has been using colace twice daily, no bowel movement since Thursday; passing gas regularly. Denies urinary frequency, burning, urgency. She had little vaginal bleeding since d/c home but when she stood up this afternoon felt a "gush of blood run down both legs". Pt has been ambulating at home, walking up stairs though she states she has little support at home from kids and  (on disability). Daughter at bedside states that Diana has been drinking while taking percocet- pt admits to having 2 beers on Friday night along with smoking cigarettes. Pt suffers from anxiety, recently increased Symbalta dose.     OB/GYN HISTORY:   NSVDx2   Surgical History:    History of ectopic pregnancy x2 (2018 03:01)     Allergies    No Known Allergies    Intolerances      Sleep apnea  Asthma  Reflux  Palpitations  SOB (shortness of breath)  Vitamin B 12 deficiency  Seasonal allergies  Anemia  Ectopic pregnancy  Hypertension    MEDICATIONS  (STANDING):  acetaminophen  IVPB. 1000 milliGRAM(s) IV Intermittent once  atorvastatin 20 milliGRAM(s) Oral at bedtime  diltiazem    milliGRAM(s) Oral daily  docusate sodium 100 milliGRAM(s) Oral three times a day  DULoxetine 40 milliGRAM(s) Oral daily  enoxaparin Injectable 40 milliGRAM(s) SubCutaneous daily  pantoprazole    Tablet 40 milliGRAM(s) Oral before breakfast  piperacillin/tazobactam IVPB. 3.375 Gram(s) IV Intermittent every 8 hours  sodium chloride 0.9%. 1000 milliLiter(s) (60 mL/Hr) IV Continuous <Continuous>    MEDICATIONS  (PRN):  ALBUTerol/ipratropium for Nebulization 3 milliLiter(s) Nebulizer every 6 hours PRN Bronchospasm  HYDROmorphone   Tablet 2 milliGRAM(s) Oral every 4 hours PRN Moderate Pain (4 - 6)  HYDROmorphone   Tablet 4 milliGRAM(s) Oral every 4 hours PRN Severe Pain (7 - 10)                           10.1   15.6  )-----------( 369      ( 2018 06:23 )             32.0         134<L>  |  97<L>  |  4.0<L>  ----------------------------<  103  3.4<L>   |  26.0  |  0.46<L>    Ca    8.6      2018 06:23    TPro  x   /  Alb  2.8<L>  /  TBili  x   /  DBili  x   /  AST  x   /  ALT  x   /  AlkPhos  x   30      ;  Vital Signs Last 24 Hrs  T(C): 36.4 (2018 15:59), Max: 36.9 (2018 23:53)  T(F): 97.5 (2018 15:59), Max: 98.4 (2018 23:53)  HR: 94 (2018 15:59) (90 - 94)  BP: 149/79 (2018 15:59) (99/55 - 149/79)  BP(mean): --  RR: 18 (2018 15:59) (18 - 18)  SpO2: 100% (2018 15:59) (97% - 100%)  CAPILLARY BLOOD GLUCOSE       @ 07:01  -   @ 07:00  --------------------------------------------------------  IN: 760 mL / OUT: 0 mL / NET: 760 mL      Patient feeling better No Cp, SOB, N/V +BM    HEENT: PEARLA  Neck: Supple  Cardio: S1 S2 No Murmur  Pulm: CTA No Rales or Ronchi  Abd: Soft mild tenderness worse in the pelvic area, ND BS+   Rectal Pelvic Breast- refused  Ext: No DCT  Skin: No Rash  Neuro: Awake Pleasant      Pelvic Collection v Abscess - IV antibiotics, IV hydration, Gyn Onc, Not aspiratable  according to IR  Asthma - Duoneb  Reflux - PPI  Hypertension - meds with parameters  Urine Tox  - +Cocaine

## 2018-07-31 NOTE — PROGRESS NOTE ADULT - SUBJECTIVE AND OBJECTIVE BOX
GYNECOLOGIC ONCOLOGY PROGRESS NOTE    POD#7, HOD#4    PROBLEMS:  Abdominal pain, unspecified abdominal location  Constipation  Sepsis, due to unspecified organism  Abdominal pain  Leukocytosis  Tachycardia  Intra-abdominal abscess post-procedure, initial encounter  Abnormal uterine bleeding (AUB)      Pt seen and examined at bedside, with complaint of abdominal pain. ID unable to drain abscess, patient continues on IV antibiotics and pain control.     SUBJECTIVE:      Flatus: Yes, BM: Yes   Denies Nausea, Vomiting or Diarrhea.  Denies shortness of breath, chest pain or dyspnea on exertion.  Tolerating diet.    OBJECTIVE:     VITALS:  T(F): 98.3 (18 @ 05:04), Max: 98.4 (18 @ 23:53)  HR: 94 (18 @ 05:04) (87 - 100)  BP: 99/55 (18 @ 05:04) (99/55 - 120/70)  RR: 18 (18 @ 05:04) (18 - 18)  SpO2: 97% (18 @ 05:04) (92% - 98%)      I&O's Summary    2018 07:01  -  2018 07:00  --------------------------------------------------------  IN: 760 mL / OUT: 0 mL / NET: 760 mL        MEDICATIONS  (STANDING):  acetaminophen  IVPB. 1000 milliGRAM(s) IV Intermittent once  atorvastatin 20 milliGRAM(s) Oral at bedtime  diltiazem    milliGRAM(s) Oral daily  docusate sodium 100 milliGRAM(s) Oral three times a day  DULoxetine 40 milliGRAM(s) Oral daily  enoxaparin Injectable 40 milliGRAM(s) SubCutaneous daily  pantoprazole    Tablet 40 milliGRAM(s) Oral before breakfast  piperacillin/tazobactam IVPB. 3.375 Gram(s) IV Intermittent every 8 hours  sodium chloride 0.9%. 1000 milliLiter(s) (60 mL/Hr) IV Continuous <Continuous>    MEDICATIONS  (PRN):  ALBUTerol/ipratropium for Nebulization 3 milliLiter(s) Nebulizer every 6 hours PRN Bronchospasm  HYDROmorphone   Tablet 2 milliGRAM(s) Oral every 4 hours PRN Moderate Pain (4 - 6)  HYDROmorphone   Tablet 4 milliGRAM(s) Oral every 4 hours PRN Severe Pain (7 - 10)      Physical Exam:  Constitutional: NAD  Pulmonary: clear to auscultation bilaterally   Cardiovascular: Regular rate and rhythm   Abdomen: Soft, diffuse tenderness to palpation appreciated, non-distended, normal bowel sounds. No guarding noted.   Extremities: No lower extremity edema or calve tenderness, Jasper's sign negative.  Incision: Clean, dry, intact.  Without signs of infection or hernia.        LABS: AM labs pending                         10.1   15.6  )-----------( 369      ( 2018 06:23 )             32.0     07-31    134<L>  |  97<L>  |  4.0<L>  ----------------------------<  103  3.4<L>   |  26.0  |  0.46<L>    Ca    8.6      2018 06:23    TPro  x   /  Alb  2.8<L>  /  TBili  x   /  DBili  x   /  AST  x   /  ALT  x   /  AlkPhos  x   07-30      Urinalysis Basic - ( 2018 16:24 )    Color: Yellow / Appearance: Clear / S.010 / pH: x  Gluc: x / Ketone: Negative  / Bili: Negative / Urobili: Negative mg/dL   Blood: x / Protein: Negative mg/dL / Nitrite: Negative   Leuk Esterase: Negative / RBC: 6-10 /HPF / WBC Negative   Sq Epi: x / Non Sq Epi: Few / Bacteria: x GYNECOLOGIC ONCOLOGY PROGRESS NOTE    POD#7, HOD#4    PROBLEMS:  Abdominal pain, unspecified abdominal location  Constipation  Sepsis, due to unspecified organism  Abdominal pain  Leukocytosis  Tachycardia  Intra-abdominal abscess post-procedure, initial encounter  Abnormal uterine bleeding (AUB)      Pt seen and examined at bedside, with complaint of abdominal pain. ID unable to drain abscess, patient continues on IV antibiotics and pain control.     SUBJECTIVE:      Flatus: Yes, BM: Yes   Denies Nausea, Vomiting or Diarrhea.  Denies shortness of breath, chest pain or dyspnea on exertion.  Tolerating diet.    OBJECTIVE:     VITALS:  T(F): 98.3 (18 @ 05:04), Max: 98.4 (18 @ 23:53)  HR: 94 (18 @ 05:04) (87 - 100)  BP: 99/55 (18 @ 05:04) (99/55 - 120/70)  RR: 18 (18 @ 05:04) (18 - 18)  SpO2: 97% (18 @ 05:04) (92% - 98%)      I&O's Summary    2018 07:01  -  2018 07:00  --------------------------------------------------------  IN: 760 mL / OUT: 0 mL / NET: 760 mL        MEDICATIONS  (STANDING):  acetaminophen  IVPB. 1000 milliGRAM(s) IV Intermittent once  atorvastatin 20 milliGRAM(s) Oral at bedtime  diltiazem    milliGRAM(s) Oral daily  docusate sodium 100 milliGRAM(s) Oral three times a day  DULoxetine 40 milliGRAM(s) Oral daily  enoxaparin Injectable 40 milliGRAM(s) SubCutaneous daily  pantoprazole    Tablet 40 milliGRAM(s) Oral before breakfast  piperacillin/tazobactam IVPB. 3.375 Gram(s) IV Intermittent every 8 hours  sodium chloride 0.9%. 1000 milliLiter(s) (60 mL/Hr) IV Continuous <Continuous>    MEDICATIONS  (PRN):  ALBUTerol/ipratropium for Nebulization 3 milliLiter(s) Nebulizer every 6 hours PRN Bronchospasm  HYDROmorphone   Tablet 2 milliGRAM(s) Oral every 4 hours PRN Moderate Pain (4 - 6)  HYDROmorphone   Tablet 4 milliGRAM(s) Oral every 4 hours PRN Severe Pain (7 - 10)      Physical Exam:  Constitutional: NAD  Pulmonary: clear to auscultation bilaterally   Cardiovascular: Regular rate and rhythm   Abdomen: Softly distended, diffuse tenderness to palpation appreciated, normal bowel sounds. No guarding noted.   Extremities: No lower extremity edema or calve tenderness, Jasper's sign negative.  Incision: Clean, dry, intact.  Without signs of infection or hernia.        LABS: AM labs pending                         10.1   15.6  )-----------( 369      ( 2018 06:23 )             32.0     07-31    134<L>  |  97<L>  |  4.0<L>  ----------------------------<  103  3.4<L>   |  26.0  |  0.46<L>    Ca    8.6      2018 06:23    TPro  x   /  Alb  2.8<L>  /  TBili  x   /  DBili  x   /  AST  x   /  ALT  x   /  AlkPhos  x   07-30      Urinalysis Basic - ( 2018 16:24 )    Color: Yellow / Appearance: Clear / S.010 / pH: x  Gluc: x / Ketone: Negative  / Bili: Negative / Urobili: Negative mg/dL   Blood: x / Protein: Negative mg/dL / Nitrite: Negative   Leuk Esterase: Negative / RBC: 6-10 /HPF / WBC Negative   Sq Epi: x / Non Sq Epi: Few / Bacteria: x

## 2018-07-31 NOTE — PROGRESS NOTE ADULT - SUBJECTIVE AND OBJECTIVE BOX
[facility]  INFECTIOUS DISEASES AND INTERNAL MEDICINE at [place]  =======================================================  Aayush Peña MD  Diplomates American Board of Internal Medicine and Infectious Diseases  =======================================================    JAHAIRA MACKEY [id number]    Follow up: pelvic collection    Allergies:  No Known Allergies      Medications:  acetaminophen  IVPB. 1000 milliGRAM(s) IV Intermittent once  ALBUTerol/ipratropium for Nebulization 3 milliLiter(s) Nebulizer every 6 hours PRN  atorvastatin 20 milliGRAM(s) Oral at bedtime  diltiazem    milliGRAM(s) Oral daily  docusate sodium 100 milliGRAM(s) Oral three times a day  DULoxetine 40 milliGRAM(s) Oral daily  enoxaparin Injectable 40 milliGRAM(s) SubCutaneous daily  HYDROmorphone   Tablet 2 milliGRAM(s) Oral every 4 hours PRN  HYDROmorphone   Tablet 4 milliGRAM(s) Oral every 4 hours PRN  ketorolac   Injectable 30 milliGRAM(s) IV Push every 6 hours  pantoprazole    Tablet 40 milliGRAM(s) Oral before breakfast  piperacillin/tazobactam IVPB. 3.375 Gram(s) IV Intermittent every 8 hours  sodium chloride 0.9%. 1000 milliLiter(s) IV Continuous <Continuous>    SOCIAL       FAMILY   FAMILY HISTORY:  Family history of CABG  Hypertension  Family history of malignant neoplasm of kidney  CAD (coronary artery disease)    REVIEW OF SYSTEMS:  CONSTITUTIONAL:  No Fever or chills  HEENT:   No diplopia or blurred vision.  No earache, sore throat or runny nose.  CARDIOVASCULAR:  No pressure, squeezing, strangling, tightness, heaviness or aching about the chest, neck, axilla or epigastrium.  RESPIRATORY:  No cough, shortness of breath, PND or orthopnea.  GASTROINTESTINAL:  No nausea, vomiting or diarrhea.  GENITOURINARY:  No dysuria, frequency or urgency. No Blood in urine  MUSCULOSKELETAL:   AS PER HPI  SKIN:  No change in skin, hair or nails.  NEUROLOGIC:  No paresthesias, fasciculations, seizures or weakness.  PSYCHIATRIC:  No disorder of thought or mood.  ENDOCRINE:  No heat or cold intolerance, polyuria or polydipsia.  HEMATOLOGICAL:  No easy bruising or bleeding.            Physical Exam:  I Vital Signs Last 24 Hrs  T(C): 36.8 (2018 08:08), Max: 36.9 (2018 23:53)  T(F): 98.3 (2018 08:08), Max: 98.4 (2018 23:53)  HR: 92 (2018 08:08) (87 - 94)  BP: 121/67 (2018 08:08) (99/55 - 121/67)  BP(mean): --  RR: 18 (2018 08:08) (18 - 18)  SpO2: 98% (2018 08:08) (92% - 98%)    GEN: NAD, pleasant  HEENT: normocephalic and atraumatic. EOMI. GUCCI.    NECK: Supple. No carotid bruits.  No lymphadenopathy or thyromegaly.  LUNGS: Clear to auscultation.  HEART: Regular rate and rhythm without murmur.  ABDOMEN: Soft, MILD TENDERNESS DIFFUSELY NO REBOUND OR GUARDING  : No CVA tenderness  EXTREMITIES: Without any cyanosis, clubbing, rash, lesions or edema.  MSK: no joint swelling  NEUROLOGIC: Cranial nerves II through XII are grossly intact.  PSYCHIATRIC: Appropriate affect .  SKIN: No ulceration or induration present.        Labs:  0                       10.1   15.6  )-----------( 369      ( 2018 06:23 )             32.0   07-31    134<L>  |  97<L>  |  4.0<L>  ----------------------------<  103  3.4<L>   |  26.0  |  0.46<L>    Ca    8.6      2018 06:23    TPro  x   /  Alb  2.8<L>  /  TBili  x   /  DBili  x   /  AST  x   /  ALT  x   /  AlkPhos  x             Urinalysis Basic - ( 2018 16:24 )    Color: Yellow / Appearance: Clear / S.010 / pH: x  Gluc: x / Ketone: Negative  / Bili: Negative / Urobili: Negative mg/dL   Blood: x / Protein: Negative mg/dL / Nitrite: Negative   Leuk Esterase: Negative / RBC: 6-10 /HPF / WBC Negative   Sq Epi: x / Non Sq Epi: Few / Bacteria: x               RECENT CULTURES:

## 2018-07-31 NOTE — PROGRESS NOTE ADULT - SUBJECTIVE AND OBJECTIVE BOX
Patient seen and examined this afternoon on rounds with Dr. King. Patient appears in pain in bed, describes it as diffuse abdominal tenderness. On distracted examination, patient does not have pain with palpitation. Pain Patient seen and examined this afternoon on rounds with Dr. King. Patient appears in pain lying in bed, describes it as diffuse abdominal tenderness. On distracted examination, patient does not have pain with palpitation. Pain was Patient seen and examined this afternoon on rounds with Dr. King. Patient appears in pain lying in bed, describes it as diffuse abdominal tenderness. On distracted examination, patient does not have pain with palpitation. Discussed with patient cocaine use, patient admits to last use Friday, states she is currently in a program. Dr. King discussed that he believes her pain is gas related, instructed ambulation and chewing gum. Discussed case with ID, will follow up patient tomorrow and give recommendations for outpatient antibiotic treatment. Patient tolerating regular diet, passing gas and bowel movements.

## 2018-08-01 LAB
ANION GAP SERPL CALC-SCNC: 12 MMOL/L — SIGNIFICANT CHANGE UP (ref 5–17)
BASOPHILS # BLD AUTO: 0 K/UL — SIGNIFICANT CHANGE UP (ref 0–0.2)
BASOPHILS NFR BLD AUTO: 0.1 % — SIGNIFICANT CHANGE UP (ref 0–2)
BUN SERPL-MCNC: <3 MG/DL — LOW (ref 8–20)
CALCIUM SERPL-MCNC: 8.9 MG/DL — SIGNIFICANT CHANGE UP (ref 8.6–10.2)
CHLORIDE SERPL-SCNC: 99 MMOL/L — SIGNIFICANT CHANGE UP (ref 98–107)
CO2 SERPL-SCNC: 25 MMOL/L — SIGNIFICANT CHANGE UP (ref 22–29)
CREAT SERPL-MCNC: 0.38 MG/DL — LOW (ref 0.5–1.3)
EOSINOPHIL # BLD AUTO: 0.2 K/UL — SIGNIFICANT CHANGE UP (ref 0–0.5)
EOSINOPHIL NFR BLD AUTO: 1.5 % — SIGNIFICANT CHANGE UP (ref 0–6)
GLUCOSE SERPL-MCNC: 103 MG/DL — SIGNIFICANT CHANGE UP (ref 70–115)
HCT VFR BLD CALC: 31.4 % — LOW (ref 37–47)
HGB BLD-MCNC: 9.8 G/DL — LOW (ref 12–16)
LYMPHOCYTES # BLD AUTO: 1.8 K/UL — SIGNIFICANT CHANGE UP (ref 1–4.8)
LYMPHOCYTES # BLD AUTO: 11.3 % — LOW (ref 20–55)
MCHC RBC-ENTMCNC: 29.7 PG — SIGNIFICANT CHANGE UP (ref 27–31)
MCHC RBC-ENTMCNC: 31.2 G/DL — LOW (ref 32–36)
MCV RBC AUTO: 95.2 FL — SIGNIFICANT CHANGE UP (ref 81–99)
MONOCYTES # BLD AUTO: 1.1 K/UL — HIGH (ref 0–0.8)
MONOCYTES NFR BLD AUTO: 7.1 % — SIGNIFICANT CHANGE UP (ref 3–10)
NEUTROPHILS # BLD AUTO: 12.4 K/UL — HIGH (ref 1.8–8)
NEUTROPHILS NFR BLD AUTO: 79.7 % — HIGH (ref 37–73)
PLATELET # BLD AUTO: 405 K/UL — HIGH (ref 150–400)
POTASSIUM SERPL-MCNC: 3.7 MMOL/L — SIGNIFICANT CHANGE UP (ref 3.5–5.3)
POTASSIUM SERPL-SCNC: 3.7 MMOL/L — SIGNIFICANT CHANGE UP (ref 3.5–5.3)
RBC # BLD: 3.3 M/UL — LOW (ref 4.4–5.2)
RBC # FLD: 13.4 % — SIGNIFICANT CHANGE UP (ref 11–15.6)
SODIUM SERPL-SCNC: 136 MMOL/L — SIGNIFICANT CHANGE UP (ref 135–145)
WBC # BLD: 15.5 K/UL — HIGH (ref 4.8–10.8)
WBC # FLD AUTO: 15.5 K/UL — HIGH (ref 4.8–10.8)

## 2018-08-01 PROCEDURE — 74177 CT ABD & PELVIS W/CONTRAST: CPT | Mod: 26

## 2018-08-01 PROCEDURE — 99232 SBSQ HOSP IP/OBS MODERATE 35: CPT

## 2018-08-01 RX ADMIN — DULOXETINE HYDROCHLORIDE 40 MILLIGRAM(S): 30 CAPSULE, DELAYED RELEASE ORAL at 13:43

## 2018-08-01 RX ADMIN — PIPERACILLIN AND TAZOBACTAM 25 GRAM(S): 4; .5 INJECTION, POWDER, LYOPHILIZED, FOR SOLUTION INTRAVENOUS at 21:51

## 2018-08-01 RX ADMIN — HYDROMORPHONE HYDROCHLORIDE 4 MILLIGRAM(S): 2 INJECTION INTRAMUSCULAR; INTRAVENOUS; SUBCUTANEOUS at 05:24

## 2018-08-01 RX ADMIN — HYDROMORPHONE HYDROCHLORIDE 4 MILLIGRAM(S): 2 INJECTION INTRAMUSCULAR; INTRAVENOUS; SUBCUTANEOUS at 10:50

## 2018-08-01 RX ADMIN — PIPERACILLIN AND TAZOBACTAM 25 GRAM(S): 4; .5 INJECTION, POWDER, LYOPHILIZED, FOR SOLUTION INTRAVENOUS at 05:23

## 2018-08-01 RX ADMIN — HYDROMORPHONE HYDROCHLORIDE 4 MILLIGRAM(S): 2 INJECTION INTRAMUSCULAR; INTRAVENOUS; SUBCUTANEOUS at 14:30

## 2018-08-01 RX ADMIN — Medication 100 MILLIGRAM(S): at 13:44

## 2018-08-01 RX ADMIN — ATORVASTATIN CALCIUM 20 MILLIGRAM(S): 80 TABLET, FILM COATED ORAL at 21:51

## 2018-08-01 RX ADMIN — Medication 100 MILLIGRAM(S): at 21:51

## 2018-08-01 RX ADMIN — HYDROMORPHONE HYDROCHLORIDE 4 MILLIGRAM(S): 2 INJECTION INTRAMUSCULAR; INTRAVENOUS; SUBCUTANEOUS at 00:11

## 2018-08-01 RX ADMIN — Medication 360 MILLIGRAM(S): at 05:24

## 2018-08-01 RX ADMIN — PANTOPRAZOLE SODIUM 40 MILLIGRAM(S): 20 TABLET, DELAYED RELEASE ORAL at 05:25

## 2018-08-01 RX ADMIN — HYDROMORPHONE HYDROCHLORIDE 4 MILLIGRAM(S): 2 INJECTION INTRAMUSCULAR; INTRAVENOUS; SUBCUTANEOUS at 18:42

## 2018-08-01 RX ADMIN — HYDROMORPHONE HYDROCHLORIDE 4 MILLIGRAM(S): 2 INJECTION INTRAMUSCULAR; INTRAVENOUS; SUBCUTANEOUS at 23:15

## 2018-08-01 RX ADMIN — ENOXAPARIN SODIUM 40 MILLIGRAM(S): 100 INJECTION SUBCUTANEOUS at 13:42

## 2018-08-01 RX ADMIN — Medication 100 MILLIGRAM(S): at 05:25

## 2018-08-01 RX ADMIN — HYDROMORPHONE HYDROCHLORIDE 4 MILLIGRAM(S): 2 INJECTION INTRAMUSCULAR; INTRAVENOUS; SUBCUTANEOUS at 10:41

## 2018-08-01 RX ADMIN — PIPERACILLIN AND TAZOBACTAM 25 GRAM(S): 4; .5 INJECTION, POWDER, LYOPHILIZED, FOR SOLUTION INTRAVENOUS at 13:42

## 2018-08-01 RX ADMIN — HYDROMORPHONE HYDROCHLORIDE 4 MILLIGRAM(S): 2 INJECTION INTRAMUSCULAR; INTRAVENOUS; SUBCUTANEOUS at 22:48

## 2018-08-01 NOTE — PROGRESS NOTE ADULT - SUBJECTIVE AND OBJECTIVE BOX
[facility]  INFECTIOUS DISEASES AND INTERNAL MEDICINE at [place]  =======================================================  Aayush Peña MD  Diplomates American Board of Internal Medicine and Infectious Diseases  =======================================================    JAHAIRA MACKEY [id number]    Follow up: pelvic collection  pt non toxic    Allergies:  No Known Allergies      Medications:  acetaminophen  IVPB. 1000 milliGRAM(s) IV Intermittent once  ALBUTerol/ipratropium for Nebulization 3 milliLiter(s) Nebulizer every 6 hours PRN  atorvastatin 20 milliGRAM(s) Oral at bedtime  diltiazem    milliGRAM(s) Oral daily  docusate sodium 100 milliGRAM(s) Oral three times a day  DULoxetine 40 milliGRAM(s) Oral daily  enoxaparin Injectable 40 milliGRAM(s) SubCutaneous daily  HYDROmorphone   Tablet 2 milliGRAM(s) Oral every 4 hours PRN  HYDROmorphone   Tablet 4 milliGRAM(s) Oral every 4 hours PRN  ketorolac   Injectable 30 milliGRAM(s) IV Push every 6 hours  pantoprazole    Tablet 40 milliGRAM(s) Oral before breakfast  piperacillin/tazobactam IVPB. 3.375 Gram(s) IV Intermittent every 8 hours  sodium chloride 0.9%. 1000 milliLiter(s) IV Continuous <Continuous>    SOCIAL       FAMILY   FAMILY HISTORY:  Family history of CABG  Hypertension  Family history of malignant neoplasm of kidney  CAD (coronary artery disease)    REVIEW OF SYSTEMS:  CONSTITUTIONAL:  No Fever or chills  HEENT:   No diplopia or blurred vision.  No earache, sore throat or runny nose.  CARDIOVASCULAR:  No pressure, squeezing, strangling, tightness, heaviness or aching about the chest, neck, axilla or epigastrium.  RESPIRATORY:  No cough, shortness of breath, PND or orthopnea.  GASTROINTESTINAL: FEELS BLOATED  GENITOURINARY:  No dysuria, frequency or urgency. No Blood in urine  MUSCULOSKELETAL:   AS PER HPI  SKIN:  No change in skin, hair or nails.  NEUROLOGIC:  No paresthesias, fasciculations, seizures or weakness.  PSYCHIATRIC:  No disorder of thought or mood.  ENDOCRINE:  No heat or cold intolerance, polyuria or polydipsia.  HEMATOLOGICAL:  No easy bruising or bleeding.            Physical Exam:   Vital Signs Last 24 Hrs  T(C): 36.9 (01 Aug 2018 07:36), Max: 37.3 (31 Jul 2018 23:44)  T(F): 98.5 (01 Aug 2018 07:36), Max: 99.1 (31 Jul 2018 23:44)  HR: 87 (01 Aug 2018 07:36) (87 - 103)  BP: 107/70 (01 Aug 2018 07:36) (107/70 - 149/79)  BP(mean): --  RR: 18 (01 Aug 2018 07:36) (18 - 20)  SpO2: 99% (01 Aug 2018 07:36) (98% - 100%)    GEN: NAD, pleasant  HEENT: normocephalic and atraumatic. EOMI. GUCCI.    NECK: Supple. No carotid bruits.  No lymphadenopathy or thyromegaly.  LUNGS: Clear to auscultation.  HEART: Regular rate and rhythm without murmur.  ABDOMEN: Soft, NON TENDER MILD DISTENDED  : No CVA tenderness  EXTREMITIES: Without any cyanosis, clubbing, rash, lesions or edema.  MSK: no joint swelling  NEUROLOGIC: Cranial nerves II through XII are grossly intact.  PSYCHIATRIC: Appropriate affect .  SKIN: No ulceration or induration present.        Labs:  0                                    9.8    15.5  )-----------( 405      ( 01 Aug 2018 06:36 )             31.4   08-01    136  |  99  |  <3.0<L>  ----------------------------<  103  3.7   |  25.0  |  0.38<L>    Ca    8.9      01 Aug 2018 06:36            Urina   RECENT CULTURES:

## 2018-08-01 NOTE — PROGRESS NOTE ADULT - SUBJECTIVE AND OBJECTIVE BOX
GYNECOLOGIC ONCOLOGY PROGRESS NOTE    POD#8, HD#5    PROBLEMS:  Abdominal pain, unspecified abdominal location  Constipation  Sepsis, due to unspecified organism  Abdominal pain  Leukocytosis  Tachycardia  Intra-abdominal abscess post-procedure, initial encounter  Abnormal uterine bleeding (AUB)      Pt seen and examined at bedside.     SUBJECTIVE:    Patient is resting comfortably but when woken up continues to complain of diffuse abdominal pain. Patient reports pain is worse with urination and she complains of pain with bowel movements as well.   Flatus: Yes, having bowel movements   Denies Nausea, Vomiting or Diarrhea.  Denies shortness of breath, chest pain or dyspnea on exertion.  Tolerating diet.    OBJECTIVE:     VITALS:  T(F): 98.5 (08-01-18 @ 07:36), Max: 99.1 (07-31-18 @ 23:44)  HR: 87 (08-01-18 @ 07:36) (87 - 103)  BP: 107/70 (08-01-18 @ 07:36) (107/70 - 149/79)  RR: 18 (08-01-18 @ 07:36) (18 - 20)  SpO2: 99% (08-01-18 @ 07:36) (98% - 100%)  Wt(kg): --    I&O's Summary    31 Jul 2018 07:01  -  01 Aug 2018 07:00  --------------------------------------------------------  IN: 720 mL / OUT: 0 mL / NET: 720 mL    01 Aug 2018 07:01  -  01 Aug 2018 11:23  --------------------------------------------------------  IN: 120 mL / OUT: 0 mL / NET: 120 mL        MEDICATIONS  (STANDING):  acetaminophen  IVPB. 1000 milliGRAM(s) IV Intermittent once  atorvastatin 20 milliGRAM(s) Oral at bedtime  diltiazem    milliGRAM(s) Oral daily  docusate sodium 100 milliGRAM(s) Oral three times a day  DULoxetine 40 milliGRAM(s) Oral daily  enoxaparin Injectable 40 milliGRAM(s) SubCutaneous daily  pantoprazole    Tablet 40 milliGRAM(s) Oral before breakfast  piperacillin/tazobactam IVPB. 3.375 Gram(s) IV Intermittent every 8 hours  sodium chloride 0.9%. 1000 milliLiter(s) (60 mL/Hr) IV Continuous <Continuous>    MEDICATIONS  (PRN):  ALBUTerol/ipratropium for Nebulization 3 milliLiter(s) Nebulizer every 6 hours PRN Bronchospasm  HYDROmorphone   Tablet 2 milliGRAM(s) Oral every 4 hours PRN Moderate Pain (4 - 6)  HYDROmorphone   Tablet 4 milliGRAM(s) Oral every 4 hours PRN Severe Pain (7 - 10)      Physical Exam:  Constitutional: NAD  Pulmonary: clear to auscultation bilaterally   Cardiovascular: Regular rate and rhythm   Abdomen: softly distended,  bowel sounds present, tender to deep palpation in LLQ. No rebound or guarding appreciated  Extremities: no lower extremity edema or calve tenderness, Jasper's sign negative.  Incisions: Clean, dry, intact.  Without signs of infection or hernia.      LABS:                        9.8    15.5  )-----------( 405      ( 01 Aug 2018 06:36 )             31.4     08-01    136  |  99  |  <3.0<L>  ----------------------------<  103  3.7   |  25.0  |  0.38<L>    Ca    8.9      01 Aug 2018 06:36

## 2018-08-01 NOTE — PROGRESS NOTE ADULT - PROBLEM SELECTOR PLAN 1
Continue current diet, IV fluids  OOB as tolerated  Continue DVT prophylaxis  Pain control as needed  Appreciate medicine and ID recommendations   Antibiotics per ID  AM labs ordered  Blood cultures negative at 48 hours   Repeat CT ordered for today

## 2018-08-01 NOTE — PROGRESS NOTE ADULT - SUBJECTIVE AND OBJECTIVE BOX
Patient was seen and examined at bedside with Dr. King. Patient states she is feeling better.  Patient looks comfortable but reports moderate abdominal pain.   Patient drank contrast in preparation for CT A/P planned for this afternoon.   Patient is tolerating a regular diet, voiding, passing flatus/ambulating and has had one BM.   Patient understands the need for her to be NPO for her study today.

## 2018-08-01 NOTE — PROGRESS NOTE ADULT - SUBJECTIVE AND OBJECTIVE BOX
HPI:  53y   Last Menstrual Period 20 now POD5 s/p RAPATRICIO presents with vaginal bleeding, abdominal pain and constipation.   She was discharged from hospital on Thursday and had some abdominal pain at home. Percocet not working at all according to patient. She has been using colace twice daily, no bowel movement since Thursday; passing gas regularly. Denies urinary frequency, burning, urgency. She had little vaginal bleeding since d/c home but when she stood up this afternoon felt a "gush of blood run down both legs". Pt has been ambulating at home, walking up stairs though she states she has little support at home from kids and  (on disability). Daughter at bedside states that Diana has been drinking while taking percocet- pt admits to having 2 beers on Friday night along with smoking cigarettes. Pt suffers from anxiety, recently increased Symbalta dose.     OB/GYN HISTORY:   NSVDx2   Surgical History:    History of ectopic pregnancy x2 (2018 03:01)     Allergies    No Known Allergies    Intolerances      Sleep apnea  Asthma  Reflux  Palpitations  SOB (shortness of breath)  Vitamin B 12 deficiency  Seasonal allergies  Anemia  Ectopic pregnancy  Hypertension    MEDICATIONS  (STANDING):  acetaminophen  IVPB. 1000 milliGRAM(s) IV Intermittent once  atorvastatin 20 milliGRAM(s) Oral at bedtime  diltiazem    milliGRAM(s) Oral daily  docusate sodium 100 milliGRAM(s) Oral three times a day  DULoxetine 40 milliGRAM(s) Oral daily  enoxaparin Injectable 40 milliGRAM(s) SubCutaneous daily  pantoprazole    Tablet 40 milliGRAM(s) Oral before breakfast  piperacillin/tazobactam IVPB. 3.375 Gram(s) IV Intermittent every 8 hours  sodium chloride 0.9%. 1000 milliLiter(s) (60 mL/Hr) IV Continuous <Continuous>    MEDICATIONS  (PRN):  ALBUTerol/ipratropium for Nebulization 3 milliLiter(s) Nebulizer every 6 hours PRN Bronchospasm  HYDROmorphone   Tablet 2 milliGRAM(s) Oral every 4 hours PRN Moderate Pain (4 - 6)  HYDROmorphone   Tablet 4 milliGRAM(s) Oral every 4 hours PRN Severe Pain (7 - 10)                           9.8    15.5  )-----------( 405      ( 01 Aug 2018 06:36 )             31.4         136  |  99  |  <3.0<L>  ----------------------------<  103  3.7   |  25.0  |  0.38<L>    Ca    8.9      01 Aug 2018 06:36        ;  Vital Signs Last 24 Hrs  T(C): 36.7 (01 Aug 2018 16:39), Max: 37.3 (2018 23:44)  T(F): 98 (01 Aug 2018 16:39), Max: 99.1 (2018 23:44)  HR: 86 (01 Aug 2018 16:39) (86 - 103)  BP: 92/62 (01 Aug 2018 16:39) (92/62 - 141/83)  BP(mean): --  RR: 18 (01 Aug 2018 16:39) (18 - 20)  SpO2: 96% (01 Aug 2018 16:39) (96% - 100%)  CAPILLARY BLOOD GLUCOSE       @ : @ 07:00  --------------------------------------------------------  IN: 720 mL / OUT: 0 mL / NET: 720 mL     @ 07:  -  08 @ 18:38  --------------------------------------------------------  IN: 120 mL / OUT: 0 mL / NET: 120 mL        Patient feeling better No Cp, SOB, N/V +BM    HEENT: PEARLA  Neck: Supple  Cardio: S1 S2 No Murmur  Pulm: CTA No Rales or Ronchi  Abd: Soft mild tenderness worse in the pelvic area, ND BS+   Rectal Pelvic Breast- refused  Ext: No DCT  Skin: No Rash  Neuro: Awake Pleasant      Pelvic Collection v Abscess - IV antibiotics, IV hydration, Gyn Onc, Not aspiratable  according to IR  Asthma - Duoneb  Reflux - PPI  Hypertension - meds with parameters  Urine Tox  - +Cocaine HPI:  53y   Last Menstrual Period 20 now POD5 s/p RAPATRICIO presents with vaginal bleeding, abdominal pain and constipation.   She was discharged from hospital on Thursday and had some abdominal pain at home. Percocet not working at all according to patient. She has been using colace twice daily, no bowel movement since Thursday; passing gas regularly. Denies urinary frequency, burning, urgency. She had little vaginal bleeding since d/c home but when she stood up this afternoon felt a "gush of blood run down both legs". Pt has been ambulating at home, walking up stairs though she states she has little support at home from kids and  (on disability). Daughter at bedside states that Diana has been drinking while taking percocet- pt admits to having 2 beers on Friday night along with smoking cigarettes. Pt suffers from anxiety, recently increased Symbalta dose.     OB/GYN HISTORY:   NSVDx2   Surgical History:    History of ectopic pregnancy x2 (2018 03:01)     Allergies    No Known Allergies    Intolerances      Sleep apnea  Asthma  Reflux  Palpitations  SOB (shortness of breath)  Vitamin B 12 deficiency  Seasonal allergies  Anemia  Ectopic pregnancy  Hypertension    MEDICATIONS  (STANDING):  acetaminophen  IVPB. 1000 milliGRAM(s) IV Intermittent once  atorvastatin 20 milliGRAM(s) Oral at bedtime  diltiazem    milliGRAM(s) Oral daily  docusate sodium 100 milliGRAM(s) Oral three times a day  DULoxetine 40 milliGRAM(s) Oral daily  enoxaparin Injectable 40 milliGRAM(s) SubCutaneous daily  pantoprazole    Tablet 40 milliGRAM(s) Oral before breakfast  piperacillin/tazobactam IVPB. 3.375 Gram(s) IV Intermittent every 8 hours  sodium chloride 0.9%. 1000 milliLiter(s) (60 mL/Hr) IV Continuous <Continuous>    MEDICATIONS  (PRN):  ALBUTerol/ipratropium for Nebulization 3 milliLiter(s) Nebulizer every 6 hours PRN Bronchospasm  HYDROmorphone   Tablet 2 milliGRAM(s) Oral every 4 hours PRN Moderate Pain (4 - 6)  HYDROmorphone   Tablet 4 milliGRAM(s) Oral every 4 hours PRN Severe Pain (7 - 10)                           9.8    15.5  )-----------( 405      ( 01 Aug 2018 06:36 )             31.4         136  |  99  |  <3.0<L>  ----------------------------<  103  3.7   |  25.0  |  0.38<L>    Ca    8.9      01 Aug 2018 06:36        ;  Vital Signs Last 24 Hrs  T(C): 36.7 (01 Aug 2018 16:39), Max: 37.3 (2018 23:44)  T(F): 98 (01 Aug 2018 16:39), Max: 99.1 (2018 23:44)  HR: 86 (01 Aug 2018 16:39) (86 - 103)  BP: 92/62 (01 Aug 2018 16:39) (92/62 - 141/83)  BP(mean): --  RR: 18 (01 Aug 2018 16:39) (18 - 20)  SpO2: 96% (01 Aug 2018 16:39) (96% - 100%)  CAPILLARY BLOOD GLUCOSE       @ : @ 07:00  --------------------------------------------------------  IN: 720 mL / OUT: 0 mL / NET: 720 mL     @ 07:  -  08 @ 18:38  --------------------------------------------------------  IN: 120 mL / OUT: 0 mL / NET: 120 mL        Patient feeling better No Cp, SOB, N/V +BM    HEENT: PEARLA  Neck: Supple  Cardio: S1 S2 No Murmur  Pulm: CTA No Rales or Ronchi  Abd: Soft mild tenderness worse in the pelvic area, ND BS+   Rectal Pelvic Breast- refused  Ext: No DCT  Skin: No Rash  Neuro: Awake Pleasant      Pelvic Collection v Abscess - IV antibiotics, IV hydration, Gyn Onc, ID, Collection unchanged   Asthma - Duoneb  Reflux - PPI  Hypertension - meds with parameters

## 2018-08-02 ENCOUNTER — RECORD ABSTRACTING (OUTPATIENT)
Age: 53
End: 2018-08-02

## 2018-08-02 LAB
ANION GAP SERPL CALC-SCNC: 11 MMOL/L — SIGNIFICANT CHANGE UP (ref 5–17)
ANISOCYTOSIS BLD QL: SLIGHT — SIGNIFICANT CHANGE UP
BUN SERPL-MCNC: 3 MG/DL — LOW (ref 8–20)
CALCIUM SERPL-MCNC: 9.4 MG/DL — SIGNIFICANT CHANGE UP (ref 8.6–10.2)
CHLORIDE SERPL-SCNC: 98 MMOL/L — SIGNIFICANT CHANGE UP (ref 98–107)
CO2 SERPL-SCNC: 27 MMOL/L — SIGNIFICANT CHANGE UP (ref 22–29)
CREAT SERPL-MCNC: 0.44 MG/DL — LOW (ref 0.5–1.3)
ELLIPTOCYTES BLD QL SMEAR: SLIGHT — SIGNIFICANT CHANGE UP
EOSINOPHIL NFR BLD AUTO: 3 % — SIGNIFICANT CHANGE UP (ref 0–5)
GLUCOSE SERPL-MCNC: 102 MG/DL — SIGNIFICANT CHANGE UP (ref 70–115)
GRAM STN FLD: SIGNIFICANT CHANGE UP
HCT VFR BLD CALC: 33.5 % — LOW (ref 37–47)
HGB BLD-MCNC: 10.7 G/DL — LOW (ref 12–16)
HYPOCHROMIA BLD QL: SLIGHT — SIGNIFICANT CHANGE UP
LYMPHOCYTES # BLD AUTO: 7 % — LOW (ref 20–55)
MACROCYTES BLD QL: SLIGHT — SIGNIFICANT CHANGE UP
MCHC RBC-ENTMCNC: 30.1 PG — SIGNIFICANT CHANGE UP (ref 27–31)
MCHC RBC-ENTMCNC: 31.9 G/DL — LOW (ref 32–36)
MCV RBC AUTO: 94.1 FL — SIGNIFICANT CHANGE UP (ref 81–99)
MICROCYTES BLD QL: SLIGHT — SIGNIFICANT CHANGE UP
MONOCYTES NFR BLD AUTO: 5 % — SIGNIFICANT CHANGE UP (ref 3–10)
NEUTROPHILS NFR BLD AUTO: 84 % — HIGH (ref 37–73)
PLAT MORPH BLD: NORMAL — SIGNIFICANT CHANGE UP
PLATELET # BLD AUTO: 489 K/UL — HIGH (ref 150–400)
POIKILOCYTOSIS BLD QL AUTO: SLIGHT — SIGNIFICANT CHANGE UP
POTASSIUM SERPL-MCNC: 4.2 MMOL/L — SIGNIFICANT CHANGE UP (ref 3.5–5.3)
POTASSIUM SERPL-SCNC: 4.2 MMOL/L — SIGNIFICANT CHANGE UP (ref 3.5–5.3)
RBC # BLD: 3.56 M/UL — LOW (ref 4.4–5.2)
RBC # FLD: 13.4 % — SIGNIFICANT CHANGE UP (ref 11–15.6)
RBC BLD AUTO: ABNORMAL
SODIUM SERPL-SCNC: 136 MMOL/L — SIGNIFICANT CHANGE UP (ref 135–145)
SPECIMEN SOURCE: SIGNIFICANT CHANGE UP
VARIANT LYMPHS # BLD: 1 % — SIGNIFICANT CHANGE UP (ref 0–6)
WBC # BLD: 21 K/UL — HIGH (ref 4.8–10.8)
WBC # FLD AUTO: 21 K/UL — HIGH (ref 4.8–10.8)

## 2018-08-02 PROCEDURE — 99233 SBSQ HOSP IP/OBS HIGH 50: CPT

## 2018-08-02 PROCEDURE — 49406 IMAGE CATH FLUID PERI/RETRO: CPT

## 2018-08-02 PROCEDURE — 71046 X-RAY EXAM CHEST 2 VIEWS: CPT | Mod: 26

## 2018-08-02 RX ORDER — MORPHINE SULFATE 50 MG/1
4 CAPSULE, EXTENDED RELEASE ORAL ONCE
Qty: 0 | Refills: 0 | Status: DISCONTINUED | OUTPATIENT
Start: 2018-08-02 | End: 2018-08-02

## 2018-08-02 RX ADMIN — HYDROMORPHONE HYDROCHLORIDE 4 MILLIGRAM(S): 2 INJECTION INTRAMUSCULAR; INTRAVENOUS; SUBCUTANEOUS at 21:30

## 2018-08-02 RX ADMIN — PIPERACILLIN AND TAZOBACTAM 25 GRAM(S): 4; .5 INJECTION, POWDER, LYOPHILIZED, FOR SOLUTION INTRAVENOUS at 14:55

## 2018-08-02 RX ADMIN — PIPERACILLIN AND TAZOBACTAM 25 GRAM(S): 4; .5 INJECTION, POWDER, LYOPHILIZED, FOR SOLUTION INTRAVENOUS at 06:19

## 2018-08-02 RX ADMIN — HYDROMORPHONE HYDROCHLORIDE 4 MILLIGRAM(S): 2 INJECTION INTRAMUSCULAR; INTRAVENOUS; SUBCUTANEOUS at 08:08

## 2018-08-02 RX ADMIN — MORPHINE SULFATE 4 MILLIGRAM(S): 50 CAPSULE, EXTENDED RELEASE ORAL at 14:54

## 2018-08-02 RX ADMIN — HYDROMORPHONE HYDROCHLORIDE 4 MILLIGRAM(S): 2 INJECTION INTRAMUSCULAR; INTRAVENOUS; SUBCUTANEOUS at 12:21

## 2018-08-02 RX ADMIN — HYDROMORPHONE HYDROCHLORIDE 4 MILLIGRAM(S): 2 INJECTION INTRAMUSCULAR; INTRAVENOUS; SUBCUTANEOUS at 04:35

## 2018-08-02 RX ADMIN — ENOXAPARIN SODIUM 40 MILLIGRAM(S): 100 INJECTION SUBCUTANEOUS at 12:21

## 2018-08-02 RX ADMIN — Medication 100 MILLIGRAM(S): at 12:21

## 2018-08-02 RX ADMIN — Medication 100 MILLIGRAM(S): at 21:00

## 2018-08-02 RX ADMIN — PANTOPRAZOLE SODIUM 40 MILLIGRAM(S): 20 TABLET, DELAYED RELEASE ORAL at 06:18

## 2018-08-02 RX ADMIN — ATORVASTATIN CALCIUM 20 MILLIGRAM(S): 80 TABLET, FILM COATED ORAL at 21:00

## 2018-08-02 RX ADMIN — HYDROMORPHONE HYDROCHLORIDE 4 MILLIGRAM(S): 2 INJECTION INTRAMUSCULAR; INTRAVENOUS; SUBCUTANEOUS at 03:25

## 2018-08-02 RX ADMIN — Medication 100 MILLIGRAM(S): at 06:19

## 2018-08-02 RX ADMIN — DULOXETINE HYDROCHLORIDE 40 MILLIGRAM(S): 30 CAPSULE, DELAYED RELEASE ORAL at 12:21

## 2018-08-02 RX ADMIN — HYDROMORPHONE HYDROCHLORIDE 4 MILLIGRAM(S): 2 INJECTION INTRAMUSCULAR; INTRAVENOUS; SUBCUTANEOUS at 21:00

## 2018-08-02 RX ADMIN — PIPERACILLIN AND TAZOBACTAM 25 GRAM(S): 4; .5 INJECTION, POWDER, LYOPHILIZED, FOR SOLUTION INTRAVENOUS at 21:01

## 2018-08-02 NOTE — BRIEF OPERATIVE NOTE - OPERATION/FINDINGS
10 F drain left transgluteal.  no complication. 5 cc old blood aspirated.  Hemovac a[applied.  flush 10 cc NS BID

## 2018-08-02 NOTE — PROGRESS NOTE ADULT - SUBJECTIVE AND OBJECTIVE BOX
[facility]  INFECTIOUS DISEASES AND INTERNAL MEDICINE at [place]  =======================================================  Aayush Peña MD  Diplomates American Board of Internal Medicine and Infectious Diseases  =======================================================    JAHAIRA MACKEY [id number]    Follow up: pelvic collection  pt non toxic  REPEAT CT SCAN DONE    Allergies:  No Known Allergies      Medications:  acetaminophen  IVPB. 1000 milliGRAM(s) IV Intermittent once  ALBUTerol/ipratropium for Nebulization 3 milliLiter(s) Nebulizer every 6 hours PRN  atorvastatin 20 milliGRAM(s) Oral at bedtime  diltiazem    milliGRAM(s) Oral daily  docusate sodium 100 milliGRAM(s) Oral three times a day  DULoxetine 40 milliGRAM(s) Oral daily  enoxaparin Injectable 40 milliGRAM(s) SubCutaneous daily  HYDROmorphone   Tablet 2 milliGRAM(s) Oral every 4 hours PRN  HYDROmorphone   Tablet 4 milliGRAM(s) Oral every 4 hours PRN  ketorolac   Injectable 30 milliGRAM(s) IV Push every 6 hours  pantoprazole    Tablet 40 milliGRAM(s) Oral before breakfast  piperacillin/tazobactam IVPB. 3.375 Gram(s) IV Intermittent every 8 hours  sodium chloride 0.9%. 1000 milliLiter(s) IV Continuous <Continuous>    SOCIAL       FAMILY   FAMILY HISTORY:  Family history of CABG  Hypertension  Family history of malignant neoplasm of kidney  CAD (coronary artery disease)    REVIEW OF SYSTEMS:  CONSTITUTIONAL:  No Fever or chills  HEENT:   No diplopia or blurred vision.  No earache, sore throat or runny nose.  CARDIOVASCULAR:  No pressure, squeezing, strangling, tightness, heaviness or aching about the chest, neck, axilla or epigastrium.  RESPIRATORY:  No cough, shortness of breath, PND or orthopnea.  GASTROINTESTINAL: FEELS BLOATED  GENITOURINARY:  No dysuria, frequency or urgency. No Blood in urine  MUSCULOSKELETAL:   AS PER HPI  SKIN:  No change in skin, hair or nails.  NEUROLOGIC:  No paresthesias, fasciculations, seizures or weakness.  PSYCHIATRIC:  No disorder of thought or mood.  ENDOCRINE:  No heat or cold intolerance, polyuria or polydipsia.  HEMATOLOGICAL:  No easy bruising or bleeding.            Physical Exam:   V Vital Signs Last 24 Hrs  T(C): 36.9 (01 Aug 2018 23:53), Max: 36.9 (01 Aug 2018 23:53)  T(F): 98.4 (01 Aug 2018 23:53), Max: 98.4 (01 Aug 2018 23:53)  HR: 84 (02 Aug 2018 04:35) (84 - 89)  BP: 111/72 (02 Aug 2018 04:35) (91/54 - 111/72)  BP(mean): --  RR: 18 (01 Aug 2018 23:53) (18 - 18)  SpO2: 98% (01 Aug 2018 23:53) (94% - 98%)    GEN: NAD, pleasant  HEENT: normocephalic and atraumatic. EOMI. GUCCI.    NECK: Supple. No carotid bruits.  No lymphadenopathy or thyromegaly.  LUNGS: Clear to auscultation.  HEART: Regular rate and rhythm without murmur.  ABDOMEN: Soft, NON TENDER MILD DISTENDED  : No CVA tenderness  EXTREMITIES: Without any cyanosis, clubbing, rash, lesions or edema.  MSK: no joint swelling  NEUROLOGIC: Cranial nerves II through XII are grossly intact.  PSYCHIATRIC: Appropriate affect .  SKIN: No ulceration or induration present.        Labs:                         10.7   21.0  )-----------( 489      ( 02 Aug 2018 05:18 )             33.5   08-02    136  |  98  |  3.0<L>  ----------------------------<  102  4.2   |  27.0  |  0.44<L>    Ca    9.4      02 Aug 2018 05:18              Urina   RECENT CULTURES:

## 2018-08-02 NOTE — PROGRESS NOTE ADULT - ATTENDING COMMENTS
continue  IV antibiotics per ID recommendation , IR drainage of pelvic fluid collection catheter in place , will repeat CBC next am , continue current plan. will discuss plan with Dr King

## 2018-08-02 NOTE — PROGRESS NOTE ADULT - SUBJECTIVE AND OBJECTIVE BOX
HPI:  53y   Last Menstrual Period 18 now POD5 s/p RAPATRICIO presents with vaginal bleeding, abdominal pain and constipation.   She was discharged from hospital on Thursday and had some abdominal pain at home. Percocet not working at all according to patient. She has been using colace twice daily, no bowel movement since Thursday; passing gas regularly. Denies urinary frequency, burning, urgency. She had little vaginal bleeding since d/c home but when she stood up this afternoon felt a "gush of blood run down both legs". Pt has been ambulating at home, walking up stairs though she states she has little support at home from kids and  (on disability). Daughter at bedside states that Diana has been drinking while taking percocet- pt admits to having 2 beers on Friday night along with smoking cigarettes. Pt suffers from anxiety, recently increased Symbalta dose.     OB/GYN HISTORY:   NSVDx2   Surgical History:    History of ectopic pregnancy x2 (2018 03:01)     Allergies    No Known Allergies    Intolerances      Sleep apnea  Asthma  Reflux  Palpitations  SOB (shortness of breath)  Vitamin B 12 deficiency  Seasonal allergies  Anemia  Ectopic pregnancy  Hypertension    MEDICATIONS  (STANDING):  acetaminophen  IVPB. 1000 milliGRAM(s) IV Intermittent once  atorvastatin 20 milliGRAM(s) Oral at bedtime  diltiazem    milliGRAM(s) Oral daily  docusate sodium 100 milliGRAM(s) Oral three times a day  DULoxetine 40 milliGRAM(s) Oral daily  enoxaparin Injectable 40 milliGRAM(s) SubCutaneous daily  pantoprazole    Tablet 40 milliGRAM(s) Oral before breakfast  piperacillin/tazobactam IVPB. 3.375 Gram(s) IV Intermittent every 8 hours  sodium chloride 0.9%. 1000 milliLiter(s) (60 mL/Hr) IV Continuous <Continuous>    MEDICATIONS  (PRN):  ALBUTerol/ipratropium for Nebulization 3 milliLiter(s) Nebulizer every 6 hours PRN Bronchospasm  HYDROmorphone   Tablet 2 milliGRAM(s) Oral every 4 hours PRN Moderate Pain (4 - 6)  HYDROmorphone   Tablet 4 milliGRAM(s) Oral every 4 hours PRN Severe Pain (7 - 10)                           10.7   21.0  )-----------( 489      ( 02 Aug 2018 05:18 )             33.5     08-    136  |  98  |  3.0<L>  ----------------------------<  102  4.2   |  27.0  |  0.44<L>    Ca    9.4      02 Aug 2018 05:18        ;  Vital Signs Last 24 Hrs  T(C): 36.8 (02 Aug 2018 08:10), Max: 36.9 (01 Aug 2018 23:53)  T(F): 98.3 (02 Aug 2018 08:10), Max: 98.4 (01 Aug 2018 23:53)  HR: 84 (02 Aug 2018 08:10) (84 - 89)  BP: 95/52 (02 Aug 2018 08:10) (91/54 - 111/72)  BP(mean): --  RR: 18 (02 Aug 2018 08:10) (18 - 18)  SpO2: 95% (02 Aug 2018 08:10) (94% - 98%)  CAPILLARY BLOOD GLUCOSE       @ 07:01  -   @ 07:00  --------------------------------------------------------  IN: 120 mL / OUT: 0 mL / NET: 120 mL      Patient feeling better No Cp, SOB, N/V +BM     HEENT: PEARLA  Neck: Supple  Cardio: S1 S2 No Murmur  Pulm: CTA No Rales or Ronchi  Abd: Soft mild tenderness worse in the pelvic area, ND BS+, + Drain  Rectal Pelvic Breast- refused  Ext: No DCT  Skin: No Rash  Neuro: Awake Pleasant      Pelvic Collection v Abscess - IV antibiotics, IV hydration, Gyn Onc, ID, Collection unchanged, Fluid Cultured  Asthma - Duoneb  Reflux - PPI  Hypertension - meds with parameters

## 2018-08-02 NOTE — PROGRESS NOTE ADULT - PROBLEM SELECTOR PLAN 1
Continue current diet, IV fluids  OOB as tolerated  Continue DVT prophylaxis  Pain control as needed  Appreciate medicine and ID recommendations   Antibiotics per ID  AM labs ordered  Blood cultures negative at 48 hours   CT showing Hysterectomy bed abscess

## 2018-08-02 NOTE — BRIEF OPERATIVE NOTE - PROCEDURE
Aspiration or drainage, abscess, with with CT guidance  08/02/2018    Active  HALPER <<-----Click on this checkbox to enter Procedure

## 2018-08-02 NOTE — PROGRESS NOTE ADULT - SUBJECTIVE AND OBJECTIVE BOX
GYNECOLOGIC ONCOLOGY PROGRESS NOTE    POD#9, HD#6    PROBLEMS:  Abdominal pain, unspecified abdominal location  Constipation  Sepsis, due to unspecified organism  Abdominal pain  Leukocytosis  Tachycardia  Intra-abdominal abscess post-procedure, initial encounter  Abnormal uterine bleeding (AUB)      Pt seen and examined at bedside with Dr. Pena.     SUBJECTIVE:    No acute events overnight. Patient continues to report 7-10/10 diffuse abdominal pain. Reports chills overnight but no fevers.   Flatus: Yes, having bowel movements   Denies Nausea, Vomiting or Diarrhea.  Denies shortness of breath, chest pain or dyspnea on exertion.  Tolerating diet.       OBJECTIVE:     VITALS:  Vital Signs Last 24 Hrs  T(C): 36.9 (01 Aug 2018 23:53), Max: 36.9 (01 Aug 2018 23:53)  T(F): 98.4 (01 Aug 2018 23:53), Max: 98.4 (01 Aug 2018 23:53)  HR: 84 (02 Aug 2018 04:35) (84 - 89)  BP: 111/72 (02 Aug 2018 04:35) (91/54 - 111/72)  BP(mean): --  RR: 18 (01 Aug 2018 23:53) (18 - 18)  SpO2: 98% (01 Aug 2018 23:53) (94% - 98%)      01 Aug 2018 07:01  -  02 Aug 2018 07:00  --------------------------------------------------------  IN:    sodium chloride 0.9%.: 120 mL  Total IN: 120 mL    OUT:  Total OUT: 0 mL    Total NET: 120 mL      MEDICATIONS  (STANDING):  acetaminophen  IVPB. 1000 milliGRAM(s) IV Intermittent once  atorvastatin 20 milliGRAM(s) Oral at bedtime  diltiazem    milliGRAM(s) Oral daily  docusate sodium 100 milliGRAM(s) Oral three times a day  DULoxetine 40 milliGRAM(s) Oral daily  enoxaparin Injectable 40 milliGRAM(s) SubCutaneous daily  pantoprazole    Tablet 40 milliGRAM(s) Oral before breakfast  piperacillin/tazobactam IVPB. 3.375 Gram(s) IV Intermittent every 8 hours  sodium chloride 0.9%. 1000 milliLiter(s) (60 mL/Hr) IV Continuous <Continuous>    MEDICATIONS  (PRN):  ALBUTerol/ipratropium for Nebulization 3 milliLiter(s) Nebulizer every 6 hours PRN Bronchospasm  HYDROmorphone   Tablet 2 milliGRAM(s) Oral every 4 hours PRN Moderate Pain (4 - 6)  HYDROmorphone   Tablet 4 milliGRAM(s) Oral every 4 hours PRN Severe Pain (7 - 10)      Physical Exam:  Constitutional: NAD  Pulmonary: clear to auscultation bilaterally   Cardiovascular: Regular rate and rhythm   Abdomen: softly distended,  bowel sounds present, tender to deep palpation in supra-pubic region with focal rebound. No guarding appreciated  Extremities: no lower extremity edema or calve tenderness, Jasper's sign negative.  Incisions: Clean, dry, intact.  Without signs of infection or hernia.      LABS:                           10.7   21.0  )-----------( 489      ( 02 Aug 2018 05:18 )             33.5 ,                       9.8    15.5  )-----------( 405      ( 01 Aug 2018 06:36 )             31.4     08-02    136  |  98  |  3.0<L>  ----------------------------<  102  4.2   |  27.0  |  0.44<L>    Ca    9.4      02 Aug 2018 05:18           EXAM:  CT ABDOMEN AND PELVIS OC IC                          PROCEDURE DATE:  08/01/2018          INTERPRETATION:  Clinical information: Abdominal pain, history of pelvic   abscess    Comparison: 7/29    PROCEDURE:   CT of the Abdomen and Pelvis was performed with intravenous contrast.  90ml of Omnipaque 350 was injected intravenously. 10cc was discarded.    FINDINGS:    LOWER CHEST: Within normal limits    ABDOMEN:  LIVER: Within normal limits  BILE DUCTS: Normal caliber  GALLBLADDER: No calcified gallstones. Normal caliber wall  PANCREAS: Within normal limits  SPLEEN: Within normal limits  ADRENALS: Within normal limits  KIDNEYS: Small left renal cyst.    PELVIS:  REPRODUCTIVE ORGANS: Hysterectomy bed abscess 6 x 3.4 cm, stable.   Inflammatory changes in the adnexa stable.  BLADDER: Thickened posterior wall. Trace intravesicular air improved.    PERITONEUM:No ascites, no free air.  BOWEL: Sigmoid diverticulosis.  VESSELS: Within normal limits  RETROPERITONEUM: No retroperitoneal or pelvic adenopathy  ABDOMINAL WALL: Within normal limits  MUSCULOSKELETAL: Within normal limits    IMPRESSION:     Stable hysterectomy bed abscess. Other comments as above.

## 2018-08-02 NOTE — PROGRESS NOTE ADULT - SUBJECTIVE AND OBJECTIVE BOX
GYNECOLOGIC ONCOLOGY PROGRESS NOTE    POD#9, HD#6    PROBLEMS:  Abdominal pain, unspecified abdominal location  Constipation  Sepsis, due to unspecified organism  Abdominal pain  Leukocytosis  Tachycardia  Intra-abdominal abscess post-procedure, initial encounter  Abnormal uterine bleeding (AUB)      Pt seen and examined at bedside.     SUBJECTIVE:    Patient is complaining of moderate to sever pain s/p IR CT guided pelvic collection drainage .  Flatus: passing gas   Denies Nausea, Vomiting or Diarrhea.  Denies shortness of breath, chest pain or dyspnea on exertion.  Tolerating diet.    OBJECTIVE:     VITALS:  T(F): 98.3 (08-02-18 @ 08:10), Max: 98.4 (08-01-18 @ 23:53)  HR: 84 (08-02-18 @ 08:10) (84 - 89)  BP: 95/52 (08-02-18 @ 08:10) (91/54 - 111/72)  RR: 18 (08-02-18 @ 08:10) (18 - 18)  SpO2: 95% (08-02-18 @ 08:10) (94% - 98%)  Wt(kg): --    I&O's Summary    01 Aug 2018 07:01  -  02 Aug 2018 07:00  --------------------------------------------------------  IN: 120 mL / OUT: 0 mL / NET: 120 mL        MEDICATIONS  (STANDING):  acetaminophen  IVPB. 1000 milliGRAM(s) IV Intermittent once  atorvastatin 20 milliGRAM(s) Oral at bedtime  diltiazem    milliGRAM(s) Oral daily  docusate sodium 100 milliGRAM(s) Oral three times a day  DULoxetine 40 milliGRAM(s) Oral daily  enoxaparin Injectable 40 milliGRAM(s) SubCutaneous daily  morphine  - Injectable 4 milliGRAM(s) IV Push once  pantoprazole    Tablet 40 milliGRAM(s) Oral before breakfast  piperacillin/tazobactam IVPB. 3.375 Gram(s) IV Intermittent every 8 hours  sodium chloride 0.9%. 1000 milliLiter(s) (60 mL/Hr) IV Continuous <Continuous>    MEDICATIONS  (PRN):  ALBUTerol/ipratropium for Nebulization 3 milliLiter(s) Nebulizer every 6 hours PRN Bronchospasm  HYDROmorphone   Tablet 2 milliGRAM(s) Oral every 4 hours PRN Moderate Pain (4 - 6)  HYDROmorphone   Tablet 4 milliGRAM(s) Oral every 4 hours PRN Severe Pain (7 - 10)      Physical Exam:  Constitutional: NAD  Pulmonary: clear to auscultation bilaterally   Cardiovascular: Regular rate and rhythm   Abdomen: soft, non-tender, non-distended, normal bowel signs  Extremities: no lower extremity edema or calve tenderness, Jasper's sign negative.  Incision: Clean, dry, intact.  Without signs of infection or hernia.      LABS:                        10.7   21.0  )-----------( 489      ( 02 Aug 2018 05:18 )             33.5     08-02    136  |  98  |  3.0<L>  ----------------------------<  102  4.2   |  27.0  |  0.44<L>    Ca    9.4      02 Aug 2018 05:18            RADIOLOGY & ADDITIONAL TESTS:

## 2018-08-03 DIAGNOSIS — R10.84 GENERALIZED ABDOMINAL PAIN: ICD-10-CM

## 2018-08-03 LAB
AMPHET UR-MCNC: NEGATIVE — SIGNIFICANT CHANGE UP
APPEARANCE UR: CLEAR — SIGNIFICANT CHANGE UP
BARBITURATES UR SCN-MCNC: NEGATIVE — SIGNIFICANT CHANGE UP
BASOPHILS # BLD AUTO: 0 K/UL — SIGNIFICANT CHANGE UP (ref 0–0.2)
BASOPHILS NFR BLD AUTO: 0.2 % — SIGNIFICANT CHANGE UP (ref 0–2)
BENZODIAZ UR-MCNC: POSITIVE
BILIRUB UR-MCNC: NEGATIVE — SIGNIFICANT CHANGE UP
COCAINE METAB.OTHER UR-MCNC: NEGATIVE — SIGNIFICANT CHANGE UP
COLOR SPEC: YELLOW — SIGNIFICANT CHANGE UP
CULTURE RESULTS: SIGNIFICANT CHANGE UP
CULTURE RESULTS: SIGNIFICANT CHANGE UP
DIFF PNL FLD: ABNORMAL
EOSINOPHIL # BLD AUTO: 0.2 K/UL — SIGNIFICANT CHANGE UP (ref 0–0.5)
EOSINOPHIL NFR BLD AUTO: 1.1 % — SIGNIFICANT CHANGE UP (ref 0–6)
EPI CELLS # UR: SIGNIFICANT CHANGE UP
GLUCOSE UR QL: NEGATIVE MG/DL — SIGNIFICANT CHANGE UP
HCT VFR BLD CALC: 33.2 % — LOW (ref 37–47)
HGB BLD-MCNC: 10.6 G/DL — LOW (ref 12–16)
KETONES UR-MCNC: NEGATIVE — SIGNIFICANT CHANGE UP
LACTATE SERPL-SCNC: 1.2 MMOL/L — SIGNIFICANT CHANGE UP (ref 0.5–2)
LEUKOCYTE ESTERASE UR-ACNC: NEGATIVE — SIGNIFICANT CHANGE UP
LYMPHOCYTES # BLD AUTO: 11.9 % — LOW (ref 20–55)
LYMPHOCYTES # BLD AUTO: 2.4 K/UL — SIGNIFICANT CHANGE UP (ref 1–4.8)
MCHC RBC-ENTMCNC: 30.1 PG — SIGNIFICANT CHANGE UP (ref 27–31)
MCHC RBC-ENTMCNC: 31.9 G/DL — LOW (ref 32–36)
MCV RBC AUTO: 94.3 FL — SIGNIFICANT CHANGE UP (ref 81–99)
METHADONE UR-MCNC: NEGATIVE — SIGNIFICANT CHANGE UP
MONOCYTES # BLD AUTO: 1.2 K/UL — HIGH (ref 0–0.8)
MONOCYTES NFR BLD AUTO: 5.7 % — SIGNIFICANT CHANGE UP (ref 3–10)
NEUTROPHILS # BLD AUTO: 16.3 K/UL — HIGH (ref 1.8–8)
NEUTROPHILS NFR BLD AUTO: 80.6 % — HIGH (ref 37–73)
NITRITE UR-MCNC: NEGATIVE — SIGNIFICANT CHANGE UP
OPIATES UR-MCNC: POSITIVE
PCP SPEC-MCNC: SIGNIFICANT CHANGE UP
PCP UR-MCNC: NEGATIVE — SIGNIFICANT CHANGE UP
PH UR: 6.5 — SIGNIFICANT CHANGE UP (ref 5–8)
PLATELET # BLD AUTO: 496 K/UL — HIGH (ref 150–400)
PROT UR-MCNC: 15 MG/DL
RBC # BLD: 3.52 M/UL — LOW (ref 4.4–5.2)
RBC # FLD: 13.4 % — SIGNIFICANT CHANGE UP (ref 11–15.6)
RBC CASTS # UR COMP ASSIST: SIGNIFICANT CHANGE UP /HPF (ref 0–4)
SP GR SPEC: 1.01 — SIGNIFICANT CHANGE UP (ref 1.01–1.02)
SPECIMEN SOURCE: SIGNIFICANT CHANGE UP
SPECIMEN SOURCE: SIGNIFICANT CHANGE UP
THC UR QL: NEGATIVE — SIGNIFICANT CHANGE UP
UROBILINOGEN FLD QL: NEGATIVE MG/DL — SIGNIFICANT CHANGE UP
WBC # BLD: 20.2 K/UL — HIGH (ref 4.8–10.8)
WBC # FLD AUTO: 20.2 K/UL — HIGH (ref 4.8–10.8)
WBC UR QL: NEGATIVE — SIGNIFICANT CHANGE UP

## 2018-08-03 PROCEDURE — 99233 SBSQ HOSP IP/OBS HIGH 50: CPT

## 2018-08-03 RX ORDER — KETOROLAC TROMETHAMINE 30 MG/ML
15 SYRINGE (ML) INJECTION EVERY 6 HOURS
Qty: 0 | Refills: 0 | Status: DISCONTINUED | OUTPATIENT
Start: 2018-08-03 | End: 2018-08-03

## 2018-08-03 RX ORDER — VANCOMYCIN HCL 1 G
1250 VIAL (EA) INTRAVENOUS EVERY 12 HOURS
Qty: 0 | Refills: 0 | Status: DISCONTINUED | OUTPATIENT
Start: 2018-08-03 | End: 2018-08-03

## 2018-08-03 RX ORDER — DOCUSATE SODIUM 100 MG
100 CAPSULE ORAL THREE TIMES A DAY
Qty: 0 | Refills: 0 | Status: DISCONTINUED | OUTPATIENT
Start: 2018-08-03 | End: 2018-08-04

## 2018-08-03 RX ORDER — DILTIAZEM HCL 120 MG
360 CAPSULE, EXT RELEASE 24 HR ORAL DAILY
Qty: 0 | Refills: 0 | Status: DISCONTINUED | OUTPATIENT
Start: 2018-08-03 | End: 2018-08-04

## 2018-08-03 RX ORDER — IPRATROPIUM/ALBUTEROL SULFATE 18-103MCG
3 AEROSOL WITH ADAPTER (GRAM) INHALATION EVERY 6 HOURS
Qty: 0 | Refills: 0 | Status: DISCONTINUED | OUTPATIENT
Start: 2018-08-03 | End: 2018-08-04

## 2018-08-03 RX ORDER — HYDROMORPHONE HYDROCHLORIDE 2 MG/ML
4 INJECTION INTRAMUSCULAR; INTRAVENOUS; SUBCUTANEOUS EVERY 4 HOURS
Qty: 0 | Refills: 0 | Status: DISCONTINUED | OUTPATIENT
Start: 2018-08-03 | End: 2018-08-04

## 2018-08-03 RX ORDER — ATORVASTATIN CALCIUM 80 MG/1
20 TABLET, FILM COATED ORAL AT BEDTIME
Qty: 0 | Refills: 0 | Status: DISCONTINUED | OUTPATIENT
Start: 2018-08-03 | End: 2018-08-04

## 2018-08-03 RX ORDER — ONDANSETRON 8 MG/1
4 TABLET, FILM COATED ORAL ONCE
Qty: 0 | Refills: 0 | Status: DISCONTINUED | OUTPATIENT
Start: 2018-08-03 | End: 2018-08-03

## 2018-08-03 RX ORDER — ACETAMINOPHEN 500 MG
1000 TABLET ORAL ONCE
Qty: 0 | Refills: 0 | Status: COMPLETED | OUTPATIENT
Start: 2018-08-03 | End: 2018-08-03

## 2018-08-03 RX ORDER — PIPERACILLIN AND TAZOBACTAM 4; .5 G/20ML; G/20ML
3.38 INJECTION, POWDER, LYOPHILIZED, FOR SOLUTION INTRAVENOUS EVERY 8 HOURS
Qty: 0 | Refills: 0 | Status: DISCONTINUED | OUTPATIENT
Start: 2018-08-03 | End: 2018-08-04

## 2018-08-03 RX ORDER — HYDROMORPHONE HYDROCHLORIDE 2 MG/ML
2 INJECTION INTRAMUSCULAR; INTRAVENOUS; SUBCUTANEOUS EVERY 4 HOURS
Qty: 0 | Refills: 0 | Status: DISCONTINUED | OUTPATIENT
Start: 2018-08-03 | End: 2018-08-04

## 2018-08-03 RX ORDER — SODIUM CHLORIDE 9 MG/ML
1000 INJECTION, SOLUTION INTRAVENOUS
Qty: 0 | Refills: 0 | Status: DISCONTINUED | OUTPATIENT
Start: 2018-08-03 | End: 2018-08-03

## 2018-08-03 RX ORDER — ENOXAPARIN SODIUM 100 MG/ML
40 INJECTION SUBCUTANEOUS DAILY
Qty: 0 | Refills: 0 | Status: DISCONTINUED | OUTPATIENT
Start: 2018-08-03 | End: 2018-08-04

## 2018-08-03 RX ORDER — FENTANYL CITRATE 50 UG/ML
25 INJECTION INTRAVENOUS
Qty: 0 | Refills: 0 | Status: DISCONTINUED | OUTPATIENT
Start: 2018-08-03 | End: 2018-08-03

## 2018-08-03 RX ORDER — VANCOMYCIN HCL 1 G
1250 VIAL (EA) INTRAVENOUS EVERY 12 HOURS
Qty: 0 | Refills: 0 | Status: DISCONTINUED | OUTPATIENT
Start: 2018-08-03 | End: 2018-08-04

## 2018-08-03 RX ORDER — PANTOPRAZOLE SODIUM 20 MG/1
40 TABLET, DELAYED RELEASE ORAL
Qty: 0 | Refills: 0 | Status: DISCONTINUED | OUTPATIENT
Start: 2018-08-03 | End: 2018-08-04

## 2018-08-03 RX ADMIN — PIPERACILLIN AND TAZOBACTAM 25 GRAM(S): 4; .5 INJECTION, POWDER, LYOPHILIZED, FOR SOLUTION INTRAVENOUS at 14:09

## 2018-08-03 RX ADMIN — HYDROMORPHONE HYDROCHLORIDE 2 MILLIGRAM(S): 2 INJECTION INTRAMUSCULAR; INTRAVENOUS; SUBCUTANEOUS at 23:30

## 2018-08-03 RX ADMIN — ENOXAPARIN SODIUM 40 MILLIGRAM(S): 100 INJECTION SUBCUTANEOUS at 11:06

## 2018-08-03 RX ADMIN — HYDROMORPHONE HYDROCHLORIDE 4 MILLIGRAM(S): 2 INJECTION INTRAMUSCULAR; INTRAVENOUS; SUBCUTANEOUS at 11:06

## 2018-08-03 RX ADMIN — Medication 400 MILLIGRAM(S): at 18:04

## 2018-08-03 RX ADMIN — Medication 100 MILLIGRAM(S): at 23:30

## 2018-08-03 RX ADMIN — PIPERACILLIN AND TAZOBACTAM 25 GRAM(S): 4; .5 INJECTION, POWDER, LYOPHILIZED, FOR SOLUTION INTRAVENOUS at 05:31

## 2018-08-03 RX ADMIN — SODIUM CHLORIDE 100 MILLILITER(S): 9 INJECTION, SOLUTION INTRAVENOUS at 23:25

## 2018-08-03 RX ADMIN — Medication 100 MILLIGRAM(S): at 11:05

## 2018-08-03 RX ADMIN — DULOXETINE HYDROCHLORIDE 40 MILLIGRAM(S): 30 CAPSULE, DELAYED RELEASE ORAL at 11:05

## 2018-08-03 RX ADMIN — Medication 100 MILLIGRAM(S): at 05:31

## 2018-08-03 RX ADMIN — Medication 15 MILLIGRAM(S): at 18:04

## 2018-08-03 RX ADMIN — HYDROMORPHONE HYDROCHLORIDE 4 MILLIGRAM(S): 2 INJECTION INTRAMUSCULAR; INTRAVENOUS; SUBCUTANEOUS at 02:32

## 2018-08-03 RX ADMIN — Medication 166.67 MILLIGRAM(S): at 18:25

## 2018-08-03 RX ADMIN — SODIUM CHLORIDE 60 MILLILITER(S): 9 INJECTION INTRAMUSCULAR; INTRAVENOUS; SUBCUTANEOUS at 02:04

## 2018-08-03 RX ADMIN — HYDROMORPHONE HYDROCHLORIDE 4 MILLIGRAM(S): 2 INJECTION INTRAMUSCULAR; INTRAVENOUS; SUBCUTANEOUS at 06:43

## 2018-08-03 RX ADMIN — PANTOPRAZOLE SODIUM 40 MILLIGRAM(S): 20 TABLET, DELAYED RELEASE ORAL at 05:31

## 2018-08-03 RX ADMIN — Medication 400 MILLIGRAM(S): at 11:06

## 2018-08-03 RX ADMIN — PIPERACILLIN AND TAZOBACTAM 25 GRAM(S): 4; .5 INJECTION, POWDER, LYOPHILIZED, FOR SOLUTION INTRAVENOUS at 22:41

## 2018-08-03 RX ADMIN — ATORVASTATIN CALCIUM 20 MILLIGRAM(S): 80 TABLET, FILM COATED ORAL at 22:41

## 2018-08-03 RX ADMIN — HYDROMORPHONE HYDROCHLORIDE 4 MILLIGRAM(S): 2 INJECTION INTRAMUSCULAR; INTRAVENOUS; SUBCUTANEOUS at 02:02

## 2018-08-03 RX ADMIN — HYDROMORPHONE HYDROCHLORIDE 4 MILLIGRAM(S): 2 INJECTION INTRAMUSCULAR; INTRAVENOUS; SUBCUTANEOUS at 18:05

## 2018-08-03 NOTE — PROGRESS NOTE ADULT - SUBJECTIVE AND OBJECTIVE BOX
GYNECOLOGIC ONCOLOGY PROGRESS NOTE    POD#10, HD#7    PROBLEMS:  Abdominal pain, unspecified abdominal location  Constipation  Sepsis, due to unspecified organism  Abdominal pain  Leukocytosis  Tachycardia  Intra-abdominal abscess post-procedure, initial encounter    Pt seen and examined at bedside. Repeat CT A/P revealed stable abscess and inflammatory changes, diverticulosis. IR drain was placed yesterday.      SUBJECTIVE:    Patient is without complaints.  Pain well-controlled.  Flatus: Yes, BM: No   Denies Nausea, Vomiting or Diarrhea.  Denies shortness of breath, chest pain or dyspnea on exertion.  Tolerating diet.    OBJECTIVE:     VITALS:  T(F): 98 (08-03-18 @ 04:10), Max: 98 (08-02-18 @ 23:45)  HR: 94 (08-03-18 @ 04:10) (94 - 99)  BP: 91/53 (08-03-18 @ 06:53) (85/58 - 102/62)  RR: 18 (08-03-18 @ 04:10) (18 - 18)  SpO2: 99% (08-03-18 @ 04:10) (95% - 99%)      I&O's Summary    drain with 10cc serosanguinous output.         MEDICATIONS  (STANDING):  acetaminophen  IVPB. 1000 milliGRAM(s) IV Intermittent once  atorvastatin 20 milliGRAM(s) Oral at bedtime  diltiazem    milliGRAM(s) Oral daily  docusate sodium 100 milliGRAM(s) Oral three times a day  DULoxetine 40 milliGRAM(s) Oral daily  enoxaparin Injectable 40 milliGRAM(s) SubCutaneous daily  pantoprazole    Tablet 40 milliGRAM(s) Oral before breakfast  piperacillin/tazobactam IVPB. 3.375 Gram(s) IV Intermittent every 8 hours  sodium chloride 0.9%. 1000 milliLiter(s) (60 mL/Hr) IV Continuous <Continuous>  vancomycin  IVPB 1250 milliGRAM(s) IV Intermittent every 12 hours    MEDICATIONS  (PRN):  ALBUTerol/ipratropium for Nebulization 3 milliLiter(s) Nebulizer every 6 hours PRN Bronchospasm  HYDROmorphone   Tablet 2 milliGRAM(s) Oral every 4 hours PRN Moderate Pain (4 - 6)  HYDROmorphone   Tablet 4 milliGRAM(s) Oral every 4 hours PRN Severe Pain (7 - 10)      Physical Exam:  Constitutional: NAD  Pulmonary: clear to auscultation bilaterally   Cardiovascular: Regular rate and rhythm   Abdomen: soft, non-tender, non-distended, normal bowel sounds  Extremities: no lower extremity edema or calve tenderness, Jasper's sign negative.  Incision: Clean, dry, intact.  Without signs of infection or hernia.      LABS:                        10.6   20.2  )-----------( 496      ( 03 Aug 2018 06:45 )             33.2     08-02    136  |  98  |  3.0<L>  ----------------------------<  102  4.2   |  27.0  |  0.44<L>    Ca    9.4      02 Aug 2018 05:18

## 2018-08-03 NOTE — PROGRESS NOTE ADULT - ATTENDING COMMENTS
The patient evaluated and overall clinically improved, however, the WBC has not decreased significantly. Addition of Vancomycin added until cultures are back. Patient is ambulating, she has no had any nausea/vomiting. After discussion with ID consult would recommend taking the patient for exam under anesthesia with opening of the vaginal cuff to help drain the abscess. We will remove the drain intraoperatively. Discussed differential diagnosis, of non-infectious causes of elevation, however, prior to considering them we must make an effort in draining the collection. The fluid draining from the IR placed drain does not appear to be feculent or overtly like an abscess, but rather a collection of pelvic fluid.

## 2018-08-03 NOTE — PROGRESS NOTE ADULT - PROBLEM SELECTOR PLAN 1
1. Continue with Dilaudid tablets as ordered  2. IV Tylenol ordered  3. Add Ketorolac 15mg q6hrs x2 days  4. Maximize bowel regimen  - Consider addition of Senna to currently ordered Senna  5. Continue to encourage ambulation and fluid intake  Pain management signed off  [phone]

## 2018-08-03 NOTE — PROGRESS NOTE ADULT - SUBJECTIVE AND OBJECTIVE BOX
[facility]  INFECTIOUS DISEASES AND INTERNAL MEDICINE at [place]  =======================================================  Aayush Peña MD  Diplomates American Board of Internal Medicine and Infectious Diseases  =======================================================    NARENDRA JAHAIRA [id number]    Follow up: pelvic collection  pt non toxic  REPEAT CT SCAN DONE AND IR DRAINAGE DOEN   PRELIM CX GM POSITIVE COCCI     Allergies:  No Known Allergies      Medications:  acetaminophen  IVPB. 1000 milliGRAM(s) IV Intermittent once  ALBUTerol/ipratropium for Nebulization 3 milliLiter(s) Nebulizer every 6 hours PRN  atorvastatin 20 milliGRAM(s) Oral at bedtime  diltiazem    milliGRAM(s) Oral daily  docusate sodium 100 milliGRAM(s) Oral three times a day  DULoxetine 40 milliGRAM(s) Oral daily  enoxaparin Injectable 40 milliGRAM(s) SubCutaneous daily  HYDROmorphone   Tablet 2 milliGRAM(s) Oral every 4 hours PRN  HYDROmorphone   Tablet 4 milliGRAM(s) Oral every 4 hours PRN  ketorolac   Injectable 30 milliGRAM(s) IV Push every 6 hours  pantoprazole    Tablet 40 milliGRAM(s) Oral before breakfast  piperacillin/tazobactam IVPB. 3.375 Gram(s) IV Intermittent every 8 hours  sodium chloride 0.9%. 1000 milliLiter(s) IV Continuous <Continuous>    SOCIAL       FAMILY   FAMILY HISTORY:  Family history of CABG  Hypertension  Family history of malignant neoplasm of kidney  CAD (coronary artery disease)    REVIEW OF SYSTEMS:  CONSTITUTIONAL:  No Fever or chills  HEENT:   No diplopia or blurred vision.  No earache, sore throat or runny nose.  CARDIOVASCULAR:  No pressure, squeezing, strangling, tightness, heaviness or aching about the chest, neck, axilla or epigastrium.  RESPIRATORY:  No cough, shortness of breath, PND or orthopnea.  GASTROINTESTINAL: FEELS BLOATED  GENITOURINARY:  No dysuria, frequency or urgency. No Blood in urine  MUSCULOSKELETAL:   AS PER HPI  SKIN:  No change in skin, hair or nails.  NEUROLOGIC:  No paresthesias, fasciculations, seizures or weakness.  PSYCHIATRIC:  No disorder of thought or mood.  ENDOCRINE:  No heat or cold intolerance, polyuria or polydipsia.  HEMATOLOGICAL:  No easy bruising or bleeding.            Physical Exam:    Vital Signs Last 24 Hrs  T(C): 36.7 (03 Aug 2018 04:10), Max: 36.7 (02 Aug 2018 23:45)  T(F): 98 (03 Aug 2018 04:10), Max: 98 (02 Aug 2018 23:45)  HR: 94 (03 Aug 2018 04:10) (94 - 99)  BP: 91/53 (03 Aug 2018 06:53) (85/58 - 102/62)  BP(mean): --  RR: 18 (03 Aug 2018 04:10) (18 - 18)  SpO2: 99% (03 Aug 2018 04:10) (95% - 99%)    GEN: NAD, pleasant  HEENT: normocephalic and atraumatic. EOMI. GUCCI.    NECK: Supple. No carotid bruits.  No lymphadenopathy or thyromegaly.  LUNGS: Clear to auscultation.  HEART: Regular rate and rhythm without murmur.  ABDOMEN: Soft, NON TENDER MILD DISTENDED  : No CVA tenderness  EXTREMITIES: Without any cyanosis, clubbing, rash, lesions or edema.  MSK: no joint swelling  NEUROLOGIC: Cranial nerves II through XII are grossly intact.  PSYCHIATRIC: Appropriate affect .  SKIN: No ulceration or induration present.        Labs:                                   10.6   20.2  )-----------( 496      ( 03 Aug 2018 06:45 )             33.2   08-02    136  |  98  |  3.0<L>  ----------------------------<  102  4.2   |  27.0  |  0.44<L>    Ca    9.4      02 Aug 2018 05:18          Urina   RECENT CULTURES:

## 2018-08-03 NOTE — PROGRESS NOTE ADULT - SUBJECTIVE AND OBJECTIVE BOX
HPI:  53y   Last Menstrual Period 20 now POD5 s/p RAPATRICIO presents with vaginal bleeding, abdominal pain and constipation.   She was discharged from hospital on Thursday and had some abdominal pain at home. Percocet not working at all according to patient. She has been using colace twice daily, no bowel movement since Thursday; passing gas regularly. Denies urinary frequency, burning, urgency. She had little vaginal bleeding since d/c home but when she stood up this afternoon felt a "gush of blood run down both legs". Pt has been ambulating at home, walking up stairs though she states she has little support at home from kids and  (on disability). Daughter at bedside states that Diana has been drinking while taking percocet- pt admits to having 2 beers on Friday night along with smoking cigarettes. Pt suffers from anxiety, recently increased Symbalta dose.     OB/GYN HISTORY:   NSVDx2   Surgical History:    History of ectopic pregnancy x2 (2018 03:01)     Allergies    No Known Allergies    Intolerances      Sleep apnea  Asthma  Reflux  Palpitations  SOB (shortness of breath)  Vitamin B 12 deficiency  Seasonal allergies  Anemia  Ectopic pregnancy  Hypertension    MEDICATIONS  (STANDING):  atorvastatin 20 milliGRAM(s) Oral at bedtime  diltiazem    milliGRAM(s) Oral daily  docusate sodium 100 milliGRAM(s) Oral three times a day  DULoxetine 40 milliGRAM(s) Oral daily  enoxaparin Injectable 40 milliGRAM(s) SubCutaneous daily  ketorolac   Injectable 15 milliGRAM(s) IV Push every 6 hours  pantoprazole    Tablet 40 milliGRAM(s) Oral before breakfast  piperacillin/tazobactam IVPB. 3.375 Gram(s) IV Intermittent every 8 hours  sodium chloride 0.9%. 1000 milliLiter(s) (60 mL/Hr) IV Continuous <Continuous>  vancomycin  IVPB 1250 milliGRAM(s) IV Intermittent every 12 hours    MEDICATIONS  (PRN):  ALBUTerol/ipratropium for Nebulization 3 milliLiter(s) Nebulizer every 6 hours PRN Bronchospasm  HYDROmorphone   Tablet 2 milliGRAM(s) Oral every 4 hours PRN Moderate Pain (4 - 6)  HYDROmorphone   Tablet 4 milliGRAM(s) Oral every 4 hours PRN Severe Pain (7 - 10)                           10.6   20.2  )-----------( 496      ( 03 Aug 2018 06:45 )             33.2     08-    136  |  98  |  3.0<L>  ----------------------------<  102  4.2   |  27.0  |  0.44<L>    Ca    9.4      02 Aug 2018 05:18        ;  Vital Signs Last 24 Hrs  T(C): 36.4 (03 Aug 2018 15:29), Max: 36.8 (03 Aug 2018 07:43)  T(F): 97.6 (03 Aug 2018 15:29), Max: 98.2 (03 Aug 2018 07:43)  HR: 87 (03 Aug 2018 15:29) (87 - 99)  BP: 116/76 (03 Aug 2018 15:29) (85/58 - 116/76)  BP(mean): --  RR: 18 (03 Aug 2018 07:43) (18 - 18)  SpO2: 99% (03 Aug 2018 15:29) (95% - 99%)  CAPILLARY BLOOD GLUCOSE      - @ 07:01  -  08-03 @ 07:00  --------------------------------------------------------  IN: 0 mL / OUT: 10 mL / NET: -10 mL        Patient feeling better No Cp, SOB, N/V  Pain controlled    HEENT: PEARLA  Neck: Supple  Cardio: S1 S2 No Murmur  Pulm: CTA No Rales or Ronchi  Abd: Soft mild tenderness worse in the pelvic area, ND BS+, + Drain  Rectal Pelvic Breast- refused  Ext: No DCT  Skin: No Rash  Neuro: Awake Pleasant      Pelvic Collection v Abscess - IV antibiotics, IV hydration, Gyn Onc, ID,  Fluid Cultured= gram+, Medically cleared for procedure  Asthma - Duoneb  Reflux - PPI  Hypertension - meds with parameters

## 2018-08-03 NOTE — PROGRESS NOTE ADULT - SUBJECTIVE AND OBJECTIVE BOX
Patient seen and examined with Dr. Pena for afternoon rounds. Patient with complaint of diffuse abdominal pain s/p 1G IV Tylenol. Pain management reconsulted, will add Toradol IV Q6. Will continue on IV antibiotics.

## 2018-08-03 NOTE — PROGRESS NOTE ADULT - PROBLEM SELECTOR PLAN 1
s/p IR drain placed on 8/2/18  Continue current diet, IV fluids  OOB as tolerated  Continue DVT prophylaxis  Pain control as needed  Appreciate medicine and ID recommendations   Antibiotics per ID - vancomycin added this AM for gram + rods prelim growth noted, awaiting sensitivity   AM labs ordered s/p IR drain placed on 8/2/18  Continue current diet, IV fluids  OOB as tolerated  Continue DVT prophylaxis  Pain control as needed  Appreciate medicine and ID recommendations   Antibiotics per ID - vancomycin added this AM for gram + rods prelim growth noted, awaiting sensitivity   AM labs ordered  Blood cx NG at 48 hours, awaiting final results. s/p IR drain placed on 8/2/18  Continue current diet, IV fluids  OOB as tolerated   Continue DVT prophylaxis  Pain control as needed  Appreciate medicine and ID recommendations   Antibiotics per ID - vancomycin added this AM for gram + rods prelim growth noted, awaiting sensitivity   AM labs ordered  Blood cx NG at 48 hours, awaiting final results.

## 2018-08-03 NOTE — PROGRESS NOTE ADULT - SUBJECTIVE AND OBJECTIVE BOX
HPI:  Patient is a 53y old Female with a chief complaint of abdominal pain. She is s/p RA [facility] Bs, cystoscopy for AUB, Fibroids on 7/24. She presents with vaginal bleeding, constipation, and abdominal pain unrelieved by Percocet (29 Jul 2018 03:01).     Patient was found to have a post-procedure Intra-abdominal abscess, now on IV antibiotics. She does have a history of illicit substance use. Toxicology screening was completed upon admission which + for cocaine and opiates. Pt admits to having one can of Budweiser on Friday 7/27/18 and used Cocaine on Thursday 7/26. She is currently in court mandated outpatient substance abuse treatment in [place].    She is post procedure day #1 from abscess aspiration or drainage with CT guidance and 10 F left transgluteal drain, with Hemovac application    VERBAL REPORT: "I have pain all in my abdomen."  Patient indicates her entire mid and lower abdomen as she speaks. She reports having "a little" flatus since the procedure on 8/2/18. She admits relief with IV Tylenol and oral Dilaudid combined. She reports not having a bowel movement.  PAIN SCORE:   5/10 with activity                SCALE USED: VNRS    Allergies  No Known Allergies    PAST MEDICAL & SURGICAL HISTORY:  Sleep apnea: does not use the CPAP but use O2 prn  Asthma: controlled on meds  Reflux  Palpitations  SOB (shortness of breath)  Vitamin B 12 deficiency  Seasonal allergies  Ectopic pregnancy  Hypertension  History of ectopic pregnancy  History of bilateral oophorectomies      MEDICATIONS  (STANDING):  acetaminophen  IVPB. 1000 milliGRAM(s) IV Intermittent once  atorvastatin 20 milliGRAM(s) Oral at bedtime  diltiazem    milliGRAM(s) Oral daily  docusate sodium 100 milliGRAM(s) Oral three times a day  DULoxetine 40 milliGRAM(s) Oral daily  enoxaparin Injectable 40 milliGRAM(s) SubCutaneous daily  ketorolac   Injectable 15 milliGRAM(s) IV Push every 6 hours  pantoprazole    Tablet 40 milliGRAM(s) Oral before breakfast  piperacillin/tazobactam IVPB. 3.375 Gram(s) IV Intermittent every 8 hours  sodium chloride 0.9%. 1000 milliLiter(s) (60 mL/Hr) IV Continuous <Continuous>  vancomycin  IVPB 1250 milliGRAM(s) IV Intermittent every 12 hours    MEDICATIONS  (PRN):  ALBUTerol/ipratropium for Nebulization 3 milliLiter(s) Nebulizer every 6 hours PRN Bronchospasm  HYDROmorphone   Tablet 2 milliGRAM(s) Oral every 4 hours PRN Moderate Pain (4 - 6)  HYDROmorphone   Tablet 4 milliGRAM(s) Oral every 4 hours PRN Severe Pain (7 - 10)      PHYSICAL EXAM:  GENERAL: NAD, well-developed  HEAD:  Atraumatic, Normocephalic  NERVOUS SYSTEM:  Alert & Oriented X3, Good concentration; Motor Strength 5/5 B/L upper and lower extremities  CHEST/LUNG: Clear speech, full sentences no SOB. Regular non-labored respirations.  ABDOMEN: Mild distention; Tolerates regular food      Vital Signs Last 24 Hrs  T(C): 36.8 (03 Aug 2018 07:43), Max: 36.8 (03 Aug 2018 07:43)  T(F): 98.2 (03 Aug 2018 07:43), Max: 98.2 (03 Aug 2018 07:43)  HR: 96 (03 Aug 2018 07:43) (94 - 99)  BP: 93/61 (03 Aug 2018 07:43) (85/58 - 102/62)  BP(mean): --  RR: 18 (03 Aug 2018 07:43) (18 - 18)  SpO2: 96% (03 Aug 2018 07:43) (95% - 99%)                          10.6   20.2  )-----------( 496      ( 03 Aug 2018 06:45 )             33.2

## 2018-08-04 ENCOUNTER — TRANSCRIPTION ENCOUNTER (OUTPATIENT)
Age: 53
End: 2018-08-04

## 2018-08-04 VITALS — WEIGHT: 178.79 LBS

## 2018-08-04 DIAGNOSIS — I10 ESSENTIAL (PRIMARY) HYPERTENSION: ICD-10-CM

## 2018-08-04 LAB
-  AMPICILLIN: SIGNIFICANT CHANGE UP
-  AMPICILLIN: SIGNIFICANT CHANGE UP
-  TETRACYCLINE: SIGNIFICANT CHANGE UP
-  TETRACYCLINE: SIGNIFICANT CHANGE UP
-  VANCOMYCIN: SIGNIFICANT CHANGE UP
-  VANCOMYCIN: SIGNIFICANT CHANGE UP
ANION GAP SERPL CALC-SCNC: 15 MMOL/L — SIGNIFICANT CHANGE UP (ref 5–17)
ANISOCYTOSIS BLD QL: SLIGHT — SIGNIFICANT CHANGE UP
BUN SERPL-MCNC: 5 MG/DL — LOW (ref 8–20)
CALCIUM SERPL-MCNC: 9.3 MG/DL — SIGNIFICANT CHANGE UP (ref 8.6–10.2)
CHLORIDE SERPL-SCNC: 100 MMOL/L — SIGNIFICANT CHANGE UP (ref 98–107)
CO2 SERPL-SCNC: 25 MMOL/L — SIGNIFICANT CHANGE UP (ref 22–29)
CREAT SERPL-MCNC: 0.57 MG/DL — SIGNIFICANT CHANGE UP (ref 0.5–1.3)
GLUCOSE SERPL-MCNC: 166 MG/DL — HIGH (ref 70–115)
HCT VFR BLD CALC: 32 % — LOW (ref 37–47)
HGB BLD-MCNC: 10.2 G/DL — LOW (ref 12–16)
LYMPHOCYTES # BLD AUTO: 11 % — LOW (ref 20–55)
MACROCYTES BLD QL: SLIGHT — SIGNIFICANT CHANGE UP
MCHC RBC-ENTMCNC: 30 PG — SIGNIFICANT CHANGE UP (ref 27–31)
MCHC RBC-ENTMCNC: 31.9 G/DL — LOW (ref 32–36)
MCV RBC AUTO: 94.1 FL — SIGNIFICANT CHANGE UP (ref 81–99)
METHOD TYPE: SIGNIFICANT CHANGE UP
METHOD TYPE: SIGNIFICANT CHANGE UP
MICROCYTES BLD QL: SLIGHT — SIGNIFICANT CHANGE UP
MONOCYTES NFR BLD AUTO: 4 % — SIGNIFICANT CHANGE UP (ref 3–10)
NEUTROPHILS NFR BLD AUTO: 85 % — HIGH (ref 37–73)
PLAT MORPH BLD: NORMAL — SIGNIFICANT CHANGE UP
PLATELET # BLD AUTO: 521 K/UL — HIGH (ref 150–400)
POIKILOCYTOSIS BLD QL AUTO: SLIGHT — SIGNIFICANT CHANGE UP
POLYCHROMASIA BLD QL SMEAR: SLIGHT — SIGNIFICANT CHANGE UP
POTASSIUM SERPL-MCNC: 4.1 MMOL/L — SIGNIFICANT CHANGE UP (ref 3.5–5.3)
POTASSIUM SERPL-SCNC: 4.1 MMOL/L — SIGNIFICANT CHANGE UP (ref 3.5–5.3)
RBC # BLD: 3.4 M/UL — LOW (ref 4.4–5.2)
RBC # FLD: 13.1 % — SIGNIFICANT CHANGE UP (ref 11–15.6)
RBC BLD AUTO: ABNORMAL
SODIUM SERPL-SCNC: 140 MMOL/L — SIGNIFICANT CHANGE UP (ref 135–145)
WBC # BLD: 20.6 K/UL — HIGH (ref 4.8–10.8)
WBC # FLD AUTO: 20.6 K/UL — HIGH (ref 4.8–10.8)

## 2018-08-04 PROCEDURE — 99233 SBSQ HOSP IP/OBS HIGH 50: CPT

## 2018-08-04 RX ORDER — HYDROXYZINE HCL 10 MG
1 TABLET ORAL
Qty: 0 | Refills: 0 | COMMUNITY

## 2018-08-04 RX ORDER — OXYCODONE AND ACETAMINOPHEN 5; 325 MG/1; MG/1
1 TABLET ORAL
Qty: 30 | Refills: 0
Start: 2018-08-04

## 2018-08-04 RX ADMIN — PIPERACILLIN AND TAZOBACTAM 25 GRAM(S): 4; .5 INJECTION, POWDER, LYOPHILIZED, FOR SOLUTION INTRAVENOUS at 05:05

## 2018-08-04 RX ADMIN — HYDROMORPHONE HYDROCHLORIDE 2 MILLIGRAM(S): 2 INJECTION INTRAMUSCULAR; INTRAVENOUS; SUBCUTANEOUS at 00:30

## 2018-08-04 RX ADMIN — Medication 166.67 MILLIGRAM(S): at 05:05

## 2018-08-04 RX ADMIN — Medication 360 MILLIGRAM(S): at 05:05

## 2018-08-04 RX ADMIN — Medication 100 MILLIGRAM(S): at 05:05

## 2018-08-04 RX ADMIN — PANTOPRAZOLE SODIUM 40 MILLIGRAM(S): 20 TABLET, DELAYED RELEASE ORAL at 05:05

## 2018-08-04 NOTE — PROGRESS NOTE ADULT - PROBLEM SELECTOR PLAN 3
Continue pain regimen as needed  Pain management following
Continue pain regimen as needed  Pain management following.
Continue pain regimen as needed  Pain management following
Likely due to Abscess, awaiting ID consult.  s/p IV abx in ED.  Pending AM labs, will follow.
Likely due to Abscess, plan above.  Pending AM labs, will follow.
Likely due to Abscess, plan above.  Pending AM labs, will follow.
improving today
s/p IR drainage of pelvic fluids collection with bloody fluids drainage

## 2018-08-04 NOTE — DISCHARGE NOTE ADULT - PLAN OF CARE
slightly improved ID team on board patient will be on Augmentin pain was improving patient is s/p exam under anaesthesia , incision and drainage of pelvic fluid collection ,  IR drainage of pelvic fluid collection improved

## 2018-08-04 NOTE — DISCHARGE NOTE ADULT - MEDICATION SUMMARY - MEDICATIONS TO TAKE
I will START or STAY ON the medications listed below when I get home from the hospital:    acetaminophen-oxycodone 325 mg-5 mg oral tablet  -- 1 tab(s) by mouth every 6 hours, As Needed -for severe pain MDD:4 tablets  -- Caution federal law prohibits the transfer of this drug to any person other  than the person for whom it was prescribed.  May cause drowsiness.  Alcohol may intensify this effect.  Use care when operating dangerous machinery.  This prescription cannot be refilled.  This product contains acetaminophen.  Do not use  with any other product containing acetaminophen to prevent possible liver damage.  Using more of this medication than prescribed may cause serious breathing problems.    -- Indication: For Abdominal pain    Naprosyn 500 mg oral tablet  -- 1 tab(s) by mouth 2 times a day   -- Check with your doctor before becoming pregnant.  May cause drowsiness or dizziness.  Obtain medical advice before taking any non-prescription drugs as some may affect the action of this medication.  Take with food or milk.    -- Indication: For Abdominal pain    oxyCODONE-acetaminophen 5 mg-325 mg oral tablet  -- 1 tab(s) by mouth 4 times a day MDD:6 tablets  -- Caution federal law prohibits the transfer of this drug to any person other  than the person for whom it was prescribed.  May cause drowsiness.  Alcohol may intensify this effect.  Use care when operating dangerous machinery.  This prescription cannot be refilled.  This product contains acetaminophen.  Do not use  with any other product containing acetaminophen to prevent possible liver damage.  Using more of this medication than prescribed may cause serious breathing problems.    -- Indication: For Abdominal pain    DilTIAZem Hydrochloride  mg/24 hours oral capsule, extended release  -- 1 cap(s) by mouth once a day  -- Indication: For Hypertension    Cymbalta  -- 40 mg/24h by mouth once a day  -- Indication: For depression    DULoxetine 20 mg oral delayed release capsule  -- 1 cap(s) by mouth 2 times a day  -- Indication: For depression     atorvastatin 20 mg oral tablet  -- 1 tab(s) by mouth once a day  -- Indication: For Hypercholestermia    raNITIdine 300 mg oral tablet  -- 1 tab(s) by mouth once a day (at bedtime)  -- Indication: For Reflux    Colace 100 mg oral capsule  -- 1 cap(s) by mouth 2 times a day   -- Medication should be taken with plenty of water.    -- Indication: For Constipation     amoxicillin-clavulanate 875 mg-125 mg oral tablet  -- 1 tab(s) by mouth every 12 hours   -- Finish all this medication unless otherwise directed by prescriber.  Take with food or milk.    -- Indication: For Infection following procedure    omeprazole 40 mg oral delayed release capsule  -- 1 cap(s) by mouth once a day  -- Indication: For Reflux    Vitamin D2 50,000 intl units (1.25 mg) oral capsule  -- 1 cap(s) by mouth once a week  -- Indication: For vitamin d deficincy

## 2018-08-04 NOTE — PROGRESS NOTE ADULT - PROBLEM SELECTOR PROBLEM 1
Intra-abdominal abscess post-procedure, initial encounter
Intra-abdominal abscess post-procedure, initial encounter
Referred generalized abdominal pain
Abdominal pain, unspecified abdominal location
Abdominal pain, unspecified abdominal location
Intra-abdominal abscess post-procedure, initial encounter
Tachycardia

## 2018-08-04 NOTE — PROGRESS NOTE ADULT - ASSESSMENT
52 yo s/p RA [facility] BS, cystoscopy for AUB, Fibroids on 7/24 prior admission. Patient admitted this stay for abdominal pain, constipation, leukocytosis and tachycardia, suspected abscess on CT. POD#10, HOD#7.
52 yo s/p RA [facility] BS, cystoscopy for AUB, Fibroids on 7/24 prior admission. Patient admitted this stay for abdominal pain, constipation, leukocytosis and tachycardia, suspected abscess on CT. POD#8, HOD#5.
54 y/o F post procedure day #1 from abscess aspiration or drainage with CT guidance and 10 F left transgluteal drain. A&Ox3, NAD, ambulating to restroom with IV pole. No signs of oversedation, + facial grimace with discomfort.     Post procedure gas pain in addition to possible constipation suspected.
54y/o  Female with recent RAH 7/24/18 presents with vaginal bleeding, abdominal pain and constipation. Patient reports she was discharged after her surgery on 7/26, was fine, and had a BM on Thursday. After that patient started to abdominal pain, chills and sweats. This continued into Friday and yesterday patient noted vaginal bleeding so she came to the ER. In the ER patient was noted to be tachycardic, Leukocytosis to 23k, Lactic acidosis to 2.4 and CT A/P with possible abscess.    IR EVALUATED AND  COLLECTION WAS NOT AMENABLE TO IR DRAINAGE   ON EMPIRIC ABX WILL    PT WITH COMPLAINTS OF ABD PAIN BUT DID HAVE BOWEL MOVEMENTS  MAY BE GAS  GYN TO FOLLOW UP  MAY CONSIDER REPEAT IMAGING  AT SHORT INTERVAL AS DIFFICULT TO ASSESS IN THIS PT WITH  SUBJECTIVE COMPLAINTS   IF REMAINS AFEBRILE CAN CONSIDER CHANGE TO ORAL REGIMEN
52 yo s/p RA [facility] BS, cystoscopy for AUB, Fibroids on 7/24 prior admission. Patient admitted this stay for abdominal pain, constipation, leukocytosis and tachycardia, suspected abscess on CT. POD#11, HOD#8.POD#1 S/P exam under anaesthesia , incision and drainage of pelvic fluid collection
52 yo s/p RA [facility] BS, cystoscopy for AUB, Fibroids on 7/24 prior admission. Patient admitted this stay for abdominal pain, constipation, leukocytosis and tachycardia, suspected abscess on CT. POD#6, HOD#3.
52 yo s/p RA [facility] BS, cystoscopy for AUB, Fibroids on 7/24 prior admission. Patient admitted this stay for abdominal pain, constipation, leukocytosis and tachycardia, suspected abscess on CT. POD#9, HOD#6.
52y/o  Female with recent RAH 7/24/18 presents with vaginal bleeding, abdominal pain and constipation. Patient reports she was discharged after her surgery on 7/26, was fine, and had a BM on Thursday. After that patient started to abdominal pain, chills and sweats. This continued into Friday and yesterday patient noted vaginal bleeding so she came to the ER. In the ER patient was noted to be tachycardic, Leukocytosis to 23k, Lactic acidosis to 2.4 and CT A/P with possible abscess.        REPEAT IMAGING  DONE WITH REPORTED NO SIGNIFICANT CHANGE IN Collection  PT S/P IR GUIDED DRAINAGE 8/2  S/P OR LAST PM NO ABSCESS  CX ENTEROCCOCUS  WBC   2O   OVERALL IMPROVED OK FOR D/C ON AUGMENTIN FOR 7 DAYS   SENT TO [facility]  IN TERMS OF WBC UNCLEAR IF LEUKEMOID BUT ON REVIEW WBC APPEARS TO HAVE BEEN ELEVATED EVEN ON 5/14 ER VISIT  SUGGEST FOLLOW UP  CBC NEXT WEEK IF STILL HIGH SUGGEST HEM EVAL  D/W GYN
53y old F, POD #6 radical abdominal hysterectomy. Admitted with post-procedure intra-abdominal abscess, + for cocaine and opiates on admission. Patient is now A&Ox3, NAD, sitting up in bed. No signs of intense pain or oversedation. Reports adequate analgesia on current regimen
54 yo s/p RA [facility] BS, cystoscopy for AUB, Fibroids on 7/24 prior admission. Patient admitted this stay for abdominal pain, constipation, leukocytosis and tachycardia, suspected abscess on CT. POD#7, HOD#4.
54 yo s/p RA [facility] BS, cystoscopy for AUB, Fibroids on 7/24 prior admission. Patient admitted this stay for abdominal pain, constipation, leukocytosis and tachycardia, suspected abscess on CT. POD#9, HOD#6.
54 yo s/p RA [facility] Bs, cystoscopy fo AUB, Fibroids on 7/24 prior admission. Patient admitted for abdominal pain, constipation, leukocytosis and tachycardia, suspected abscess on CT. POD#5, HOD#2.
54y/o  Female with recent RAH 7/24/18 presents with vaginal bleeding, abdominal pain and constipation. Patient reports she was discharged after her surgery on 7/26, was fine, and had a BM on Thursday. After that patient started to abdominal pain, chills and sweats. This continued into Friday and yesterday patient noted vaginal bleeding so she came to the ER. In the ER patient was noted to be tachycardic, Leukocytosis to 23k, Lactic acidosis to 2.4 and CT A/P with possible abscess.        REPEAT IMAGING  DONE WITH REPORTED NO SIGNIFICANT CHANGE IN Collection  PT S/P IR GUIDED DRAINAGE 8/2  PRELIM CX GM POS  WBC UP TO 2O  WILL ADD VANCO UNTIL CX BACK  MONITOR WBC   IF PERSISTS MAY NEED FURTHER INTERVENTION  WILL FOLLOWUP WITH FURTHER RECOMMENDATIONS   D/W GYN ONC PA
54y/o  Female with recent RAH 7/24/18 presents with vaginal bleeding, abdominal pain and constipation. Patient reports she was discharged after her surgery on 7/26, was fine, and had a BM on Thursday. After that patient started to abdominal pain, chills and sweats. This continued into Friday and yesterday patient noted vaginal bleeding so she came to the ER. In the ER patient was noted to be tachycardic, Leukocytosis to 23k, Lactic acidosis to 2.4 and CT A/P with possible abscess.    IR EVALUATED AND  COLLECTION WAS NOT AMENABLE TO IR DRAINAGE   ON EMPIRIC ABX WILL    PT WITH COMPLAINTS OF ABD PAIN BUT DID HAVE BOWEL MOVEMENTS  GYN TO REEVALUATE CONTINUE IV ABX
54y/o  Female with recent RAH 7/24/18 presents with vaginal bleeding, abdominal pain and constipation. Patient reports she was discharged after her surgery on 7/26, was fine, and had a BM on Thursday. After that patient started to abdominal pain, chills and sweats. This continued into Friday and yesterday patient noted vaginal bleeding so she came to the ER. In the ER patient was noted to be tachycardic, Leukocytosis to 23k, Lactic acidosis to 2.4 and CT A/P with possible abscess.    IR EVALUATED AND  COLLECTION WAS NOT AMENABLE TO IR DRAINAGE   ON EMPIRIC ABX WILL    PT WITH COMPLAINTS OF ABD PAIN BUT DID HAVE BOWEL MOVEMENTS  MAY BE GAS   REPEAT IMAGING  DOEN WITH REPORTED NO SIGNIFICANT CHANGE IN COLLECTON  WBC UP TO 21K  WILL D/W GYN MICHEAL
54y/o  Female with recent RAH 7/24/18 presents with vaginal bleeding, abdominal pain and constipation. Patient reports she was discharged after her surgery on 7/26, was fine, and had a BM on Thursday. After that patient started to abdominal pain, chills and sweats. This continued into Friday and yesterday patient noted vaginal bleeding so she came to the ER. In the ER patient was noted to be tachycardic, Leukocytosis to 23k, Lactic acidosis to 2.4 and CT A/P with possible abscess.    IR EVALUATED AND  COLLECTION WAS NOT AMENABLE TO IR DRAINAGE   ON EMPIRIC ABX WILL NEED TO D/W GYN  WILL FOLLOW UP  WITH FURTHER RECOMMENDATIONS

## 2018-08-04 NOTE — DIETITIAN INITIAL EVALUATION ADULT. - PERTINENT LABORATORY DATA
08-04 Na140 mmol/L Glu 166 mg/dL<H> K+ 4.1 mmol/L Cr  0.57 mg/dL BUN 5.0 mg/dL<L> Phos n/a   Alb n/a   PAB n/a

## 2018-08-04 NOTE — DISCHARGE NOTE ADULT - CARE PROVIDER_API CALL
Malik King), [facility]  [address] [place] [address]  Phone: [phone]  Fax: [phone]

## 2018-08-04 NOTE — PROGRESS NOTE ADULT - PROBLEM SELECTOR PROBLEM 3
Abdominal pain
Intra-abdominal abscess post-procedure, initial encounter
Leukocytosis

## 2018-08-04 NOTE — PROGRESS NOTE ADULT - SUBJECTIVE AND OBJECTIVE BOX
GYNECOLOGIC ONCOLOGY PROGRESS NOTE    POD#11. HD#8, POD#1    PROBLEMS:  Referred generalized abdominal pain  Abdominal pain, unspecified abdominal location  Constipation  Sepsis, due to unspecified organism  Abdominal pain  Leukocytosis  Tachycardia  Intra-abdominal fluids collection post-procedure, initial encounter  Abnormal uterine bleeding (AUB)      Pt seen and examined at bedside.     SUBJECTIVE:    Patient is without complaints.  Pain well-controlled.  Flatus: passing gas   Denies Nausea, Vomiting or Diarrhea.  Denies shortness of breath, chest pain or dyspnea on exertion.  Tolerating diet.  no drainage through Souza     OBJECTIVE:     VITALS:  T(F): 97.7 (18 @ 08:33), Max: 98.2 (18 @ 20:43)  HR: 90 (18 @ 08:33) (75 - 90)  BP: 120/73 (18 @ 08:33) (106/75 - 141/75)  RR: 18 (18 @ 08:33) (12 - 22)  SpO2: 97% (18 @ 08:33) (97% - 100%)  Wt(kg): --    I&O's Summary    03 Aug 2018 07:01  -  04 Aug 2018 07:00  --------------------------------------------------------  IN: 0 mL / OUT: 550 mL / NET: -550 mL        MEDICATIONS  (STANDING):  atorvastatin 20 milliGRAM(s) Oral at bedtime  diltiazem    milliGRAM(s) Oral daily  docusate sodium 100 milliGRAM(s) Oral three times a day  enoxaparin Injectable 40 milliGRAM(s) SubCutaneous daily  pantoprazole    Tablet 40 milliGRAM(s) Oral before breakfast  piperacillin/tazobactam IVPB. 3.375 Gram(s) IV Intermittent every 8 hours  vancomycin  IVPB 1250 milliGRAM(s) IV Intermittent every 12 hours    MEDICATIONS  (PRN):  ALBUTerol/ipratropium for Nebulization 3 milliLiter(s) Nebulizer every 6 hours PRN Bronchospasm  HYDROmorphone   Tablet 4 milliGRAM(s) Oral every 4 hours PRN Severe Pain (7 - 10)  HYDROmorphone   Tablet 2 milliGRAM(s) Oral every 4 hours PRN Moderate Pain (4 - 6)      Physical Exam:  Constitutional: NAD  Pulmonary: clear to auscultation bilaterally   Cardiovascular: Regular rate and rhythm   Abdomen: soft, non-tender, non-distended, normal bowel sounds  Extremities: no lower extremity edema or calve tenderness, Jasper's sign negative.        LABS:                        10.2   20.6  )-----------( 521      ( 04 Aug 2018 05:21 )             32.0     08-04    140  |  100  |  5.0<L>  ----------------------------<  166<H>  4.1   |  25.0  |  0.57    Ca    9.3      04 Aug 2018 05:21        Urinalysis Basic - ( 03 Aug 2018 22:47 )    Color: Yellow / Appearance: Clear / S.010 / pH: x  Gluc: x / Ketone: Negative  / Bili: Negative / Urobili: Negative mg/dL   Blood: x / Protein: 15 mg/dL / Nitrite: Negative   Leuk Esterase: Negative / RBC: 0-2 /HPF / WBC Negative   Sq Epi: x / Non Sq Epi: Occasional / Bacteria: x        RADIOLOGY & ADDITIONAL TESTS:

## 2018-08-04 NOTE — PROGRESS NOTE ADULT - PROBLEM SELECTOR PROBLEM 2
Leukocytosis
Tachycardia

## 2018-08-04 NOTE — DIETITIAN INITIAL EVALUATION ADULT. - OTHER INFO
BMI 29, Pt did not yet eat breakfast this AM. Pt not answering questions this AM. Pt tired and not feeling well.

## 2018-08-04 NOTE — PROGRESS NOTE ADULT - PROBLEM SELECTOR PLAN 2
ID following   On IV antibiotics  Repeat labs ordered for AM
WBC this AM slightly downtrending at 20.2. Afebrile.  ID following continue Zosyn and Vancomycin at this time  Repeat labs ordered for AM.
Increase in WBC from 15 to 21 this AM. Afebrile.  ID following, does not recommend transition to PO antibiotics at this time, continue with IV antibiotics for now  On IV antibiotics  Repeat labs ordered for AM
Resolved, likely pain related.  Will monitor.
Resolved, was likely pain related.  Will monitor.
Resolved, was likely pain related.  Will monitor.
we expect decrease of WBC after IR drainage and IV antibiotics
will contact ID to switch from iv antibiotics to oral , patient is s/p drainge of pelvic fluid collection

## 2018-08-04 NOTE — DISCHARGE NOTE ADULT - NS MD DC FALL RISK RISK
For information on Fall & Injury Prevention, visit [email or web address]
attending Pito: 67yM h/o DM on insulin pump, Pancreatitis, prior MI, Hip Replacement sent to ED by his Nephrologist for worsening renal function and hyperkalemia on outpatient labs performed yesterday. Pt asymptomatic. Reports normal urination. No SOB. Reports chronic RLE edema. On exam, VSS, well-appearing, lungs clear,  2+ pitting edema RLE up to calf. Will obtain ekg, place on tele, renal US and RLE duplex eval for DVT, likely admit to tele for hyperkalemia.

## 2018-08-04 NOTE — PROGRESS NOTE ADULT - SUBJECTIVE AND OBJECTIVE BOX
[facility]  INFECTIOUS DISEASES AND INTERNAL MEDICINE at [place]  =======================================================  Aayush Peña MD  Diplomates American Board of Internal Medicine and Infectious Diseases  =======================================================    JAHAIRA MACKEY [id number]    Follow up: pelvic collection  pt non toxic  REPEAT CT SCAN DONE AND IR DRAINAGE DONE ADN OR     CX GM POSITIVE COCCI ENTEROCOCCI     Allergies:  No Known Allergies      Medications:  acetaminophen  IVPB. 1000 milliGRAM(s) IV Intermittent once  ALBUTerol/ipratropium for Nebulization 3 milliLiter(s) Nebulizer every 6 hours PRN  atorvastatin 20 milliGRAM(s) Oral at bedtime  diltiazem    milliGRAM(s) Oral daily  docusate sodium 100 milliGRAM(s) Oral three times a day  DULoxetine 40 milliGRAM(s) Oral daily  enoxaparin Injectable 40 milliGRAM(s) SubCutaneous daily  HYDROmorphone   Tablet 2 milliGRAM(s) Oral every 4 hours PRN  HYDROmorphone   Tablet 4 milliGRAM(s) Oral every 4 hours PRN  ketorolac   Injectable 30 milliGRAM(s) IV Push every 6 hours  pantoprazole    Tablet 40 milliGRAM(s) Oral before breakfast  piperacillin/tazobactam IVPB. 3.375 Gram(s) IV Intermittent every 8 hours  sodium chloride 0.9%. 1000 milliLiter(s) IV Continuous <Continuous>    SOCIAL       FAMILY   FAMILY HISTORY:  Family history of CABG  Hypertension  Family history of malignant neoplasm of kidney  CAD (coronary artery disease)    REVIEW OF SYSTEMS:  CONSTITUTIONAL:  No Fever or chills  HEENT:   No diplopia or blurred vision.  No earache, sore throat or runny nose.  CARDIOVASCULAR:  No pressure, squeezing, strangling, tightness, heaviness or aching about the chest, neck, axilla or epigastrium.  RESPIRATORY:  No cough, shortness of breath, PND or orthopnea.  GASTROINTESTINAL: FEELS BLOATED  GENITOURINARY:  No dysuria, frequency or urgency. No Blood in urine  MUSCULOSKELETAL:   AS PER HPI  SKIN:  No change in skin, hair or nails.  NEUROLOGIC:  No paresthesias, fasciculations, seizures or weakness.  PSYCHIATRIC:  No disorder of thought or mood.  ENDOCRINE:  No heat or cold intolerance, polyuria or polydipsia.  HEMATOLOGICAL:  No easy bruising or bleeding.            Physical Exam:    Vital Signs Last 24 Hrs  T(C): 36.7 (03 Aug 2018 04:10), Max: 36.7 (02 Aug 2018 23:45)  T(F): 98 (03 Aug 2018 04:10), Max: 98 (02 Aug 2018 23:45)  HR: 94 (03 Aug 2018 04:10) (94 - 99)  BP: 91/53 (03 Aug 2018 06:53) (85/58 - 102/62)  BP(mean): --  RR: 18 (03 Aug 2018 04:10) (18 - 18)  SpO2: 99% (03 Aug 2018 04:10) (95% - 99%)    GEN: NAD, pleasant  HEENT: normocephalic and atraumatic. EOMI. GUCCI.    NECK: Supple. No carotid bruits.  No lymphadenopathy or thyromegaly.  LUNGS: Clear to auscultation.  HEART: Regular rate and rhythm without murmur.  ABDOMEN: Soft, NON TENDER MILD DISTENDED  : No CVA tenderness  EXTREMITIES: Without any cyanosis, clubbing, rash, lesions or edema.  MSK: no joint swelling  NEUROLOGIC: Cranial nerves II through XII are grossly intact.  PSYCHIATRIC: Appropriate affect .  SKIN: No ulceration or induration present.        Labs:                                   10.2   20.6  )-----------( 521      ( 04 Aug 2018 05:21 )             32.0         Urina   RECENT CULTURES:

## 2018-08-04 NOTE — PROGRESS NOTE ADULT - PROVIDER SPECIALTY LIST ADULT
Family Medicine
Gyn Onc
Infectious Disease
Pain Medicine
Pain Medicine
Family Medicine
Infectious Disease
Pain Medicine
Gyn Onc
Gyn Onc

## 2018-08-04 NOTE — PROGRESS NOTE ADULT - ATTENDING COMMENTS
will repeat cbc next am . will contact ID to see if we can switch from iv to oral antibiotics per Dr King recommendation

## 2018-08-04 NOTE — DISCHARGE NOTE ADULT - CARE PLAN
Principal Discharge DX:	Abdominal pain, unspecified abdominal location  Goal:	pain was improving  Assessment and plan of treatment:	patient is s/p exam under anaesthesia , incision and drainage of pelvic fluid collection ,  IR drainage of pelvic fluid collection  Secondary Diagnosis:	Tachycardia  Goal:	improved  Secondary Diagnosis:	Leukocytosis  Goal:	slightly improved  Assessment and plan of treatment:	ID team on board patient will be on Augmentin  Secondary Diagnosis:	Sepsis, due to unspecified organism  Secondary Diagnosis:	Reflux  Secondary Diagnosis:	Intra-abdominal abscess post-procedure, initial encounter

## 2018-08-04 NOTE — DISCHARGE NOTE ADULT - SECONDARY DIAGNOSIS.
Leukocytosis Sepsis, due to unspecified organism Reflux Intra-abdominal abscess post-procedure, initial encounter Tachycardia

## 2018-08-04 NOTE — DISCHARGE NOTE ADULT - HOSPITAL COURSE
54 yo s/p RA TLH BS, cystoscopy for AUB, Fibroids on 7/24 prior admission. Patient admitted this stay for abdominal pain, constipation, leukocytosis and tachycardia, suspected abscess on CT. POD#11, HOD#8.POD#1 S/P exam under anaesthesia , incision and drainage of pelvic fluid collection . WBC upon discharge was 20.6 , ID team evaluated the patient and felt the high wbc could be for other reason beside post op infection especially after the finding of  drainage of pelvic fluid collection will consider heme consult as outpatient  ,abdominal  pain was improving during hospital stay , vitals was stable . patient will be discharged on Augmentin based on ID recommendation . and encouraged to follow up with Dr King in 1 week .

## 2018-08-04 NOTE — PROGRESS NOTE ADULT - PROBLEM SELECTOR PROBLEM 4
Tachycardia
Tachycardia
Abdominal pain
Hypertension
Tachycardia
Tachycardia

## 2018-08-04 NOTE — DISCHARGE NOTE ADULT - PATIENT PORTAL LINK FT
You can access the [facility] Patient Portal, offered by [facility], by registering with the following website: [email or web address]

## 2018-08-04 NOTE — PROGRESS NOTE ADULT - PROBLEM SELECTOR PLAN 4
Improved, will continue to monitor    Case discussed with Dr. King
Improved, will continue to monitor.    Above plan to be discussed with Dr. King.
Appreciate pain management consult.   Continue PO dilaudid, IV tylenol and IV toradol for pain control.
Appreciate pain management consult.   Continue PO dilaudid, IV tylenol and IV toradol for pain control.
Improved, will continue to monitor
Moderately controlled this AM.   Per pharmacy will no longer have IV dilaudid as of this afternoon.  Will switch to PO dilaudid (2mg q 4 hours for moderate pain and 4mg q 4 hours for severe pain) as well as standing IV toradol (15mg q 6 hours).    Plan discussed with Dr. Bolaños
improved
medicine on board and blood pressure relatively stable

## 2018-08-06 ENCOUNTER — RESULT REVIEW (OUTPATIENT)
Age: 53
End: 2018-08-06

## 2018-08-06 ENCOUNTER — FORM ENCOUNTER (OUTPATIENT)
Age: 53
End: 2018-08-06

## 2018-08-06 PROCEDURE — 86901 BLOOD TYPING SEROLOGIC RH(D): CPT

## 2018-08-06 PROCEDURE — 85027 COMPLETE CBC AUTOMATED: CPT

## 2018-08-06 PROCEDURE — 87070 CULTURE OTHR SPECIMN AEROBIC: CPT

## 2018-08-06 PROCEDURE — 81001 URINALYSIS AUTO W/SCOPE: CPT

## 2018-08-06 PROCEDURE — 85730 THROMBOPLASTIN TIME PARTIAL: CPT

## 2018-08-06 PROCEDURE — 71046 X-RAY EXAM CHEST 2 VIEWS: CPT

## 2018-08-06 PROCEDURE — 96375 TX/PRO/DX INJ NEW DRUG ADDON: CPT

## 2018-08-06 PROCEDURE — 74177 CT ABD & PELVIS W/CONTRAST: CPT

## 2018-08-06 PROCEDURE — 86900 BLOOD TYPING SEROLOGIC ABO: CPT

## 2018-08-06 PROCEDURE — 77012 CT SCAN FOR NEEDLE BIOPSY: CPT

## 2018-08-06 PROCEDURE — C1769: CPT

## 2018-08-06 PROCEDURE — 86850 RBC ANTIBODY SCREEN: CPT

## 2018-08-06 PROCEDURE — 80307 DRUG TEST PRSMV CHEM ANLYZR: CPT

## 2018-08-06 PROCEDURE — 96365 THER/PROPH/DIAG IV INF INIT: CPT | Mod: XU

## 2018-08-06 PROCEDURE — 36415 COLL VENOUS BLD VENIPUNCTURE: CPT

## 2018-08-06 PROCEDURE — 87205 SMEAR GRAM STAIN: CPT

## 2018-08-06 PROCEDURE — 88300 SURGICAL PATH GROSS: CPT | Mod: 26

## 2018-08-06 PROCEDURE — 96361 HYDRATE IV INFUSION ADD-ON: CPT | Mod: XU

## 2018-08-06 PROCEDURE — 85610 PROTHROMBIN TIME: CPT

## 2018-08-06 PROCEDURE — C1729: CPT

## 2018-08-06 PROCEDURE — 87040 BLOOD CULTURE FOR BACTERIA: CPT

## 2018-08-06 PROCEDURE — 71275 CT ANGIOGRAPHY CHEST: CPT

## 2018-08-06 PROCEDURE — 82040 ASSAY OF SERUM ALBUMIN: CPT

## 2018-08-06 PROCEDURE — 83605 ASSAY OF LACTIC ACID: CPT

## 2018-08-06 PROCEDURE — 80053 COMPREHEN METABOLIC PANEL: CPT

## 2018-08-06 PROCEDURE — 80048 BASIC METABOLIC PNL TOTAL CA: CPT

## 2018-08-06 PROCEDURE — 88300 SURGICAL PATH GROSS: CPT

## 2018-08-06 PROCEDURE — 87186 SC STD MICRODIL/AGAR DIL: CPT

## 2018-08-06 PROCEDURE — 99285 EMERGENCY DEPT VISIT HI MDM: CPT | Mod: XU

## 2018-08-06 PROCEDURE — 96367 TX/PROPH/DG ADDL SEQ IV INF: CPT

## 2018-08-07 ENCOUNTER — APPOINTMENT (OUTPATIENT)
Dept: GYNECOLOGIC ONCOLOGY | Facility: CLINIC | Age: 53
End: 2018-08-07
Payer: MEDICAID

## 2018-08-07 VITALS
HEIGHT: 66 IN | HEART RATE: 97 BPM | DIASTOLIC BLOOD PRESSURE: 83 MMHG | WEIGHT: 180 LBS | TEMPERATURE: 97.9 F | BODY MASS INDEX: 28.93 KG/M2 | SYSTOLIC BLOOD PRESSURE: 129 MMHG | OXYGEN SATURATION: 94 %

## 2018-08-07 LAB
CULTURE RESULTS: SIGNIFICANT CHANGE UP
ORGANISM # SPEC MICROSCOPIC CNT: SIGNIFICANT CHANGE UP
SPECIMEN SOURCE: SIGNIFICANT CHANGE UP

## 2018-08-07 PROCEDURE — 99024 POSTOP FOLLOW-UP VISIT: CPT

## 2018-08-08 LAB
CULTURE RESULTS: SIGNIFICANT CHANGE UP
SPECIMEN SOURCE: SIGNIFICANT CHANGE UP

## 2018-08-09 LAB
CULTURE RESULTS: SIGNIFICANT CHANGE UP
SPECIMEN SOURCE: SIGNIFICANT CHANGE UP

## 2018-08-17 ENCOUNTER — OUTPATIENT (OUTPATIENT)
Dept: OUTPATIENT SERVICES | Facility: HOSPITAL | Age: 53
LOS: 1 days | Discharge: HOME | End: 2018-08-17

## 2018-08-17 DIAGNOSIS — Z90.722 ACQUIRED ABSENCE OF OVARIES, BILATERAL: Chronic | ICD-10-CM

## 2018-08-17 DIAGNOSIS — D72.89 OTHER SPECIFIED DISORDERS OF WHITE BLOOD CELLS: ICD-10-CM

## 2018-08-17 DIAGNOSIS — Z87.59 PERSONAL HISTORY OF OTHER COMPLICATIONS OF PREGNANCY, CHILDBIRTH AND THE PUERPERIUM: Chronic | ICD-10-CM

## 2018-08-27 NOTE — ED PROVIDER NOTE - RADIATION
Increase fluids  Motrin for fever  Using antibiotic as directed  If she is not improving by tomorrow I like her rechecked tomorrow or the very latest on Wednesday 
chest

## 2018-08-31 ENCOUNTER — RX RENEWAL (OUTPATIENT)
Age: 53
End: 2018-08-31

## 2018-09-04 ENCOUNTER — RX RENEWAL (OUTPATIENT)
Age: 53
End: 2018-09-04

## 2018-09-04 ENCOUNTER — OUTPATIENT (OUTPATIENT)
Dept: OUTPATIENT SERVICES | Facility: HOSPITAL | Age: 53
LOS: 1 days | End: 2018-09-04
Payer: MEDICAID

## 2018-09-04 DIAGNOSIS — Z90.722 ACQUIRED ABSENCE OF OVARIES, BILATERAL: Chronic | ICD-10-CM

## 2018-09-04 DIAGNOSIS — Z87.59 PERSONAL HISTORY OF OTHER COMPLICATIONS OF PREGNANCY, CHILDBIRTH AND THE PUERPERIUM: Chronic | ICD-10-CM

## 2018-09-04 DIAGNOSIS — D72.89 OTHER SPECIFIED DISORDERS OF WHITE BLOOD CELLS: ICD-10-CM

## 2018-09-11 ENCOUNTER — APPOINTMENT (OUTPATIENT)
Dept: GASTROENTEROLOGY | Facility: GI CENTER | Age: 53
End: 2018-09-11

## 2018-09-11 ENCOUNTER — APPOINTMENT (OUTPATIENT)
Dept: GYNECOLOGIC ONCOLOGY | Facility: CLINIC | Age: 53
End: 2018-09-11
Payer: MEDICAID

## 2018-09-11 VITALS
SYSTOLIC BLOOD PRESSURE: 143 MMHG | WEIGHT: 180 LBS | OXYGEN SATURATION: 95 % | DIASTOLIC BLOOD PRESSURE: 90 MMHG | RESPIRATION RATE: 16 BRPM | BODY MASS INDEX: 28.93 KG/M2 | HEART RATE: 83 BPM | HEIGHT: 66 IN

## 2018-09-11 DIAGNOSIS — N93.9 ABNORMAL UTERINE AND VAGINAL BLEEDING, UNSPECIFIED: ICD-10-CM

## 2018-09-11 DIAGNOSIS — R10.2 PELVIC AND PERINEAL PAIN: ICD-10-CM

## 2018-09-11 DIAGNOSIS — Z86.19 PERSONAL HISTORY OF OTHER INFECTIOUS AND PARASITIC DISEASES: ICD-10-CM

## 2018-09-11 PROCEDURE — 99213 OFFICE O/P EST LOW 20 MIN: CPT | Mod: 24

## 2018-09-17 ENCOUNTER — EMERGENCY (EMERGENCY)
Facility: HOSPITAL | Age: 53
LOS: 1 days | Discharge: HOME | End: 2018-09-17
Attending: STUDENT IN AN ORGANIZED HEALTH CARE EDUCATION/TRAINING PROGRAM
Payer: COMMERCIAL

## 2018-09-17 VITALS
SYSTOLIC BLOOD PRESSURE: 130 MMHG | OXYGEN SATURATION: 97 % | RESPIRATION RATE: 18 BRPM | HEART RATE: 106 BPM | DIASTOLIC BLOOD PRESSURE: 82 MMHG | TEMPERATURE: 98 F

## 2018-09-17 VITALS — HEIGHT: 66 IN | WEIGHT: 179.9 LBS

## 2018-09-17 DIAGNOSIS — Z87.59 PERSONAL HISTORY OF OTHER COMPLICATIONS OF PREGNANCY, CHILDBIRTH AND THE PUERPERIUM: Chronic | ICD-10-CM

## 2018-09-17 DIAGNOSIS — Z90.722 ACQUIRED ABSENCE OF OVARIES, BILATERAL: Chronic | ICD-10-CM

## 2018-09-17 LAB
ALBUMIN SERPL ELPH-MCNC: 3.8 G/DL — SIGNIFICANT CHANGE UP (ref 3.3–5.2)
ALP SERPL-CCNC: 93 U/L — SIGNIFICANT CHANGE UP (ref 40–120)
ALT FLD-CCNC: 30 U/L — SIGNIFICANT CHANGE UP
ANION GAP SERPL CALC-SCNC: 14 MMOL/L — SIGNIFICANT CHANGE UP (ref 5–17)
APPEARANCE UR: ABNORMAL
AST SERPL-CCNC: 49 U/L — HIGH
BACTERIA # UR AUTO: ABNORMAL
BASOPHILS # BLD AUTO: 0 K/UL — SIGNIFICANT CHANGE UP (ref 0–0.2)
BASOPHILS NFR BLD AUTO: 0.1 % — SIGNIFICANT CHANGE UP (ref 0–2)
BILIRUB SERPL-MCNC: 0.3 MG/DL — LOW (ref 0.4–2)
BILIRUB UR-MCNC: NEGATIVE — SIGNIFICANT CHANGE UP
BUN SERPL-MCNC: 7 MG/DL — LOW (ref 8–20)
CALCIUM SERPL-MCNC: 9.2 MG/DL — SIGNIFICANT CHANGE UP (ref 8.6–10.2)
CHLORIDE SERPL-SCNC: 98 MMOL/L — SIGNIFICANT CHANGE UP (ref 98–107)
CO2 SERPL-SCNC: 23 MMOL/L — SIGNIFICANT CHANGE UP (ref 22–29)
COLOR SPEC: YELLOW — SIGNIFICANT CHANGE UP
CREAT SERPL-MCNC: 0.49 MG/DL — LOW (ref 0.5–1.3)
DIFF PNL FLD: ABNORMAL
EOSINOPHIL # BLD AUTO: 0.2 K/UL — SIGNIFICANT CHANGE UP (ref 0–0.5)
EOSINOPHIL NFR BLD AUTO: 1.8 % — SIGNIFICANT CHANGE UP (ref 0–6)
EPI CELLS # UR: ABNORMAL
GLUCOSE SERPL-MCNC: 102 MG/DL — SIGNIFICANT CHANGE UP (ref 70–115)
GLUCOSE UR QL: NEGATIVE MG/DL — SIGNIFICANT CHANGE UP
HCG SERPL-ACNC: <5 MIU/ML — SIGNIFICANT CHANGE UP
HCG UR QL: NEGATIVE — SIGNIFICANT CHANGE UP
HCT VFR BLD CALC: 38.6 % — SIGNIFICANT CHANGE UP (ref 37–47)
HGB BLD-MCNC: 12.3 G/DL — SIGNIFICANT CHANGE UP (ref 12–16)
KETONES UR-MCNC: ABNORMAL
LACTATE BLDV-MCNC: 1 MMOL/L — SIGNIFICANT CHANGE UP (ref 0.5–2)
LEUKOCYTE ESTERASE UR-ACNC: NEGATIVE — SIGNIFICANT CHANGE UP
LIDOCAIN IGE QN: 204 U/L — HIGH (ref 22–51)
LYMPHOCYTES # BLD AUTO: 1.6 K/UL — SIGNIFICANT CHANGE UP (ref 1–4.8)
LYMPHOCYTES # BLD AUTO: 12.4 % — LOW (ref 20–55)
MCHC RBC-ENTMCNC: 29.3 PG — SIGNIFICANT CHANGE UP (ref 27–31)
MCHC RBC-ENTMCNC: 31.9 G/DL — LOW (ref 32–36)
MCV RBC AUTO: 91.9 FL — SIGNIFICANT CHANGE UP (ref 81–99)
MONOCYTES # BLD AUTO: 0.5 K/UL — SIGNIFICANT CHANGE UP (ref 0–0.8)
MONOCYTES NFR BLD AUTO: 3.9 % — SIGNIFICANT CHANGE UP (ref 3–10)
NEUTROPHILS # BLD AUTO: 10.9 K/UL — HIGH (ref 1.8–8)
NEUTROPHILS NFR BLD AUTO: 81.6 % — HIGH (ref 37–73)
NITRITE UR-MCNC: NEGATIVE — SIGNIFICANT CHANGE UP
PH UR: 5 — SIGNIFICANT CHANGE UP (ref 5–8)
PLATELET # BLD AUTO: 389 K/UL — SIGNIFICANT CHANGE UP (ref 150–400)
POTASSIUM SERPL-MCNC: 3 MMOL/L — LOW (ref 3.5–5.3)
POTASSIUM SERPL-SCNC: 3 MMOL/L — LOW (ref 3.5–5.3)
PROT SERPL-MCNC: 7.9 G/DL — SIGNIFICANT CHANGE UP (ref 6.6–8.7)
PROT UR-MCNC: 15 MG/DL
RBC # BLD: 4.2 M/UL — LOW (ref 4.4–5.2)
RBC # FLD: 14.5 % — SIGNIFICANT CHANGE UP (ref 11–15.6)
RBC CASTS # UR COMP ASSIST: ABNORMAL /HPF (ref 0–4)
SODIUM SERPL-SCNC: 135 MMOL/L — SIGNIFICANT CHANGE UP (ref 135–145)
SP GR SPEC: 1.02 — SIGNIFICANT CHANGE UP (ref 1.01–1.02)
UROBILINOGEN FLD QL: NEGATIVE MG/DL — SIGNIFICANT CHANGE UP
WBC # BLD: 13.4 K/UL — HIGH (ref 4.8–10.8)
WBC # FLD AUTO: 13.4 K/UL — HIGH (ref 4.8–10.8)
WBC UR QL: SIGNIFICANT CHANGE UP

## 2018-09-17 PROCEDURE — 99285 EMERGENCY DEPT VISIT HI MDM: CPT

## 2018-09-17 RX ORDER — MORPHINE SULFATE 50 MG/1
4 CAPSULE, EXTENDED RELEASE ORAL ONCE
Qty: 0 | Refills: 0 | Status: DISCONTINUED | OUTPATIENT
Start: 2018-09-17 | End: 2018-09-17

## 2018-09-17 RX ADMIN — MORPHINE SULFATE 4 MILLIGRAM(S): 50 CAPSULE, EXTENDED RELEASE ORAL at 22:20

## 2018-09-17 RX ADMIN — MORPHINE SULFATE 4 MILLIGRAM(S): 50 CAPSULE, EXTENDED RELEASE ORAL at 21:25

## 2018-09-17 NOTE — ED ADULT TRIAGE NOTE - CHIEF COMPLAINT QUOTE
"I have diffuse abdominal pain for the last 2 days its worsening" Pt denies nausea and vomiting c/o pain only.

## 2018-09-17 NOTE — ED STATDOCS - GASTROINTESTINAL, MLM
abdomen soft, generalized abdominal tenderness, no rebound, no guarding. and non-distended. Bowel sounds present.

## 2018-09-17 NOTE — ED STATDOCS - MEDICAL DECISION MAKING DETAILS
CT scan to eval abd pain of nonspecific etiology CT scan to eval abd pain of nonspecific etiology. Abd is soft and non-surgical, well appearing and tolerated PO.

## 2018-09-17 NOTE — ED STATDOCS - OBJECTIVE STATEMENT
54 y/o F pt with hx of hysterectomy, HTN presents to ED c/o abdominal pain initially intermittent but now constant x 2 days. Pt was in ED 2 months ago for infection after hysterectomy. Denies fever, vomiting, nausea. 52 y/o F pt with hx of hysterectomy, HTN presents to ED c/o abdominal pain initially intermittent but now constant x 2 days. Pain is diffuse with no alleviatign or exacerbating factors. Pt was in ED 2 months ago for infection after hysterectomy. Denies fever, vomiting, nausea, diarrhea.

## 2018-09-17 NOTE — ED STATDOCS - PROGRESS NOTE DETAILS
As per sign-out from Dr. Bolaños, patient with diffuse abdominal discomfort pending CT. Patient feeling better. Labs and CT are as noted. Discussed the results and importance follow-up with primary care doctor for repeat evaluation and labs. Patient also informed that GI Follow-up may be needed.

## 2018-09-18 PROCEDURE — 74177 CT ABD & PELVIS W/CONTRAST: CPT | Mod: 26

## 2018-09-18 PROCEDURE — 84702 CHORIONIC GONADOTROPIN TEST: CPT

## 2018-09-18 PROCEDURE — 96374 THER/PROPH/DIAG INJ IV PUSH: CPT | Mod: XU

## 2018-09-18 PROCEDURE — 99284 EMERGENCY DEPT VISIT MOD MDM: CPT | Mod: 25

## 2018-09-18 PROCEDURE — 96375 TX/PRO/DX INJ NEW DRUG ADDON: CPT

## 2018-09-18 PROCEDURE — 96376 TX/PRO/DX INJ SAME DRUG ADON: CPT

## 2018-09-18 PROCEDURE — 74177 CT ABD & PELVIS W/CONTRAST: CPT

## 2018-09-18 PROCEDURE — 85027 COMPLETE CBC AUTOMATED: CPT

## 2018-09-18 PROCEDURE — 81001 URINALYSIS AUTO W/SCOPE: CPT

## 2018-09-18 PROCEDURE — 80053 COMPREHEN METABOLIC PANEL: CPT

## 2018-09-18 PROCEDURE — 83605 ASSAY OF LACTIC ACID: CPT

## 2018-09-18 PROCEDURE — 36415 COLL VENOUS BLD VENIPUNCTURE: CPT

## 2018-09-18 PROCEDURE — 81025 URINE PREGNANCY TEST: CPT

## 2018-09-18 PROCEDURE — 83690 ASSAY OF LIPASE: CPT

## 2018-09-18 RX ORDER — MORPHINE SULFATE 50 MG/1
4 CAPSULE, EXTENDED RELEASE ORAL ONCE
Qty: 0 | Refills: 0 | Status: DISCONTINUED | OUTPATIENT
Start: 2018-09-18 | End: 2018-09-18

## 2018-09-18 RX ORDER — KETOROLAC TROMETHAMINE 30 MG/ML
30 SYRINGE (ML) INJECTION ONCE
Qty: 0 | Refills: 0 | Status: DISCONTINUED | OUTPATIENT
Start: 2018-09-18 | End: 2018-09-18

## 2018-09-18 RX ORDER — METRONIDAZOLE 500 MG
1 TABLET ORAL
Qty: 30 | Refills: 0
Start: 2018-09-18 | End: 2018-09-27

## 2018-09-18 RX ORDER — METRONIDAZOLE 500 MG
500 TABLET ORAL ONCE
Qty: 0 | Refills: 0 | Status: COMPLETED | OUTPATIENT
Start: 2018-09-18 | End: 2018-09-18

## 2018-09-18 RX ORDER — CIPROFLOXACIN LACTATE 400MG/40ML
1 VIAL (ML) INTRAVENOUS
Qty: 20 | Refills: 0
Start: 2018-09-18 | End: 2018-09-27

## 2018-09-18 RX ORDER — CIPROFLOXACIN LACTATE 400MG/40ML
750 VIAL (ML) INTRAVENOUS ONCE
Qty: 0 | Refills: 0 | Status: COMPLETED | OUTPATIENT
Start: 2018-09-18 | End: 2018-09-18

## 2018-09-18 RX ORDER — POTASSIUM CHLORIDE 20 MEQ
40 PACKET (EA) ORAL ONCE
Qty: 0 | Refills: 0 | Status: COMPLETED | OUTPATIENT
Start: 2018-09-18 | End: 2018-09-18

## 2018-09-18 RX ORDER — IBUPROFEN 200 MG
1 TABLET ORAL
Qty: 24 | Refills: 0
Start: 2018-09-18 | End: 2018-09-23

## 2018-09-18 RX ADMIN — Medication 40 MILLIEQUIVALENT(S): at 02:22

## 2018-09-18 RX ADMIN — Medication 750 MILLIGRAM(S): at 03:04

## 2018-09-18 RX ADMIN — MORPHINE SULFATE 4 MILLIGRAM(S): 50 CAPSULE, EXTENDED RELEASE ORAL at 01:22

## 2018-09-18 RX ADMIN — MORPHINE SULFATE 4 MILLIGRAM(S): 50 CAPSULE, EXTENDED RELEASE ORAL at 00:20

## 2018-09-18 RX ADMIN — Medication 500 MILLIGRAM(S): at 03:04

## 2018-09-18 RX ADMIN — Medication 30 MILLIGRAM(S): at 03:04

## 2018-09-20 ENCOUNTER — OUTPATIENT (OUTPATIENT)
Dept: OUTPATIENT SERVICES | Facility: HOSPITAL | Age: 53
LOS: 1 days | Discharge: HOME | End: 2018-09-20

## 2018-09-20 DIAGNOSIS — Z87.59 PERSONAL HISTORY OF OTHER COMPLICATIONS OF PREGNANCY, CHILDBIRTH AND THE PUERPERIUM: Chronic | ICD-10-CM

## 2018-09-20 DIAGNOSIS — Z90.722 ACQUIRED ABSENCE OF OVARIES, BILATERAL: Chronic | ICD-10-CM

## 2018-09-20 DIAGNOSIS — D72.89 OTHER SPECIFIED DISORDERS OF WHITE BLOOD CELLS: ICD-10-CM

## 2018-09-27 ENCOUNTER — RESULT REVIEW (OUTPATIENT)
Age: 53
End: 2018-09-27

## 2018-09-27 ENCOUNTER — APPOINTMENT (OUTPATIENT)
Dept: HEMATOLOGY ONCOLOGY | Facility: CLINIC | Age: 53
End: 2018-09-27
Payer: MEDICAID

## 2018-09-27 VITALS
OXYGEN SATURATION: 94 % | HEART RATE: 110 BPM | HEIGHT: 66 IN | SYSTOLIC BLOOD PRESSURE: 128 MMHG | TEMPERATURE: 98.1 F | DIASTOLIC BLOOD PRESSURE: 88 MMHG | BODY MASS INDEX: 28.04 KG/M2 | WEIGHT: 174.49 LBS

## 2018-09-27 LAB
BASOPHILS # BLD AUTO: 0.1 K/UL — SIGNIFICANT CHANGE UP (ref 0–0.2)
BASOPHILS NFR BLD AUTO: 0.5 % — SIGNIFICANT CHANGE UP (ref 0–2)
EOSINOPHIL # BLD AUTO: 0.3 K/UL — SIGNIFICANT CHANGE UP (ref 0–0.5)
EOSINOPHIL NFR BLD AUTO: 1.9 % — SIGNIFICANT CHANGE UP (ref 0–6)
HCT VFR BLD CALC: 40.8 % — SIGNIFICANT CHANGE UP (ref 34.5–45)
HGB BLD-MCNC: 13 G/DL — SIGNIFICANT CHANGE UP (ref 11.5–15.5)
LYMPHOCYTES # BLD AUTO: 21 % — SIGNIFICANT CHANGE UP (ref 13–44)
LYMPHOCYTES # BLD AUTO: 3.5 K/UL — HIGH (ref 1–3.3)
MCHC RBC-ENTMCNC: 29 PG — SIGNIFICANT CHANGE UP (ref 27–34)
MCHC RBC-ENTMCNC: 31.7 GM/DL — LOW (ref 32–36)
MCV RBC AUTO: 91.5 FL — SIGNIFICANT CHANGE UP (ref 80–100)
MONOCYTES # BLD AUTO: 0.7 K/UL — SIGNIFICANT CHANGE UP (ref 0–0.9)
MONOCYTES NFR BLD AUTO: 4.4 % — SIGNIFICANT CHANGE UP (ref 2–14)
NEUTROPHILS # BLD AUTO: 12.1 K/UL — HIGH (ref 1.8–7.4)
NEUTROPHILS NFR BLD AUTO: 72.1 % — SIGNIFICANT CHANGE UP (ref 43–77)
PLATELET # BLD AUTO: 490 K/UL — HIGH (ref 150–400)
RBC # BLD: 4.46 M/UL — SIGNIFICANT CHANGE UP (ref 3.8–5.2)
RBC # FLD: 13.7 % — SIGNIFICANT CHANGE UP (ref 10.3–14.5)
WBC # BLD: 16.8 K/UL — HIGH (ref 3.8–10.5)
WBC # FLD AUTO: 16.8 K/UL — HIGH (ref 3.8–10.5)

## 2018-09-27 PROCEDURE — 81206 BCR/ABL1 GENE MAJOR BP: CPT

## 2018-09-27 PROCEDURE — 81207 BCR/ABL1 GENE MINOR BP: CPT

## 2018-09-27 PROCEDURE — 99204 OFFICE O/P NEW MOD 45 MIN: CPT

## 2018-09-27 PROCEDURE — G0452: CPT | Mod: 26

## 2018-10-03 LAB — BCR/ABL BY RT - PCR QUANTITATIVE: SIGNIFICANT CHANGE UP

## 2018-10-17 ENCOUNTER — MEDICATION RENEWAL (OUTPATIENT)
Age: 53
End: 2018-10-17

## 2018-10-23 ENCOUNTER — APPOINTMENT (OUTPATIENT)
Dept: GASTROENTEROLOGY | Facility: CLINIC | Age: 53
End: 2018-10-23
Payer: MEDICAID

## 2018-10-23 VITALS
HEIGHT: 66 IN | BODY MASS INDEX: 27.97 KG/M2 | DIASTOLIC BLOOD PRESSURE: 80 MMHG | HEART RATE: 95 BPM | RESPIRATION RATE: 15 BRPM | WEIGHT: 174 LBS | OXYGEN SATURATION: 98 % | SYSTOLIC BLOOD PRESSURE: 122 MMHG

## 2018-10-23 DIAGNOSIS — R10.9 UNSPECIFIED ABDOMINAL PAIN: ICD-10-CM

## 2018-10-23 PROCEDURE — 82270 OCCULT BLOOD FECES: CPT

## 2018-10-23 PROCEDURE — 99214 OFFICE O/P EST MOD 30 MIN: CPT

## 2018-10-23 RX ORDER — NAPROXEN 500 MG/1
500 TABLET ORAL
Qty: 20 | Refills: 0 | Status: DISCONTINUED | COMMUNITY
Start: 2018-08-07 | End: 2018-10-23

## 2018-10-23 RX ORDER — GABAPENTIN 300 MG/1
300 CAPSULE ORAL
Refills: 0 | Status: DISCONTINUED | COMMUNITY
End: 2018-10-23

## 2018-10-23 RX ORDER — OMEPRAZOLE 40 MG/1
40 CAPSULE, DELAYED RELEASE ORAL TWICE DAILY
Qty: 180 | Refills: 1 | Status: DISCONTINUED | COMMUNITY
Start: 2017-11-01 | End: 2018-10-23

## 2018-10-23 RX ORDER — RANITIDINE HYDROCHLORIDE 300 MG/1
300 CAPSULE ORAL
Qty: 90 | Refills: 3 | Status: DISCONTINUED | COMMUNITY
Start: 2018-08-31 | End: 2018-10-23

## 2018-10-23 RX ORDER — FLUCONAZOLE 150 MG/1
150 TABLET ORAL
Qty: 1 | Refills: 0 | Status: DISCONTINUED | COMMUNITY
Start: 2018-09-11 | End: 2018-10-23

## 2018-10-23 RX ORDER — OMEPRAZOLE 40 MG/1
40 CAPSULE, DELAYED RELEASE ORAL TWICE DAILY
Qty: 180 | Refills: 3 | Status: DISCONTINUED | COMMUNITY
Start: 2018-08-31 | End: 2018-10-23

## 2018-11-29 ENCOUNTER — OTHER (OUTPATIENT)
Age: 53
End: 2018-11-29

## 2019-01-04 NOTE — ED PROVIDER NOTE - ENMT NEGATIVE STATEMENT, MLM
stretcher Ears: no ear pain and no hearing problems.Nose: no nasal congestion and no nasal drainage.Mouth/Throat: no dysphagia, no hoarseness and no throat pain.Neck: no lumps, no pain, no stiffness and no swollen glands.

## 2019-01-24 ENCOUNTER — APPOINTMENT (OUTPATIENT)
Dept: CARDIOLOGY | Facility: CLINIC | Age: 54
End: 2019-01-24

## 2019-02-04 ENCOUNTER — OUTPATIENT (OUTPATIENT)
Dept: OUTPATIENT SERVICES | Facility: HOSPITAL | Age: 54
LOS: 1 days | End: 2019-02-04
Payer: COMMERCIAL

## 2019-02-04 ENCOUNTER — APPOINTMENT (OUTPATIENT)
Dept: GASTROENTEROLOGY | Facility: GI CENTER | Age: 54
End: 2019-02-04
Payer: MEDICAID

## 2019-02-04 DIAGNOSIS — Z86.010 PERSONAL HISTORY OF COLONIC POLYPS: ICD-10-CM

## 2019-02-04 DIAGNOSIS — Z87.59 PERSONAL HISTORY OF OTHER COMPLICATIONS OF PREGNANCY, CHILDBIRTH AND THE PUERPERIUM: Chronic | ICD-10-CM

## 2019-02-04 DIAGNOSIS — R10.9 UNSPECIFIED ABDOMINAL PAIN: ICD-10-CM

## 2019-02-04 DIAGNOSIS — Z90.722 ACQUIRED ABSENCE OF OVARIES, BILATERAL: Chronic | ICD-10-CM

## 2019-02-04 PROCEDURE — 45378 DIAGNOSTIC COLONOSCOPY: CPT

## 2019-02-04 PROCEDURE — G0105: CPT

## 2019-02-04 NOTE — HISTORY OF PRESENT ILLNESS
[FreeTextEntry1] : 54yo BF with Htn, HLD.  Prior colon polyp in AC + FH of colon polyps, brother.  Prior post op abscess from hysterectomy, drained and treated with prolonged antibiotics.  Recent bout of infectious colitis in 9/2018.  Clinically resolved.  Recent BW were OK.

## 2019-02-04 NOTE — PROCEDURE
[Fm Hx of Colon Ca/Polyps] : family history of colon cancer and/or polyps [Hx of Colon Polyps] : history of colon polyps [Procedure Explained] : The procedure was explained [Allergies Reviewed] : allergies reviewed. [Risks] : Risks [Benefits] : benefits [Alternatives] : alternatives [Consent Obtained] : written consent was obtained prior to the procedure and is detailed in the patient's record [Patient] : the patient [Bowel Prep Kit] : the patient took the appropriate bowel preparation kit as directed [Approved Diet Followed] : the patient avoided solid foods and adhered to the approved diet list for 24 hours prior to the procedure [Automated Blood Pressure Cuff] : automated blood pressure cuff [Cardiac Monitor] : cardiac monitor [Pulse Oximeter] : pulse oximeter [Propofol ___ mg IV] : Propofol [unfilled] ~Umg intravenously [3] : 3 [Prep Qualtiy: ___] : Prep Quality:  [unfilled] [Withdrawal Time: ___] : Withdrawal Time:  [unfilled] [Left Lateral Decubitus] : The patient was positioned in the left lateral decubitus position [Abnormal Rectum] : a normal rectum [External Hemorrhoids] : no external hemorrhoids [Cecum (Landmarks/Transillum)] : and guided to the cecum which was identified by the anatomic landmarks of the appendiceal orifice and ileocecal valve and by transillumination in the right lower quadrant [Terminal Ileum via Ileocecal Valve] : and the terminal ileum was examined by entering the ileocecal valve [No Difficulty] : without difficulty [Insufflated] : insufflated [Multiple Passes Needed] : after multiple passes [Retroflex View] : a retroflex view of the rectum was performed [Diverticulosis] : diverticulosis [Normal] : Normal [Tolerated Well] : the patient tolerated the procedure well [Vital Signs Stable] : the vital signs were stable [No Complications] : There were no complications [de-identified] : Trilyte. split dose.  [de-identified] : TI / ICV:  normal.  \par A few tics within the cecum.  No inflammation anywhere.   [de-identified] : Scattered diverticulosis.   [de-identified] : Scattered diverticulosis [de-identified] : Including a retroflexion exam.  No internl / external hemorrhoids.  No proctitis.   [de-identified] : Universal diverticulosis;  no neoplasia.

## 2019-02-04 NOTE — PROCEDURE
[Fm Hx of Colon Ca/Polyps] : family history of colon cancer and/or polyps [Hx of Colon Polyps] : history of colon polyps [Procedure Explained] : The procedure was explained [Allergies Reviewed] : allergies reviewed. [Risks] : Risks [Benefits] : benefits [Alternatives] : alternatives [Consent Obtained] : written consent was obtained prior to the procedure and is detailed in the patient's record [Patient] : the patient [Bowel Prep Kit] : the patient took the appropriate bowel preparation kit as directed [Approved Diet Followed] : the patient avoided solid foods and adhered to the approved diet list for 24 hours prior to the procedure [Automated Blood Pressure Cuff] : automated blood pressure cuff [Cardiac Monitor] : cardiac monitor [Pulse Oximeter] : pulse oximeter [Propofol ___ mg IV] : Propofol [unfilled] ~Umg intravenously [3] : 3 [Prep Qualtiy: ___] : Prep Quality:  [unfilled] [Withdrawal Time: ___] : Withdrawal Time:  [unfilled] [Left Lateral Decubitus] : The patient was positioned in the left lateral decubitus position [Abnormal Rectum] : a normal rectum [External Hemorrhoids] : no external hemorrhoids [Cecum (Landmarks/Transillum)] : and guided to the cecum which was identified by the anatomic landmarks of the appendiceal orifice and ileocecal valve and by transillumination in the right lower quadrant [Terminal Ileum via Ileocecal Valve] : and the terminal ileum was examined by entering the ileocecal valve [No Difficulty] : without difficulty [Insufflated] : insufflated [Multiple Passes Needed] : after multiple passes [Retroflex View] : a retroflex view of the rectum was performed [Diverticulosis] : diverticulosis [Normal] : Normal [Tolerated Well] : the patient tolerated the procedure well [Vital Signs Stable] : the vital signs were stable [No Complications] : There were no complications [de-identified] : Trilyte. split dose.  [de-identified] : TI / ICV:  normal.  \par A few tics within the cecum.  No inflammation anywhere.   [de-identified] : Scattered diverticulosis.   [de-identified] : Scattered diverticulosis [de-identified] : Including a retroflexion exam.  No internl / external hemorrhoids.  No proctitis.   [de-identified] : Universal diverticulosis;  no neoplasia.

## 2019-02-04 NOTE — ASSESSMENT
[FreeTextEntry1] : Universal diverticulosis:  High fiber diet.  \par \par No neoplasia on current exam.  ;  + PH of colon polyps;  + FH of colon polyps, father. \par Repeat colonoscopy in 5 yrs for further surveillance.

## 2019-02-04 NOTE — PHYSICAL EXAM
[General Appearance - Alert] : alert [General Appearance - In No Acute Distress] : in no acute distress [Sclera] : the sclera and conjunctiva were normal [PERRL With Normal Accommodation] : pupils were equal in size, round, and reactive to light [Extraocular Movements] : extraocular movements were intact [Outer Ear] : the ears and nose were normal in appearance [Oropharynx] : the oropharynx was normal [Neck Appearance] : the appearance of the neck was normal [Neck Cervical Mass (___cm)] : no neck mass was observed [Jugular Venous Distention Increased] : there was no jugular-venous distention [Thyroid Diffuse Enlargement] : the thyroid was not enlarged [Thyroid Nodule] : there were no palpable thyroid nodules [Auscultation Breath Sounds / Voice Sounds] : lungs were clear to auscultation bilaterally [Heart Rate And Rhythm] : heart rate was normal and rhythm regular [Heart Sounds] : normal S1 and S2 [Heart Sounds Gallop] : no gallops [Murmurs] : no murmurs [Heart Sounds Pericardial Friction Rub] : no pericardial rub [Bowel Sounds] : normal bowel sounds [Abdomen Soft] : soft [Abdomen Tenderness] : non-tender [] : no hepato-splenomegaly [Abdomen Mass (___ Cm)] : no abdominal mass palpated [Normal Sphincter Tone] : normal sphincter tone [No Rectal Mass] : no rectal mass [Internal Hemorrhoid] : no internal hemorrhoids [External Hemorrhoid] : no external hemorrhoids [Occult Blood Positive] : stool was negative for occult blood [FreeTextEntry1] : no lesions. No hemorrhoids. Normal anal tone. No internal palpable lesions. Brown stool. Occult blood negative [Deep Tendon Reflexes (DTR)] : deep tendon reflexes were 2+ and symmetric [Sensation] : the sensory exam was normal to light touch and pinprick [No Focal Deficits] : no focal deficits

## 2019-02-05 ENCOUNTER — APPOINTMENT (OUTPATIENT)
Dept: CARDIOLOGY | Facility: CLINIC | Age: 54
End: 2019-02-05

## 2019-02-14 NOTE — ED PROVIDER NOTE - CPE EDP ENMT NORM
Thank you Dr. Krishnamurthy for referring this very pleasant patient to my practice and for allowing me to participate in her care. normal...

## 2019-03-15 ENCOUNTER — APPOINTMENT (OUTPATIENT)
Dept: PULMONOLOGY | Facility: CLINIC | Age: 54
End: 2019-03-15
Payer: MEDICAID

## 2019-03-15 VITALS
OXYGEN SATURATION: 98 % | HEIGHT: 66 IN | BODY MASS INDEX: 32.78 KG/M2 | WEIGHT: 204 LBS | DIASTOLIC BLOOD PRESSURE: 70 MMHG | HEART RATE: 97 BPM | SYSTOLIC BLOOD PRESSURE: 120 MMHG

## 2019-03-15 PROCEDURE — 99214 OFFICE O/P EST MOD 30 MIN: CPT

## 2019-04-10 ENCOUNTER — APPOINTMENT (OUTPATIENT)
Dept: GASTROENTEROLOGY | Facility: GI CENTER | Age: 54
End: 2019-04-10

## 2019-05-22 NOTE — ED STATDOCS - CROS ED CARDIOVAS ALL NEG
Problem: Nutrition/Hydration-ADULT  Goal: Nutrient/Hydration intake appropriate for improving, restoring or maintaining nutritional needs  Description  Monitor and assess patient's nutrition/hydration status for malnutrition (ex- brittle hair, bruises, dry skin, pale skin and conjunctiva, muscle wasting, smooth red tongue, and disorientation)  Collaborate with interdisciplinary team and initiate plan and interventions as ordered  Monitor patient's weight and dietary intake as ordered or per policy  Utilize nutrition screening tool and intervene per policy  Determine patient's food preferences and provide high-protein, high-caloric foods as appropriate       INTERVENTIONS:  - Monitor oral intake, urinary output, labs, and treatment plans  - Assess nutrition and hydration status and recommend course of action  - Evaluate amount of meals eaten  - Assist patient with eating if necessary   - Allow adequate time for meals  - Recommend/ encourage appropriate diets, oral nutritional supplements, and vitamin/mineral supplements  - Order, calculate, and assess calorie counts as needed  - Recommend, monitor, and adjust tube feedings and TPN/PPN based on assessed needs  - Assess need for intravenous fluids  - Provide specific nutrition/hydration education as appropriate  - Include patient/family/caregiver in decisions related to nutrition  Outcome: Progressing negative...

## 2019-05-29 ENCOUNTER — APPOINTMENT (OUTPATIENT)
Dept: GASTROENTEROLOGY | Facility: GI CENTER | Age: 54
End: 2019-05-29

## 2019-05-30 ENCOUNTER — APPOINTMENT (OUTPATIENT)
Dept: CARDIOLOGY | Facility: CLINIC | Age: 54
End: 2019-05-30

## 2019-06-17 ENCOUNTER — APPOINTMENT (OUTPATIENT)
Dept: PULMONOLOGY | Facility: CLINIC | Age: 54
End: 2019-06-17
Payer: MEDICAID

## 2019-06-17 VITALS
BODY MASS INDEX: 32.28 KG/M2 | HEART RATE: 90 BPM | SYSTOLIC BLOOD PRESSURE: 104 MMHG | OXYGEN SATURATION: 98 % | DIASTOLIC BLOOD PRESSURE: 70 MMHG | WEIGHT: 200 LBS | RESPIRATION RATE: 16 BRPM

## 2019-06-17 PROCEDURE — 99214 OFFICE O/P EST MOD 30 MIN: CPT

## 2019-06-21 ENCOUNTER — APPOINTMENT (OUTPATIENT)
Dept: ULTRASOUND IMAGING | Facility: CLINIC | Age: 54
End: 2019-06-21
Payer: MEDICAID

## 2019-06-21 ENCOUNTER — OUTPATIENT (OUTPATIENT)
Dept: OUTPATIENT SERVICES | Facility: HOSPITAL | Age: 54
LOS: 1 days | End: 2019-06-21
Payer: MEDICAID

## 2019-06-21 ENCOUNTER — APPOINTMENT (OUTPATIENT)
Dept: MAMMOGRAPHY | Facility: CLINIC | Age: 54
End: 2019-06-21
Payer: MEDICAID

## 2019-06-21 DIAGNOSIS — Z87.59 PERSONAL HISTORY OF OTHER COMPLICATIONS OF PREGNANCY, CHILDBIRTH AND THE PUERPERIUM: Chronic | ICD-10-CM

## 2019-06-21 DIAGNOSIS — R92.8 OTHER ABNORMAL AND INCONCLUSIVE FINDINGS ON DIAGNOSTIC IMAGING OF BREAST: ICD-10-CM

## 2019-06-21 DIAGNOSIS — Z90.722 ACQUIRED ABSENCE OF OVARIES, BILATERAL: Chronic | ICD-10-CM

## 2019-06-21 PROCEDURE — 77063 BREAST TOMOSYNTHESIS BI: CPT | Mod: 26

## 2019-06-21 PROCEDURE — 77067 SCR MAMMO BI INCL CAD: CPT | Mod: 26

## 2019-06-21 PROCEDURE — 76642 ULTRASOUND BREAST LIMITED: CPT | Mod: 26,RT

## 2019-06-21 PROCEDURE — 77067 SCR MAMMO BI INCL CAD: CPT

## 2019-06-21 PROCEDURE — 77063 BREAST TOMOSYNTHESIS BI: CPT

## 2019-06-21 PROCEDURE — 76642 ULTRASOUND BREAST LIMITED: CPT

## 2019-07-07 NOTE — ED ADULT NURSE REASSESSMENT NOTE - NS ED NURSE REASSESS COMMENT FT1
TPA instilled on both ports hd catheter. Medication label attached to the access.     Reminded primary nurse not to aspirate medication until hd nurse came in to dialyze the patient later today - per Dr. Stuart CHEN order   Patient straight cath., urine sent to lab, cultures obtained, afternoon meds given, pt. resting comfortably will continue to monitor.

## 2019-09-30 ENCOUNTER — RX RENEWAL (OUTPATIENT)
Age: 54
End: 2019-09-30

## 2019-10-14 ENCOUNTER — APPOINTMENT (OUTPATIENT)
Dept: GASTROENTEROLOGY | Facility: CLINIC | Age: 54
End: 2019-10-14
Payer: MEDICAID

## 2019-10-14 VITALS
OXYGEN SATURATION: 98 % | SYSTOLIC BLOOD PRESSURE: 145 MMHG | HEART RATE: 99 BPM | BODY MASS INDEX: 29.25 KG/M2 | WEIGHT: 182 LBS | RESPIRATION RATE: 15 BRPM | HEIGHT: 66 IN | DIASTOLIC BLOOD PRESSURE: 99 MMHG

## 2019-10-14 DIAGNOSIS — R12 HEARTBURN: ICD-10-CM

## 2019-10-14 DIAGNOSIS — K64.4 RESIDUAL HEMORRHOIDAL SKIN TAGS: ICD-10-CM

## 2019-10-14 PROCEDURE — 99214 OFFICE O/P EST MOD 30 MIN: CPT

## 2019-10-14 RX ORDER — RANITIDINE 300 MG/1
300 TABLET ORAL
Qty: 90 | Refills: 1 | Status: DISCONTINUED | COMMUNITY
Start: 2017-11-01 | End: 2019-10-14

## 2019-10-14 RX ORDER — HYDROCORTISONE 25 MG/G
2.5 CREAM TOPICAL
Qty: 28.35 | Refills: 6 | Status: DISCONTINUED | COMMUNITY
Start: 2018-08-31 | End: 2019-10-14

## 2019-10-14 NOTE — PHYSICAL EXAM
[General Appearance - Alert] : alert [Sclera] : the sclera and conjunctiva were normal [General Appearance - In No Acute Distress] : in no acute distress [Extraocular Movements] : extraocular movements were intact [PERRL With Normal Accommodation] : pupils were equal in size, round, and reactive to light [Oropharynx] : the oropharynx was normal [Outer Ear] : the ears and nose were normal in appearance [Neck Appearance] : the appearance of the neck was normal [Neck Cervical Mass (___cm)] : no neck mass was observed [Jugular Venous Distention Increased] : there was no jugular-venous distention [Thyroid Diffuse Enlargement] : the thyroid was not enlarged [Thyroid Nodule] : there were no palpable thyroid nodules [Auscultation Breath Sounds / Voice Sounds] : lungs were clear to auscultation bilaterally [Heart Rate And Rhythm] : heart rate was normal and rhythm regular [Heart Sounds Gallop] : no gallops [Heart Sounds] : normal S1 and S2 [Murmurs] : no murmurs [Heart Sounds Pericardial Friction Rub] : no pericardial rub [Bowel Sounds] : normal bowel sounds [Abdomen Soft] : soft [Abdomen Tenderness] : non-tender [] : no hepato-splenomegaly [Abdomen Mass (___ Cm)] : no abdominal mass palpated [Normal Sphincter Tone] : normal sphincter tone [No Rectal Mass] : no rectal mass [Internal Hemorrhoid] : no internal hemorrhoids [External Hemorrhoid] : no external hemorrhoids [Occult Blood Positive] : stool was negative for occult blood [FreeTextEntry1] : no lesions. No hemorrhoids. Normal anal tone. No internal palpable lesions. Brown stool. Occult blood negative [Deep Tendon Reflexes (DTR)] : deep tendon reflexes were 2+ and symmetric [Sensation] : the sensory exam was normal to light touch and pinprick [No Focal Deficits] : no focal deficits

## 2019-10-14 NOTE — ASSESSMENT
[FreeTextEntry1] : Prior results of esophagitis on last endoscopy 4 years ago. No hiatus hernia. Modest persistent symptoms. Pantoprazole renewed at 40 mg p.o. q.d. Advised antireflux diet and weight reduction.\par Universal diverticulosis on most recent colonoscopy. Advised high-fiber diet\par Alleged flare of external hemorrhoids. New prescription for hydrocortisone 2.5%. Rectal cream given. Maintain adequate hydration. Use hydrocortisone cream externally as needed. \par GI office followup in 6 months.

## 2019-10-14 NOTE — HISTORY OF PRESENT ILLNESS
[FreeTextEntry1] : Visit-year-old  female with history of erosive esophagitis on upper endoscopy in 2015 and Universal diverticulosis. No hemorrhoids were identified. At time of colonoscopy in February of this year.\par Patient presents for followup with worsening heartburn. Has basically run out of a PPI medications. Informed that the ranitidine has been removed from the market to safety concerns of the medication. Patient had been taking this 300 mg h.s. along with pantoprazole 40 mg per day. Modest weight gain. No upper GI alarm symptoms. No dysphagia or odynophagia or weight loss. No bleeding. Patient has been scheduled on multiple occasions for repeat upper endoscopy and is has been a consistent no-show for her endoscopies.  \par Also alleges that the hemorrhoids have returned and are causing itching and local discomfort.

## 2019-10-17 ENCOUNTER — APPOINTMENT (OUTPATIENT)
Dept: PULMONOLOGY | Facility: CLINIC | Age: 54
End: 2019-10-17
Payer: MEDICAID

## 2019-10-17 VITALS
SYSTOLIC BLOOD PRESSURE: 126 MMHG | BODY MASS INDEX: 30.02 KG/M2 | DIASTOLIC BLOOD PRESSURE: 80 MMHG | RESPIRATION RATE: 16 BRPM | OXYGEN SATURATION: 98 % | HEART RATE: 103 BPM | WEIGHT: 186 LBS

## 2019-10-17 PROCEDURE — 99214 OFFICE O/P EST MOD 30 MIN: CPT | Mod: 25

## 2019-10-17 PROCEDURE — 94010 BREATHING CAPACITY TEST: CPT

## 2019-10-17 RX ORDER — PANTOPRAZOLE 40 MG/1
40 TABLET, DELAYED RELEASE ORAL
Qty: 90 | Refills: 3 | Status: DISCONTINUED | COMMUNITY
Start: 2019-10-14 | End: 2019-10-17

## 2019-10-17 NOTE — PHYSICAL EXAM
[General Appearance - Well Developed] : well developed [General Appearance - In No Acute Distress] : no acute distress [General Appearance - Well Nourished] : well nourished [Normal Conjunctiva] : the conjunctiva exhibited no abnormalities [Jugular Venous Distention Increased] : there was no jugular-venous distention [] : the neck was supple [Neck Appearance] : the appearance of the neck was normal [Neck Cervical Mass (___cm)] : no neck mass was observed [Heart Rate And Rhythm] : heart rate and rhythm were normal [Heart Sounds] : normal S1 and S2 [Arterial Pulses Normal] : the arterial pulses were normal [Bowel Sounds] : normal bowel sounds [Abdomen Soft] : soft [Skin Color & Pigmentation] : normal skin color and pigmentation [Abnormal Walk] : normal gait [Abdomen Tenderness] : non-tender [Skin Turgor] : normal skin turgor [Cranial Nerves] : cranial nerves 2-12 were intact [Oriented To Time, Place, And Person] : oriented to person, place, and time [No Focal Deficits] : no focal deficits [Impaired Insight] : insight and judgment were intact [Affect] : the affect was normal [Normal Oropharynx] : normal oropharynx [Normal Oral Mucosa] : normal oral mucosa [Normal Rhythm/Effort] : normal respiratory rhythm and effort [Normal Rate] : the respiratory rate was normal [Rate ___] : at [unfilled] breaths per minute [Normal Breath Sounds] : normal bilateral breath sounds [Clear Bilaterally] : the lungs were clear to auscultation bilaterally [Decreased Breath Sounds] : breath sounds were not diminished [Wheezing Bilaterally] : no wheezing was heard [Rales / Crackles Bilateral] : no rales or crackles were heard [Rhonchi Bilateral] : no rhonchi were heard [Nail Clubbing] : no clubbing of the fingernails [Cyanosis, Localized] : no localized cyanosis [Acc Muscles Use] : no accessory muscle use [Air Hunger] : no air hunger [Wet Cough] : no wet cough [Dry Cough] : no dry cough [Distress] : no respiratory distress [Paroxysmal Cough] : no paroxysmal cough

## 2019-10-17 NOTE — ASSESSMENT
[FreeTextEntry1] : Etiology of the dyspnea is still uncertain. Workup as above negative. Extensive cardiac evaluation as well including a loop recorder. Previously, inhalers of no help.   R/O asthma   Current attack of cough seems more consistent with upper airway nasal congestion, postnasal drip. No BRENDEN seen. No significant noct hypoxia.

## 2019-10-17 NOTE — CONSULT LETTER
[Courtesy Letter:] : I had the pleasure of seeing your patient, [unfilled], in my office today. [Dear  ___] : Dear  [unfilled], [Consult Closing:] : Thank you very much for allowing me to participate in the care of this patient.  If you have any questions, please do not hesitate to contact me. [DrLesly  ___] : Dr. GARCIA [Francois Morales MD] : Francois Morales MD

## 2019-10-17 NOTE — REVIEW OF SYSTEMS
[Fatigue] : fatigue [Poor Appetite] : poor appetite [Postnasal Drip] : postnasal drip [Cough] : cough [Dyspnea] : dyspnea [Wheezing] : wheezing [Palpitations] : palpitations [As Noted in HPI] : as noted in HPI [Heartburn] : heartburn [Reflux] : reflux [Negative] : Psychiatric [Chills] : no chills [Fever] : no fever [Eye Irritation] : no ~T irritation of the eyes [Dry Eyes] : no dryness of the eyes

## 2019-10-30 ENCOUNTER — RX RENEWAL (OUTPATIENT)
Age: 54
End: 2019-10-30

## 2019-11-27 ENCOUNTER — RX RENEWAL (OUTPATIENT)
Age: 54
End: 2019-11-27

## 2019-12-25 PROBLEM — R10.2 PELVIC PAIN IN FEMALE: Status: ACTIVE | Noted: 2018-08-07

## 2020-06-06 ENCOUNTER — TRANSCRIPTION ENCOUNTER (OUTPATIENT)
Age: 55
End: 2020-06-06

## 2020-06-15 ENCOUNTER — APPOINTMENT (OUTPATIENT)
Dept: PULMONOLOGY | Facility: CLINIC | Age: 55
End: 2020-06-15
Payer: MEDICARE

## 2020-06-18 ENCOUNTER — LABORATORY RESULT (OUTPATIENT)
Age: 55
End: 2020-06-18

## 2020-06-18 ENCOUNTER — APPOINTMENT (OUTPATIENT)
Dept: FAMILY MEDICINE | Facility: CLINIC | Age: 55
End: 2020-06-18
Payer: MEDICARE

## 2020-06-18 ENCOUNTER — APPOINTMENT (OUTPATIENT)
Dept: CARDIOLOGY | Facility: CLINIC | Age: 55
End: 2020-06-18

## 2020-06-18 VITALS
BODY MASS INDEX: 26.03 KG/M2 | DIASTOLIC BLOOD PRESSURE: 100 MMHG | HEART RATE: 105 BPM | WEIGHT: 162 LBS | TEMPERATURE: 97.6 F | SYSTOLIC BLOOD PRESSURE: 170 MMHG | HEIGHT: 66 IN | OXYGEN SATURATION: 98 %

## 2020-06-18 VITALS — SYSTOLIC BLOOD PRESSURE: 110 MMHG | DIASTOLIC BLOOD PRESSURE: 82 MMHG

## 2020-06-18 LAB — CYTOLOGY CVX/VAG DOC THIN PREP: NORMAL

## 2020-06-18 PROCEDURE — 99386 PREV VISIT NEW AGE 40-64: CPT | Mod: 25

## 2020-06-18 PROCEDURE — 36415 COLL VENOUS BLD VENIPUNCTURE: CPT

## 2020-06-18 RX ORDER — CLINDAMYCIN HYDROCHLORIDE 300 MG/1
300 CAPSULE ORAL
Qty: 21 | Refills: 0 | Status: COMPLETED | COMMUNITY
Start: 2020-01-13

## 2020-06-18 RX ORDER — CEPHALEXIN 500 MG/1
500 CAPSULE ORAL
Qty: 14 | Refills: 0 | Status: COMPLETED | COMMUNITY
Start: 2020-06-06

## 2020-06-18 RX ORDER — PHENAZOPYRIDINE HYDROCHLORIDE 200 MG/1
200 TABLET ORAL
Qty: 6 | Refills: 0 | Status: COMPLETED | COMMUNITY
Start: 2020-06-06

## 2020-06-18 RX ORDER — RANITIDINE 300 MG/1
300 TABLET ORAL
Qty: 30 | Refills: 11 | Status: DISCONTINUED | COMMUNITY
Start: 2018-10-23 | End: 2020-06-18

## 2020-06-18 NOTE — ASSESSMENT
[FreeTextEntry1] : 56 y/o AAF establishing a new PCP:\par \par HCM:\par -Blood and UA today\par -HIV screening\par \par Gyn: Dr Cui\par Mammo: 2019/ Schedule for next week\par Colonoscopy: 2019./ Pt can't recall to have colonosocopy last year\par \par HTN:\par -on Cardizem.\par -F/up with cardiology, Dr medley.\par \par Dyslipidemia:\par -On Atorvastatin\par \par Asthma:\par -On Breo.\par -F/up with Pulmonology Dr ariza\par \par GERD:\par -On Pantoprazole.\par -GI: Dr Ritter\par \par Chronic lumbalgia/ Cervicalgia? Knee OA;\par -Seen by Orthopedic.\par -On Naproxen prn and methocarbamol\par \par Screening for depression> Severe depression;\par -On Cymbalta and Buspirone.\par -Takes Vistaril prn.\par -F/up at [facility]

## 2020-06-18 NOTE — HEALTH RISK ASSESSMENT
[Poor] : ~his/her~ mood as  poor [Yes] : Yes [No falls in past year] : Patient reported no falls in the past year [No] : In the past 12 months have you used drugs other than those required for medical reasons? No [3] : 1) Little interest or pleasure doing things for nearly every day (3) [Patient reported mammogram was normal] : Patient reported mammogram was normal [Patient reported colonoscopy was normal] : Patient reported colonoscopy was normal [HIV Test offered] : HIV Test offered [Patient reported PAP Smear was normal] : Patient reported PAP Smear was normal [Hepatitis C test declined] : Hepatitis C test declined [Alone] : lives alone [None] : None [On disability] : on disability [] :  [# Of Children ___] : has [unfilled] children [Fully functional (bathing, dressing, toileting, transferring, walking, feeding)] : Fully functional (bathing, dressing, toileting, transferring, walking, feeding) [Feels Safe at Home] : Feels safe at home [Fully functional (using the telephone, shopping, preparing meals, housekeeping, doing laundry, using] : Fully functional and needs no help or supervision to perform IADLs (using the telephone, shopping, preparing meals, housekeeping, doing laundry, using transportation, managing medications and managing finances) [Some assistance needed] : shopping [Independent] : managing finances [Full assistance needed] : using transportation [Smoke Detector] : smoke detector [Seat Belt] :  uses seat belt [Safety elements used in home] : safety elements used in home [Name: ___] : Health Care Proxy's Name: [unfilled]  [With Patient/Caregiver] : With Patient/Caregiver [Designated Healthcare Proxy] : Designated healthcare proxy [Relationship: ___] : Relationship: [unfilled] [FreeTextEntry1] : recent loss nephew [] : No [de-identified] : rare [de-identified] : walks [de-identified] : no [Language] : denies difficulty with language [Change in mental status noted] : No change in mental status noted [Handling Complex Tasks] : denies difficulty handling complex tasks [Reports changes in hearing] : Reports no changes in hearing [Reports changes in vision] : Reports no changes in vision [TB Exposure] : is not being exposed to tuberculosis [MammogramDate] : 2019 [Reports changes in dental health] : Reports no changes in dental health [ColonoscopyDate] : 02/19 [PapSmearDate] : 2020 [PapSmearComments] : Gyn: Dr Cui in [place] [ColonoscopyComments] : GI: Dr Ritter/ Pt states can't recall have a colonsocopy last year [HepatitisCDate] : 2017 [de-identified] : since 2018 due to Back and neck pain, Depression, OA knee. [HepatitisCComments] : negative [AdvancecareDate] : 6/18/20

## 2020-06-18 NOTE — PHYSICAL EXAM
[No Acute Distress] : no acute distress [Well Nourished] : well nourished [Well-Appearing] : well-appearing [Well Developed] : well developed [PERRL] : pupils equal round and reactive to light [Normal Sclera/Conjunctiva] : normal sclera/conjunctiva [Normal Outer Ear/Nose] : the outer ears and nose were normal in appearance [EOMI] : extraocular movements intact [No JVD] : no jugular venous distention [Normal Oropharynx] : the oropharynx was normal [No Lymphadenopathy] : no lymphadenopathy [No Respiratory Distress] : no respiratory distress  [Thyroid Normal, No Nodules] : the thyroid was normal and there were no nodules present [Supple] : supple [Clear to Auscultation] : lungs were clear to auscultation bilaterally [No Accessory Muscle Use] : no accessory muscle use [Regular Rhythm] : with a regular rhythm [Normal Rate] : normal rate  [No Murmur] : no murmur heard [No Carotid Bruits] : no carotid bruits [Normal S1, S2] : normal S1 and S2 [No Varicosities] : no varicosities [No Abdominal Bruit] : a ~M bruit was not heard ~T in the abdomen [No Palpable Aorta] : no palpable aorta [Pedal Pulses Present] : the pedal pulses are present [No Edema] : there was no peripheral edema [Non Tender] : non-tender [Soft] : abdomen soft [No Extremity Clubbing/Cyanosis] : no extremity clubbing/cyanosis [No Masses] : no abdominal mass palpated [Non-distended] : non-distended [No HSM] : no HSM [Normal Posterior Cervical Nodes] : no posterior cervical lymphadenopathy [Normal Bowel Sounds] : normal bowel sounds [No Spinal Tenderness] : no spinal tenderness [Normal Anterior Cervical Nodes] : no anterior cervical lymphadenopathy [No CVA Tenderness] : no CVA  tenderness [No Joint Swelling] : no joint swelling [No Rash] : no rash [Grossly Normal Strength/Tone] : grossly normal strength/tone [Coordination Grossly Intact] : coordination grossly intact [No Focal Deficits] : no focal deficits [Deep Tendon Reflexes (DTR)] : deep tendon reflexes were 2+ and symmetric [Normal Gait] : normal gait [Normal Affect] : the affect was normal [Normal Insight/Judgement] : insight and judgment were intact

## 2020-06-18 NOTE — HISTORY OF PRESENT ILLNESS
[de-identified] : 54 y/o AAF presents today to establish anew PCP and annual physical. Pt states lost today her 48 y/o nephew with Down and is very emotional.\par Previous PCP: Kenya Cook\par Pt with hx depression on Buspirone and Cymbalta, HTN on Diltiazem, Dyslipidemia on Atorvastatin, Asthma on Breo, Chronic Cervicalgia, low back pain and Knee OA on methocarbamol and naproxen prn.\par [facility] at [facility].\par Pt seen by Allergist: Matias Bailey\par Seen by cardiology, Dr Whittaker\par Pulmonology: Dr Morales\par GI: Dr Ritter\par In transition to move to [place]. [FreeTextEntry1] : Establish anew PCP and CPE

## 2020-06-18 NOTE — PAST MEDICAL HISTORY
[Menopause Age____] : age at menopause was [unfilled] [Total Preg ___] : G[unfilled] [Surgical Menopause] : in surgical menopause [Ectopics ___] : ectopic pregnancies: [unfilled]  [Living ___] : Living: [unfilled]

## 2020-06-18 NOTE — COUNSELING
[None] : None [Behavioral health counseling provided] : Behavioral health counseling provided [Fall prevention counseling provided] : Fall prevention counseling provided [Good understanding] : Patient has a good understanding of lifestyle changes and steps needed to achieve self management goal

## 2020-06-23 ENCOUNTER — APPOINTMENT (OUTPATIENT)
Dept: ULTRASOUND IMAGING | Facility: CLINIC | Age: 55
End: 2020-06-23
Payer: MEDICARE

## 2020-06-23 ENCOUNTER — APPOINTMENT (OUTPATIENT)
Dept: MAMMOGRAPHY | Facility: CLINIC | Age: 55
End: 2020-06-23
Payer: MEDICARE

## 2020-06-23 ENCOUNTER — OUTPATIENT (OUTPATIENT)
Dept: OUTPATIENT SERVICES | Facility: HOSPITAL | Age: 55
LOS: 1 days | End: 2020-06-23
Payer: COMMERCIAL

## 2020-06-23 DIAGNOSIS — Z90.722 ACQUIRED ABSENCE OF OVARIES, BILATERAL: Chronic | ICD-10-CM

## 2020-06-23 DIAGNOSIS — R92.8 OTHER ABNORMAL AND INCONCLUSIVE FINDINGS ON DIAGNOSTIC IMAGING OF BREAST: ICD-10-CM

## 2020-06-23 DIAGNOSIS — Z87.59 PERSONAL HISTORY OF OTHER COMPLICATIONS OF PREGNANCY, CHILDBIRTH AND THE PUERPERIUM: Chronic | ICD-10-CM

## 2020-06-23 PROCEDURE — 71046 X-RAY EXAM CHEST 2 VIEWS: CPT

## 2020-06-23 PROCEDURE — 77063 BREAST TOMOSYNTHESIS BI: CPT | Mod: 26

## 2020-06-23 PROCEDURE — 71046 X-RAY EXAM CHEST 2 VIEWS: CPT | Mod: 26

## 2020-06-23 PROCEDURE — 77063 BREAST TOMOSYNTHESIS BI: CPT

## 2020-06-23 PROCEDURE — 77067 SCR MAMMO BI INCL CAD: CPT

## 2020-06-23 PROCEDURE — 77067 SCR MAMMO BI INCL CAD: CPT | Mod: 26

## 2020-06-24 LAB
25(OH)D3 SERPL-MCNC: 10.6 NG/ML
ALBUMIN SERPL ELPH-MCNC: 4.3 G/DL
ALP BLD-CCNC: 102 U/L
ALT SERPL-CCNC: 23 U/L
ANION GAP SERPL CALC-SCNC: 16 MMOL/L
APPEARANCE: ABNORMAL
AST SERPL-CCNC: 23 U/L
BASOPHILS # BLD AUTO: 0.06 K/UL
BASOPHILS NFR BLD AUTO: 0.5 %
BILIRUB SERPL-MCNC: 0.3 MG/DL
BILIRUBIN URINE: NEGATIVE
BLOOD URINE: ABNORMAL
BUN SERPL-MCNC: 11 MG/DL
CALCIUM SERPL-MCNC: 9.6 MG/DL
CHLORIDE SERPL-SCNC: 102 MMOL/L
CHOLEST SERPL-MCNC: 217 MG/DL
CHOLEST/HDLC SERPL: 3.7 RATIO
CO2 SERPL-SCNC: 24 MMOL/L
COLOR: YELLOW
CREAT SERPL-MCNC: 0.68 MG/DL
EOSINOPHIL # BLD AUTO: 0.29 K/UL
EOSINOPHIL NFR BLD AUTO: 2.4 %
ESTIMATED AVERAGE GLUCOSE: 108 MG/DL
GLUCOSE QUALITATIVE U: NEGATIVE
GLUCOSE SERPL-MCNC: 93 MG/DL
HBA1C MFR BLD HPLC: 5.4 %
HCT VFR BLD CALC: 45.9 %
HDLC SERPL-MCNC: 59 MG/DL
HGB BLD-MCNC: 14.2 G/DL
HIV1+2 AB SPEC QL IA.RAPID: NONREACTIVE
IMM GRANULOCYTES NFR BLD AUTO: 0.4 %
KETONES URINE: NEGATIVE
LDLC SERPL CALC-MCNC: 122 MG/DL
LEUKOCYTE ESTERASE URINE: NEGATIVE
LYMPHOCYTES # BLD AUTO: 2.32 K/UL
LYMPHOCYTES NFR BLD AUTO: 19.1 %
MAN DIFF?: NORMAL
MCHC RBC-ENTMCNC: 30.9 GM/DL
MCHC RBC-ENTMCNC: 32.9 PG
MCV RBC AUTO: 106.3 FL
MONOCYTES # BLD AUTO: 0.53 K/UL
MONOCYTES NFR BLD AUTO: 4.4 %
NEUTROPHILS # BLD AUTO: 8.87 K/UL
NEUTROPHILS NFR BLD AUTO: 73.2 %
NITRITE URINE: NEGATIVE
PH URINE: 5.5
PLATELET # BLD AUTO: 326 K/UL
POTASSIUM SERPL-SCNC: 3.6 MMOL/L
PROT SERPL-MCNC: 7.2 G/DL
PROTEIN URINE: NEGATIVE
RBC # BLD: 4.32 M/UL
RBC # FLD: 13.3 %
SODIUM SERPL-SCNC: 142 MMOL/L
SPECIFIC GRAVITY URINE: 1.02
TRIGL SERPL-MCNC: 176 MG/DL
TSH SERPL-ACNC: 2.09 UIU/ML
UROBILINOGEN URINE: NORMAL
WBC # FLD AUTO: 12.12 K/UL

## 2020-07-01 ENCOUNTER — APPOINTMENT (OUTPATIENT)
Dept: PULMONOLOGY | Facility: CLINIC | Age: 55
End: 2020-07-01
Payer: MEDICARE

## 2020-07-01 VITALS
OXYGEN SATURATION: 98 % | WEIGHT: 160 LBS | DIASTOLIC BLOOD PRESSURE: 70 MMHG | HEART RATE: 100 BPM | SYSTOLIC BLOOD PRESSURE: 114 MMHG | BODY MASS INDEX: 25.82 KG/M2

## 2020-07-01 PROCEDURE — 99214 OFFICE O/P EST MOD 30 MIN: CPT

## 2020-07-01 RX ORDER — ALBUTEROL SULFATE 90 UG/1
108 (90 BASE) INHALANT RESPIRATORY (INHALATION)
Qty: 3 | Refills: 5 | Status: DISCONTINUED | COMMUNITY
Start: 2019-10-30 | End: 2020-07-01

## 2020-07-01 RX ORDER — ALBUTEROL SULFATE 90 UG/1
108 (90 BASE) AEROSOL, METERED RESPIRATORY (INHALATION) EVERY 4 HOURS
Qty: 3 | Refills: 1 | Status: DISCONTINUED | COMMUNITY
Start: 2018-05-10 | End: 2020-07-01

## 2020-07-06 ENCOUNTER — APPOINTMENT (OUTPATIENT)
Dept: GASTROENTEROLOGY | Facility: CLINIC | Age: 55
End: 2020-07-06

## 2020-08-11 ENCOUNTER — APPOINTMENT (OUTPATIENT)
Dept: OBGYN | Facility: CLINIC | Age: 55
End: 2020-08-11

## 2020-11-09 ENCOUNTER — TRANSCRIPTION ENCOUNTER (OUTPATIENT)
Age: 55
End: 2020-11-09

## 2020-11-09 ENCOUNTER — APPOINTMENT (OUTPATIENT)
Dept: PULMONOLOGY | Facility: CLINIC | Age: 55
End: 2020-11-09
Payer: MEDICARE

## 2020-11-09 VITALS
HEART RATE: 91 BPM | BODY MASS INDEX: 29.16 KG/M2 | OXYGEN SATURATION: 98 % | WEIGHT: 175 LBS | RESPIRATION RATE: 16 BRPM | HEIGHT: 65 IN | SYSTOLIC BLOOD PRESSURE: 124 MMHG | DIASTOLIC BLOOD PRESSURE: 80 MMHG

## 2020-11-09 DIAGNOSIS — Z23 ENCOUNTER FOR IMMUNIZATION: ICD-10-CM

## 2020-11-09 PROCEDURE — 99214 OFFICE O/P EST MOD 30 MIN: CPT | Mod: 25

## 2020-11-09 PROCEDURE — 99072 ADDL SUPL MATRL&STAF TM PHE: CPT

## 2020-11-09 PROCEDURE — 90686 IIV4 VACC NO PRSV 0.5 ML IM: CPT

## 2020-11-09 PROCEDURE — G0008: CPT

## 2020-11-09 RX ORDER — METHOCARBAMOL 750 MG/1
750 TABLET, FILM COATED ORAL
Refills: 0 | Status: DISCONTINUED | COMMUNITY
End: 2020-11-09

## 2020-11-09 RX ORDER — PANTOPRAZOLE 40 MG/1
40 TABLET, DELAYED RELEASE ORAL
Qty: 90 | Refills: 3 | Status: DISCONTINUED | COMMUNITY
Start: 2018-10-23 | End: 2020-11-09

## 2020-11-16 ENCOUNTER — APPOINTMENT (OUTPATIENT)
Dept: FAMILY MEDICINE | Facility: CLINIC | Age: 55
End: 2020-11-16

## 2020-11-20 ENCOUNTER — APPOINTMENT (OUTPATIENT)
Dept: GASTROENTEROLOGY | Facility: CLINIC | Age: 55
End: 2020-11-20

## 2020-11-20 ENCOUNTER — APPOINTMENT (OUTPATIENT)
Dept: GASTROENTEROLOGY | Facility: CLINIC | Age: 55
End: 2020-11-20
Payer: MEDICARE

## 2020-11-20 VITALS
DIASTOLIC BLOOD PRESSURE: 82 MMHG | SYSTOLIC BLOOD PRESSURE: 140 MMHG | OXYGEN SATURATION: 98 % | WEIGHT: 172 LBS | BODY MASS INDEX: 28.66 KG/M2 | HEART RATE: 93 BPM | TEMPERATURE: 97.7 F | HEIGHT: 65 IN | RESPIRATION RATE: 14 BRPM

## 2020-11-20 PROCEDURE — 99214 OFFICE O/P EST MOD 30 MIN: CPT

## 2020-11-20 NOTE — ASSESSMENT
[FreeTextEntry1] : Chronic stable heartburn; no upper GI alarm symptoms. Pantoprazole renewed at 40 mg p.o. q.d. #90 refill x3. His weight reduction and to adhere to antireflux diet.\par \par Chronic anal pruritus. Refill of the proximal 2.5% hydrocortisone cream to be applied topically to 3 times per day on a p.r.n. basis. 3 refills.\par \par Diverticulosis; high fiber diet. \par GI office followup in one yr.

## 2020-11-20 NOTE — HISTORY OF PRESENT ILLNESS
[FreeTextEntry1] : 55-year-old American female with obesity and erosive esophagitis and diverticulosis past endoscopy done 2015 showing erosive esophagitis. Patient has had chronic heartburn for years despite taking pantoprazole and formally ranitidine 300 mg p.o. q.h.s. Ranitidine has been removed from the market. Moderately symptomatic with persistent heartburn. No upper GI alarm symptoms. Last seen one year ago. Still complaining of anal pruritus. Last colonoscopy showed no evidence of hemorrhoids just diverticulosis.

## 2020-11-23 ENCOUNTER — APPOINTMENT (OUTPATIENT)
Dept: ORTHOPEDIC SURGERY | Facility: CLINIC | Age: 55
End: 2020-11-23
Payer: MEDICARE

## 2020-11-23 VITALS
WEIGHT: 172 LBS | BODY MASS INDEX: 28.66 KG/M2 | DIASTOLIC BLOOD PRESSURE: 77 MMHG | SYSTOLIC BLOOD PRESSURE: 136 MMHG | HEART RATE: 99 BPM | HEIGHT: 65 IN

## 2020-11-23 VITALS — TEMPERATURE: 97.1 F

## 2020-11-23 PROCEDURE — 99203 OFFICE O/P NEW LOW 30 MIN: CPT

## 2020-12-16 PROBLEM — Z86.19 HISTORY OF CANDIDAL VULVOVAGINITIS: Status: RESOLVED | Noted: 2018-09-11 | Resolved: 2020-12-16

## 2021-01-27 ENCOUNTER — APPOINTMENT (OUTPATIENT)
Dept: FAMILY MEDICINE | Facility: CLINIC | Age: 56
End: 2021-01-27
Payer: MEDICARE

## 2021-01-27 VITALS
TEMPERATURE: 98 F | RESPIRATION RATE: 14 BRPM | HEART RATE: 100 BPM | WEIGHT: 175 LBS | OXYGEN SATURATION: 98 % | DIASTOLIC BLOOD PRESSURE: 80 MMHG | BODY MASS INDEX: 29.16 KG/M2 | SYSTOLIC BLOOD PRESSURE: 140 MMHG | HEIGHT: 65 IN

## 2021-01-27 DIAGNOSIS — M67.479 GANGLION, UNSPECIFIED ANKLE AND FOOT: ICD-10-CM

## 2021-01-27 DIAGNOSIS — E56.9 VITAMIN DEFICIENCY, UNSPECIFIED: ICD-10-CM

## 2021-01-27 PROCEDURE — 99213 OFFICE O/P EST LOW 20 MIN: CPT

## 2021-01-27 PROCEDURE — 99072 ADDL SUPL MATRL&STAF TM PHE: CPT

## 2021-01-27 RX ORDER — NAPROXEN 500 MG/1
500 TABLET ORAL
Qty: 30 | Refills: 0 | Status: DISCONTINUED | COMMUNITY
Start: 2020-06-18 | End: 2021-01-27

## 2021-01-27 NOTE — PHYSICAL EXAM
[Normal] : normal rate, regular rhythm, normal S1 and S2 and no murmur heard [Skin Lesions 1] : Skin lesion: [Single Cyst ___ cm] : a single [unfilled] ~Ucm [Location: ___] : [unfilled] [Lesions Feet] : on the left foot [Lesions Feet Top Left] : on the dorsum of the left foot

## 2021-01-27 NOTE — HISTORY OF PRESENT ILLNESS
[FreeTextEntry1] : Lump on top of left foot [de-identified] : 56 y/o AAF presents today for lump in left foot..\par Previous PCP: Kenya Cook\par Pt with hx depression on Buspirone and Cymbalta, HTN on Diltiazem, Dyslipidemia on Atorvastatin, Asthma on Breo, Chronic Cervicalgia, low back pain and Knee OA on methocarbamol and Ibuprofen. She was seen by Orthopedic for RT elbow pain and knee pain. She is with chiropractor.\par Seen at [facility] for Depression.\par Pt seen by Allergist: Matias Bailey\par Seen by cardiology, Dr Whittaker\par Pulmonology: Dr Morales\par GI: Dr Ritter\par Today presents for evaluation of lump in dorsal left foot present for several months. pain with shoes. denies any trauma, injury.\par \par

## 2021-01-27 NOTE — REVIEW OF SYSTEMS
[Patient Intake Form Reviewed] : Patient intake form was reviewed [de-identified] : lump in left dorsal foot

## 2021-01-27 NOTE — HEALTH RISK ASSESSMENT
[No] : In the past 12 months have you used drugs other than those required for medical reasons? No [No falls in past year] : Patient reported no falls in the past year [] : No [de-identified] : GI, Orthopedic, Pulmonology

## 2021-01-27 NOTE — ASSESSMENT
[FreeTextEntry1] : HCM:\par -06/20\par -HIV screening: negative 2020\par \par Gyn: Dr Cui\par Mammo: 2019/ \par Colonoscopy: 2019./ F/up with Dr Ritter\par \par HTN:\par -on Cardizem.\par -F/up with cardiology, Dr medley.\par \par Dyslipidemia:\par -On Atorvastatin\par \par Asthma:\par -On Breo.\par -F/up with Pulmonology Dr airza\par \par GERD:\par -On Pantoprazole.\par -GI: Dr Ritter\par \par Chronic lumbalgia/ Cervicalgia? Knee OA;RT elbow tendinitis\par -Seen by Orthopedic.\par -On Ibuprofen prn and methocarbamol\par -Seen by Chiropractor\par \par Screening for depression> Severe depression;\par -On Cymbalta and Buspirone.\par -Takes Vistaril prn.\par [facility] \par \par Ganglion Cyst in Left dorsal foot:\par -Podiatry referral\par \par F/up for annual physical or prn.\par

## 2021-01-28 ENCOUNTER — APPOINTMENT (OUTPATIENT)
Dept: OBGYN | Facility: CLINIC | Age: 56
End: 2021-01-28
Payer: MEDICARE

## 2021-01-28 VITALS
SYSTOLIC BLOOD PRESSURE: 135 MMHG | BODY MASS INDEX: 29.16 KG/M2 | WEIGHT: 175 LBS | DIASTOLIC BLOOD PRESSURE: 80 MMHG | HEIGHT: 65 IN

## 2021-01-28 DIAGNOSIS — Z01.419 ENCOUNTER FOR GYNECOLOGICAL EXAMINATION (GENERAL) (ROUTINE) W/OUT ABNORMAL FINDINGS: ICD-10-CM

## 2021-01-28 DIAGNOSIS — Z87.19 PERSONAL HISTORY OF OTHER DISEASES OF THE DIGESTIVE SYSTEM: ICD-10-CM

## 2021-01-28 PROCEDURE — 99386 PREV VISIT NEW AGE 40-64: CPT

## 2021-01-28 PROCEDURE — 99072 ADDL SUPL MATRL&STAF TM PHE: CPT

## 2021-01-28 RX ORDER — HYDROCORTISONE 25 MG/G
2.5 CREAM TOPICAL
Qty: 1 | Refills: 3 | Status: DISCONTINUED | COMMUNITY
Start: 2019-10-14 | End: 2021-01-28

## 2021-01-28 RX ORDER — IBUPROFEN 800 MG/1
800 TABLET, FILM COATED ORAL 3 TIMES DAILY
Qty: 42 | Refills: 0 | Status: DISCONTINUED | COMMUNITY
Start: 2020-11-23 | End: 2021-01-28

## 2021-01-28 RX ORDER — HYDROCORTISONE 25 MG/G
2.5 CREAM TOPICAL
Qty: 1 | Refills: 3 | Status: DISCONTINUED | COMMUNITY
Start: 2018-10-23 | End: 2021-01-28

## 2021-01-28 NOTE — PHYSICAL EXAM
[Appropriately responsive] : appropriately responsive [Alert] : alert [No Acute Distress] : no acute distress [Soft] : soft [Non-tender] : non-tender [Non-distended] : non-distended [No HSM] : No HSM [No Lesions] : no lesions [No Mass] : no mass [Oriented x3] : oriented x3 [Examination Of The Breasts] : a normal appearance [No Masses] : no breast masses were palpable [Labia Majora] : normal [Labia Minora] : normal [Normal] : normal [Absent] : absent [Uterine Adnexae] : normal [No Tenderness] : no tenderness [Nl Sphincter Tone] : normal sphincter tone

## 2021-01-28 NOTE — HISTORY OF PRESENT ILLNESS
[N] : Patient is not sexually active [Y] : Positive pregnancy history [Menarche Age: ____] : age at menarche was [unfilled] [[facility]Full[facility]] : 2 [PGHxTotal] : 4 [PGHxPremature] : 0 [PGHxAbortions] : 0 [[facility]xLiving] : 2 [PGHxABInduced] : 0 [PGHxABSpont] : 0 [PGxEctopic] : 2 [PGHxMultBirths] : 0

## 2021-02-02 ENCOUNTER — APPOINTMENT (OUTPATIENT)
Dept: PHYSICAL MEDICINE AND REHAB | Facility: CLINIC | Age: 56
End: 2021-02-02

## 2021-02-03 LAB
FSH SERPL-MCNC: 76.1 IU/L
LH SERPL-ACNC: 39.1 IU/L

## 2021-02-04 ENCOUNTER — APPOINTMENT (OUTPATIENT)
Dept: ORTHOPEDIC SURGERY | Facility: CLINIC | Age: 56
End: 2021-02-04
Payer: MEDICARE

## 2021-02-04 VITALS
WEIGHT: 175 LBS | HEIGHT: 65 IN | DIASTOLIC BLOOD PRESSURE: 91 MMHG | HEART RATE: 107 BPM | SYSTOLIC BLOOD PRESSURE: 149 MMHG | BODY MASS INDEX: 29.16 KG/M2

## 2021-02-04 PROCEDURE — 99213 OFFICE O/P EST LOW 20 MIN: CPT | Mod: 25

## 2021-02-04 PROCEDURE — 20610 DRAIN/INJ JOINT/BURSA W/O US: CPT | Mod: LT

## 2021-02-04 PROCEDURE — 20605 DRAIN/INJ JOINT/BURSA W/O US: CPT | Mod: 59,RT

## 2021-02-04 PROCEDURE — 99072 ADDL SUPL MATRL&STAF TM PHE: CPT

## 2021-02-11 ENCOUNTER — APPOINTMENT (OUTPATIENT)
Dept: PULMONOLOGY | Facility: CLINIC | Age: 56
End: 2021-02-11

## 2021-03-11 ENCOUNTER — APPOINTMENT (OUTPATIENT)
Dept: ORTHOPEDIC SURGERY | Facility: CLINIC | Age: 56
End: 2021-03-11
Payer: MEDICARE

## 2021-03-11 VITALS
HEIGHT: 65 IN | DIASTOLIC BLOOD PRESSURE: 96 MMHG | WEIGHT: 175 LBS | BODY MASS INDEX: 29.16 KG/M2 | SYSTOLIC BLOOD PRESSURE: 154 MMHG | HEART RATE: 93 BPM

## 2021-03-11 VITALS — TEMPERATURE: 98.2 F

## 2021-03-11 PROCEDURE — 99072 ADDL SUPL MATRL&STAF TM PHE: CPT

## 2021-03-11 PROCEDURE — 73564 X-RAY EXAM KNEE 4 OR MORE: CPT | Mod: RT

## 2021-03-11 PROCEDURE — 99213 OFFICE O/P EST LOW 20 MIN: CPT

## 2021-03-22 ENCOUNTER — APPOINTMENT (OUTPATIENT)
Dept: ORTHOPEDIC SURGERY | Facility: CLINIC | Age: 56
End: 2021-03-22
Payer: MEDICARE

## 2021-03-22 PROCEDURE — 99443: CPT | Mod: 95

## 2021-04-26 ENCOUNTER — APPOINTMENT (OUTPATIENT)
Dept: ORTHOPEDIC SURGERY | Facility: CLINIC | Age: 56
End: 2021-04-26
Payer: MEDICARE

## 2021-04-26 VITALS
WEIGHT: 175 LBS | HEART RATE: 90 BPM | DIASTOLIC BLOOD PRESSURE: 88 MMHG | HEIGHT: 65 IN | SYSTOLIC BLOOD PRESSURE: 165 MMHG | BODY MASS INDEX: 29.16 KG/M2

## 2021-04-26 PROCEDURE — 99072 ADDL SUPL MATRL&STAF TM PHE: CPT

## 2021-04-26 PROCEDURE — 99213 OFFICE O/P EST LOW 20 MIN: CPT

## 2021-04-27 ENCOUNTER — RX RENEWAL (OUTPATIENT)
Age: 56
End: 2021-04-27

## 2021-04-30 ENCOUNTER — APPOINTMENT (OUTPATIENT)
Dept: PULMONOLOGY | Facility: CLINIC | Age: 56
End: 2021-04-30
Payer: MEDICARE

## 2021-04-30 VITALS — BODY MASS INDEX: 29.82 KG/M2 | WEIGHT: 179 LBS | HEIGHT: 65 IN

## 2021-04-30 VITALS
HEART RATE: 102 BPM | SYSTOLIC BLOOD PRESSURE: 120 MMHG | TEMPERATURE: 97.8 F | OXYGEN SATURATION: 98 % | DIASTOLIC BLOOD PRESSURE: 76 MMHG

## 2021-04-30 DIAGNOSIS — R06.00 DYSPNEA, UNSPECIFIED: ICD-10-CM

## 2021-04-30 PROCEDURE — 99214 OFFICE O/P EST MOD 30 MIN: CPT

## 2021-04-30 PROCEDURE — 99072 ADDL SUPL MATRL&STAF TM PHE: CPT

## 2021-04-30 RX ORDER — HYDROCORTISONE 25 MG/G
2.5 CREAM TOPICAL TWICE DAILY
Qty: 2 | Refills: 3 | Status: DISCONTINUED | COMMUNITY
Start: 2020-11-20 | End: 2021-04-30

## 2021-04-30 RX ORDER — PANTOPRAZOLE 40 MG/1
40 TABLET, DELAYED RELEASE ORAL
Qty: 90 | Refills: 1 | Status: DISCONTINUED | COMMUNITY
Start: 2018-09-04 | End: 2021-04-30

## 2021-04-30 RX ORDER — NYSTATIN AND TRIAMCINOLONE ACETONIDE 100000; 1 [USP'U]/G; MG/G
100000-0.1 OINTMENT TOPICAL
Qty: 90 | Refills: 0 | Status: DISCONTINUED | COMMUNITY
Start: 2020-06-06 | End: 2021-04-30

## 2021-05-11 ENCOUNTER — APPOINTMENT (OUTPATIENT)
Dept: PHYSICAL MEDICINE AND REHAB | Facility: CLINIC | Age: 56
End: 2021-05-11
Payer: MEDICARE

## 2021-05-11 VITALS
BODY MASS INDEX: 27.32 KG/M2 | RESPIRATION RATE: 14 BRPM | DIASTOLIC BLOOD PRESSURE: 83 MMHG | WEIGHT: 170 LBS | OXYGEN SATURATION: 98 % | SYSTOLIC BLOOD PRESSURE: 141 MMHG | TEMPERATURE: 97 F | HEART RATE: 102 BPM | HEIGHT: 66 IN

## 2021-05-11 DIAGNOSIS — Z86.79 PERSONAL HISTORY OF OTHER DISEASES OF THE CIRCULATORY SYSTEM: ICD-10-CM

## 2021-05-11 DIAGNOSIS — Z78.9 OTHER SPECIFIED HEALTH STATUS: ICD-10-CM

## 2021-05-11 PROCEDURE — 99072 ADDL SUPL MATRL&STAF TM PHE: CPT

## 2021-05-11 PROCEDURE — 99204 OFFICE O/P NEW MOD 45 MIN: CPT

## 2021-05-11 RX ORDER — IBUPROFEN 800 MG/1
800 TABLET, FILM COATED ORAL 3 TIMES DAILY
Qty: 21 | Refills: 0 | Status: DISCONTINUED | COMMUNITY
Start: 2021-02-22 | End: 2021-05-11

## 2021-05-11 RX ORDER — BUSPIRONE HYDROCHLORIDE 5 MG/1
5 TABLET ORAL
Qty: 90 | Refills: 0 | Status: DISCONTINUED | COMMUNITY
Start: 2020-02-19 | End: 2021-05-11

## 2021-05-11 NOTE — PHYSICAL EXAM
[FreeTextEntry1] : NAD\par A&Ox3\par Overweight\par ROM L-spine: limited in all planes 2' pain\par ROM Hips: smooth IR/ER w/o pain\par Pelvic tilt: ++\par Seated slump test: neg\par SLR: neg\par TAMIR's: neg\par DTR's: 2+ knees; depressed ankles\par MMT: 4+/5 pain related weakness HF; 5/5 DF/PF\par Sensation: SILT\par Toe & Heel Walk: Yes\par Palpation: SIJs NTTP\par
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
## 2021-05-11 NOTE — ASSESSMENT
[FreeTextEntry1] : 55 y.o. F w/ CLBP w/o prominent radicular features.  I spent most of today's visit (30 min) reviewing the patient's MRI scan and discussing further non-operative management.  Advised caution w/ chronic use of PO NSAIDs 2' GI/cardiac/renal toxicities.  Discussed R/B/A to short course low dose methocarbamol 500 mg.   No role for LESI given paucity of radicular sxs and relatively benign MRI.   Rx P.T. for modalities, gentle ROM, stretching and strengthening exercises.  Further advised walking in pool for weight loss and core stabilization exercises.  RTC prn.

## 2021-05-11 NOTE — DATA REVIEWED
[MRI] : MRI [FreeTextEntry1] : MRI L-spine (Aug 2020): Multilevel lumbar disc desiccation. Circumferential bulging and marked facet proliferative \par  degeneration L4-5 producing moderate spinal stenosis.

## 2021-05-13 ENCOUNTER — APPOINTMENT (OUTPATIENT)
Dept: CARDIOLOGY | Facility: CLINIC | Age: 56
End: 2021-05-13

## 2021-05-19 ENCOUNTER — RX RENEWAL (OUTPATIENT)
Age: 56
End: 2021-05-19

## 2021-05-24 ENCOUNTER — RX RENEWAL (OUTPATIENT)
Age: 56
End: 2021-05-24

## 2021-05-25 ENCOUNTER — RX RENEWAL (OUTPATIENT)
Age: 56
End: 2021-05-25

## 2021-06-28 DIAGNOSIS — R92.1 MAMMOGRAPHIC CALCIFICATION FOUND ON DIAGNOSTIC IMAGING OF BREAST: ICD-10-CM

## 2021-07-05 ENCOUNTER — APPOINTMENT (OUTPATIENT)
Dept: ULTRASOUND IMAGING | Facility: CLINIC | Age: 56
End: 2021-07-05
Payer: MEDICARE

## 2021-07-05 ENCOUNTER — RESULT REVIEW (OUTPATIENT)
Age: 56
End: 2021-07-05

## 2021-07-05 ENCOUNTER — OUTPATIENT (OUTPATIENT)
Dept: OUTPATIENT SERVICES | Facility: HOSPITAL | Age: 56
LOS: 1 days | End: 2021-07-05
Payer: MEDICARE

## 2021-07-05 ENCOUNTER — APPOINTMENT (OUTPATIENT)
Dept: MAMMOGRAPHY | Facility: CLINIC | Age: 56
End: 2021-07-05
Payer: MEDICARE

## 2021-07-05 DIAGNOSIS — R92.8 OTHER ABNORMAL AND INCONCLUSIVE FINDINGS ON DIAGNOSTIC IMAGING OF BREAST: ICD-10-CM

## 2021-07-05 DIAGNOSIS — Z87.59 PERSONAL HISTORY OF OTHER COMPLICATIONS OF PREGNANCY, CHILDBIRTH AND THE PUERPERIUM: Chronic | ICD-10-CM

## 2021-07-05 DIAGNOSIS — Z90.722 ACQUIRED ABSENCE OF OVARIES, BILATERAL: Chronic | ICD-10-CM

## 2021-07-05 PROCEDURE — 76641 ULTRASOUND BREAST COMPLETE: CPT

## 2021-07-05 PROCEDURE — 77067 SCR MAMMO BI INCL CAD: CPT

## 2021-07-05 PROCEDURE — 76641 ULTRASOUND BREAST COMPLETE: CPT | Mod: 26,50

## 2021-07-05 PROCEDURE — 77063 BREAST TOMOSYNTHESIS BI: CPT

## 2021-07-05 PROCEDURE — 77067 SCR MAMMO BI INCL CAD: CPT | Mod: 26

## 2021-07-05 PROCEDURE — 77063 BREAST TOMOSYNTHESIS BI: CPT | Mod: 26

## 2021-07-13 ENCOUNTER — APPOINTMENT (OUTPATIENT)
Dept: PHYSICAL MEDICINE AND REHAB | Facility: CLINIC | Age: 56
End: 2021-07-13

## 2021-07-14 ENCOUNTER — APPOINTMENT (OUTPATIENT)
Dept: FAMILY MEDICINE | Facility: CLINIC | Age: 56
End: 2021-07-14
Payer: MEDICARE

## 2021-07-14 VITALS
RESPIRATION RATE: 16 BRPM | HEIGHT: 66 IN | HEART RATE: 95 BPM | TEMPERATURE: 97.3 F | BODY MASS INDEX: 29.41 KG/M2 | OXYGEN SATURATION: 98 % | WEIGHT: 183 LBS | DIASTOLIC BLOOD PRESSURE: 80 MMHG | SYSTOLIC BLOOD PRESSURE: 130 MMHG

## 2021-07-14 DIAGNOSIS — Z11.3 ENCOUNTER FOR SCREENING FOR INFECTIONS WITH A PREDOMINANTLY SEXUAL MODE OF TRANSMISSION: ICD-10-CM

## 2021-07-14 DIAGNOSIS — Z13.31 ENCOUNTER FOR SCREENING FOR DEPRESSION: ICD-10-CM

## 2021-07-14 PROCEDURE — 36415 COLL VENOUS BLD VENIPUNCTURE: CPT

## 2021-07-14 PROCEDURE — 99214 OFFICE O/P EST MOD 30 MIN: CPT | Mod: 25

## 2021-07-14 PROCEDURE — 99072 ADDL SUPL MATRL&STAF TM PHE: CPT

## 2021-07-14 RX ORDER — AMOXICILLIN 875 MG/1
875 TABLET, FILM COATED ORAL
Refills: 0 | Status: DISCONTINUED | COMMUNITY
End: 2021-07-14

## 2021-07-14 NOTE — REVIEW OF SYSTEMS
[Patient Intake Form Reviewed] : Patient intake form was reviewed [de-identified] : lump in left dorsal foot

## 2021-07-14 NOTE — HISTORY OF PRESENT ILLNESS
[FreeTextEntry1] : Depression\par meds refills [de-identified] : 57 y/o AAF presents today for f/up, meds refills...\par Previous PCP: Kenya Cook\par Pt with hx depression , she was on Buspirone, Vistaril  and Cymbalta, HTN on Diltiazem, Dyslipidemia on Atorvastatin, Asthma on Breo, Chronic Cervicalgia, low back pain and Knee OA on methocarbamol and Ibuprofen. She was seen by Orthopedic for RT elbow pain and knee pain. She is with chiropractor.\par Seen at [facility] for Depression.\par Pt seen by Allergist: Matias Bailey\par Seen by cardiology, Dr Whittaker\par Pulmonology: Dr Morales\par GI: Dr Ritter\par She states she d/c depression meds and f/up at [facility] aprox 1 year ago. She states she needs to reassume meds.She try to reassume care at [facility] and is on waiting list.\par \par

## 2021-07-14 NOTE — HEALTH RISK ASSESSMENT
[No] : In the past 12 months have you used drugs other than those required for medical reasons? No [No falls in past year] : Patient reported no falls in the past year [Yes] : Yes [2] : 2) Feeling down, depressed, or hopeless for more than half of the days (2) [] : No [de-identified] : GI, Orthopedic, Pulmonology [de-identified] : beer ocassional

## 2021-07-16 DIAGNOSIS — E53.8 DEFICIENCY OF OTHER SPECIFIED B GROUP VITAMINS: ICD-10-CM

## 2021-07-16 LAB
25(OH)D3 SERPL-MCNC: 13.8 NG/ML
ALBUMIN SERPL ELPH-MCNC: 4.3 G/DL
ALP BLD-CCNC: 93 U/L
ALT SERPL-CCNC: 19 U/L
ANION GAP SERPL CALC-SCNC: 25 MMOL/L
AST SERPL-CCNC: 16 U/L
BASOPHILS # BLD AUTO: 0.04 K/UL
BASOPHILS NFR BLD AUTO: 0.3 %
BILIRUB SERPL-MCNC: 0.2 MG/DL
BUN SERPL-MCNC: 7 MG/DL
C TRACH RRNA SPEC QL NAA+PROBE: NOT DETECTED
CALCIUM SERPL-MCNC: 9.7 MG/DL
CHLORIDE SERPL-SCNC: 102 MMOL/L
CHOLEST SERPL-MCNC: 219 MG/DL
CO2 SERPL-SCNC: 17 MMOL/L
CREAT SERPL-MCNC: 0.51 MG/DL
EOSINOPHIL # BLD AUTO: 0.34 K/UL
EOSINOPHIL NFR BLD AUTO: 2.9 %
FOLATE SERPL-MCNC: 6.5 NG/ML
GLUCOSE SERPL-MCNC: 104 MG/DL
HCT VFR BLD CALC: 42.8 %
HCV AB SER QL: NONREACTIVE
HCV S/CO RATIO: 0.08 S/CO
HDLC SERPL-MCNC: 62 MG/DL
HGB BLD-MCNC: 13.1 G/DL
HIV1+2 AB SPEC QL IA.RAPID: NONREACTIVE
IMM GRANULOCYTES NFR BLD AUTO: 0.2 %
LDLC SERPL CALC-MCNC: 104 MG/DL
LYMPHOCYTES # BLD AUTO: 2.81 K/UL
LYMPHOCYTES NFR BLD AUTO: 24.2 %
MAN DIFF?: NORMAL
MCHC RBC-ENTMCNC: 30.6 GM/DL
MCHC RBC-ENTMCNC: 32 PG
MCV RBC AUTO: 104.4 FL
MONOCYTES # BLD AUTO: 0.5 K/UL
MONOCYTES NFR BLD AUTO: 4.3 %
N GONORRHOEA RRNA SPEC QL NAA+PROBE: NOT DETECTED
NEUTROPHILS # BLD AUTO: 7.89 K/UL
NEUTROPHILS NFR BLD AUTO: 68.1 %
NONHDLC SERPL-MCNC: 157 MG/DL
PLATELET # BLD AUTO: 296 K/UL
POTASSIUM SERPL-SCNC: 3.5 MMOL/L
PROT SERPL-MCNC: 7.1 G/DL
RBC # BLD: 4.1 M/UL
RBC # FLD: 14.3 %
SODIUM SERPL-SCNC: 144 MMOL/L
SOURCE AMPLIFICATION: NORMAL
T PALLIDUM AB SER QL IA: NEGATIVE
TRIGL SERPL-MCNC: 267 MG/DL
TSH SERPL-ACNC: 2.55 UIU/ML
VIT B12 SERPL-MCNC: 213 PG/ML
WBC # FLD AUTO: 11.6 K/UL

## 2021-07-22 ENCOUNTER — RX RENEWAL (OUTPATIENT)
Age: 56
End: 2021-07-22

## 2021-08-09 NOTE — ED PROVIDER NOTE - PMH
Paco is a 57 year old who is being evaluated via a billable telephone visit.      What phone number would you like to be contacted at? [phone]  How would you like to obtain your AVS? MyChart    Assessment & Plan   Dysphagia, unspecified type  Increased symptoms lately with bread and meat getting stuck at times to the point of vomiting.  Previous EGD in 2018 did not show any abnormalities but milder symptoms at that time.  He is interested in having biopsy done for eosinophilic esophagitis which I think is reasonable and I recommended he bring that up to the gastroenterologist who does this procedure.  Meantime would recommend daily Nexium use.  Avoid caffeine NSAIDs as much as possible.  - esomeprazole (NEXIUM) 20 MG DR capsule  - Adult Gastro Ref - Procedure Only        Return in about 1 month (around 9/9/2021) for symptoms failing to improve or sooner if worsening.      José Miguel Gaviria MD      [address] [address]  [email or web address]   Office: [phone]       Subjective   Paco is a 57 year old who presents for the following health issues     HPI     Dysphagia - his food (sascha meat or bread) at times gets stuck going down with some vomiting- heartburn - having issue for years now getting worse- He has been using PPI - nexium use.  He uses advil regularly and some caffeine.  Some Alcohol on the weekends.   Coughing when eating.      Review of Systems   Constitutional, HEENT, cardiovascular, pulmonary, gi and gu systems are negative, except as otherwise noted.      Objective         Vitals:  No vitals were obtained today due to virtual visit.    Physical Exam   healthy, alert and no distress  PSYCH: Alert and oriented times 3; coherent speech, normal   rate and volume, able to articulate logical thoughts, able   to abstract reason, no tangential thoughts, no hallucinations   or delusions  His affect is normal  RESP: No cough, no audible wheezing, able to  talk in full sentences  Remainder of exam unable to be completed due to telephone visits        Phone call duration: 11 minutes   Asthma  controlled on meds  Ectopic pregnancy    Hypertension    Palpitations    Reflux    Seasonal allergies    Sleep apnea  does not use the CPAP but use O2 prn  SOB (shortness of breath)    Vitamin B 12 deficiency

## 2021-08-26 ENCOUNTER — APPOINTMENT (OUTPATIENT)
Dept: FAMILY MEDICINE | Facility: CLINIC | Age: 56
End: 2021-08-26

## 2021-09-02 ENCOUNTER — APPOINTMENT (OUTPATIENT)
Dept: CARDIOLOGY | Facility: CLINIC | Age: 56
End: 2021-09-02

## 2021-09-09 RX ORDER — IBUPROFEN 800 MG/1
800 TABLET, FILM COATED ORAL 3 TIMES DAILY
Qty: 42 | Refills: 0 | Status: DISCONTINUED | COMMUNITY
Start: 2021-04-26 | End: 2021-09-09

## 2021-09-14 NOTE — ED ADULT NURSE NOTE - CHPI ED SYMPTOMS POS
Quality 130: Documentation Of Current Medications In The Medical Record: Current Medications Documented
Detail Level: Detailed
Quality 110: Preventive Care And Screening: Influenza Immunization: Influenza Immunization previously received during influenza season
PAIN/CRAMPS/NAUSEA

## 2021-09-27 ENCOUNTER — APPOINTMENT (OUTPATIENT)
Dept: ORTHOPEDIC SURGERY | Facility: CLINIC | Age: 56
End: 2021-09-27
Payer: MEDICARE

## 2021-09-27 VITALS
WEIGHT: 183 LBS | SYSTOLIC BLOOD PRESSURE: 190 MMHG | HEART RATE: 97 BPM | DIASTOLIC BLOOD PRESSURE: 110 MMHG | HEIGHT: 66 IN | BODY MASS INDEX: 29.41 KG/M2

## 2021-09-27 PROCEDURE — 99213 OFFICE O/P EST LOW 20 MIN: CPT | Mod: 25

## 2021-09-27 PROCEDURE — 20610 DRAIN/INJ JOINT/BURSA W/O US: CPT | Mod: RT

## 2021-09-27 PROCEDURE — 20605 DRAIN/INJ JOINT/BURSA W/O US: CPT | Mod: RT

## 2021-09-27 NOTE — PROCEDURE
[de-identified] : I injected the patient's right knee today with cortisone.\par \par I discussed at length with the patient the planned steroid and lidocaine injection. The risks, benefits, convalescence and alternatives were reviewed. The possible side effects discussed included but were not limited to: pain, swelling, heat, bleeding, and redness. Symptoms are generally mild but if they are extensive then contact the office. Giving pain relievers by mouth such as NSAIDs or Tylenol can generally treat the reactions to steroid and lidocaine. Rare cases of infection have been noted. Rash, hives and itching may occur post injection. If you have muscle pain or cramps, flushing and or swelling of the face, rapid heart beat, nausea, dizziness, fever, chills, headache, difficulty breathing, swelling in the arms or legs, or have a prickly feeling of your skin, contact a health care provider immediately. Following this discussion, the knee was prepped with Chlorhexidine and Alcohol and under sterile conditions the 80 mg Depo-Medrol and 4 cc Lidocaine injection was performed with a 22 gauge needle through a anterolateral injection site. The needle was introduced into the joint, aspiration was performed to ensure intra-articular placement and the medication was injected. Upon withdrawal of the needle the site was cleaned with alcohol and a band aid applied. The patient tolerated the injection well and there were no adverse effects. Post injection instructions included no strenuous activity for 24 hours, cryotherapy and if there are any adverse effects to contact the office. \par \par \par \par \par I injected the patient's right elbow lateral epicondyle today with cortisone.\par \par I discussed at length with the patient the planned steroid and lidocaine injection. The risks, benefits, convalescence and alternatives were reviewed. The possible side effects discussed included but were not limited to: pain, swelling, heat, bleeding, and redness. Symptoms are generally mild but if they are extensive then contact the office. Giving pain relievers by mouth such as NSAIDs or Tylenol can generally treat the reactions to steroid and lidocaine. Rare cases of infection have been noted. Rash, hives and itching may occur post injection. If you have muscle pain or cramps, flushing and or swelling of the face, rapid heart beat, nausea, dizziness, fever, chills, headache, difficulty breathing, swelling in the arms or legs, or have a prickly feeling of your skin, contact a health care provider immediately. Following this discussion, the shoulder was prepped with Chlorhexidine and Alcohol and under sterile conditions the 40 mg Depo-Medrol and 1 cc Lidocaine injection was performed with a 22 gauge needle through a lateral injection site. The needle was introduced down to the lateral epicondyle, slightly withdrawn, and the medication was injected. Upon withdrawal of the needle the site was cleaned with alcohol and a band aid was applied. The patient tolerated the injection well and there were no adverse effects. Post injection instructions included no strenuous activity for 24 hours, cryotherapy and if there are any adverse effects to contact the office.

## 2021-09-27 NOTE — PHYSICAL EXAM
[de-identified] : General:\par Awake, alert, no acute distress, Patient was cooperative and appropriate during the examination.\par \par The patient is of normal weight for height and age.\par \par Walks with an antalgic gait on the right side.\par \par Full, painless range of motion of the neck and back.\par \par Exam of the bilateral lower extremities is intact and symmetric with regards to dermatologic, vascular, and neurologic exam. Bilateral lower extremity sensation is grossly intact to light touch in the DP/SP/T/S/S nerve distributions. Intact DF/PF/EHL. BIlateral lower extremity warm and well-perfused with brisk capillary refill.\par \par Pulmonary:\par Regular, nonlabored breathing\par \par Abdomen:\par Soft, nontender, nondistended.\par \par Lymphatic:\par No evidence of inguinal lymphadenopathy\par \par Right knee Examination:\par Physical examination of the knee demonstrates normal skin without signs of skin changes or abnormalities. No erythema or warmth is appreciated.  No knee joint effusion.\par \par Sensation is intact to light touch L2-S1\par Palpable DP/PT pulse\par EHL/FHL/TA/GSC motor function intact\par \par Range of Motion\par 0 to 130 degrees\par \par Strength Testing\par Quadriceps/Hamstring 5/5\par Patient is able to perform a straight leg raise without difficulty.\par \par Palpation\par Not tender to palpation about the proximal tibia or patella\par Mildly tender to palpation over the femoral origin of the MCL\par No palpable defect appreciated in the quadriceps or patellar tendons\par Exquisitely tender to palpation of medial joint line\par Mildly tender to palpation of lateral joint line\par \par Special Tests\par Anterior Drawer negative\par Posterior Drawer negative\par Lachman Exam negative\par No varus or valgus laxity at 0 or 30 degrees of knee flexion\par Misty's positive medially\par Active compression of the patella is negative for pain\par Translation of the patella less than 2 quadrants with a firm endpoint\par \par \par Right Elbow Exam:\par Physical exam of the elbow demonstrates normal skin without signs of skin changes or abnormalities. No erythema, warmth, or joint effusion appreciated. \par \par Sensation intact light touch C5-T1\par Palpable radial pulse\par Radial/ulnar/median/axillary/musculocutaneous/AIN/PIN nerves grossly intact\par \par Range of motion:\par Flexion-Extension 0 to 145 degrees\par Pronation-Supination 85 degrees of pronation to 85 degrees of supination\par \par Palpation:\par Exquisitely tender to palpation over the lateral epicondyle and common extensor origin\par Not tender palpation over the medial epicondyle\par Not tender to palpation over the radial head\par Not tender to palpation over the olecranon\par Not tender to palpation over the distal biceps insertion\par Not tender to palpation over the distal triceps insertion\par Not tender to palpation over the cubital tunnel\par \par Strength testing:\par Elbow Flexion 5/5\par Elbow Extension 5/5\par Pronation 5/5\par Supination 5/5\par \par Special Tests:\par No varus/valgus laxity at 0 and 30 degrees of elbow flexion\par Moderate pain with resisted wrist/finger extension and forearm supination\par No pain with resisted wrist/finger flexion and forearm pronation\par Negative hook test\par Negative Tinel's over the carpal and cubital tunnels [de-identified] : MRI of the right knee from  radiology on 3/17/2021 was reviewed the patient today in the office.  The patient does have a grade 2 sprain of the MCL.  There is a trace joint effusion.  There is a small popliteal cyst.  Mild patella chepe and lateral patellar tilt.  No fracture.

## 2021-09-27 NOTE — DISCUSSION/SUMMARY
[de-identified] : Assessment: 56-year-old female with right knee pain secondary to early arthritis versus possible medial meniscus tear, and right elbow pain secondary to lateral epicondylitis\par \par Plan: I had a long discussion with the patient today regarding the nature of their diagnosis and treatment plan. We discussed the risks and benefits of no treatment as well as nonoperative and operative treatments.  I reviewed the patient's MRI again with her today in the office which demonstrates a grade 2 MCL sprain.  On examination today the patient has significant joint line to the medial joint line of the knee as well as the lateral epicondyle of the elbow.  She would like to continue with conservative treatments at this time with resting, icing, heating stretching, anti-inflammatory medicines as needed, activity modifications, and home exercises.  I reviewed the risks and benefits associated with NSAID use.  She has had extensive physical therapy in the past and feels comfortable doing exercises on her own at home.  Cortisone injections were administered to the patient's right knee today in the office as well as the right elbow lateral epicondyle.  She tolerated these well and there are no adverse events.  Recommend follow-up in 8 weeks for repeat clinical evaluation.  The patient continues to have significant pain in 1 of these areas at her follow-up visit we will obtain an MRI.\par \par The patient verbalizes their understanding and agrees with the plan.  All questions were answered to their satisfaction.

## 2021-09-27 NOTE — HISTORY OF PRESENT ILLNESS
[de-identified] : 9/27/21: Diana is a 56-year-old female who returns to the office today for follow-up regarding her right knee pain and right elbow pain.  At her previous visit she was diagnosed with a grade 2 MCL sprain.  She was provided with a short runner brace and referred for physical therapy.  She was lost to follow-up after the injury but she complains of persistent pain in her knee.  She also has pain in her elbow again.  She denies any new injuries since her last visit.  She has been doing home exercises on her own that she learned in physical therapy.  She has been taking oral NSAIDs as needed.  The patient denies any fevers, chills, sweats, recent illnesses, numbness, tingling, weakness, or pain elsewhere at this time.\par \par 4/26/21: Diana is a pleasant 55 year old female who is being seen for follow-up for right knee pain.  The patient recently returned from visiting her family in [place] and would like to obtain the knee brace that we discussed over the phone the last time we spoke.  We discussed her MRI results which demonstrated a grade 2 MCL sprain.  Her pain has improved since the initial time of injury but she does have pain with ambulation at times.  She is taking occasional NSAIDs for pain which are mildly helpful.  She has her first therapy appointment scheduled for tomorrow.  The patient denies any fevers, chills, sweats, recent illnesses, numbness, tingling, weakness, or pain elsewhere at this time.

## 2021-10-11 ENCOUNTER — APPOINTMENT (OUTPATIENT)
Dept: FAMILY MEDICINE | Facility: CLINIC | Age: 56
End: 2021-10-11
Payer: MEDICARE

## 2021-10-11 VITALS
DIASTOLIC BLOOD PRESSURE: 80 MMHG | OXYGEN SATURATION: 98 % | HEIGHT: 66 IN | TEMPERATURE: 97.3 F | BODY MASS INDEX: 30.05 KG/M2 | SYSTOLIC BLOOD PRESSURE: 132 MMHG | WEIGHT: 187 LBS | HEART RATE: 94 BPM | RESPIRATION RATE: 16 BRPM

## 2021-10-11 DIAGNOSIS — I99.9 UNSPECIFIED DISORDER OF CIRCULATORY SYSTEM: ICD-10-CM

## 2021-10-11 PROCEDURE — 96372 THER/PROPH/DIAG INJ SC/IM: CPT

## 2021-10-11 PROCEDURE — 99214 OFFICE O/P EST MOD 30 MIN: CPT | Mod: 25

## 2021-10-11 RX ORDER — CYANOCOBALAMIN 1000 UG/ML
1000 INJECTION INTRAMUSCULAR; SUBCUTANEOUS
Qty: 0 | Refills: 0 | Status: COMPLETED | OUTPATIENT
Start: 2021-10-11

## 2021-10-11 RX ADMIN — CYANOCOBALAMIN 0 MCG/ML: 1000 INJECTION, SOLUTION INTRAMUSCULAR at 00:00

## 2021-10-11 NOTE — REVIEW OF SYSTEMS
[Patient Intake Form Reviewed] : Patient intake form was reviewed [de-identified] : lump in left dorsal foot

## 2021-10-11 NOTE — HISTORY OF PRESENT ILLNESS
[FreeTextEntry1] : Depression\par meds refills [de-identified] : 57 y/o AAF presents today for f/up, meds refills...\par Previous PCP: Kenya Cook\par Pt with hx depression , she was on Buspirone, Vistaril  and Cymbalta, HTN on Diltiazem, Dyslipidemia on Atorvastatin, Asthma on Breo, Chronic Cervicalgia, low back pain and Knee OA on methocarbamol and Ibuprofen. She was seen by Orthopedic for RT elbow pain and knee pain. She is with chiropractor.\par [facility].\par Pt seen by Allergist: Matias Bailey\par Seen by cardiology, Dr Whittaker\par Pulmonology: Dr Morales\par GI: Dr Ritter\par \par \par

## 2021-10-11 NOTE — ASSESSMENT
[FreeTextEntry1] : HCM:\par -06/20\par -HIV screening: negative 2020\par \par Gyn: Dr Cui\par Mammo: 2019/ \par Colonoscopy: 2019./ F/up with Dr Ritter\par \par HTN:\par -on Cardizem.\par -F/up with cardiology, Dr medley.\par \par Dyslipidemia:\par -On Atorvastatin\par \par Asthma:\par -On Breo.\par -F/up with Pulmonology Dr ariza\par \par GERD:\par -On Pantoprazole.\par -GI: Dr Ritter\par \par Chronic lumbalgia/ Cervicalgia? Knee OA;RT elbow tendinitis\par -Seen by Orthopedic.\par -On Ibuprofen prn and methocarbamol\par -Seen by Chiropractor\par \par Screening for depression>  depression with anxiety\par -Continue Cymbalta.\par -Continue Vistaril prn.\par -F/up at [facility]. > psychiatry and mental counseling referral.\par -Schedule for telephonic eval with Dr Rios for counseling / psychiatry\par \par Vit B12 def:\par -B12 shot today\par \par Ganglion Cyst in Left dorsal foot:\par -Resolved\par \par F/up for annual physical or prn.\par \par

## 2021-10-11 NOTE — HEALTH RISK ASSESSMENT
[Yes] : Yes [No] : In the past 12 months have you used drugs other than those required for medical reasons? No [No falls in past year] : Patient reported no falls in the past year [2] : 2) Feeling down, depressed, or hopeless for more than half of the days (2) [] : No [de-identified] : GI, Orthopedic, Pulmonology [de-identified] : beer ocassional

## 2021-11-16 ENCOUNTER — RX RENEWAL (OUTPATIENT)
Age: 56
End: 2021-11-16

## 2021-11-22 ENCOUNTER — APPOINTMENT (OUTPATIENT)
Dept: ORTHOPEDIC SURGERY | Facility: CLINIC | Age: 56
End: 2021-11-22
Payer: MEDICARE

## 2021-11-22 VITALS
DIASTOLIC BLOOD PRESSURE: 85 MMHG | HEIGHT: 66 IN | BODY MASS INDEX: 30.05 KG/M2 | SYSTOLIC BLOOD PRESSURE: 134 MMHG | WEIGHT: 187 LBS | HEART RATE: 91 BPM

## 2021-11-22 DIAGNOSIS — M25.561 PAIN IN RIGHT KNEE: ICD-10-CM

## 2021-11-22 PROCEDURE — 99213 OFFICE O/P EST LOW 20 MIN: CPT | Mod: 25

## 2021-11-22 PROCEDURE — 20610 DRAIN/INJ JOINT/BURSA W/O US: CPT | Mod: LT

## 2021-11-22 NOTE — HISTORY OF PRESENT ILLNESS
[de-identified] : 11/22/21: Diana is a 56-year-old female who returns to the office today for follow-up regarding her right knee and elbow pain.  She is now also complaining of left knee pain.  At her previous office visit we discussed conservative treatments and she received cortisone injections to both the right knee and the right elbow.  She states that her right elbow pain has resolved after the cortisone injection but her right knee continues to bother her.  Her left knee is also now bothering her and bothers her just as much as the right.  She denies any history of new trauma.  She has been taking ibuprofen on an as-needed basis for pain in both knees.  The patient denies any fevers, chills, sweats, recent illnesses, numbness, tingling, weakness, or pain elsewhere at this time.\par \par 9/27/21: Diana is a 56-year-old female who returns to the office today for follow-up regarding her right knee pain and right elbow pain.  At her previous visit she was diagnosed with a grade 2 MCL sprain.  She was provided with a short runner brace and referred for physical therapy.  She was lost to follow-up after the injury but she complains of persistent pain in her knee.  She also has pain in her elbow again.  She denies any new injuries since her last visit.  She has been doing home exercises on her own that she learned in physical therapy.  She has been taking oral NSAIDs as needed.  The patient denies any fevers, chills, sweats, recent illnesses, numbness, tingling, weakness, or pain elsewhere at this time.\par \par 4/26/21: Diana is a pleasant 55 year old female who is being seen for follow-up for right knee pain.  The patient recently returned from visiting her family in [place] and would like to obtain the knee brace that we discussed over the phone the last time we spoke.  We discussed her MRI results which demonstrated a grade 2 MCL sprain.  Her pain has improved since the initial time of injury but she does have pain with ambulation at times.  She is taking occasional NSAIDs for pain which are mildly helpful.  She has her first therapy appointment scheduled for tomorrow.  The patient denies any fevers, chills, sweats, recent illnesses, numbness, tingling, weakness, or pain elsewhere at this time.

## 2021-11-22 NOTE — DISCUSSION/SUMMARY
[de-identified] : Assessment: 56-year-old female with bilateral knee pain secondary to early arthritis, resolving right lateral epicondylitis\par \par Plan: I had a long discussion with the patient today regarding the nature of their diagnosis and treatment plan. We discussed the risks and benefits of no treatment as well as nonoperative and operative treatments.  I reviewed the patient's bilateral knee x-rays today with her in the office which demonstrate early arthritic changes.  I also reviewed her MRI of the right knee from March 2021 which demonstrated a grade 2 MCL tear.  On examination most of her pain is along the joint line and she has minimal residual pain about the MCL origin without any signs of ligamentous laxity.  She did receive some relief from her right knee cortisone injection at her previous visit but it is too soon to consider an injection again for this area.  The patient's right elbow pain is substantially improved.  At this time I recommend conservative treatment of the patient's condition with modalities including rest, ice, heat, anti-inflammatory medications, activity modifications, and home stretching and strengthening exercises daily.  A new prescription for ibuprofen was sent to her pharmacy today.  I discussed with the patient the risks and benefits associated with NSAID use.  A cortisone injection was administered to the patient's left knee today in the office under sterile conditions.  The patient tolerated this well and there are no adverse events.  Recommend follow-up in 8 weeks for repeat clinical evaluation.  If she does continue to complain of pain we may consider a repeat MRI one of the knees.  We also may consider a referral to rheumatology for evaluation of a possible rheumatologic process.\par \par The patient verbalizes their understanding and agrees with the plan.  All questions were answered to their satisfaction.

## 2021-11-22 NOTE — PHYSICAL EXAM
[de-identified] : General:\par Awake, alert, no acute distress, Patient was cooperative and appropriate during the examination.\par \par The patient is of normal weight for height and age.\par \par Walks with an antalgic gait on the right side.\par \par Full, painless range of motion of the neck and back.\par \par Exam of the bilateral lower extremities is intact and symmetric with regards to dermatologic, vascular, and neurologic exam. Bilateral lower extremity sensation is grossly intact to light touch in the DP/SP/T/S/S nerve distributions. Intact DF/PF/EHL. BIlateral lower extremity warm and well-perfused with brisk capillary refill.\par \par Pulmonary:\par Regular, nonlabored breathing\par \par Abdomen:\par Soft, nontender, nondistended.\par \par Lymphatic:\par No evidence of inguinal lymphadenopathy\par \par Bilateral knee Examination:\par Physical examination of the knees demonstrates normal skin without signs of skin changes or abnormalities. No erythema or warmth is appreciated.  No knee joint effusion.\par \par Sensation is intact to light touch L2-S1\par Palpable DP/PT pulse\par EHL/FHL/TA/GSC motor function intact\par \par Range of Motion\par 0 to 130 degrees bilaterally\par \par Strength Testing\par Quadriceps/Hamstring 5/5\par Patient is able to perform a straight leg raise without difficulty.\par \par Palpation\par Not tender to palpation about the proximal tibias or patellas\par Minimally tender to palpation about the MCL origins\par No palpable defect appreciated in the quadriceps or patellar tendons\par Mildly tender to palpation of medial joint lines\par Mildly tender to palpation of lateral joint lines\par \par Special Tests\par Anterior Drawer negative bilaterally\par Posterior Drawer negative bilaterally\par Lachman Exam negative bilaterally\par No varus or valgus laxity at 0 or 30 degrees of knee flexion\par Misty's positive medially bilaterally\par Active compression of the patella sis negative for pain\par Translation of the patellas less than 2 quadrants with a firm endpoint\par \par \par Right Elbow Exam:\par Physical exam of the elbow demonstrates normal skin without signs of skin changes or abnormalities. No erythema, warmth, or joint effusion appreciated. \par \par Sensation intact light touch C5-T1\par Palpable radial pulse\par Radial/ulnar/median/axillary/musculocutaneous/AIN/PIN nerves grossly intact\par \par Range of motion:\par Flexion-Extension 0 to 145 degrees\par Pronation-Supination 85 degrees of pronation to 85 degrees of supination\par \par Palpation:\par Not tender to palpation over the lateral epicondyle and common extensor origin\par Not tender palpation over the medial epicondyle\par Not tender to palpation over the radial head\par Not tender to palpation over the olecranon\par Not tender to palpation over the distal biceps insertion\par Not tender to palpation over the distal triceps insertion\par Not tender to palpation over the cubital tunnel\par \par Strength testing:\par Elbow Flexion 5/5\par Elbow Extension 5/5\par Pronation 5/5\par Supination 5/5\par \par Special Tests:\par No varus/valgus laxity at 0 and 30 degrees of elbow flexion\par Minimal pain with resisted wrist/finger extension and forearm supination\par No pain with resisted wrist/finger flexion and forearm pronation\par Negative hook test\par Negative Tinel's over the carpal and cubital tunnels [de-identified] : X-rays including 3 views of the bilateral knees were obtained in the office and reviewed the patient today.  There is no acute fracture or dislocation.  There are minimal arthritic changes with mild medial joint space narrowing bilaterally.\par \par MRI of the right knee from  radiology on 3/17/2021 was reviewed the patient today in the office.  The patient does have a grade 2 sprain of the MCL.  There is a trace joint effusion.  There is a small popliteal cyst.  Mild patella chepe and lateral patellar tilt.  No fracture.

## 2021-11-22 NOTE — PROCEDURE
[de-identified] : I injected the patient's left knee today with cortisone.\par \par I discussed at length with the patient the planned steroid and lidocaine injection. The risks, benefits, convalescence and alternatives were reviewed. The possible side effects discussed included but were not limited to: pain, swelling, heat, bleeding, and redness. Symptoms are generally mild but if they are extensive then contact the office. Giving pain relievers by mouth such as NSAIDs or Tylenol can generally treat the reactions to steroid and lidocaine. Rare cases of infection have been noted. Rash, hives and itching may occur post injection. If you have muscle pain or cramps, flushing and or swelling of the face, rapid heart beat, nausea, dizziness, fever, chills, headache, difficulty breathing, swelling in the arms or legs, or have a prickly feeling of your skin, contact a health care provider immediately. Following this discussion, the knee was prepped with Chlorhexidine and Alcohol and under sterile conditions the 80 mg Depo-Medrol and 4 cc Lidocaine injection was performed with a 22 gauge needle through a anterolateral injection site. The needle was introduced into the joint, aspiration was performed to ensure intra-articular placement and the medication was injected. Upon withdrawal of the needle the site was cleaned with alcohol and a band aid applied. The patient tolerated the injection well and there were no adverse effects. Post injection instructions included no strenuous activity for 24 hours, cryotherapy and if there are any adverse effects to contact the office.

## 2021-11-30 ENCOUNTER — APPOINTMENT (OUTPATIENT)
Dept: OBGYN | Facility: CLINIC | Age: 56
End: 2021-11-30
Payer: MEDICARE

## 2021-11-30 VITALS
BODY MASS INDEX: 29.41 KG/M2 | DIASTOLIC BLOOD PRESSURE: 88 MMHG | SYSTOLIC BLOOD PRESSURE: 130 MMHG | WEIGHT: 183 LBS | HEIGHT: 66 IN

## 2021-11-30 PROCEDURE — 99213 OFFICE O/P EST LOW 20 MIN: CPT

## 2021-11-30 NOTE — PHYSICAL EXAM
[Chaperone Present] : A chaperone was present in the examining room during all aspects of the physical examination [Awake] : awake [Alert] : alert [Soft] : soft [Oriented x3] : oriented to person, place, and time [Normal] : vagina [No Bleeding] : there was no active vaginal bleeding [Absent] : absent [Uterine Adnexae] : were not tender and not enlarged [Acute Distress] : no acute distress [Tender] : non tender

## 2021-12-02 LAB
APPEARANCE: ABNORMAL
BACTERIA UR CULT: NORMAL
BACTERIA: NEGATIVE
BILIRUBIN URINE: NEGATIVE
BLOOD URINE: ABNORMAL
COLOR: YELLOW
GLUCOSE QUALITATIVE U: NEGATIVE
HYALINE CASTS: 0 /LPF
KETONES URINE: NEGATIVE
LEUKOCYTE ESTERASE URINE: NEGATIVE
MICROSCOPIC-UA: NORMAL
NITRITE URINE: NEGATIVE
PH URINE: 5.5
PROTEIN URINE: NORMAL
RED BLOOD CELLS URINE: 20 /HPF
SPECIFIC GRAVITY URINE: 1.03
SQUAMOUS EPITHELIAL CELLS: 20 /HPF
UROBILINOGEN URINE: NORMAL
WHITE BLOOD CELLS URINE: 2 /HPF

## 2021-12-16 ENCOUNTER — APPOINTMENT (OUTPATIENT)
Dept: NEUROLOGY | Facility: CLINIC | Age: 56
End: 2021-12-16
Payer: MEDICARE

## 2021-12-16 VITALS
HEIGHT: 66 IN | SYSTOLIC BLOOD PRESSURE: 142 MMHG | OXYGEN SATURATION: 97 % | HEART RATE: 87 BPM | WEIGHT: 180 LBS | DIASTOLIC BLOOD PRESSURE: 78 MMHG | TEMPERATURE: 97.2 F | BODY MASS INDEX: 28.93 KG/M2

## 2021-12-16 DIAGNOSIS — M54.2 CERVICALGIA: ICD-10-CM

## 2021-12-16 PROCEDURE — 99203 OFFICE O/P NEW LOW 30 MIN: CPT

## 2021-12-16 RX ORDER — DULOXETINE HYDROCHLORIDE 60 MG/1
60 CAPSULE, DELAYED RELEASE ORAL
Refills: 0 | Status: DISCONTINUED | COMMUNITY
End: 2021-12-16

## 2021-12-17 NOTE — DISCUSSION/SUMMARY
[FreeTextEntry1] : Ms. Dumont is a 56 year-old woman with PMH depression, HTN, HLD, asthma, LBP who presented to the office today for evaluation of chronic headache. Plan to perform MRI head for worsening headaches that have increased in frequency. Due to the frequency and severity of headaches, I recommend she begin prophylactic medication. Begin TPM 25mg QHS, increase dose by 25mg weekly to a goal of 100mg QHS. Referral provided to physical therapy for neck pain. Follow-up with me in two months or sooner should the need arise. \par \par She was instructed to call the office if her condition worsens, medication fails to improve symptoms or she experiences any new or concerning symptoms. She was educated on safety precautions and side effects of medication. All of patient's questions and concerns were addressed.

## 2021-12-17 NOTE — PHYSICAL EXAM
[FreeTextEntry1] : GENERAL PHYSICAL EXAM:\par GEN: no distress, normal affect\par HEENT: NCAT, OP clear\par EYES: sclera white, conjunctiva clear, no nystagmus\par NECK: supple\par CV: normal rhythm\par PULM: no respiratory distress, normal rhythm and effort\par EXT: no edema, no cyanosis\par MSK: muscle tone and strength normal\par SKIN: warm, dry, no rash or lesion on exposed skin \par \par NEUROLOGICAL EXAM:\par Mental Status\par Orientation: alert and oriented to person, place, time, and situation, 3/3 recall after 5 minutes\par Language: clear and fluent, intact comprehension and repetition, intact naming and reading\par \par Cranial Nerves\par II: visual fields full to confrontation \par III, IV, VI: PERRL, EOMI\par V, VII: facial sensation and movement intact and symmetric \par VIII: hearing intact \par IX, X: uvula midline, soft palate elevates normally \par XI: BL shoulder shrug intact \par XII: tongue midline\par \par Motor\par Shoulder abd: 5 (R), 5 (L)\par EF/EE: 5 (R), 5 (L)\par hand : 5 (R), 5 (L)\par HF/HE: 5 (R), 5 (L)\par KF/KE: 5 (R), 5 (L)\par DF/PF: 5 (R), 5 (L) \par Tone and bulk are normal in upper and lower limbs\par No pronator drift\par \par Sensation\par Intact to light touch in all 4 EXTs\par \par Reflex\par 1+ in BL biceps, brachioradialis, patella\par \par Coordination\par Normal FTN bilaterally\par Dysdiadochokinesia not present. \par Able to perform rapid, alternating movements\par \par Gait\par Normal stance, stride, and pivot turn\par Tandem walk intact\par Negative Romberg

## 2021-12-17 NOTE — REVIEW OF SYSTEMS
[Memory Lapses or Loss] : memory loss [Tingling] : tingling [Depression] : depression [As Noted in HPI] : as noted in HPI [Shortness Of Breath] : shortness of breath [SOB on Exertion] : shortness of breath during exertion [Arthralgias] : arthralgias [Fever] : no fever [Chills] : no chills [Confused or Disoriented] : no confusion [Decr. Concentrating Ability] : no decrease in concentrating ability [Difficulty with Language] : no ~M difficulty with language [Facial Weakness] : no facial weakness [Arm Weakness] : no arm weakness [Hand Weakness] : no hand weakness [Leg Weakness] : no leg weakness [Poor Coordination] : good coordination [Numbness] : no numbness [Seizures] : no convulsions [Fainting] : no fainting [Lightheadedness] : no lightheadedness [Difficulty Walking] : no difficulty walking [Inability to Walk] : able to walk [Ataxia] : no ataxia [Frequent Falls] : not falling [Anxiety] : no anxiety [Eye Pain] : no eye pain [Earache] : no earache [Loss Of Hearing] : no hearing loss [Chest Pain] : no chest pain [Palpitations] : no palpitations [Cough] : no cough [Constipation] : no constipation [Diarrhea] : no diarrhea [Dysuria] : no dysuria [Incontinence] : no incontinence [Joint Pain] : no joint pain [FreeTextEntry3] : Blurred vision, floaters [FreeTextEntry9] : LBP

## 2021-12-17 NOTE — HISTORY OF PRESENT ILLNESS
[FreeTextEntry1] : PCP: Dr. Dolores Canales \par \par Ms. Dumont is a 56 year-old woman with PMH depression, HTN, HLD, asthma, LBP who presents to the office today for evaluation of chronic headache. She reports headache began four months ago, location vary but generally they occur almost daily in bitemporal region. She describes headache as a 7/10 throbbing or sharp pain that is associated with blurred vision, visual aura and tingling on the left side of her body. She denies current headache, loss of vision, changes in personality or cognition, difficulty speaking or finding the correct words, unilateral weakness or impaired sensation, problems with coordination, difficulty walking or falls. \par \par

## 2021-12-20 ENCOUNTER — RX RENEWAL (OUTPATIENT)
Age: 56
End: 2021-12-20

## 2021-12-30 ENCOUNTER — NON-APPOINTMENT (OUTPATIENT)
Age: 56
End: 2021-12-30

## 2022-01-17 ENCOUNTER — RX RENEWAL (OUTPATIENT)
Age: 57
End: 2022-01-17

## 2022-01-18 ENCOUNTER — RX RENEWAL (OUTPATIENT)
Age: 57
End: 2022-01-18

## 2022-01-18 ENCOUNTER — LABORATORY RESULT (OUTPATIENT)
Age: 57
End: 2022-01-18

## 2022-01-18 ENCOUNTER — APPOINTMENT (OUTPATIENT)
Dept: OBGYN | Facility: CLINIC | Age: 57
End: 2022-01-18
Payer: MEDICARE

## 2022-01-18 VITALS
SYSTOLIC BLOOD PRESSURE: 130 MMHG | DIASTOLIC BLOOD PRESSURE: 80 MMHG | WEIGHT: 180 LBS | HEIGHT: 66 IN | BODY MASS INDEX: 28.93 KG/M2

## 2022-01-18 DIAGNOSIS — F32.A ANXIETY DISORDER, UNSPECIFIED: ICD-10-CM

## 2022-01-18 DIAGNOSIS — Z01.419 ENCOUNTER FOR GYNECOLOGICAL EXAMINATION (GENERAL) (ROUTINE) W/OUT ABNORMAL FINDINGS: ICD-10-CM

## 2022-01-18 DIAGNOSIS — R39.9 UNSPECIFIED SYMPTOMS AND SIGNS INVOLVING THE GENITOURINARY SYSTEM: ICD-10-CM

## 2022-01-18 DIAGNOSIS — F41.9 ANXIETY DISORDER, UNSPECIFIED: ICD-10-CM

## 2022-01-18 DIAGNOSIS — K52.9 NONINFECTIVE GASTROENTERITIS AND COLITIS, UNSPECIFIED: ICD-10-CM

## 2022-01-18 PROCEDURE — 99396 PREV VISIT EST AGE 40-64: CPT

## 2022-01-18 RX ORDER — PANTOPRAZOLE 40 MG/1
40 TABLET, DELAYED RELEASE ORAL
Qty: 90 | Refills: 3 | Status: DISCONTINUED | COMMUNITY
Start: 2020-11-20 | End: 2022-01-18

## 2022-01-18 NOTE — HISTORY OF PRESENT ILLNESS
[N] : Patient is not sexually active [Menarche Age: ____] : age at menarche was [unfilled] [PGHxTotal] : 4 [[facility] : 2 [PGHxPremature] : 0 [PGHxAbortions] : 0 [[facility] : 2 [PGHxABInduced] : 0 [PGHxABSpont] : 0 [PGxEctopic] : 2 [PGHxMultBirths] : 0

## 2022-01-18 NOTE — PHYSICAL EXAM
[Chaperone Present] : A chaperone was present in the examining room during all aspects of the physical examination [Appropriately responsive] : appropriately responsive [Alert] : alert [No Acute Distress] : no acute distress [Soft] : soft [Non-tender] : non-tender [Non-distended] : non-distended [No HSM] : No HSM [No Lesions] : no lesions [No Mass] : no mass [Oriented x3] : oriented x3 [Examination Of The Breasts] : a normal appearance [No Masses] : no breast masses were palpable [Labia Majora] : normal [Labia Minora] : normal [Normal] : normal [Absent] : absent [Uterine Adnexae] : normal [No Tenderness] : no tenderness [Nl Sphincter Tone] : normal sphincter tone

## 2022-01-18 NOTE — REASON FOR VISIT
[Annual] : an annual visit. [FreeTextEntry2] : The patient presents complaining of frequency. She denies any urgency or dysuria.

## 2022-01-19 LAB
APPEARANCE: ABNORMAL
BILIRUBIN URINE: NEGATIVE
BLOOD URINE: ABNORMAL
COLOR: NORMAL
GLUCOSE QUALITATIVE U: NEGATIVE
KETONES URINE: NEGATIVE
LEUKOCYTE ESTERASE URINE: NEGATIVE
NITRITE URINE: NEGATIVE
PH URINE: 6
PROTEIN URINE: NORMAL
SPECIFIC GRAVITY URINE: 1.02
UROBILINOGEN URINE: NORMAL

## 2022-01-20 ENCOUNTER — APPOINTMENT (OUTPATIENT)
Dept: ORTHOPEDIC SURGERY | Facility: CLINIC | Age: 57
End: 2022-01-20
Payer: MEDICARE

## 2022-01-20 VITALS
HEART RATE: 90 BPM | WEIGHT: 180 LBS | HEIGHT: 66 IN | BODY MASS INDEX: 28.93 KG/M2 | DIASTOLIC BLOOD PRESSURE: 86 MMHG | SYSTOLIC BLOOD PRESSURE: 149 MMHG

## 2022-01-20 DIAGNOSIS — M25.50 PAIN IN UNSPECIFIED JOINT: ICD-10-CM

## 2022-01-20 PROCEDURE — 99213 OFFICE O/P EST LOW 20 MIN: CPT

## 2022-01-20 PROCEDURE — 73080 X-RAY EXAM OF ELBOW: CPT | Mod: LT

## 2022-01-20 NOTE — HISTORY OF PRESENT ILLNESS
[de-identified] : 1/20/22: Diana is a 56-year-old female who returns to the office today for follow-up regarding her bilateral knee pain and right elbow pain.  At her previous office visit her elbow had been doing well.  We discussed conservative treatment options for her knees.  Today her right elbow and both of her knees are status quo.  However, she is now having pain in her left elbow.  She has never had pain here before.  She denies any history of trauma.  The pain in her elbow is activity related and alleviated by rest.  It is very similar to the pain that she had in her right elbow.  The patient denies any fevers, chills, sweats, recent illnesses, numbness, tingling, weakness, or pain elsewhere at this time.\par \par 11/22/21: Diana is a 56-year-old female who returns to the office today for follow-up regarding her right knee and elbow pain.  She is now also complaining of left knee pain.  At her previous office visit we discussed conservative treatments and she received cortisone injections to both the right knee and the right elbow.  She states that her right elbow pain has resolved after the cortisone injection but her right knee continues to bother her.  Her left knee is also now bothering her and bothers her just as much as the right.  She denies any history of new trauma.  She has been taking ibuprofen on an as-needed basis for pain in both knees.  The patient denies any fevers, chills, sweats, recent illnesses, numbness, tingling, weakness, or pain elsewhere at this time.\par \par 9/27/21: Diana is a 56-year-old female who returns to the office today for follow-up regarding her right knee pain and right elbow pain.  At her previous visit she was diagnosed with a grade 2 MCL sprain.  She was provided with a short runner brace and referred for physical therapy.  She was lost to follow-up after the injury but she complains of persistent pain in her knee.  She also has pain in her elbow again.  She denies any new injuries since her last visit.  She has been doing home exercises on her own that she learned in physical therapy.  She has been taking oral NSAIDs as needed.  The patient denies any fevers, chills, sweats, recent illnesses, numbness, tingling, weakness, or pain elsewhere at this time.\par \par 4/26/21: Diana is a pleasant 55 year old female who is being seen for follow-up for right knee pain.  The patient recently returned from visiting her family in [place] and would like to obtain the knee brace that we discussed over the phone the last time we spoke.  We discussed her MRI results which demonstrated a grade 2 MCL sprain.  Her pain has improved since the initial time of injury but she does have pain with ambulation at times.  She is taking occasional NSAIDs for pain which are mildly helpful.  She has her first therapy appointment scheduled for tomorrow.  The patient denies any fevers, chills, sweats, recent illnesses, numbness, tingling, weakness, or pain elsewhere at this time.

## 2022-01-20 NOTE — PHYSICAL EXAM
[de-identified] : General:\par Awake, alert, no acute distress, Patient was cooperative and appropriate during the examination.\par \par The patient's weight is of normal weight for height and age.\par \par Walks without an antalgic gait.\par \par Full, painless range of motion of the neck and back.\par \par Exam of the bilateral lower extremities is intact and symmetric with regards to dermatologic, vascular, and neurologic exam. Bilateral lower extremity sensation is grossly intact to light touch in the DP/SP/T/S/S nerve distributions. Intact DF/PF/EHL. BIlateral lower extremities warm and well-perfused with brisk capillary refill.\par \par \par Pulmonary:\par Regular, nonlabored breathing\par \par Abdomen:\par Soft, nontender, nondistended.\par \par Lymphatic:\par No evidence of axillary lymphadenopathy\par \par \par \par Left Elbow Exam:\par Physical exam of the elbow demonstrates normal skin without signs of skin changes or abnormalities. No erythema, warmth, or joint effusion appreciated. \par \par Sensation intact light touch C5-T1\par Palpable radial pulse\par Radial/ulnar/median/axillary/musculocutaneous/AIN/PIN nerves grossly intact\par \par Range of motion:\par Flexion-Extension 0 to 145 degrees\par Pronation-Supination 85 degrees of pronation to 80 degrees of supination\par \par Palpation:\par Exquisitely tender to palpation over the lateral epicondyle and common extensor origin\par Not tender palpation over the medial epicondyle\par Not tender to palpation over the radial head\par Not tender to palpation over the olecranon\par Not tender to palpation over the distal biceps insertion\par Not tender to palpation over the distal triceps insertion\par Not tender to palpation over the cubital tunnel\par \par Strength testing:\par Elbow Flexion 5/5\par Elbow Extension 5/5\par Pronation 5/5\par Supination 5/5\par \par Special Tests:\par No varus/valgus laxity at 0 and 30 degrees of elbow flexion\par Moderate pain with resisted wrist/finger extension and forearm supination\par No pain with resisted wrist/finger flexion and forearm pronation\par Negative hook test\par Negative Tinel's over the carpal and cubital tunnels [de-identified] : X-rays including 3 views of the left elbow were obtained in the office and reviewed the patient today in 1/20/2022.  There is no acute fracture or dislocation.  There is no significant arthritis.\par \par X-rays including 3 views of the bilateral knees from my office on 11/22/2021 reviewed again today.  There is no acute fracture or dislocation.  There are minimal arthritic changes with mild medial joint space narrowing bilaterally.\par \par MRI of the right knee from  radiology on 3/17/2021 was reviewed the patient today in the office.  The patient does have a grade 2 sprain of the MCL.  There is a trace joint effusion.  There is a small popliteal cyst.  Mild patella chepe and lateral patellar tilt.  No fracture.

## 2022-01-20 NOTE — DISCUSSION/SUMMARY
[de-identified] : Assessment: 56-year-old female with left elbow pain secondary to lateral epicondylitis in the setting of multiple joint pain\par \par Plan: I had a long discussion with the patient today regarding the nature of their diagnosis and treatment plan. We discussed the risks and benefits of no treatment as well as nonoperative and operative treatments.  I reviewed the patient's left elbow x-rays today with her in the office which are negative for any acute pathology.  At this time I recommend continued conservative treatment of the patient's condition with modalities including rest, ice, heat, anti-inflammatory medications, activity modifications, and home stretching and strengthening exercises daily. I discussed with the patient the risks and benefits associated with NSAID use.  The patient would like to hold off on any physical therapy or injections at this time.  A referral for rheumatology was provided today to evaluate her for multiple joint pain now that both of her elbows and knees seem to be bothering her consistently.  Recommend follow-up after she sees rheumatology for repeat evaluation.\par \par The patient verbalizes their understanding and agrees with the plan.  All questions were answered to their satisfaction.

## 2022-01-21 ENCOUNTER — APPOINTMENT (OUTPATIENT)
Dept: RADIOLOGY | Facility: CLINIC | Age: 57
End: 2022-01-21
Payer: MEDICARE

## 2022-01-21 ENCOUNTER — OUTPATIENT (OUTPATIENT)
Dept: OUTPATIENT SERVICES | Facility: HOSPITAL | Age: 57
LOS: 1 days | End: 2022-01-21
Payer: COMMERCIAL

## 2022-01-21 DIAGNOSIS — Z90.722 ACQUIRED ABSENCE OF OVARIES, BILATERAL: Chronic | ICD-10-CM

## 2022-01-21 DIAGNOSIS — Z87.59 PERSONAL HISTORY OF OTHER COMPLICATIONS OF PREGNANCY, CHILDBIRTH AND THE PUERPERIUM: Chronic | ICD-10-CM

## 2022-01-21 DIAGNOSIS — Z00.8 ENCOUNTER FOR OTHER GENERAL EXAMINATION: ICD-10-CM

## 2022-01-21 PROCEDURE — 77080 DXA BONE DENSITY AXIAL: CPT | Mod: 26

## 2022-01-21 PROCEDURE — 77080 DXA BONE DENSITY AXIAL: CPT

## 2022-01-24 ENCOUNTER — LABORATORY RESULT (OUTPATIENT)
Age: 57
End: 2022-01-24

## 2022-01-24 ENCOUNTER — APPOINTMENT (OUTPATIENT)
Dept: FAMILY MEDICINE | Facility: CLINIC | Age: 57
End: 2022-01-24
Payer: MEDICARE

## 2022-01-24 VITALS
RESPIRATION RATE: 16 BRPM | TEMPERATURE: 97.2 F | HEART RATE: 105 BPM | WEIGHT: 194 LBS | DIASTOLIC BLOOD PRESSURE: 80 MMHG | SYSTOLIC BLOOD PRESSURE: 120 MMHG | BODY MASS INDEX: 31.18 KG/M2 | HEIGHT: 66 IN | OXYGEN SATURATION: 98 %

## 2022-01-24 PROCEDURE — 36415 COLL VENOUS BLD VENIPUNCTURE: CPT

## 2022-01-24 PROCEDURE — 99214 OFFICE O/P EST MOD 30 MIN: CPT | Mod: 25

## 2022-01-24 NOTE — HISTORY OF PRESENT ILLNESS
[FreeTextEntry1] : Depression\par meds refills [de-identified] : 55 y/o AAF presents today for f/up, meds refills...\par Previous PCP: Kenya Cook\par Pt with hx depression , she was on Buspirone, Vistaril  and Cymbalta, HTN on Diltiazem, Dyslipidemia on Atorvastatin, Asthma on Breo, Chronic Cervicalgia, low back pain and Knee OA on methocarbamol and Ibuprofen. She was seen by Orthopedic for RT elbow pain and knee pain. \par She reports multiple joints aches and was advise to have Rheumatology eval.\par She was seen by psychology in [place] and now is awaiting for new placement\par Seen at [facility] for Depression in the past.\par Pt seen by Allergist: Matias Bailey\par Seen by cardiology, Dr Whittaker\par Pulmonology: Dr Morales\par GI: Dr Ritter\par \par \par

## 2022-01-24 NOTE — REVIEW OF SYSTEMS
[Patient Intake Form Reviewed] : Patient intake form was reviewed [de-identified] : lump in left dorsal foot

## 2022-01-24 NOTE — ASSESSMENT
[FreeTextEntry1] : HCM:\par -06/20\par -HIV screening: negative 2020\par \par Gyn: Dr gaitan\par Mammo: 2019/ \par Colonoscopy: 2019./ F/up with Dr Ritter\par \par HTN:\par -on Cardizem.\par -F/up with cardiology, Dr medley.\par \par Dyslipidemia:\par -On Atorvastatin\par \par Asthma:\par -On Breo.\par -F/up with Pulmonology Dr ariza\par \par GERD:\par -On Pantoprazole.\par -GI: Dr Ritter\par \par Chronic lumbalgia/ Cervicalgia/ Knee OA;RT elbow tendinitis/ Polyarthralgia\par -Seen by Orthopedic.\par -On Ibuprofen prn and methocarbamol\par -pending Rheumatology eval on 05/23/22\par \par Screening for depression>  depression with anxiety\par -Continue Cymbalta.\par -Continue Vistaril prn.\par -Seen in the past at [facility].\par -Schedule for telephonic eval with Dr Rios for counseling / psychiatry\par \par Vit B12 def:\par -B12 level today\par \par Ganglion Cyst in Left dorsal foot:\par -Resolved\par \par F/up for annual physical or prn.\par \par

## 2022-01-24 NOTE — HEALTH RISK ASSESSMENT
[Yes] : Yes [No] : In the past 12 months have you used drugs other than those required for medical reasons? No [No falls in past year] : Patient reported no falls in the past year [2] : 2) Feeling down, depressed, or hopeless for more than half of the days (2) [de-identified] : GI, Orthopedic, Gyn, Neurology [de-identified] : beer ocassional

## 2022-01-27 LAB
ANA SER IF-ACNC: NEGATIVE
ANION GAP SERPL CALC-SCNC: 16 MMOL/L
BASOPHILS # BLD AUTO: 0.04 K/UL
BASOPHILS NFR BLD AUTO: 0.3 %
BUN SERPL-MCNC: 10 MG/DL
CALCIUM SERPL-MCNC: 9.8 MG/DL
CHLORIDE SERPL-SCNC: 105 MMOL/L
CO2 SERPL-SCNC: 22 MMOL/L
CREAT SERPL-MCNC: 0.5 MG/DL
CRP SERPL-MCNC: 8 MG/L
EOSINOPHIL # BLD AUTO: 0.31 K/UL
EOSINOPHIL NFR BLD AUTO: 2 %
ERYTHROCYTE [SEDIMENTATION RATE] IN BLOOD BY WESTERGREN METHOD: 43 MM/HR
FOLATE SERPL-MCNC: 9.8 NG/ML
GLUCOSE SERPL-MCNC: 121 MG/DL
HCT VFR BLD CALC: 39.7 %
HGB BLD-MCNC: 12.4 G/DL
IMM GRANULOCYTES NFR BLD AUTO: 0.3 %
LYMPHOCYTES # BLD AUTO: 2.22 K/UL
LYMPHOCYTES NFR BLD AUTO: 14.3 %
MAN DIFF?: NORMAL
MCHC RBC-ENTMCNC: 31.2 GM/DL
MCHC RBC-ENTMCNC: 31.8 PG
MCV RBC AUTO: 101.8 FL
MONOCYTES # BLD AUTO: 0.61 K/UL
MONOCYTES NFR BLD AUTO: 3.9 %
NEUTROPHILS # BLD AUTO: 12.35 K/UL
NEUTROPHILS NFR BLD AUTO: 79.2 %
PLATELET # BLD AUTO: 348 K/UL
POTASSIUM SERPL-SCNC: 3.3 MMOL/L
RBC # BLD: 3.9 M/UL
RBC # FLD: 13.8 %
RHEUMATOID FACT SER QL: <10 IU/ML
SODIUM SERPL-SCNC: 143 MMOL/L
VIT B12 SERPL-MCNC: 298 PG/ML
WBC # FLD AUTO: 15.57 K/UL

## 2022-02-03 ENCOUNTER — APPOINTMENT (OUTPATIENT)
Dept: CARDIOLOGY | Facility: CLINIC | Age: 57
End: 2022-02-03

## 2022-02-18 ENCOUNTER — APPOINTMENT (OUTPATIENT)
Dept: RHEUMATOLOGY | Facility: CLINIC | Age: 57
End: 2022-02-18

## 2022-03-13 ENCOUNTER — RX RENEWAL (OUTPATIENT)
Age: 57
End: 2022-03-13

## 2022-03-14 ENCOUNTER — RX RENEWAL (OUTPATIENT)
Age: 57
End: 2022-03-14

## 2022-03-31 ENCOUNTER — RX RENEWAL (OUTPATIENT)
Age: 57
End: 2022-03-31

## 2022-04-04 ENCOUNTER — APPOINTMENT (OUTPATIENT)
Dept: NEUROLOGY | Facility: CLINIC | Age: 57
End: 2022-04-04

## 2022-04-07 ENCOUNTER — APPOINTMENT (OUTPATIENT)
Dept: ORTHOPEDIC SURGERY | Facility: CLINIC | Age: 57
End: 2022-04-07

## 2022-05-09 ENCOUNTER — RESULT REVIEW (OUTPATIENT)
Age: 57
End: 2022-05-09

## 2022-05-09 ENCOUNTER — APPOINTMENT (OUTPATIENT)
Dept: RHEUMATOLOGY | Facility: CLINIC | Age: 57
End: 2022-05-09
Payer: MEDICARE

## 2022-05-09 ENCOUNTER — LABORATORY RESULT (OUTPATIENT)
Age: 57
End: 2022-05-09

## 2022-05-09 DIAGNOSIS — Z60.2 PROBLEMS RELATED TO LIVING ALONE: ICD-10-CM

## 2022-05-09 DIAGNOSIS — Z86.39 PERSONAL HISTORY OF OTHER ENDOCRINE, NUTRITIONAL AND METABOLIC DISEASE: ICD-10-CM

## 2022-05-09 DIAGNOSIS — Z82.61 FAMILY HISTORY OF ARTHRITIS: ICD-10-CM

## 2022-05-09 DIAGNOSIS — Z63.5 DISRUPTION OF FAMILY BY SEPARATION AND DIVORCE: ICD-10-CM

## 2022-05-09 DIAGNOSIS — Z78.0 ASYMPTOMATIC MENOPAUSAL STATE: ICD-10-CM

## 2022-05-09 PROCEDURE — 99205 OFFICE O/P NEW HI 60 MIN: CPT

## 2022-05-09 RX ORDER — MELOXICAM 7.5 MG/1
7.5 TABLET ORAL
Qty: 60 | Refills: 0 | Status: DISCONTINUED | COMMUNITY
Start: 2021-04-07 | End: 2022-05-09

## 2022-05-09 RX ORDER — METHOCARBAMOL 500 MG/1
500 TABLET, FILM COATED ORAL
Qty: 14 | Refills: 0 | Status: DISCONTINUED | COMMUNITY
Start: 2021-05-11 | End: 2022-05-09

## 2022-05-09 RX ORDER — FLUTICASONE FUROATE AND VILANTEROL TRIFENATATE 100; 25 UG/1; UG/1
100-25 POWDER RESPIRATORY (INHALATION)
Qty: 1 | Refills: 5 | Status: DISCONTINUED | COMMUNITY
Start: 2019-03-15 | End: 2022-05-09

## 2022-05-09 RX ORDER — CLINDAMYCIN PHOSPHATE 1 G/10ML
1 GEL TOPICAL
Qty: 60 | Refills: 0 | Status: DISCONTINUED | COMMUNITY
Start: 2021-07-01 | End: 2022-05-09

## 2022-05-09 RX ORDER — MELOXICAM 15 MG/1
15 TABLET ORAL
Qty: 14 | Refills: 0 | Status: DISCONTINUED | COMMUNITY
Start: 2021-03-11 | End: 2022-05-09

## 2022-05-09 RX ORDER — ERGOCALCIFEROL 1.25 MG/1
1.25 MG CAPSULE, LIQUID FILLED ORAL
Qty: 12 | Refills: 3 | Status: DISCONTINUED | COMMUNITY
Start: 2021-01-27 | End: 2022-05-09

## 2022-05-09 RX ORDER — HYDROXYZINE PAMOATE 50 MG/1
50 CAPSULE ORAL
Qty: 90 | Refills: 0 | Status: DISCONTINUED | COMMUNITY
Start: 2022-01-18 | End: 2022-05-09

## 2022-05-09 RX ORDER — IBUPROFEN 800 MG/1
800 TABLET, FILM COATED ORAL
Refills: 0 | Status: DISCONTINUED | COMMUNITY
Start: 2021-11-22 | End: 2022-05-09

## 2022-05-09 RX ORDER — PREDNISONE 50 MG/1
50 TABLET ORAL
Qty: 5 | Refills: 0 | Status: DISCONTINUED | COMMUNITY
Start: 2022-03-10 | End: 2022-05-09

## 2022-05-09 RX ORDER — IBUPROFEN 800 MG/1
800 TABLET, FILM COATED ORAL 3 TIMES DAILY
Qty: 42 | Refills: 0 | Status: DISCONTINUED | COMMUNITY
Start: 2021-09-27 | End: 2022-05-09

## 2022-05-09 RX ORDER — MECOBAL/LEVOMEFOLAT CA/B6 PHOS 2-3-35 MG
3-35-2 TABLET ORAL
Qty: 60 | Refills: 3 | Status: DISCONTINUED | COMMUNITY
Start: 2021-07-16 | End: 2022-05-09

## 2022-05-09 RX ORDER — IBUPROFEN 400 MG/1
400 TABLET, FILM COATED ORAL EVERY 8 HOURS
Qty: 20 | Refills: 0 | Status: DISCONTINUED | COMMUNITY
Start: 2021-09-09 | End: 2022-05-09

## 2022-05-09 SDOH — SOCIAL STABILITY - SOCIAL INSECURITY: DISRUPTION OF FAMILY BY SEPARATION AND DIVORCE: Z63.5

## 2022-05-09 SDOH — SOCIAL STABILITY - SOCIAL INSECURITY: PROBLEMS RELATED TO LIVING ALONE: Z60.2

## 2022-05-09 NOTE — REVIEW OF SYSTEMS
[Negative] : Heme/Lymph [Feeling Tired] : feeling tired [Arthralgias] : arthralgias [Joint Pain] : joint pain [Joint Swelling] : joint swelling [Joint Stiffness] : joint stiffness [As Noted in HPI] : as noted in HPI

## 2022-05-10 ENCOUNTER — APPOINTMENT (OUTPATIENT)
Dept: RADIOLOGY | Facility: CLINIC | Age: 57
End: 2022-05-10
Payer: MEDICARE

## 2022-05-10 ENCOUNTER — RX RENEWAL (OUTPATIENT)
Age: 57
End: 2022-05-10

## 2022-05-10 ENCOUNTER — APPOINTMENT (OUTPATIENT)
Dept: ORTHOPEDIC SURGERY | Facility: CLINIC | Age: 57
End: 2022-05-10
Payer: MEDICARE

## 2022-05-10 ENCOUNTER — OUTPATIENT (OUTPATIENT)
Dept: OUTPATIENT SERVICES | Facility: HOSPITAL | Age: 57
LOS: 1 days | End: 2022-05-10
Payer: COMMERCIAL

## 2022-05-10 VITALS
HEIGHT: 66 IN | SYSTOLIC BLOOD PRESSURE: 157 MMHG | WEIGHT: 194 LBS | HEART RATE: 96 BPM | DIASTOLIC BLOOD PRESSURE: 91 MMHG | BODY MASS INDEX: 31.18 KG/M2

## 2022-05-10 DIAGNOSIS — Z87.59 PERSONAL HISTORY OF OTHER COMPLICATIONS OF PREGNANCY, CHILDBIRTH AND THE PUERPERIUM: Chronic | ICD-10-CM

## 2022-05-10 DIAGNOSIS — Z90.722 ACQUIRED ABSENCE OF OVARIES, BILATERAL: Chronic | ICD-10-CM

## 2022-05-10 DIAGNOSIS — Z00.8 ENCOUNTER FOR OTHER GENERAL EXAMINATION: ICD-10-CM

## 2022-05-10 PROCEDURE — 73080 X-RAY EXAM OF ELBOW: CPT | Mod: 26,50

## 2022-05-10 PROCEDURE — 73562 X-RAY EXAM OF KNEE 3: CPT | Mod: 26,50

## 2022-05-10 PROCEDURE — 73030 X-RAY EXAM OF SHOULDER: CPT | Mod: 26,50

## 2022-05-10 PROCEDURE — 73030 X-RAY EXAM OF SHOULDER: CPT

## 2022-05-10 PROCEDURE — 73130 X-RAY EXAM OF HAND: CPT | Mod: 26,50

## 2022-05-10 PROCEDURE — 73110 X-RAY EXAM OF WRIST: CPT | Mod: 26,50

## 2022-05-10 PROCEDURE — 72110 X-RAY EXAM L-2 SPINE 4/>VWS: CPT | Mod: 26

## 2022-05-10 PROCEDURE — 73521 X-RAY EXAM HIPS BI 2 VIEWS: CPT | Mod: 26

## 2022-05-10 PROCEDURE — 71046 X-RAY EXAM CHEST 2 VIEWS: CPT | Mod: 26

## 2022-05-10 PROCEDURE — 73562 X-RAY EXAM OF KNEE 3: CPT

## 2022-05-10 PROCEDURE — 72050 X-RAY EXAM NECK SPINE 4/5VWS: CPT

## 2022-05-10 PROCEDURE — 72050 X-RAY EXAM NECK SPINE 4/5VWS: CPT | Mod: 26

## 2022-05-10 PROCEDURE — 71046 X-RAY EXAM CHEST 2 VIEWS: CPT

## 2022-05-10 PROCEDURE — 73521 X-RAY EXAM HIPS BI 2 VIEWS: CPT

## 2022-05-10 PROCEDURE — 99213 OFFICE O/P EST LOW 20 MIN: CPT | Mod: 25

## 2022-05-10 PROCEDURE — 73080 X-RAY EXAM OF ELBOW: CPT

## 2022-05-10 PROCEDURE — 20610 DRAIN/INJ JOINT/BURSA W/O US: CPT | Mod: RT

## 2022-05-10 PROCEDURE — 72110 X-RAY EXAM L-2 SPINE 4/>VWS: CPT

## 2022-05-10 PROCEDURE — 73130 X-RAY EXAM OF HAND: CPT

## 2022-05-10 PROCEDURE — 73110 X-RAY EXAM OF WRIST: CPT

## 2022-05-10 NOTE — PHYSICAL EXAM
[de-identified] : General:\par Awake, alert, no acute distress, Patient was cooperative and appropriate during the examination.\par \par The patient's weight is of normal weight for height and age.\par \par Walks without an antalgic gait.\par \par Full, painless range of motion of the neck and back.\par \par Exam of the bilateral lower extremities is intact and symmetric with regards to dermatologic, vascular, and neurologic exam. Bilateral lower extremity sensation is grossly intact to light touch in the DP/SP/T/S/S nerve distributions. Intact DF/PF/EHL. BIlateral lower extremities warm and well-perfused with brisk capillary refill.\par \par \par Pulmonary:\par Regular, nonlabored breathing\par \par Abdomen:\par Soft, nontender, nondistended.\par \par Lymphatic:\par No evidence of axillary lymphadenopathy\par \par Bilateral Elbows Exam:\par Physical exam of the elbow demonstrates normal skin without signs of skin changes or abnormalities. No erythema, warmth, or joint effusion appreciated. \par  \par Sensation intact light touch C5-T1\par Palpable radial pulse\par Radial/ulnar/median/axillary/musculocutaneous/AIN/PIN nerves grossly intact\par  \par Range of motion:\par Flexion-Extension 0-130 with pain at terminal degrees of flexion \par Pronation-Supination 85-85 with pain at terminal degrees of supination\par  \par Palpation:\par Exquisitely tender to palpation over the lateral epicondyle and common extensor origin\par Not tender palpation over the medial epicondyle\par Not tender to palpation over the radial head\par Not tender to palpation over the olecranon\par Not tender to palpation over the distal biceps insertion\par Not tender to palpation over the distal triceps insertion\par Not tender to palpation over the cubital tunnel\par  \par Strength testing:\par Elbow Flexion 5/5\par Elbow Extension 5/5\par Pronation 5/5\par Supination 5/5\par  \par Special Tests:\par No varus/valgus laxity at 0 and 30 degrees of elbow flexion\par Moderate pain with resisted wrist/finger extension and forearm supination\par No pain with resisted wrist/finger flexion and forearm pronation\par Negative hook test\par Negative Tinel's over the carpal and cubital tunnels\par \par Bilateral Knee Examination:\par Physical examination of the knees demonstrates normal skin without signs of skin changes or abnormalities. No erythema, warmth, or joint effusion is appreciated. \par  \par Sensation is intact to light touch L2-S1\par Palpable DP/PT pulse\par EHL/FHL/TA/GSC motor function intact\par  \par Range of Motion\par 0 to 130 degrees bilaterally with mild discomfort throughout range of motion.\par  \par Strength Testing\par Quadriceps/Hamstrings 5/5 bilaterally\par Patient is able to perform a straight leg raise bilaterally without difficulty.\par  \par Palpation\par Not tender to palpation about the distal femurs, proximal tibias, or patellas\par No palpable defect appreciated in the quadriceps or patellar tendons\par Moderately tender to palpation of medial joint lines\par Mildly tender to palpation of lateral joint lines\par  \par Special Tests\par Anterior Drawer negative bilaterally\par Posterior Drawer negative bilaterally\par Lachman Exam negative bilaterally\par No Varus or Valgus Laxity at 0 or 30 degrees of knee flexion negative bilaterally\par Misty's Test negative bilaterally\par Active compression of the patella negative bilaterally\par Translation of the patella no hypermobility bilaterally\par  \par  \par  \par  \par  \par  \par   [de-identified] : MRI of the right elbow from  was reviewed today with the patient in the office. There is a moderate to high-grade partial-thickness tear of the common extensor origin of the lateral epicondyle.\par \par X-rays including 3 views of the left elbow were obtained in the office and reviewed the patient today in 1/20/2022.  There is no acute fracture or dislocation.  There is no significant arthritis.\par \par X-rays including 3 views of the bilateral knees from my office on 11/22/2021 reviewed again today.  There is no acute fracture or dislocation.  There are minimal arthritic changes with mild medial joint space narrowing bilaterally.\par \par MRI of the right knee from  radiology on 3/17/2021 was reviewed the patient today in the office.  The patient does have a grade 2 sprain of the MCL.  There is a trace joint effusion.  There is a small popliteal cyst.  Mild patella chepe and lateral patellar tilt.  No fracture.

## 2022-05-10 NOTE — HISTORY OF PRESENT ILLNESS
[de-identified] : 05/10/2022 : JAHAIRA MACKEY  is a 56 year year old female who presents to the office for follow-up valuation of her bilateral knees, bilateral elbow pain.  She states she is now having pain in the left elbow as well.  She has done  2 injections of cortisone into her left knee in the past year, 1 injection into her right knee and 1 injection into her right elbow.  She states the injections into the knees are giving short-term temporary relief and she had some decent relief with her first right elbow injection however the pain is returning.  She states she saw a rheumatologist since the last office visit and had is getting x-rays and blood work completed and is following up with him for a full rheumatological work-up.  She presents for follow-up of her bilateral knees and elbows today.  She has x-ray appointment scheduled for her bilateral elbows later on today.  She states that she is taken meloxicam in the past which gives some relief.  She is here for routine follow-up today.  She has not done therapy for the knees or elbow recently.  She denies any numbness or tingling distally.  She denies any new injury.  She states "her whole body hurts".\par \par 1/20/22: Jahaira is a 56-year-old female who returns to the office today for follow-up regarding her bilateral knee pain and right elbow pain.  At her previous office visit her elbow had been doing well.  We discussed conservative treatment options for her knees.  Today her right elbow and both of her knees are status quo.  However, she is now having pain in her left elbow.  She has never had pain here before.  She denies any history of trauma.  The pain in her elbow is activity related and alleviated by rest.  It is very similar to the pain that she had in her right elbow.  The patient denies any fevers, chills, sweats, recent illnesses, numbness, tingling, weakness, or pain elsewhere at this time.\par \par 11/22/21: Jahaira is a 56-year-old female who returns to the office today for follow-up regarding her right knee and elbow pain.  She is now also complaining of left knee pain.  At her previous office visit we discussed conservative treatments and she received cortisone injections to both the right knee and the right elbow.  She states that her right elbow pain has resolved after the cortisone injection but her right knee continues to bother her.  Her left knee is also now bothering her and bothers her just as much as the right.  She denies any history of new trauma.  She has been taking ibuprofen on an as-needed basis for pain in both knees.  The patient denies any fevers, chills, sweats, recent illnesses, numbness, tingling, weakness, or pain elsewhere at this time.\par \par 9/27/21: Jahaira is a 56-year-old female who returns to the office today for follow-up regarding her right knee pain and right elbow pain.  At her previous visit she was diagnosed with a grade 2 MCL sprain.  She was provided with a short runner brace and referred for physical therapy.  She was lost to follow-up after the injury but she complains of persistent pain in her knee.  She also has pain in her elbow again.  She denies any new injuries since her last visit.  She has been doing home exercises on her own that she learned in physical therapy.  She has been taking oral NSAIDs as needed.  The patient denies any fevers, chills, sweats, recent illnesses, numbness, tingling, weakness, or pain elsewhere at this time.\par \par 4/26/21: Jahaira is a pleasant 55 year old female who is being seen for follow-up for right knee pain.  The patient recently returned from visiting her family in [place] and would like to obtain the knee brace that we discussed over the phone the last time we spoke.  We discussed her MRI results which demonstrated a grade 2 MCL sprain.  Her pain has improved since the initial time of injury but she does have pain with ambulation at times.  She is taking occasional NSAIDs for pain which are mildly helpful.  She has her first therapy appointment scheduled for tomorrow.  The patient denies any fevers, chills, sweats, recent illnesses, numbness, tingling, weakness, or pain elsewhere at this time.

## 2022-05-10 NOTE — DISCUSSION/SUMMARY
[de-identified] : Assessment: 56-year-old female with bilateral lateral epicondylitis, right knee chronic sprain, left knee pain, possible internal derangement, possible rheumatological pathology\par \par Plan: I had a long discussion with the patient today regarding the nature of their diagnosis and treatment plan. We discussed the risks and benefits of no treatment as well as nonoperative and operative treatments.  I reviewed the patient's x-ray and MRI results with her in the office today.  At this time I am recommending continued conservative treatment including ice, heat, rest, Mobic 15 mg once daily with food for pain inflammation.  A prescription was sent to the pharmacy and GI precautions were discussed.  I am also recommending physical therapy for the bilateral elbows and right knee.  She was given a prescription today.  She will avoid exacerbating activities and avoid high-impact activities and will avoid palm down lifting.  She will also use braces for the bilateral elbows and right knee for support.  She was shown the braces in the office today to  over-the-counter.  She will continue to follow-up with her rheumatologist for rheumatological work-up regarding her multiple joint aches and pains.  I am also recommending an MRI of the left knee for further evaluation due to the chronicity of her symptoms and lack of improvement with conservative treatment.  I am also recommending a second injection of the right elbow be administered in the office today.  She tolerated procedure well with no adverse effects.  She was educated on potential elevation of her blood sugars and hypopigmentation of the skin.  I advised her she can only have 1 more injection into the right elbow as she should not have more than 3.  I am not recommending injections of the bilateral knees as they are not giving her significant long-term relief and her MRI of her right knee does not show any acute pathology.  She will follow-up after the MRI is complete in 8 weeks for repeat evaluation after she follows up with her rheumatologist.  Patient discussed and reviewed with Dr. Urrutia today.\par \par The patient verbalizes their understanding and agrees with the plan.  All questions were answered to their satisfaction.

## 2022-05-10 NOTE — PROCEDURE
[de-identified] : I injected the patient's right elbow lateral epicondyle today with cortisone.\par  \par I discussed at length with the patient the planned steroid and lidocaine injection. The risks, benefits, convalescence and alternatives were reviewed. The possible side effects discussed included but were not limited to: pain, swelling, heat, bleeding, and redness. Symptoms are generally mild but if they are extensive then contact the office. Giving pain relievers by mouth such as NSAIDs or Tylenol can generally treat the reactions to steroid and lidocaine. Rare cases of infection have been noted. Rash, hives and itching may occur post injection. If you have muscle pain or cramps, flushing and or swelling of the face, rapid heart beat, nausea, dizziness, fever, chills, headache, difficulty breathing, swelling in the arms or legs, or have a prickly feeling of your skin, contact a health care provider immediately. Following this discussion, the shoulder was prepped with Chlorhexidine and Alcohol and under sterile conditions the 40 mg Depo-Medrol and 1 cc Lidocaine injection was performed with a 22 gauge needle through a lateral injection site. The needle was introduced down to the lateral epicondyle, slightly withdrawn, and the medication was injected. Upon withdrawal of the needle the site was cleaned with alcohol and a band aid was applied. The patient tolerated the injection well and there were no adverse effects. Post injection instructions included no strenuous activity for 24 hours, cryotherapy and if there are any adverse effects to contact the office.

## 2022-05-11 ENCOUNTER — APPOINTMENT (OUTPATIENT)
Dept: PULMONOLOGY | Facility: CLINIC | Age: 57
End: 2022-05-11
Payer: MEDICARE

## 2022-05-11 VITALS
DIASTOLIC BLOOD PRESSURE: 80 MMHG | SYSTOLIC BLOOD PRESSURE: 120 MMHG | BODY MASS INDEX: 31.47 KG/M2 | WEIGHT: 195 LBS | OXYGEN SATURATION: 98 % | HEART RATE: 99 BPM

## 2022-05-11 DIAGNOSIS — R05.9 COUGH, UNSPECIFIED: ICD-10-CM

## 2022-05-11 PROCEDURE — 99214 OFFICE O/P EST MOD 30 MIN: CPT

## 2022-05-12 ENCOUNTER — APPOINTMENT (OUTPATIENT)
Dept: FAMILY MEDICINE | Facility: CLINIC | Age: 57
End: 2022-05-12
Payer: MEDICARE

## 2022-05-12 VITALS
TEMPERATURE: 97.8 F | RESPIRATION RATE: 16 BRPM | HEIGHT: 66 IN | OXYGEN SATURATION: 97 % | SYSTOLIC BLOOD PRESSURE: 122 MMHG | HEART RATE: 75 BPM | DIASTOLIC BLOOD PRESSURE: 78 MMHG | WEIGHT: 200 LBS | BODY MASS INDEX: 32.14 KG/M2

## 2022-05-12 DIAGNOSIS — L72.0 EPIDERMAL CYST: ICD-10-CM

## 2022-05-12 DIAGNOSIS — F41.8 OTHER SPECIFIED ANXIETY DISORDERS: ICD-10-CM

## 2022-05-12 PROCEDURE — 36415 COLL VENOUS BLD VENIPUNCTURE: CPT

## 2022-05-12 PROCEDURE — 99214 OFFICE O/P EST MOD 30 MIN: CPT | Mod: 25

## 2022-05-12 PROCEDURE — 96372 THER/PROPH/DIAG INJ SC/IM: CPT

## 2022-05-12 RX ORDER — CYANOCOBALAMIN 1000 UG/ML
1000 INJECTION INTRAMUSCULAR; SUBCUTANEOUS
Qty: 0 | Refills: 0 | Status: COMPLETED | OUTPATIENT
Start: 2022-05-12

## 2022-05-12 RX ADMIN — CYANOCOBALAMIN 0 MCG/ML: 1000 INJECTION INTRAMUSCULAR; SUBCUTANEOUS at 00:00

## 2022-05-12 NOTE — HISTORY OF PRESENT ILLNESS
[FreeTextEntry1] : Depression\par meds refills [de-identified] : 55 y/o AAF presents today for f/up, meds refills...\par Previous PCP: Kenya Cook\par Pt with hx depression , she was on Buspirone, Vistaril  and Cymbalta, HTN on Diltiazem, Dyslipidemia on Atorvastatin, Asthma on Breo, Chronic Cervicalgia, low back pain and Knee OA on methocarbamol and Ibuprofen. She was seen by Orthopedic for RT elbow pain and knee pain. \par She reports multiple joints aches and was seen by Rheumatology eval.\par She was seen by psychology in [place] and now is awaiting for new placement\par Seen at [facility] for Depression in the past.\par Pt seen by Allergist: Matias Bailey\par Seen by cardiology, Dr Whittaker\par Pulmonology: Dr Morales\par GI: Dr Ritter\par \par \par

## 2022-05-12 NOTE — ASSESSMENT
[FreeTextEntry1] : HCM:\par -06/20\par -HIV screening: negative 2020\par \par Gyn: Dr gaitan\par Mammo: 2019/ \par Colonoscopy: 2019./ F/up with Dr Ritter\par \par # HTN:\par -on Cardizem.\par -F/up with cardiology, Dr medley.\par \par # Dyslipidemia:\par -On Atorvastatin\par \par # Asthma:\par -On Breo.\par -F/up with Pulmonology Dr ariza\par \par # GERD:\par -On Pantoprazole.\par -GI: Dr Ritter\par \par # Chronic lumbalgia/ Cervicalgia/ Knee OA;RT elbow tendinitis/ Polyarthralgia\par -Seen by Orthopedic.\par -On Ibuprofen prn and methocarbamol\par -Rheumatology eval on 05/09/22 appreciated\par \par # Screening for depression>  depression with anxiety\par -Continue Cymbalta.\par -Continue Vistaril prn.\par -Seen in the past at [facility].\par -Schedule for telephonic eval with Dr Rios for counseling / psychiatry\par \par # Vit B12 def:\par -B12 shot today\par \par # Cyst in RT side neck\par -Dermatology referral\par \par # STD screening as requested per pt.\par \par F/up for annual physical or prn.\par \par

## 2022-05-12 NOTE — HEALTH RISK ASSESSMENT
[Yes] : Yes [No] : In the past 12 months have you used drugs other than those required for medical reasons? No [No falls in past year] : Patient reported no falls in the past year [2] : 2) Feeling down, depressed, or hopeless for more than half of the days (2) [Never] : Never [de-identified] : GI, Orthopedic, Gyn, Neurology [de-identified] : beer ocassional

## 2022-05-12 NOTE — REVIEW OF SYSTEMS
[Patient Intake Form Reviewed] : Patient intake form was reviewed [de-identified] : lump in left dorsal foot

## 2022-05-13 LAB
C TRACH RRNA SPEC QL NAA+PROBE: NOT DETECTED
N GONORRHOEA RRNA SPEC QL NAA+PROBE: NOT DETECTED
SOURCE AMPLIFICATION: NORMAL

## 2022-05-15 PROBLEM — Z60.2 LIVES ALONE: Status: ACTIVE | Noted: 2022-05-15

## 2022-05-15 PROBLEM — Z63.5 SEPARATED FROM SPOUSE: Status: ACTIVE | Noted: 2022-05-09

## 2022-05-15 PROBLEM — Z82.61 FAMILY HISTORY OF ARTHRITIS: Status: ACTIVE | Noted: 2022-05-15

## 2022-05-15 PROBLEM — Z86.39 HISTORY OF HYPERLIPIDEMIA: Status: RESOLVED | Noted: 2022-05-15 | Resolved: 2022-05-15

## 2022-05-15 PROBLEM — Z78.0 HISTORY OF MENOPAUSE: Status: RESOLVED | Noted: 2022-05-15 | Resolved: 2022-05-15

## 2022-05-15 LAB
ALBUMIN SERPL ELPH-MCNC: 4.8 G/DL
ALP BLD-CCNC: 112 U/L
ALT SERPL-CCNC: 47 U/L
ANA SER IF-ACNC: NEGATIVE
ANION GAP SERPL CALC-SCNC: 24 MMOL/L
APPEARANCE: CLEAR
AST SERPL-CCNC: 34 U/L
BACTERIA: NEGATIVE
BASOPHILS # BLD AUTO: 0.05 K/UL
BASOPHILS NFR BLD AUTO: 0.4 %
BILIRUB SERPL-MCNC: 0.4 MG/DL
BILIRUBIN URINE: NEGATIVE
BLOOD URINE: ABNORMAL
BUN SERPL-MCNC: 6 MG/DL
CALCIUM SERPL-MCNC: 10.3 MG/DL
CCP AB SER IA-ACNC: <8 UNITS
CHLORIDE SERPL-SCNC: 103 MMOL/L
CK SERPL-CCNC: 71 U/L
CO2 SERPL-SCNC: 16 MMOL/L
COLOR: NORMAL
CREAT SERPL-MCNC: 0.46 MG/DL
CRP SERPL-MCNC: 10 MG/L
DEPRECATED KAPPA LC FREE/LAMBDA SER: 1.1 RATIO
DSDNA AB SER-ACNC: <12 IU/ML
EGFR: 114 ML/MIN/1.73M2
ENDOMYSIUM IGA SER QL: NEGATIVE
ENDOMYSIUM IGA TITR SER: NORMAL
EOSINOPHIL # BLD AUTO: 0.26 K/UL
EOSINOPHIL NFR BLD AUTO: 2.3 %
FOLATE SERPL-MCNC: 9.3 NG/ML
GLIADIN IGA SER QL: 9.2 UNITS
GLIADIN IGG SER QL: <5 UNITS
GLIADIN PEPTIDE IGA SER-ACNC: NEGATIVE
GLIADIN PEPTIDE IGG SER-ACNC: NEGATIVE
GLUCOSE QUALITATIVE U: NEGATIVE
GLUCOSE SERPL-MCNC: 105 MG/DL
HAV IGM SER QL: NONREACTIVE
HBV CORE IGG+IGM SER QL: NONREACTIVE
HBV CORE IGM SER QL: NONREACTIVE
HBV SURFACE AG SER QL: NONREACTIVE
HCT VFR BLD CALC: 50.3 %
HCV AB SER QL: NONREACTIVE
HCV S/CO RATIO: 0.07 S/CO
HGB BLD-MCNC: 16 G/DL
HYALINE CASTS: 3 /LPF
IGA SER QL IEP: 447 MG/DL
IGG SER QL IEP: 1456 MG/DL
IGM SER QL IEP: 75 MG/DL
IMM GRANULOCYTES NFR BLD AUTO: 0.3 %
KAPPA LC CSF-MCNC: 1.77 MG/DL
KAPPA LC SERPL-MCNC: 1.94 MG/DL
KETONES URINE: NEGATIVE
LDH SERPL-CCNC: 359 U/L
LEUKOCYTE ESTERASE URINE: NEGATIVE
LYMPHOCYTES # BLD AUTO: 2.4 K/UL
LYMPHOCYTES NFR BLD AUTO: 20.8 %
M PROTEIN SPEC IFE-MCNC: NORMAL
MAGNESIUM SERPL-MCNC: 2 MG/DL
MAN DIFF?: NORMAL
MCHC RBC-ENTMCNC: 31.1 PG
MCHC RBC-ENTMCNC: 31.8 GM/DL
MCV RBC AUTO: 97.9 FL
MICROSCOPIC-UA: NORMAL
MONOCYTES # BLD AUTO: 0.38 K/UL
MONOCYTES NFR BLD AUTO: 3.3 %
NEUTROPHILS # BLD AUTO: 8.42 K/UL
NEUTROPHILS NFR BLD AUTO: 72.9 %
NITRITE URINE: NEGATIVE
PH URINE: 6
PHOSPHATE SERPL-MCNC: 4 MG/DL
PLATELET # BLD AUTO: 329 K/UL
POTASSIUM SERPL-SCNC: 3.8 MMOL/L
PROT SERPL-MCNC: 8 G/DL
PROTEIN URINE: NEGATIVE
RBC # BLD: 5.14 M/UL
RBC # FLD: 14.4 %
RED BLOOD CELLS URINE: 2 /HPF
RF+CCP IGG SER-IMP: NEGATIVE
RHEUMATOID FACT SER QL: 16 IU/ML
SODIUM SERPL-SCNC: 143 MMOL/L
SPECIFIC GRAVITY URINE: 1.02
SQUAMOUS EPITHELIAL CELLS: 6 /HPF
THYROGLOB AB SERPL-ACNC: <20 IU/ML
THYROPEROXIDASE AB SERPL IA-ACNC: <10 IU/ML
TTG IGA SER IA-ACNC: <1.2 U/ML
TTG IGA SER-ACNC: NEGATIVE
TTG IGG SER IA-ACNC: <1.2 U/ML
TTG IGG SER IA-ACNC: NEGATIVE
URATE SERPL-MCNC: 4.5 MG/DL
UROBILINOGEN URINE: NORMAL
VIT B12 SERPL-MCNC: 408 PG/ML
WBC # FLD AUTO: 11.54 K/UL
WHITE BLOOD CELLS URINE: 2 /HPF

## 2022-05-15 NOTE — ADDENDUM
[FreeTextEntry1] : I, Raysa Orozco, acted solely as a scribe for Dr. Myron I. Kleiner, MD. on 05/09/2022.

## 2022-05-15 NOTE — HISTORY OF PRESENT ILLNESS
[FreeTextEntry1] : JAHAIRA MACKEY is a 56 year old  woman who was referred here for further evaluation of joint symptoms and rheumatic diseases.\par \par 1 year ago, patient developed intermittent pain "my whole body" including bilateral shoulders, elbows, hips, knees, right ankle, plantar aspects both heels, neck with radiation to the right right upper extremity to the fingers with paresthesias and low back with radiation to bilateral lower extremities to the lower legs. Bilateral ankle and knee swelling as the day progresses. Denies joint erythema and heat. Pain lasts all day. Morning stiffness 15-20 minutes. Denies trauma or injury. Much sleep disturbance and fatigue - ? snoring. Orthopedics Dr. Amado Urrutia diagnosed torn ligament right elbow and arthritis treated with cortisone injections to bilateral knees and elbows with transient relief x2 months. Ibuprofen 800 mg q.d./b.i.d. p.r.n. without relief. Physical therapy- patient declined secondary to lack of relief in the past and pandemic. 1 year ago, pain management Dr. Moya treated with Methocarbamol 500 mg twice daily as needed with questionable relief and also Meloxicam 15 mg q.d. with transient relief. Recent bone densitometry 4 months ago. Menopause 40's - without HRT. Father and brother with arthritis of unknown type.. Orthopedics referred patient here for further evaluation.

## 2022-05-15 NOTE — CONSULT LETTER
[Dear  ___] : Dear  [unfilled], [Consult Letter:] : I had the pleasure of evaluating your patient, [unfilled]. [Please see my note below.] : Please see my note below. [Consult Closing:] : Thank you very much for allowing me to participate in the care of this patient.  If you have any questions, please do not hesitate to contact me. [Sincerely,] : Sincerely, [FreeTextEntry3] : Addy\par Myron I. Kleiner, M.D., FACR\par Chief, Division of Rheumatology\par Department of Medicine\par [facility] [DrLesly  ___] : Dr. GARCIA

## 2022-05-15 NOTE — PHYSICAL EXAM
[General Appearance - Alert] : alert [General Appearance - In No Acute Distress] : in no acute distress [General Appearance - Well Nourished] : well nourished [General Appearance - Well Developed] : well developed [General Appearance - Well-Appearing] : healthy appearing [Sclera] : the sclera and conjunctiva were normal [PERRL With Normal Accommodation] : pupils were equal in size, round, and reactive to light [Extraocular Movements] : extraocular movements were intact [Outer Ear] : the ears and nose were normal in appearance [Oropharynx] : the oropharynx was normal [Neck Appearance] : the appearance of the neck was normal [Neck Cervical Mass (___cm)] : no neck mass was observed [Jugular Venous Distention Increased] : there was no jugular-venous distention [Thyroid Diffuse Enlargement] : the thyroid was not enlarged [Thyroid Nodule] : there were no palpable thyroid nodules [Lungs Percussion] : the lungs were normal to percussion [Heart Rate And Rhythm] : heart rate was normal and rhythm regular [Edema] : there was no peripheral edema [Abdomen Soft] : soft [Abdomen Tenderness] : non-tender [Abdomen Mass (___ Cm)] : no abdominal mass palpated [Cervical Lymph Nodes Enlarged Posterior Bilaterally] : posterior cervical [Cervical Lymph Nodes Enlarged Anterior Bilaterally] : anterior cervical [Supraclavicular Lymph Nodes Enlarged Bilaterally] : supraclavicular [Axillary Lymph Nodes Enlarged Bilaterally] : axillary [No CVA Tenderness] : no ~M costovertebral angle tenderness [No Spinal Tenderness] : no spinal tenderness [Skin Color & Pigmentation] : normal skin color and pigmentation [Skin Turgor] : normal skin turgor [] : no rash [Cranial Nerves] : cranial nerves 2-12 were intact [Deep Tendon Reflexes (DTR)] : deep tendon reflexes were 2+ and symmetric [Sensation] : the sensory exam was normal to light touch and pinprick [Motor Exam] : the motor exam was normal [No Focal Deficits] : no focal deficits [Oriented To Time, Place, And Person] : oriented to person, place, and time [Impaired Insight] : insight and judgment were intact [Affect] : the affect was normal [Mood] : the mood was normal [FreeTextEntry1] : Strength-5/5

## 2022-05-15 NOTE — ASSESSMENT
[FreeTextEntry1] : Impression: JAHAIRA MACKEY is a 56 year old  woman who was referred here for further evaluation of joint symptoms and rheumatic diseases.\par \par 1 year history of intermittent arthralgias. I will evaluate for various types of rheumatic diseases including various types of inflammatory arthritis. Neck pain with radiation to the right right upper extremity to the fingers with paresthesias - consider cervical radiculopathy or carpal tunnel syndrome with right positive Phalen's maneuver. Low back pain with radiation to bilateral lower extremities to the lower legs - consider LS radiculopathy. Bilateral ankle and knee swelling as the day progresses. Much sleep disturbance and fatigue - ? snoring - consider fibromyalgia which can also be contributing to her increased joint pain. Orthopedics treated with cortisone injections to bilateral knees and elbows with transient relief x2 months. Ibuprofen 800 mg q.d./b.i.d. p.r.n. without relief. Today, patient has dry eyes - consider Sjogren's Syndrome and evaluate for sarcoidosis, hepatitis C, IgG 4 related disease and other related diseases. On exam, she has bilateral lateral epicondylitis and greater trochanteric bursitis contributing to her joint pain. Recent bone densitometry (January 21, 2022) was normal. Menopause 40's - without HRT. Father and brother with arthritis of unknown type. Orthopedics referred patient here for further evaluation. \par \par Plan: I reviewed recent Bone densitometry results with my analysis of the raw data patient with extensive discussion\par Laboratory tests ordered - see list below - with coordination of care\par X-rays ordered - see list below - with coordination of care\par Diagnosis and prognosis discussed\par Continue current medications (other than those changed below)\par Discontinue Ibuprofen \par Etodolac  mg b.i.d. end of breakfast and supper (Possible side effects explained including cardiovascular risk/MI/CVA)\par Prophylactic aspirin 81 mg q.d. at end of lunch (Possible side effects explained) \par Flexeril 10 mg one half tab q.d. at supper time; if no better/side effects 7 days, increase to 10 mg q.d. (possible side effects explained) \par Right wrist splint at bedtime\par Daily exercise starting at 10 minutes per day, gradually increasing to at least 30 minutes per day - emphasized \par Return visit 2-3 weeks

## 2022-05-16 ENCOUNTER — NON-APPOINTMENT (OUTPATIENT)
Age: 57
End: 2022-05-16

## 2022-05-16 LAB
HCV AB SER QL: NONREACTIVE
HCV S/CO RATIO: 0.08 S/CO
HIV1+2 AB SPEC QL IA.RAPID: NONREACTIVE
T PALLIDUM AB SER QL IA: NEGATIVE

## 2022-05-26 ENCOUNTER — APPOINTMENT (OUTPATIENT)
Dept: RHEUMATOLOGY | Facility: CLINIC | Age: 57
End: 2022-05-26

## 2022-05-29 LAB — HLA-B27 RELATED AG QL: NEGATIVE

## 2022-06-03 ENCOUNTER — APPOINTMENT (OUTPATIENT)
Dept: RHEUMATOLOGY | Facility: CLINIC | Age: 57
End: 2022-06-03
Payer: MEDICARE

## 2022-06-03 VITALS
OXYGEN SATURATION: 99 % | DIASTOLIC BLOOD PRESSURE: 65 MMHG | HEART RATE: 114 BPM | WEIGHT: 180 LBS | BODY MASS INDEX: 28.93 KG/M2 | SYSTOLIC BLOOD PRESSURE: 110 MMHG | HEIGHT: 66 IN | TEMPERATURE: 96.5 F

## 2022-06-03 DIAGNOSIS — G56.01 CARPAL TUNNEL SYNDROME, RIGHT UPPER LIMB: ICD-10-CM

## 2022-06-03 DIAGNOSIS — R20.0 ANESTHESIA OF SKIN: ICD-10-CM

## 2022-06-03 DIAGNOSIS — M54.2 CERVICALGIA: ICD-10-CM

## 2022-06-03 DIAGNOSIS — M25.562 PAIN IN LEFT KNEE: ICD-10-CM

## 2022-06-03 DIAGNOSIS — M54.50 LOW BACK PAIN, UNSPECIFIED: ICD-10-CM

## 2022-06-03 DIAGNOSIS — M70.61 TROCHANTERIC BURSITIS, RIGHT HIP: ICD-10-CM

## 2022-06-03 DIAGNOSIS — M51.36 OTHER INTERVERTEBRAL DISC DEGENERATION, LUMBAR REGION: ICD-10-CM

## 2022-06-03 DIAGNOSIS — M25.50 PAIN IN UNSPECIFIED JOINT: ICD-10-CM

## 2022-06-03 DIAGNOSIS — M77.11 LATERAL EPICONDYLITIS, RIGHT ELBOW: ICD-10-CM

## 2022-06-03 DIAGNOSIS — M70.62 TROCHANTERIC BURSITIS, RIGHT HIP: ICD-10-CM

## 2022-06-03 DIAGNOSIS — M25.522 PAIN IN LEFT ELBOW: ICD-10-CM

## 2022-06-03 DIAGNOSIS — M19.90 UNSPECIFIED OSTEOARTHRITIS, UNSPECIFIED SITE: ICD-10-CM

## 2022-06-03 PROCEDURE — 99215 OFFICE O/P EST HI 40 MIN: CPT | Mod: 25

## 2022-06-03 PROCEDURE — G2212 PROLONG OUTPT/OFFICE VIS: CPT

## 2022-06-03 NOTE — REVIEW OF SYSTEMS
[Feeling Tired] : feeling tired [Arthralgias] : arthralgias [Joint Pain] : joint pain [Joint Swelling] : joint swelling [Joint Stiffness] : joint stiffness [As Noted in HPI] : as noted in HPI [Negative] : Heme/Lymph

## 2022-06-05 PROBLEM — M19.90 INFLAMMATORY ARTHRITIS: Noted: 2022-05-09

## 2022-06-05 PROBLEM — M77.11 LATERAL EPICONDYLITIS OF RIGHT ELBOW: Noted: 2020-11-23

## 2022-06-05 PROBLEM — G56.01 CARPAL TUNNEL SYNDROME OF RIGHT WRIST: Status: ACTIVE | Noted: 2022-05-09

## 2022-06-05 PROBLEM — M25.522 LEFT ELBOW PAIN: Noted: 2022-01-20

## 2022-06-05 PROBLEM — M25.562 LEFT KNEE PAIN: Noted: 2020-11-23

## 2022-06-05 PROBLEM — M25.50 ARTHRALGIA: Noted: 2022-05-09

## 2022-06-05 PROBLEM — M70.61 GREATER TROCHANTERIC BURSITIS OF BOTH HIPS: Status: ACTIVE | Noted: 2022-05-09

## 2022-06-05 RX ORDER — ETODOLAC 400 MG/1
400 TABLET, FILM COATED, EXTENDED RELEASE ORAL
Qty: 60 | Refills: 1 | Status: DISCONTINUED | COMMUNITY
Start: 2022-05-09 | End: 2022-06-05

## 2022-06-05 NOTE — ADDENDUM
[FreeTextEntry1] : I, Raysa Orozco, acted solely as a scribe for Dr. Myron I. Kleiner, MD. on 06/03/2022.

## 2022-06-05 NOTE — CONSULT LETTER
[Dear  ___] : Dear  [unfilled], [Consult Letter:] : I had the pleasure of evaluating your patient, [unfilled]. [Please see my note below.] : Please see my note below. [Consult Closing:] : Thank you very much for allowing me to participate in the care of this patient.  If you have any questions, please do not hesitate to contact me. [Sincerely,] : Sincerely, [DrLesly  ___] : Dr. GARCIA [FreeTextEntry3] : Addy\par Myron I. Kleiner, M.D., FACR\par Chief, Division of Rheumatology\par Department of Medicine\par [facility]

## 2022-06-05 NOTE — ASSESSMENT
[FreeTextEntry1] : Impression: JAHAIRA MACKEY is a 57 year old woman who presents for an initial follow up for further evaluation of joint symptoms and rheumatic diseases including right carpal tunnel syndrome, fibromyalgia, osteoarthritis and chronic low back pain.\par \par Patient has persistent pain right knee, low back with radiation to the right lower extremity to the lower leg and neck without radiation--secondary to combination of her osteoarthritis and fibromyalgia.. Recent x-rays revealed osteoarthritis in various joints contributing to her joint pain. On exam, she has bilateral lateral epicondylitis and greater trochanteric bursitis. Much sleep disturbance and fatigue consistent with fibromyalgia which is also contributing to her persistent joint pain.  The etodolac and the current dose of cyclobenzaprine are not giving her adequate relief.  Denies paresthesias right fingers with her right carpal tunnel syndrome under good control. Patient has dry eyes on exam, although denies being bothersome and denies dry mouth - will continue to monitor for Sjogren's Syndrome. She continues Flexeril 10 mg half tablet q.d. and Etodolac 400 mg b.i.d. her previous bone densitometry in January was normal.  Patient denies rash or side effects with current medications. Patient is content with current medication regimen. \par \par Plan: I reviewed recent lab results with patient with extensive discussion\par I reviewed recent X-ray results with patient with extensive discussion\par I reviewed her previous bone densitometry with my analysis of the raw data with extensive discussion with the patient\par Diagnosis and prognosis discussed\par Continue current medications (other than those changed below)\par Discontinue using the right wrist splint--- restart using it if paresthesias right fingers recur\par Discontinue Etodolac\par Flurbiprofen 100 mg b.i.d end of breakfast and supper  (Possible side effects explained including cardiovascular risk/MI/CVA)\par Increase Flexeril 10 mg q.d. at supper time; if no better/side effects 7 days, increase to 10 mg b.i.d. (possible side effects explained) \par Artificial tears one drop each eye q.i.d. and p.r.n.(Possible side effects explained)\par Biotin mouthwash/spray q.i.d. and p.r.n.(Possible side effects explained)\par Oral hydration\par Daily exercise starting at 10 minutes per day, gradually increasing to at least 30 minutes per day - emphasized \par Return visit 3 months\par Total time for this office visit, including face-to-face time and non-face-to-face time, 85 minutes--- including review of the chart and previous records, review of recent lab results with extensive discussion with the patient, review of recent x-ray results with extensive discussion with the patient, review of her previous bone densitometry with my analysis of the raw data with extensive discussion with the patient, detailed medication history, extensive discussion regarding the use of her right wrist splint as noted above, review of her medications going forward and her new medications with their possible side effects with extensive discussion with the patient, reviewed the impact of her rheumatic disease on her other medical problems, reviewed the impact of her other medical problems on her rheumatic disease

## 2022-06-05 NOTE — HISTORY OF PRESENT ILLNESS
[FreeTextEntry1] : JAHAIRA MACKEY is a 57 year old woman who presents for an initial follow up for further evaluation of joint symptoms and rheumatic diseases. \par \par Patient feels fairly well. She has persistent pain right knee, low back with radiation to the right lower extremity to the lower leg and neck without radiation. Much sleep disturbance and fatigue. Not exercising regularly. Denies recent paresthesias bilateral fingers.  She has been wearing the right wrist splint at bedtime.  She has dry eyes but denies dry mouth. She continues Flexeril 10 mg half tablet q.d. and Etodolac 400 mg b.i.d. orthopedic Dr. Urrutia had prescribed meloxicam which she has not taken--she has had it in the past without adequate relief.  Patient denies rash or side effects with current medications. Patient is content with current medication regimen. \par \par PMH: History of sleep study in the past--pulmonary Dr. Morales--told no sleep apnea--but she was prescribed home oxygen to wear at night\par \par SH: May 2022--traveled to [place]

## 2022-06-15 NOTE — CURRENT MEDS
Electrophysiology [None] : Patient does not have any barriers to medication adherence [Takes medication as prescribed] : takes

## 2022-06-22 ENCOUNTER — NON-APPOINTMENT (OUTPATIENT)
Age: 57
End: 2022-06-22

## 2022-06-27 ENCOUNTER — NON-APPOINTMENT (OUTPATIENT)
Age: 57
End: 2022-06-27

## 2022-06-27 LAB
25(OH)D3 SERPL-MCNC: 8.3 NG/ML
A PHAGOCYTOPH IGG TITR SER IF: NORMAL TITER
B BURGDOR AB SER QL IA: NEGATIVE
B BURGDOR AB SER-IMP: NEGATIVE
B BURGDOR IGG+IGM SER QL: 0.12 INDEX
B MICROTI IGG TITR SER: NORMAL TITER
E CHAFFEENSIS IGG TITR SER IF: NORMAL TITER
ENA SS-A AB SER IA-ACNC: <0.2 AL
ENA SS-B AB SER IA-ACNC: <0.2 AL
ERYTHROCYTE [SEDIMENTATION RATE] IN BLOOD BY WESTERGREN METHOD: 38 MM/HR
TSH SERPL-ACNC: 2.23 UIU/ML

## 2022-06-28 ENCOUNTER — NON-APPOINTMENT (OUTPATIENT)
Age: 57
End: 2022-06-28

## 2022-06-29 ENCOUNTER — RX RENEWAL (OUTPATIENT)
Age: 57
End: 2022-06-29

## 2022-07-05 ENCOUNTER — APPOINTMENT (OUTPATIENT)
Dept: NEUROLOGY | Facility: CLINIC | Age: 57
End: 2022-07-05

## 2022-07-05 VITALS
TEMPERATURE: 97.4 F | HEART RATE: 92 BPM | BODY MASS INDEX: 29.73 KG/M2 | HEIGHT: 66 IN | SYSTOLIC BLOOD PRESSURE: 110 MMHG | DIASTOLIC BLOOD PRESSURE: 75 MMHG | OXYGEN SATURATION: 98 % | WEIGHT: 185 LBS

## 2022-07-05 DIAGNOSIS — R51.9 HEADACHE, UNSPECIFIED: ICD-10-CM

## 2022-07-05 PROCEDURE — 99213 OFFICE O/P EST LOW 20 MIN: CPT

## 2022-07-05 NOTE — HISTORY OF PRESENT ILLNESS
[FreeTextEntry1] : PCP: Dr. Dolores Canales \par \par INTERIM HISTORY: Since her last visit, Ms. Dumont MRI brain showed slight interval progression of multiple periventricular and subcortical white matter changes. She is having improvement in headaches with taking TPM 50mg as needed. She reports diffuse pain r/t fibromyalgia and arthritis and forgetfulness. She denies any other new or concerning neurological symptoms. \par \par INITIAL OFFICE VISIT 12/16/21: Ms. Dumont is a 56 year-old woman with PMH depression, HTN, HLD, asthma, LBP who presents to the office today for evaluation of chronic headache. She reports headache began four months ago, location vary but generally they occur almost daily in bitemporal region. She describes headache as a 7/10 throbbing or sharp pain that is associated with blurred vision, visual aura and tingling on the left side of her body. She denies current headache, loss of vision, changes in personality or cognition, difficulty speaking or finding the correct words, unilateral weakness or impaired sensation, problems with coordination, difficulty walking or falls. \par \par

## 2022-07-05 NOTE — DISCUSSION/SUMMARY
[FreeTextEntry1] : Ms. Dumont is a 56 year-old woman with PMH depression, HTN, HLD, asthma, LBP who presented to the office today for follow-up of chronic headache. MRI brain unremarkable. Patient educated on taking TPM daily for headache prevention. Continue TPM 50mg QD.  Follow-up with me in 3-4 months or sooner should the need arise. \par \par She was instructed to call the office if her condition worsens, medication fails to improve symptoms or she experiences any new or concerning symptoms. She was educated on safety precautions and side effects of medication. All of patient's questions and concerns were addressed.

## 2022-07-05 NOTE — REVIEW OF SYSTEMS
[Tingling] : tingling [Depression] : depression [As Noted in HPI] : as noted in HPI [Shortness Of Breath] : shortness of breath [SOB on Exertion] : shortness of breath during exertion [Arthralgias] : arthralgias [Fever] : no fever [Chills] : no chills [Confused or Disoriented] : no confusion [Memory Lapses or Loss] : no memory loss [Decr. Concentrating Ability] : no decrease in concentrating ability [Difficulty with Language] : no ~M difficulty with language [Facial Weakness] : no facial weakness [Arm Weakness] : no arm weakness [Hand Weakness] : no hand weakness [Leg Weakness] : no leg weakness [Poor Coordination] : good coordination [Numbness] : no numbness [Seizures] : no convulsions [Fainting] : no fainting [Lightheadedness] : no lightheadedness [Difficulty Walking] : no difficulty walking [Inability to Walk] : able to walk [Ataxia] : no ataxia [Frequent Falls] : not falling [Anxiety] : no anxiety [Eye Pain] : no eye pain [Earache] : no earache [Loss Of Hearing] : no hearing loss [Chest Pain] : no chest pain [Palpitations] : no palpitations [Cough] : no cough [Constipation] : no constipation [Diarrhea] : no diarrhea [Dysuria] : no dysuria [Incontinence] : no incontinence [Joint Pain] : no joint pain [de-identified] : Forgetfulness [FreeTextEntry3] : Blurred vision, floaters [FreeTextEntry9] : LBP

## 2022-07-06 ENCOUNTER — RESULT REVIEW (OUTPATIENT)
Age: 57
End: 2022-07-06

## 2022-07-06 ENCOUNTER — OUTPATIENT (OUTPATIENT)
Dept: OUTPATIENT SERVICES | Facility: HOSPITAL | Age: 57
LOS: 1 days | End: 2022-07-06

## 2022-07-06 ENCOUNTER — OUTPATIENT (OUTPATIENT)
Dept: OUTPATIENT SERVICES | Facility: HOSPITAL | Age: 57
LOS: 1 days | End: 2022-07-06
Payer: COMMERCIAL

## 2022-07-06 ENCOUNTER — APPOINTMENT (OUTPATIENT)
Dept: MAMMOGRAPHY | Facility: CLINIC | Age: 57
End: 2022-07-06

## 2022-07-06 ENCOUNTER — APPOINTMENT (OUTPATIENT)
Dept: ULTRASOUND IMAGING | Facility: CLINIC | Age: 57
End: 2022-07-06

## 2022-07-06 ENCOUNTER — APPOINTMENT (OUTPATIENT)
Dept: MRI IMAGING | Facility: CLINIC | Age: 57
End: 2022-07-06

## 2022-07-06 DIAGNOSIS — M25.562 PAIN IN LEFT KNEE: ICD-10-CM

## 2022-07-06 DIAGNOSIS — Z90.722 ACQUIRED ABSENCE OF OVARIES, BILATERAL: Chronic | ICD-10-CM

## 2022-07-06 DIAGNOSIS — Z87.59 PERSONAL HISTORY OF OTHER COMPLICATIONS OF PREGNANCY, CHILDBIRTH AND THE PUERPERIUM: Chronic | ICD-10-CM

## 2022-07-06 DIAGNOSIS — Z00.00 ENCOUNTER FOR GENERAL ADULT MEDICAL EXAMINATION WITHOUT ABNORMAL FINDINGS: ICD-10-CM

## 2022-07-06 PROCEDURE — 77067 SCR MAMMO BI INCL CAD: CPT | Mod: 26

## 2022-07-06 PROCEDURE — 76641 ULTRASOUND BREAST COMPLETE: CPT

## 2022-07-06 PROCEDURE — 77067 SCR MAMMO BI INCL CAD: CPT

## 2022-07-06 PROCEDURE — 77063 BREAST TOMOSYNTHESIS BI: CPT | Mod: 26

## 2022-07-06 PROCEDURE — 76641 ULTRASOUND BREAST COMPLETE: CPT | Mod: 26,50

## 2022-07-06 PROCEDURE — 73721 MRI JNT OF LWR EXTRE W/O DYE: CPT

## 2022-07-06 PROCEDURE — 73721 MRI JNT OF LWR EXTRE W/O DYE: CPT | Mod: 26,LT

## 2022-07-06 PROCEDURE — 77063 BREAST TOMOSYNTHESIS BI: CPT

## 2022-07-11 ENCOUNTER — NON-APPOINTMENT (OUTPATIENT)
Age: 57
End: 2022-07-11

## 2022-08-01 ENCOUNTER — RX RENEWAL (OUTPATIENT)
Age: 57
End: 2022-08-01

## 2022-08-02 ENCOUNTER — RX RENEWAL (OUTPATIENT)
Age: 57
End: 2022-08-02

## 2022-08-03 ENCOUNTER — APPOINTMENT (OUTPATIENT)
Dept: PULMONOLOGY | Facility: CLINIC | Age: 57
End: 2022-08-03

## 2022-08-09 ENCOUNTER — APPOINTMENT (OUTPATIENT)
Dept: GASTROENTEROLOGY | Facility: CLINIC | Age: 57
End: 2022-08-09

## 2022-08-09 VITALS
HEIGHT: 66 IN | DIASTOLIC BLOOD PRESSURE: 82 MMHG | BODY MASS INDEX: 30.53 KG/M2 | SYSTOLIC BLOOD PRESSURE: 126 MMHG | WEIGHT: 190 LBS | HEART RATE: 86 BPM | RESPIRATION RATE: 14 BRPM | OXYGEN SATURATION: 98 %

## 2022-08-09 DIAGNOSIS — Z83.71 FAMILY HISTORY OF COLONIC POLYPS: ICD-10-CM

## 2022-08-09 PROCEDURE — 99214 OFFICE O/P EST MOD 30 MIN: CPT

## 2022-08-09 NOTE — HISTORY OF PRESENT ILLNESS
[FreeTextEntry1] : 57-year-old -American female with history of obesity hyperlipidemia erosive esophagitis on an upper endoscopy done in 2015 maintained on chronic PPI therapy ever since.  Personal history of colon polyp and positive family history of colon polyps in father and brother.  Patient's last colonoscopy was performed by me in 2/19 and colonoscopy was negative for neoplasia.  Universal diverticulosis was noted.  No hemorrhoids were present at that time.  Due to positive family history and personal history of 5-year interval polyp surveillance colonoscopy was advised.  Patient was last seen in the office on 11/20/2020\par Patient returns for GI follow-up today.  She complains of persistent anal pruritus.  She has received some benefit with use of topical hydrocortisone cream.  She has intermittent low-volume hematochezia.  No considerable anemia.  No alteration in pattern of bowel movements.  No diarrhea or constipation.\par She is also complaining of intermittent heartburn and regurgitation, despite taking the PPI medication.  No medication side effects.  No nocturnal symptoms.  No alarm symptoms.  No weight loss.  She requests renewal of her heartburn medication.

## 2022-08-09 NOTE — ASSESSMENT
[FreeTextEntry1] : Chronic heartburn with remote history of erosive esophagitis on EGD in 2015.  Maintain antireflux diet.  Renewed PPI medication pantoprazole 40 mg p.o. daily #90 refill 3.  We will consider patient for repeat upper endoscopy at time of next colonoscopy.  No current upper GI alarm symptoms.\par Personal history of colon polyp.  Positive family history of colon polyps in brother and father.  Last negative colonoscopy was done in 2/ 2019.  Next polyp surveillance colonoscopy in a 5-year interval.  No current lower GI alarm symptoms.  Refills hydrocortisone cream, Procto-Med 2.5% topically twice daily as needed for 6 months..

## 2022-08-10 ENCOUNTER — APPOINTMENT (OUTPATIENT)
Dept: ORTHOPEDIC SURGERY | Facility: CLINIC | Age: 57
End: 2022-08-10

## 2022-08-10 VITALS
DIASTOLIC BLOOD PRESSURE: 87 MMHG | SYSTOLIC BLOOD PRESSURE: 147 MMHG | HEART RATE: 86 BPM | HEIGHT: 66 IN | BODY MASS INDEX: 30.53 KG/M2 | WEIGHT: 190 LBS

## 2022-08-10 PROCEDURE — 99213 OFFICE O/P EST LOW 20 MIN: CPT

## 2022-08-10 NOTE — HISTORY OF PRESENT ILLNESS
[de-identified] : 08/10/2022 : JAHAIRA MACKEY  is a 57 year year old female who presents to the office for follow-up evaluation of the bilateral knees, bilateral elbows.  She recently saw her rheumatologist who diagnosed her with fibromyalgia.  She states that her left knee pain is improved and she is here to discuss the MRI results of her left knee today.  She states the elbow injections have not given significant long-term relief and she still has pain in the bilateral elbows as worse certain activities and movements alleviated with rest.  She has not recently gone to physical therapy and has not worn braces for the elbows  despite our medical recommendation.  She has had 2 injections into the left knee in the past year, 1 injection into the right knee, 2 injections into the right elbow all which have given minimal short-term relief.  She states "her whole body hurts".  She denies any numbness or tingling distally.  She has no other complaints today.\par \par 05/10/2022 : JAHAIRA MACKEY  is a 56 year year old female who presents to the office for follow-up evaluation of her bilateral knees, bilateral elbow pain.  She states she is now having pain in the left elbow as well.  She has done  2 injections of cortisone into her left knee in the past year, 1 injection into her right knee and 1 injection into her right elbow.  She states the injections into the knees are giving short-term temporary relief and she had some decent relief with her first right elbow injection however the pain is returning.  She states she saw a rheumatologist since the last office visit and had is getting x-rays and blood work completed and is following up with him for a full rheumatological work-up.  She presents for follow-up of her bilateral knees and elbows today.  She has x-ray appointment scheduled for her bilateral elbows later on today.  She states that she is taken meloxicam in the past which gives some relief.  She is here for routine follow-up today.  She has not done therapy for the knees or elbow recently.  She denies any numbness or tingling distally.  She denies any new injury.  She states "her whole body hurts".\par \par 1/20/22: Jahaira is a 56-year-old female who returns to the office today for follow-up regarding her bilateral knee pain and right elbow pain.  At her previous office visit her elbow had been doing well.  We discussed conservative treatment options for her knees.  Today her right elbow and both of her knees are status quo.  However, she is now having pain in her left elbow.  She has never had pain here before.  She denies any history of trauma.  The pain in her elbow is activity related and alleviated by rest.  It is very similar to the pain that she had in her right elbow.  The patient denies any fevers, chills, sweats, recent illnesses, numbness, tingling, weakness, or pain elsewhere at this time.\par \par 11/22/21: Jahaira is a 56-year-old female who returns to the office today for follow-up regarding her right knee and elbow pain.  She is now also complaining of left knee pain.  At her previous office visit we discussed conservative treatments and she received cortisone injections to both the right knee and the right elbow.  She states that her right elbow pain has resolved after the cortisone injection but her right knee continues to bother her.  Her left knee is also now bothering her and bothers her just as much as the right.  She denies any history of new trauma.  She has been taking ibuprofen on an as-needed basis for pain in both knees.  The patient denies any fevers, chills, sweats, recent illnesses, numbness, tingling, weakness, or pain elsewhere at this time.\par \par 9/27/21: Jahaira is a 56-year-old female who returns to the office today for follow-up regarding her right knee pain and right elbow pain.  At her previous visit she was diagnosed with a grade 2 MCL sprain.  She was provided with a short runner brace and referred for physical therapy.  She was lost to follow-up after the injury but she complains of persistent pain in her knee.  She also has pain in her elbow again.  She denies any new injuries since her last visit.  She has been doing home exercises on her own that she learned in physical therapy.  She has been taking oral NSAIDs as needed.  The patient denies any fevers, chills, sweats, recent illnesses, numbness, tingling, weakness, or pain elsewhere at this time.\par \par 4/26/21: Jahaira is a pleasant 55 year old female who is being seen for follow-up for right knee pain.  The patient recently returned from visiting her family in [place] and would like to obtain the knee brace that we discussed over the phone the last time we spoke.  We discussed her MRI results which demonstrated a grade 2 MCL sprain.  Her pain has improved since the initial time of injury but she does have pain with ambulation at times.  She is taking occasional NSAIDs for pain which are mildly helpful.  She has her first therapy appointment scheduled for tomorrow.  The patient denies any fevers, chills, sweats, recent illnesses, numbness, tingling, weakness, or pain elsewhere at this time.

## 2022-08-10 NOTE — DISCUSSION/SUMMARY
[de-identified] : Assessment: 57-year-old female with bilateral lateral epicondylitis, right knee chronic sprain, left knee pain, possible internal derangement, possible rheumatological pathology\par \par Plan: I had a long discussion with the patient today regarding the nature of their diagnosis and treatment plan. We discussed the risks and benefits of no treatment as well as nonoperative and operative treatments.  I reviewed the patient's x-ray and MRI results with her in the office today.  At this time I am recommending continued conservative treatment including physical therapy, ice, heat, rest, bracing, avoiding exacerbating activities for symptomatic relief for her bilateral knees and elbows.  I advised her that she can have no more injections into her elbow because she is already exhausted cortisone injections for this injury.  She was again shown counterforce brace in the office today and advised to use knee braces as needed for symptomatic relief and support.  Emphasized importance of physical therapy both formally and on her own for symptomatic relief.  I also feel that some of her symptoms are rheumatological because she has aches and pains over her entire body with no radiological cause.  She will continue follow-up with rheumatology for pain and inflammation as well.  She will follow-up with us in 2 to 3 months for repeat evaluation to reassess.  Patient discussed and reviewed with Dr. Urrutia today.\par \par The patient verbalizes their understanding and agrees with the plan.  All questions were answered to their satisfaction.

## 2022-08-10 NOTE — REASON FOR VISIT
[Follow-Up Visit] : a follow-up visit for [Other: ____] : [unfilled] [FreeTextEntry2] : bilateral knee pain

## 2022-08-10 NOTE — PHYSICAL EXAM
[de-identified] : General:\par Awake, alert, no acute distress, Patient was cooperative and appropriate during the examination.\par \par The patient's weight is of normal weight for height and age.\par \par Walks without an antalgic gait.\par \par Full, painless range of motion of the neck and back.\par \par Exam of the bilateral lower extremities is intact and symmetric with regards to dermatologic, vascular, and neurologic exam. Bilateral lower extremity sensation is grossly intact to light touch in the DP/SP/T/S/S nerve distributions. Intact DF/PF/EHL. BIlateral lower extremities warm and well-perfused with brisk capillary refill.\par \par \par Pulmonary:\par Regular, nonlabored breathing\par \par Abdomen:\par Soft, nontender, nondistended.\par \par Lymphatic:\par No evidence of axillary lymphadenopathy\par \par Bilateral Elbows Exam:\par Physical exam of the elbow demonstrates normal skin without signs of skin changes or abnormalities. No erythema, warmth, or joint effusion appreciated. \par  \par Sensation intact light touch C5-T1\par Palpable radial pulse\par Radial/ulnar/median/axillary/musculocutaneous/AIN/PIN nerves grossly intact\par  \par Range of motion:\par Flexion-Extension 0-130 with pain at terminal degrees of flexion \par Pronation-Supination 85-85 with pain at terminal degrees of supination\par  \par Palpation:\par Exquisitely tender to palpation over the lateral epicondyle and common extensor origin\par Not tender palpation over the medial epicondyle\par Not tender to palpation over the radial head\par Not tender to palpation over the olecranon\par Not tender to palpation over the distal biceps insertion\par Not tender to palpation over the distal triceps insertion\par Not tender to palpation over the cubital tunnel\par  \par Strength testing:\par Elbow Flexion 5/5\par Elbow Extension 5/5\par Pronation 5/5\par Supination 5/5\par  \par Special Tests:\par No varus/valgus laxity at 0 and 30 degrees of elbow flexion\par Moderate pain with resisted wrist/finger extension and forearm supination\par No pain with resisted wrist/finger flexion and forearm pronation\par Negative hook test\par Negative Tinel's over the carpal and cubital tunnels\par \par Bilateral Knee Examination:\par Physical examination of the knees demonstrates normal skin without signs of skin changes or abnormalities. No erythema, warmth, or joint effusion is appreciated. \par  \par Sensation is intact to light touch L2-S1\par Palpable DP/PT pulse\par EHL/FHL/TA/GSC motor function intact\par  \par Range of Motion\par 0 to 130 degrees bilaterally with mild discomfort throughout range of motion.\par  \par Strength Testing\par Quadriceps/Hamstrings 5/5 bilaterally\par Patient is able to perform a straight leg raise bilaterally without difficulty.\par  \par Palpation\par Not tender to palpation about the distal femurs, proximal tibias, or patellas\par No palpable defect appreciated in the quadriceps or patellar tendons\par Mildly tender to palpation of medial joint line of the right knee and nontender the left knee \par Mildly tender to palpation of lateral joint lines\par  \par Special Tests\par Anterior Drawer negative bilaterally\par Posterior Drawer negative bilaterally\par Lachman Exam negative bilaterally\par No Varus or Valgus Laxity at 0 or 30 degrees of knee flexion negative bilaterally\par Misty's Test negative bilaterally\par Active compression of the patella negative bilaterally\par Translation of the patella no hypermobility bilaterally\par  \par  \par  \par  \par  \par  \par   [de-identified] : MRI result of the left knee taken at a [facility] imaging facility on July 7, 2022 reveals a sprain of the femoral attachment of the LCL with mild cartilage irregularity of the medial patellar facet and no other acute findings.\par \par MRI of the right elbow from  was reviewed today with the patient in the office. There is a moderate to high-grade partial-thickness tear of the common extensor origin of the lateral epicondyle.\par \par X-rays including 3 views of the left elbow were obtained in the office and reviewed the patient today in 1/20/2022.  There is no acute fracture or dislocation.  There is no significant arthritis.\par \par X-rays including 3 views of the bilateral knees from my office on 11/22/2021 reviewed again today.  There is no acute fracture or dislocation.  There are minimal arthritic changes with mild medial joint space narrowing bilaterally.\par \par MRI of the right knee from  radiology on 3/17/2021 was reviewed the patient today in the office.  The patient does have a grade 2 sprain of the MCL.  There is a trace joint effusion.  There is a small popliteal cyst.  Mild patella chepe and lateral patellar tilt.  No fracture.

## 2022-09-06 ENCOUNTER — APPOINTMENT (OUTPATIENT)
Dept: RHEUMATOLOGY | Facility: CLINIC | Age: 57
End: 2022-09-06

## 2022-09-09 ENCOUNTER — RX RENEWAL (OUTPATIENT)
Age: 57
End: 2022-09-09

## 2022-09-11 NOTE — HISTORY OF PRESENT ILLNESS
[FreeTextEntry1] : 54-year-old female with chronic dyspnea. \par \par PREVIOUS HISTORY: The workup here included a CT angiogram of the chest which showed no PE, normal appearing lungs. PFTs normal. Overnight polysomnogram showed no sleep apnea. She did have an overnight pulse oximetry study which showed desaturations 17-20% of the time but subsequent PSG showed no hypoxia.  She says she quit smoking in September 2014. She had an extensive cardiac evaluation as well. She had a repeat CT angio chest which was negative. Subsequent noct pulse ox study done was normal.\par \par I ordered a cardiopulmonary stress test which she had done at [facility]; showed no limitation secondary to cardiac or pulmonary cause.   \par \par Planned on meth challenge at [facility]. \par \par She reports for the past month she has had cough, worse at night, nasal congestion, mucous. Still sob. \par \par Despite negative testing for hypoxia, she continues to use the O2 when she gets SOB; says it helps.   \par  \par  No

## 2022-10-04 ENCOUNTER — RX RENEWAL (OUTPATIENT)
Age: 57
End: 2022-10-04

## 2022-10-06 ENCOUNTER — APPOINTMENT (OUTPATIENT)
Dept: FAMILY MEDICINE | Facility: CLINIC | Age: 57
End: 2022-10-06

## 2022-10-06 VITALS
DIASTOLIC BLOOD PRESSURE: 80 MMHG | HEIGHT: 66 IN | BODY MASS INDEX: 31.5 KG/M2 | TEMPERATURE: 97.9 F | HEART RATE: 78 BPM | OXYGEN SATURATION: 98 % | RESPIRATION RATE: 14 BRPM | WEIGHT: 196 LBS | SYSTOLIC BLOOD PRESSURE: 128 MMHG

## 2022-10-06 DIAGNOSIS — Z23 ENCOUNTER FOR IMMUNIZATION: ICD-10-CM

## 2022-10-06 DIAGNOSIS — E53.8 DEFICIENCY OF OTHER SPECIFIED B GROUP VITAMINS: ICD-10-CM

## 2022-10-06 PROCEDURE — 96372 THER/PROPH/DIAG INJ SC/IM: CPT

## 2022-10-06 PROCEDURE — 0124A: CPT

## 2022-10-06 PROCEDURE — 36415 COLL VENOUS BLD VENIPUNCTURE: CPT

## 2022-10-06 PROCEDURE — 99214 OFFICE O/P EST MOD 30 MIN: CPT | Mod: 25

## 2022-10-06 RX ORDER — MELOXICAM 7.5 MG/1
7.5 TABLET ORAL
Refills: 0 | Status: DISCONTINUED | COMMUNITY
End: 2022-10-06

## 2022-10-06 RX ORDER — PANTOPRAZOLE 40 MG/1
40 TABLET, DELAYED RELEASE ORAL
Qty: 90 | Refills: 3 | Status: DISCONTINUED | COMMUNITY
Start: 2021-10-26 | End: 2022-10-06

## 2022-10-06 NOTE — HEALTH RISK ASSESSMENT
[Never] : Never [Yes] : Yes [No] : In the past 12 months have you used drugs other than those required for medical reasons? No [No falls in past year] : Patient reported no falls in the past year [2] : 2) Feeling down, depressed, or hopeless for more than half of the days (2) [PHQ-2 Positive] : PHQ-2 Positive [I have developed a follow-up plan documented below in the note.] : I have developed a follow-up plan documented below in the note. [de-identified] : GI, Orthopedic, Gyn, Neurology [de-identified] : beer ocassional [[id number]] : 4

## 2022-10-06 NOTE — ASSESSMENT
[FreeTextEntry1] : HCM:\par -06/20\par -HIV screening: negative 2020\par \par Gyn: Dr gaitan\par Mammo: 2019/ \par Colonoscopy: 2019./ F/up with Dr Ritter\par \par # HTN:\par -on Cardizem.\par -F/up with cardiology, Dr medley.\par \par # Dyslipidemia:\par -On Atorvastatin\par \par # Asthma:\par -On Breo.\par -F/up with Pulmonology Dr ariza\par \par # GERD:\par -On Pantoprazole.\par -GI: Dr Ritter\par \par # Chronic lumbalgia/ Cervicalgia/ Knee OA;RT elbow tendinitis/ Polyarthralgia\par -Seen by Orthopedic.\par -On Ibuprofen prn and methocarbamol\par -Rheumatology eval on 05/09/22 appreciated\par \par #  depression with anxiety\par -Continue Cymbalta.\par -Continue Vistaril prn.\par -Seen in the past at family service [facility].\par - telephonic eval with Dr Rios for counseling / psychiatry\par \par # Vit B12 def:\par -B12 shot today\par \par # Cyst in RT side neck\par -Dermatology referral\par \par # STD screening as requested per pt.\par \par F/up for annual physical or prn.\par \par

## 2022-10-06 NOTE — REVIEW OF SYSTEMS
[Patient Intake Form Reviewed] : Patient intake form was reviewed [de-identified] : lump in left dorsal foot

## 2022-10-06 NOTE — HISTORY OF PRESENT ILLNESS
[FreeTextEntry1] : STD screening\par Blood tests [de-identified] : 56 y/o AAF presents today for f/up, meds refills...\par Previous PCP: Kenya Cook\par Pt with hx depression , she was on Buspirone, Vistaril  and Cymbalta, HTN on Diltiazem, Dyslipidemia on Atorvastatin, Asthma on Breo, Chronic Cervicalgia, low back pain and Knee OA on methocarbamol and Ibuprofen. She was seen by Orthopedic for RT elbow pain and knee pain. \par She reports multiple joints aches and was seen by Rheumatology eval.\par She was seen by psychology in [place] and now is awaiting for new placement\par Seen at [facility] for [facility] in the past.\par Pt seen by Allergist: Matias Bailey\par Seen by cardiology, Dr Whittaker\par Pulmonology: Dr Morales\par GI: Dr Ritter\par She reports unprotected intercourse, and wants STD screening.\par \par \par

## 2022-10-07 ENCOUNTER — APPOINTMENT (OUTPATIENT)
Dept: RHEUMATOLOGY | Facility: CLINIC | Age: 57
End: 2022-10-07

## 2022-10-07 VITALS
OXYGEN SATURATION: 98 % | DIASTOLIC BLOOD PRESSURE: 70 MMHG | TEMPERATURE: 98.4 F | HEART RATE: 93 BPM | SYSTOLIC BLOOD PRESSURE: 125 MMHG | HEIGHT: 66 IN

## 2022-10-07 DIAGNOSIS — R20.2 PARESTHESIA OF SKIN: ICD-10-CM

## 2022-10-07 DIAGNOSIS — G89.29 LUMBAGO WITH SCIATICA, RIGHT SIDE: ICD-10-CM

## 2022-10-07 DIAGNOSIS — M54.41 LUMBAGO WITH SCIATICA, RIGHT SIDE: ICD-10-CM

## 2022-10-07 LAB
ALBUMIN SERPL ELPH-MCNC: 4.5 G/DL
ALP BLD-CCNC: 106 U/L
ALT SERPL-CCNC: 44 U/L
ANION GAP SERPL CALC-SCNC: 12 MMOL/L
AST SERPL-CCNC: 33 U/L
BASOPHILS # BLD AUTO: 0.05 K/UL
BASOPHILS NFR BLD AUTO: 0.4 %
BILIRUB SERPL-MCNC: 0.4 MG/DL
BUN SERPL-MCNC: 5 MG/DL
C TRACH RRNA SPEC QL NAA+PROBE: NOT DETECTED
CALCIUM SERPL-MCNC: 9.5 MG/DL
CHLORIDE SERPL-SCNC: 100 MMOL/L
CHOLEST SERPL-MCNC: 226 MG/DL
CO2 SERPL-SCNC: 32 MMOL/L
CREAT SERPL-MCNC: 0.46 MG/DL
EGFR: 113 ML/MIN/1.73M2
EOSINOPHIL # BLD AUTO: 0.38 K/UL
EOSINOPHIL NFR BLD AUTO: 2.8 %
FOLATE SERPL-MCNC: 5.1 NG/ML
GLUCOSE SERPL-MCNC: 103 MG/DL
HBV SURFACE AG SER QL: NONREACTIVE
HCT VFR BLD CALC: 42.6 %
HCV AB SER QL: NONREACTIVE
HCV S/CO RATIO: 0.1 S/CO
HDLC SERPL-MCNC: 51 MG/DL
HGB BLD-MCNC: 13.7 G/DL
HIV1+2 AB SPEC QL IA.RAPID: NONREACTIVE
IMM GRANULOCYTES NFR BLD AUTO: 0.4 %
LDLC SERPL CALC-MCNC: 121 MG/DL
LYMPHOCYTES # BLD AUTO: 2.75 K/UL
LYMPHOCYTES NFR BLD AUTO: 20.6 %
MAN DIFF?: NORMAL
MCHC RBC-ENTMCNC: 31.6 PG
MCHC RBC-ENTMCNC: 32.2 GM/DL
MCV RBC AUTO: 98.4 FL
MONOCYTES # BLD AUTO: 0.55 K/UL
MONOCYTES NFR BLD AUTO: 4.1 %
N GONORRHOEA RRNA SPEC QL NAA+PROBE: NOT DETECTED
NEUTROPHILS # BLD AUTO: 9.6 K/UL
NEUTROPHILS NFR BLD AUTO: 71.7 %
NONHDLC SERPL-MCNC: 175 MG/DL
PLATELET # BLD AUTO: 333 K/UL
POTASSIUM SERPL-SCNC: 3.2 MMOL/L
PROT SERPL-MCNC: 7.4 G/DL
RBC # BLD: 4.33 M/UL
RBC # FLD: 13.5 %
SODIUM SERPL-SCNC: 144 MMOL/L
SOURCE AMPLIFICATION: NORMAL
T PALLIDUM AB SER QL IA: NEGATIVE
TRIGL SERPL-MCNC: 272 MG/DL
VIT B12 SERPL-MCNC: 393 PG/ML
WBC # FLD AUTO: 13.38 K/UL

## 2022-10-07 PROCEDURE — 81003 URINALYSIS AUTO W/O SCOPE: CPT | Mod: QW

## 2022-10-07 PROCEDURE — G2212 PROLONG OUTPT/OFFICE VIS: CPT

## 2022-10-07 PROCEDURE — 36415 COLL VENOUS BLD VENIPUNCTURE: CPT

## 2022-10-07 PROCEDURE — 99215 OFFICE O/P EST HI 40 MIN: CPT | Mod: 25

## 2022-10-07 NOTE — REVIEW OF SYSTEMS
[Feeling Tired] : feeling tired [Arthralgias] : arthralgias [Joint Pain] : joint pain [As Noted in HPI] : as noted in HPI [Negative] : Heme/Lymph [Joint Stiffness] : joint stiffness

## 2022-10-09 LAB
BASOPHILS # BLD AUTO: 0.05 K/UL
BASOPHILS NFR BLD AUTO: 0.4 %
BILIRUB UR QL STRIP: NEGATIVE
CK SERPL-CCNC: 43 U/L
CLARITY UR: CLEAR
COLLECTION METHOD: NORMAL
CRP SERPL-MCNC: 14 MG/L
EOSINOPHIL # BLD AUTO: 0.39 K/UL
EOSINOPHIL NFR BLD AUTO: 3.1 %
ERYTHROCYTE [SEDIMENTATION RATE] IN BLOOD BY WESTERGREN METHOD: 40 MM/HR
GLUCOSE UR-MCNC: NEGATIVE
HCG UR QL: 0.2 EU/DL
HCT VFR BLD CALC: 41.6 %
HGB BLD-MCNC: 13.4 G/DL
HGB UR QL STRIP.AUTO: NORMAL
IMM GRANULOCYTES NFR BLD AUTO: 0.2 %
KETONES UR-MCNC: NEGATIVE
LDH SERPL-CCNC: 174 U/L
LEUKOCYTE ESTERASE UR QL STRIP: NEGATIVE
LYMPHOCYTES # BLD AUTO: 2.17 K/UL
LYMPHOCYTES NFR BLD AUTO: 17.4 %
MAN DIFF?: NORMAL
MCHC RBC-ENTMCNC: 32.1 PG
MCHC RBC-ENTMCNC: 32.2 GM/DL
MCV RBC AUTO: 99.5 FL
MONOCYTES # BLD AUTO: 0.42 K/UL
MONOCYTES NFR BLD AUTO: 3.4 %
NEUTROPHILS # BLD AUTO: 9.44 K/UL
NEUTROPHILS NFR BLD AUTO: 75.5 %
NITRITE UR QL STRIP: NEGATIVE
PH UR STRIP: 5.5
PHOSPHATE SERPL-MCNC: 3.2 MG/DL
PLATELET # BLD AUTO: 320 K/UL
PROT UR STRIP-MCNC: 30
RBC # BLD: 4.18 M/UL
RBC # FLD: 13.7 %
SP GR UR STRIP: 1.03
WBC # FLD AUTO: 12.49 K/UL

## 2022-10-09 NOTE — ADDENDUM
[FreeTextEntry1] : I, Raysa Orozco, acted solely as a scribe for Dr. Myron I. Kleiner, MD. on 10/07/2022.

## 2022-10-09 NOTE — CONSULT LETTER
[Consult Letter:] : I had the pleasure of evaluating your patient, [unfilled]. [Please see my note below.] : Please see my note below. [Consult Closing:] : Thank you very much for allowing me to participate in the care of this patient.  If you have any questions, please do not hesitate to contact me. [Sincerely,] : Sincerely, [Dear  ___] : Dear  [unfilled], [FreeTextEntry3] : Addy\par Myron I. Kleiner, M.D., FACR\par Chief, Division of Rheumatology\par Department of Medicine\par [facility] [DrLesly  ___] : Dr. GARCIA

## 2022-10-09 NOTE — HISTORY OF PRESENT ILLNESS
[FreeTextEntry1] : JAHAIRA MACKEY is a 57 year old woman who presents for follow up office visit for further evaluation of joint symptoms and rheumatic diseases including right carpal tunnel syndrome, fibromyalgia, osteoarthritis and chronic low back pain.\par \par Patient has persistent pain "my whole body" including right elbow, right forearm, bilateral neck pain with radiation to the left upper extremity to the elbow with paresthesias left fingers and low back with radiatio to bilateral lower extremities to the feet with paresthesias. She also has pain bilateral knees especially with weightbearing. Much sleep disturbance and fatigue. Occasional buckling bilateral knees (about 2 times a month). Continues Flexeril 10 mg q.d. suppertime. The patient continues vitamin D 2,000 unit supplements -completed vitamin D 50,000 units twice a week x12 weeks. Patient denies rash or side effects with current medications. Patient is content with current medication regimen. \par \par PMH:\par August 2022 - Orthopedics referred patient to PT but patient declined secondary to lack of relief in the past \par GI follow up regarding GERD - planning Colonoscopy and Endoscopy

## 2022-10-09 NOTE — ASSESSMENT
[FreeTextEntry1] : Impression: JAHAIRA MACKEY is a 57 year old woman who presents for follow up office visit for further evaluation of joint symptoms and rheumatic diseases including right carpal tunnel syndrome, fibromyalgia, osteoarthritis and chronic low back pain.\par \par Patient has persistent arthralgias secondary to a combination of her osteoarthritis and fibromyalgia with much sleep disturbance and fatigue. On exam, patient has left bicipital tendonitis. She has neck pain with radiation to the left upper extremity to the elbow with paresthesias left fingers secondary to her osteoarthritis -- consider cervical radiculopathy or carpal tunnel syndrome. Low back pain with radiation to bilateral lower extremities to the feet with paresthesias secondary to a combination of her osteoarthritis and chronic low back pain with LS radiculopathy. Occasional buckling bilateral knees (about 2 times a month). Continues Flexeril 10 mg q.d. suppertime. The patient continues vitamin D 2,000 unit supplements for her vitamin D deficiency - completed vitamin D 50,000 units twice a week x12 weeks. Patient denies rash or side effects with current medications. Patient is content with current medication regimen. \par \par Plan: I reviewed her previous lab test results with the patient with extensive discussion\par Laboratory tests ordered - see list below - with coordination of care \par MRI LS Spine ordered (no contraindications) - with coordination of care\par MRI Cervical Spine ordered (no contraindications) - with coordination of care\par Diagnosis and prognosis discussed\par Continue current medications (other than those changed below)\par Increase Flurbiprofen 100 mg t.i.d. end of meals - In 3 weeks, if pain persists, call the office for a change of medication (Possible side effects explained including cardiovascular risk/MI/CVA)\par Increase Flexeril 10 mg b.i.d. (Possible side effects explained)\par Savella Titration Pack, increasing up to 50 mg b.i.d end of meals thereafter (Possible side effects explained)  \par Daily exercise starting at 10 minutes per day, gradually increasing to at least 30 minutes per day - emphasized\par Consult Hematology via PCP --regarding persistent leukocytosis\par Back exercises--instruction sheet given and discussed--- extensive discussion\par Bilateral knee exercises--instruction sheet given and discussed--with extensive discussion\par Patient requested a prescription for a cane--ordered--with coordination of care\par Return visit 4 months\par Total time for this office visit, including face-to-face time and non-face-to-face time, 70 minutes--- including review of the chart and previous records, review of previous lab results with extensive discussion with the patient, ordering lab tests with coordination of care, review of recent imaging reports/x-ray results with extensive discussion with the patient, ordering of new MRI of cervical spine and MRI of LS spine with coordination of care, detailed medication history, ordering of a cane with coordination of care, extensive discussion regarding need for hematology consultation, review of medications going forward with their possible side effects, reviewed the impact of the patient's rheumatic disease on their other medical problems, reviewed the impact of the patient's other medical problems on their rheumatic disease

## 2022-10-10 ENCOUNTER — NON-APPOINTMENT (OUTPATIENT)
Age: 57
End: 2022-10-10

## 2022-10-13 PROBLEM — Z13.31 SCREENING FOR DEPRESSION: Status: ACTIVE | Noted: 2021-07-14

## 2022-10-17 ENCOUNTER — RX RENEWAL (OUTPATIENT)
Age: 57
End: 2022-10-17

## 2022-10-18 ENCOUNTER — RX RENEWAL (OUTPATIENT)
Age: 57
End: 2022-10-18

## 2022-10-22 LAB
APPEARANCE: CLEAR
BACTERIA: NEGATIVE
BILIRUBIN URINE: NEGATIVE
BLOOD URINE: ABNORMAL
COLOR: YELLOW
GLUCOSE QUALITATIVE U: NEGATIVE
HYALINE CASTS: 0 /LPF
KETONES URINE: NEGATIVE
LEUKOCYTE ESTERASE URINE: NEGATIVE
MICROSCOPIC-UA: NORMAL
NITRITE URINE: NEGATIVE
PH URINE: 6
PROTEIN URINE: ABNORMAL
RED BLOOD CELLS URINE: 3 /HPF
SPECIFIC GRAVITY URINE: 1.02
SQUAMOUS EPITHELIAL CELLS: 20 /HPF
URINE COMMENTS: NORMAL
UROBILINOGEN URINE: ABNORMAL
WHITE BLOOD CELLS URINE: 4 /HPF

## 2022-10-24 ENCOUNTER — NON-APPOINTMENT (OUTPATIENT)
Age: 57
End: 2022-10-24

## 2022-10-28 ENCOUNTER — NON-APPOINTMENT (OUTPATIENT)
Age: 57
End: 2022-10-28

## 2022-11-03 ENCOUNTER — APPOINTMENT (OUTPATIENT)
Dept: FAMILY MEDICINE | Facility: CLINIC | Age: 57
End: 2022-11-03

## 2022-11-03 VITALS
HEIGHT: 66 IN | TEMPERATURE: 98.2 F | DIASTOLIC BLOOD PRESSURE: 80 MMHG | SYSTOLIC BLOOD PRESSURE: 128 MMHG | RESPIRATION RATE: 14 BRPM | OXYGEN SATURATION: 97 % | WEIGHT: 195 LBS | BODY MASS INDEX: 31.34 KG/M2 | HEART RATE: 88 BPM

## 2022-11-03 DIAGNOSIS — L72.9 FOLLICULAR CYST OF THE SKIN AND SUBCUTANEOUS TISSUE, UNSPECIFIED: ICD-10-CM

## 2022-11-03 PROCEDURE — 36415 COLL VENOUS BLD VENIPUNCTURE: CPT

## 2022-11-03 PROCEDURE — 99214 OFFICE O/P EST MOD 30 MIN: CPT | Mod: 25

## 2022-11-03 RX ORDER — CLINDAMYCIN AND BENZOYL PEROXIDE 50; 10 MG/G; MG/G
1-5 GEL TOPICAL
Qty: 50 | Refills: 0 | Status: COMPLETED | COMMUNITY
Start: 2022-10-13

## 2022-11-03 RX ORDER — CEPHALEXIN 500 MG/1
500 TABLET ORAL
Qty: 30 | Refills: 0 | Status: DISCONTINUED | COMMUNITY
Start: 2022-07-11

## 2022-11-03 RX ORDER — CLINDAMYCIN PHOSPHATE AND BENZOYL PEROXIDE 10; 50 MG/G; MG/G
1.2-5 GEL TOPICAL
Qty: 45 | Refills: 0 | Status: COMPLETED | COMMUNITY
Start: 2022-10-04

## 2022-11-03 NOTE — REVIEW OF SYSTEMS
[Patient Intake Form Reviewed] : Patient intake form was reviewed [de-identified] : lump in left dorsal foot

## 2022-11-03 NOTE — HISTORY OF PRESENT ILLNESS
[FreeTextEntry1] : \par elevated WBC [de-identified] : 58 y/o AAF presents today for f/up.\par Previous PCP: Kenya Cook\par Pt with hx depression , on Vistaril  and Cymbalta, HTN on Diltiazem, Dyslipidemia on Atorvastatin, Asthma on Breo, Chronic Cervicalgia, low back pain and Knee OA on methocarbamol and Ibuprofen. She was seen by Orthopedic for RT elbow pain and knee pain. \par She reports multiple joints aches and was seen by Rheumatology eval.\par She was seen by psychology in [place] and now is awaiting for new placement\par [facility].\par Pt seen by Allergist: Matias Bailey\par Seen by cardiology, Dr Whittaker\par Pulmonology: Dr Morales\par GI: Dr Ritter\par She was seen recently by rheumatology and was advise to f/up due to elevated wbc and abnormal UA.\par Pt denies any urinary symptoms.\par \par \par

## 2022-11-03 NOTE — HEALTH RISK ASSESSMENT
[Yes] : Yes [No] : In the past 12 months have you used drugs other than those required for medical reasons? No [No falls in past year] : Patient reported no falls in the past year [2] : 2) Feeling down, depressed, or hopeless for more than half of the days (2) [PHQ-2 Positive] : PHQ-2 Positive [I have developed a follow-up plan documented below in the note.] : I have developed a follow-up plan documented below in the note. [Former] : Former [de-identified] : GI, Orthopedic, Gyn, Neurology, Rheumatology [de-identified] : beer ocassional [[id number]] : 4

## 2022-11-03 NOTE — ASSESSMENT
[FreeTextEntry1] : HCM:\par -06/20\par -HIV screening: negative 2020\par \par Gyn: Dr gaitan\par Mammo: 2019/ \par Colonoscopy: 2019./ F/up with Dr Ritter\par \par # HTN:\par -on Cardizem.\par -F/up with cardiology, Dr medley.\par \par # Dyslipidemia:\par -On Atorvastatin\par \par # Asthma:\par -On Breo.\par -F/up with Pulmonology Dr ariza\par \par # GERD:\par -On Pantoprazole.\par -GI: Dr Ritter\par \par # Chronic lumbalgia/ Cervicalgia/ Knee OA;RT elbow tendinitis/ Polyarthralgia\par -Seen by Orthopedic.\par -On Ibuprofen prn and methocarbamol\par -Rheumatology eval on 10/07/22 appreciated\par \par #  depression with anxiety\par -Continue Cymbalta.\par -Continue Vistaril prn.\par -Seen in the past at family service [facility].\par - telephonic eval with Dr Rios for counseling / psychiatry\par \par # Vit B12 def:\par -B12 shot today\par \par # CystIN RT LQ\par -Sx referral\par \par # Leukocytosis\par -Repeat CB.\par -hematology referral\par F/up for annual physical or prn.\par \par

## 2022-11-04 ENCOUNTER — OUTPATIENT (OUTPATIENT)
Dept: OUTPATIENT SERVICES | Facility: HOSPITAL | Age: 57
LOS: 1 days | Discharge: HOME | End: 2022-11-04

## 2022-11-04 DIAGNOSIS — Z90.722 ACQUIRED ABSENCE OF OVARIES, BILATERAL: Chronic | ICD-10-CM

## 2022-11-04 DIAGNOSIS — Z87.59 PERSONAL HISTORY OF OTHER COMPLICATIONS OF PREGNANCY, CHILDBIRTH AND THE PUERPERIUM: Chronic | ICD-10-CM

## 2022-11-04 DIAGNOSIS — D72.89 OTHER SPECIFIED DISORDERS OF WHITE BLOOD CELLS: ICD-10-CM

## 2022-11-07 ENCOUNTER — RESULT REVIEW (OUTPATIENT)
Age: 57
End: 2022-11-07

## 2022-11-07 ENCOUNTER — APPOINTMENT (OUTPATIENT)
Dept: HEMATOLOGY ONCOLOGY | Facility: CLINIC | Age: 57
End: 2022-11-07

## 2022-11-07 ENCOUNTER — NON-APPOINTMENT (OUTPATIENT)
Age: 57
End: 2022-11-07

## 2022-11-07 VITALS
HEART RATE: 98 BPM | WEIGHT: 194.01 LBS | SYSTOLIC BLOOD PRESSURE: 112 MMHG | BODY MASS INDEX: 31.18 KG/M2 | OXYGEN SATURATION: 99 % | DIASTOLIC BLOOD PRESSURE: 86 MMHG | HEIGHT: 66 IN

## 2022-11-07 LAB
BASOPHILS # BLD AUTO: 0.1 K/UL — SIGNIFICANT CHANGE UP (ref 0–0.2)
BASOPHILS NFR BLD AUTO: 0.5 % — SIGNIFICANT CHANGE UP (ref 0–2)
EOSINOPHIL # BLD AUTO: 0.4 K/UL — SIGNIFICANT CHANGE UP (ref 0–0.5)
EOSINOPHIL NFR BLD AUTO: 2.3 % — SIGNIFICANT CHANGE UP (ref 0–6)
HCT VFR BLD CALC: 46 % — HIGH (ref 34.5–45)
HGB BLD-MCNC: 15.2 G/DL — SIGNIFICANT CHANGE UP (ref 11.5–15.5)
LYMPHOCYTES # BLD AUTO: 17.4 % — SIGNIFICANT CHANGE UP (ref 13–44)
LYMPHOCYTES # BLD AUTO: 2.7 K/UL — SIGNIFICANT CHANGE UP (ref 1–3.3)
MCHC RBC-ENTMCNC: 32.7 PG — SIGNIFICANT CHANGE UP (ref 27–34)
MCHC RBC-ENTMCNC: 32.9 G/DL — SIGNIFICANT CHANGE UP (ref 32–36)
MCV RBC AUTO: 99.2 FL — SIGNIFICANT CHANGE UP (ref 80–100)
MONOCYTES # BLD AUTO: 0.6 K/UL — SIGNIFICANT CHANGE UP (ref 0–0.9)
MONOCYTES NFR BLD AUTO: 3.5 % — SIGNIFICANT CHANGE UP (ref 2–14)
NEUTROPHILS # BLD AUTO: 12 K/UL — HIGH (ref 1.8–7.4)
NEUTROPHILS NFR BLD AUTO: 76.2 % — SIGNIFICANT CHANGE UP (ref 43–77)
PLATELET # BLD AUTO: 391 K/UL — SIGNIFICANT CHANGE UP (ref 150–400)
RBC # BLD: 4.64 M/UL — SIGNIFICANT CHANGE UP (ref 3.8–5.2)
RBC # FLD: 12.2 % — SIGNIFICANT CHANGE UP (ref 10.3–14.5)
WBC # BLD: 15.8 K/UL — HIGH (ref 3.8–10.5)
WBC # FLD AUTO: 15.8 K/UL — HIGH (ref 3.8–10.5)

## 2022-11-07 PROCEDURE — 99203 OFFICE O/P NEW LOW 30 MIN: CPT

## 2022-11-07 NOTE — REVIEW OF SYSTEMS
[Shortness Of Breath] : shortness of breath [Joint Pain] : joint pain [Joint Stiffness] : joint stiffness [Muscle Pain] : muscle pain [Negative] : Allergic/Immunologic [FreeTextEntry4] : toothache [FreeTextEntry7] : diarrhea last week

## 2022-11-07 NOTE — ASSESSMENT
[FreeTextEntry1] : Ms. Dumont is a 58 yo AAF referred by Dr. Kleiner for evaluation of a leucocytosis.Review of prior CBC show that her WBC count has been elevated since at least 2018. WBC count has ranged between 11.6 - 16.8, Always with an elevated neutrophil count. ANC ranges between 8-12. HGB has been normal, HCT has been intermittently elevated ranging between 39-50. Only one platelet count was elevated at 490,000, in Sept 2018.\par  She states that she has recurrent infections, and that she has intermittently been on oral steroids. She is being followed by Dr. Kleiner from Rheumatology for fibromyalgia, currently on NSAIDS.\par  Today her CBC shows WBC-15.8, HGB-15.2, HCT-46, Plat-391,000. ANC- 12. Even though WBC elevated, it is stable. We discussed the possible causes, including chronic inflammatory state, recurrent infections, intermittent steroid use. But for completeness I will send off BCR-ABL  and Flow cytometry to R/O a hematologic cause.  I will send off a repeat UA and culture to R/O infection.I will contact patient with results and plan, after reviewing results. For now will bring her back in fro OV in 3-4 months.

## 2022-11-07 NOTE — PHYSICAL EXAM
[Fully active, able to carry on all pre-disease performance without restriction] : Status 0 - Fully active, able to carry on all pre-disease performance without restriction [Normal] : affect appropriate [de-identified] : acne like rash on chin, linear nodule in skin above right anterior pelvic bone, no erythema or warmth.

## 2022-11-07 NOTE — CONSULT LETTER
[Dear  ___] : Dear  [unfilled], [Consult Letter:] : I had the pleasure of evaluating your patient, [unfilled]. [Please see my note below.] : Please see my note below. [Consult Closing:] : Thank you very much for allowing me to participate in the care of this patient.  If you have any questions, please do not hesitate to contact me. [Sincerely,] : Sincerely, [FreeTextEntry2] : Dr. Canales [FreeTextEntry1] : Please find attached our consultation on your patient, Ms. JAHAIRA MACKEY . \par We take a multidisciplinary team approach to patient care and consider you, the referring physician, an extension of our team. \par It is our commitment to provide your patient with the highest quality of advanced therapeutic options. We thank you for allowing us to participate in the care of your patient.\par Please do not hesitate to contact our team with any questions or concerns at [phone].\par \par \par  [FreeTextEntry3] : Monse SALDANA

## 2022-11-07 NOTE — HISTORY OF PRESENT ILLNESS
[de-identified] : Ms. Dumont is a 58 yo AAF referred by Dr. Kleiner for evaluation of a leucocytosis.Review of prior CBC show that her WBC count has been elevated since at least 2018. WBC count has ranged between 11.6 - 16.8, Always with an elevated neutrophil count. ANC ranges between 8-12. HGB has been normal, HCT has been intermittently elevated ranging between 39-50. Only one platelet count was elevated at 490,000, in Sept 2018.\par  She states that she has recurrent infections, and that she has intermittently been on oral steroids. She is being followed by Dr. Kleiner from Rheumatology for fibromyalgia, currently on NSAIDS. [de-identified] : Currently she has chronic daily body aches, no fevers. She is dealing with pain associated with front right upper tooth, and has an appt with dental this week.. She has had a bout of diarrhea last week. Recently told that she had yeast in her urine, under no treatment for infection at this time.

## 2022-11-10 ENCOUNTER — APPOINTMENT (OUTPATIENT)
Dept: ORTHOPEDIC SURGERY | Facility: CLINIC | Age: 57
End: 2022-11-10

## 2022-11-10 DIAGNOSIS — M25.562 PAIN IN RIGHT KNEE: ICD-10-CM

## 2022-11-10 DIAGNOSIS — M77.11 LATERAL EPICONDYLITIS, RIGHT ELBOW: ICD-10-CM

## 2022-11-10 DIAGNOSIS — M77.12 LATERAL EPICONDYLITIS, RIGHT ELBOW: ICD-10-CM

## 2022-11-10 DIAGNOSIS — M25.561 PAIN IN RIGHT KNEE: ICD-10-CM

## 2022-11-10 PROCEDURE — 99213 OFFICE O/P EST LOW 20 MIN: CPT

## 2022-11-10 NOTE — DISCUSSION/SUMMARY
[de-identified] : Assessment: 57-year-old female with bilateral lateral epicondylitis, right knee chronic sprain, left knee pain, possible internal derangement, possible rheumatological pathology\par \par Plan: I had a long discussion with the patient today regarding the nature of their diagnosis and treatment plan. We discussed the risks and benefits of no treatment as well as nonoperative and operative treatments.  I reviewed the patient's x-ray and MRI results with her in the office today.  At this time I am recommending continued conservative treatment including physical therapy, ice, heat, rest, bracing, avoiding exacerbating activities for symptomatic relief for her bilateral knees and elbows.  I advised her that she can have no more injections into her elbow because she is already exhausted cortisone injections for this injury.  She was again shown counterforce brace in the office today and advised to use knee braces as needed for symptomatic relief and support.  Emphasized importance of physical therapy both formally and on her own for symptomatic relief.  I also feel that some of her symptoms are rheumatological because she has aches and pains over her entire body.  She will continue follow-up with rheumatology for pain and inflammation as well.  I am also recommending she  continue to follow-up with the pain management doctor for further evaluation.  I advised her that no further orthopedic intervention is required at this time, she will follow-up with us as needed. Patient understands and agrees and all questions were answered. Patient discussed and reviewed with Dr. Urrutia today.\par \par The patient verbalizes their understanding and agrees with the plan.  All questions were answered to their satisfaction.

## 2022-11-10 NOTE — HISTORY OF PRESENT ILLNESS
[de-identified] : 11/10/2022 : JAHAIRA MACKEY  is a 57 year  old female who presents to the office for follow-up evaluation of her bilateral elbow and knee pain.  She is under the care of a rheumatologist who is referring her to pain management for her chronic pains.  She states that her symptoms are unchanged and she has a lot of pain diffusely about the elbows and knees and neck and back.  She states the pain interferes with her activities and is alleviated with rest.  She was started on a new medication by her rheumatologist which is starting to give her some relief.  She has a pain management appointment coming up next week for further assessment.  She is here for routine follow-up today.  She denies any numbness or tingling distally.  She has no other complaints today.\par \par 08/10/2022 : JAHAIRA MACKEY  is a 57 year year old female who presents to the office for follow-up evaluation of the bilateral knees, bilateral elbows.  She recently saw her rheumatologist who diagnosed her with fibromyalgia.  She states that her left knee pain is improved and she is here to discuss the MRI results of her left knee today.  She states the elbow injections have not given significant long-term relief and she still has pain in the bilateral elbows as worse certain activities and movements alleviated with rest.  She has not recently gone to physical therapy and has not worn braces for the elbows  despite our medical recommendation.  She has had 2 injections into the left knee in the past year, 1 injection into the right knee, 2 injections into the right elbow all which have given minimal short-term relief.  She states "her whole body hurts".  She denies any numbness or tingling distally.  She has no other complaints today.\par \par 05/10/2022 : JAHAIRA MACKEY  is a 56 year year old female who presents to the office for follow-up evaluation of her bilateral knees, bilateral elbow pain.  She states she is now having pain in the left elbow as well.  She has done  2 injections of cortisone into her left knee in the past year, 1 injection into her right knee and 1 injection into her right elbow.  She states the injections into the knees are giving short-term temporary relief and she had some decent relief with her first right elbow injection however the pain is returning.  She states she saw a rheumatologist since the last office visit and had is getting x-rays and blood work completed and is following up with him for a full rheumatological work-up.  She presents for follow-up of her bilateral knees and elbows today.  She has x-ray appointment scheduled for her bilateral elbows later on today.  She states that she is taken meloxicam in the past which gives some relief.  She is here for routine follow-up today.  She has not done therapy for the knees or elbow recently.  She denies any numbness or tingling distally.  She denies any new injury.  She states "her whole body hurts".\par \par 1/20/22: Jahaira is a 56-year-old female who returns to the office today for follow-up regarding her bilateral knee pain and right elbow pain.  At her previous office visit her elbow had been doing well.  We discussed conservative treatment options for her knees.  Today her right elbow and both of her knees are status quo.  However, she is now having pain in her left elbow.  She has never had pain here before.  She denies any history of trauma.  The pain in her elbow is activity related and alleviated by rest.  It is very similar to the pain that she had in her right elbow.  The patient denies any fevers, chills, sweats, recent illnesses, numbness, tingling, weakness, or pain elsewhere at this time.\par \par 11/22/21: Jahaira is a 56-year-old female who returns to the office today for follow-up regarding her right knee and elbow pain.  She is now also complaining of left knee pain.  At her previous office visit we discussed conservative treatments and she received cortisone injections to both the right knee and the right elbow.  She states that her right elbow pain has resolved after the cortisone injection but her right knee continues to bother her.  Her left knee is also now bothering her and bothers her just as much as the right.  She denies any history of new trauma.  She has been taking ibuprofen on an as-needed basis for pain in both knees.  The patient denies any fevers, chills, sweats, recent illnesses, numbness, tingling, weakness, or pain elsewhere at this time.\par \par 9/27/21: Jahaira is a 56-year-old female who returns to the office today for follow-up regarding her right knee pain and right elbow pain.  At her previous visit she was diagnosed with a grade 2 MCL sprain.  She was provided with a short runner brace and referred for physical therapy.  She was lost to follow-up after the injury but she complains of persistent pain in her knee.  She also has pain in her elbow again.  She denies any new injuries since her last visit.  She has been doing home exercises on her own that she learned in physical therapy.  She has been taking oral NSAIDs as needed.  The patient denies any fevers, chills, sweats, recent illnesses, numbness, tingling, weakness, or pain elsewhere at this time.\par \par 4/26/21: Jahaira is a pleasant 55 year old female who is being seen for follow-up for right knee pain.  The patient recently returned from visiting her family in [place] and would like to obtain the knee brace that we discussed over the phone the last time we spoke.  We discussed her MRI results which demonstrated a grade 2 MCL sprain.  Her pain has improved since the initial time of injury but she does have pain with ambulation at times.  She is taking occasional NSAIDs for pain which are mildly helpful.  She has her first therapy appointment scheduled for tomorrow.  The patient denies any fevers, chills, sweats, recent illnesses, numbness, tingling, weakness, or pain elsewhere at this time.

## 2022-11-10 NOTE — PHYSICAL EXAM
[de-identified] : General:\par Awake, alert, no acute distress, Patient was cooperative and appropriate during the examination.\par \par The patient's weight is of normal weight for height and age.\par \par Walks without an antalgic gait.\par \par Full, painless range of motion of the neck and back.\par \par Exam of the bilateral lower extremities is intact and symmetric with regards to dermatologic, vascular, and neurologic exam. Bilateral lower extremity sensation is grossly intact to light touch in the DP/SP/T/S/S nerve distributions. Intact DF/PF/EHL. BIlateral lower extremities warm and well-perfused with brisk capillary refill.\par \par \par Pulmonary:\par Regular, nonlabored breathing\par \par Abdomen:\par Soft, nontender, nondistended.\par \par Lymphatic:\par No evidence of axillary lymphadenopathy\par \par Bilateral Elbows Exam:\par Physical exam of the elbow demonstrates normal skin without signs of skin changes or abnormalities. No erythema, warmth, or joint effusion appreciated. \par  \par Sensation intact light touch C5-T1\par Palpable radial pulse\par Radial/ulnar/median/axillary/musculocutaneous/AIN/PIN nerves grossly intact\par  \par Range of motion:\par Flexion-Extension 0-130 with pain at terminal degrees of flexion \par Pronation-Supination 85-85 with pain at terminal degrees of supination\par  \par Palpation:\par Moderately tender to palpation over the lateral epicondyle and common extensor origin\par Moderately tender palpation over the medial epicondyle\par Not tender to palpation over the radial head\par Not tender to palpation over the olecranon\par Mildly tender to palpation over the distal biceps insertion\par Mildly tender to palpation over the distal triceps insertion\par Not tender to palpation over the cubital tunnel\par  \par Strength testing:\par Elbow Flexion 5/5\par Elbow Extension 5/5\par Pronation 5/5\par Supination 5/5\par  \par Special Tests:\par No varus/valgus laxity at 0 and 30 degrees of elbow flexion\par Moderate pain with resisted wrist/finger extension and forearm supination\par No pain with resisted wrist/finger flexion and forearm pronation\par Negative hook test\par Negative Tinel's over the carpal and cubital tunnels\par \par Bilateral Knee Examination:\par Physical examination of the knees demonstrates normal skin without signs of skin changes or abnormalities. No erythema, warmth, or joint effusion is appreciated. \par  \par Sensation is intact to light touch L2-S1\par Palpable DP/PT pulse\par EHL/FHL/TA/GSC motor function intact\par  \par Range of Motion\par 0 to 130 degrees bilaterally with mild discomfort throughout range of motion.\par  \par Strength Testing\par Quadriceps/Hamstrings 5/5 bilaterally\par Patient is able to perform a straight leg raise bilaterally without difficulty.\par  \par Palpation\par Not tender to palpation about the distal femurs, proximal tibias, or patellas\par No palpable defect appreciated in the quadriceps or patellar tendons\par Mildly tender to palpation of medial joint line of the right knee and nontender the left knee \par Mildly tender to palpation of lateral joint lines\par  \par Special Tests\par Anterior Drawer negative bilaterally\par Posterior Drawer negative bilaterally\par Lachman Exam negative bilaterally\par No Varus or Valgus Laxity at 0 or 30 degrees of knee flexion negative bilaterally\par Misty's Test negative bilaterally\par Active compression of the patella negative bilaterally\par Translation of the patella no hypermobility bilaterally\par  \par  \par  \par  \par  \par  \par   [de-identified] : MRI result of the left knee taken at a [facility] imaging facility on July 7, 2022 reveals a sprain of the femoral attachment of the LCL with mild cartilage irregularity of the medial patellar facet and no other acute findings.\par \par MRI of the right elbow from  was reviewed today with the patient in the office. There is a moderate to high-grade partial-thickness tear of the common extensor origin of the lateral epicondyle.\par \par X-rays including 3 views of the left elbow were obtained in the office and reviewed the patient today in 1/20/2022.  There is no acute fracture or dislocation.  There is no significant arthritis.\par \par X-rays including 3 views of the bilateral knees from my office on 11/22/2021 reviewed again today.  There is no acute fracture or dislocation.  There are minimal arthritic changes with mild medial joint space narrowing bilaterally.\par \par MRI of the right knee from  radiology on 3/17/2021 was reviewed the patient today in the office.  The patient does have a grade 2 sprain of the MCL.  There is a trace joint effusion.  There is a small popliteal cyst.  Mild patella chepe and lateral patellar tilt.  No fracture.

## 2022-11-14 LAB
BASOPHILS # BLD AUTO: 0.04 K/UL
BASOPHILS NFR BLD AUTO: 0.3 %
EOSINOPHIL # BLD AUTO: 0.29 K/UL
EOSINOPHIL NFR BLD AUTO: 2 %
HCT VFR BLD CALC: 43.7 %
HGB BLD-MCNC: 14 G/DL
IMM GRANULOCYTES NFR BLD AUTO: 0.3 %
LYMPHOCYTES # BLD AUTO: 2.65 K/UL
LYMPHOCYTES NFR BLD AUTO: 18.4 %
MAN DIFF?: NORMAL
MCHC RBC-ENTMCNC: 31.4 PG
MCHC RBC-ENTMCNC: 32 GM/DL
MCV RBC AUTO: 98 FL
MONOCYTES # BLD AUTO: 0.44 K/UL
MONOCYTES NFR BLD AUTO: 3.1 %
NEUTROPHILS # BLD AUTO: 10.9 K/UL
NEUTROPHILS NFR BLD AUTO: 75.9 %
PLATELET # BLD AUTO: 378 K/UL
RBC # BLD: 4.46 M/UL
RBC # FLD: 13.5 %
WBC # FLD AUTO: 14.37 K/UL

## 2022-11-15 ENCOUNTER — NON-APPOINTMENT (OUTPATIENT)
Age: 57
End: 2022-11-15

## 2022-11-15 LAB
APPEARANCE: ABNORMAL
BACTERIA UR CULT: NORMAL
BACTERIA: NEGATIVE
BILIRUBIN URINE: NEGATIVE
BLOOD URINE: ABNORMAL
COLOR: YELLOW
GLUCOSE QUALITATIVE U: NEGATIVE
HYALINE CASTS: 0 /LPF
KETONES URINE: NEGATIVE
LEUKOCYTE ESTERASE URINE: NEGATIVE
MICROSCOPIC-UA: NORMAL
NITRITE URINE: NEGATIVE
PH URINE: 5.5
PROTEIN URINE: NEGATIVE
RED BLOOD CELLS URINE: 12 /HPF
SPECIFIC GRAVITY URINE: >=1.03
SQUAMOUS EPITHELIAL CELLS: 5 /HPF
T(9;22)(ABL1,BCR)/CONTROL BLD/T: NORMAL
URINE COMMENTS: NORMAL
UROBILINOGEN URINE: NORMAL
WHITE BLOOD CELLS URINE: 1 /HPF

## 2022-11-17 NOTE — H&P PST ADULT - PROBLEM SELECTOR PLAN 5
PHYSICAL THERAPY NOTE          Patient Name: Jeanette Remy  URJYU'D Date: 11/17/2022 11/17/22 1134   PT Last Visit   PT Visit Date 11/17/22   Note Type   Note Type Treatment   Pain Assessment   Pain Assessment Tool 0-10   Pain Score No Pain   Restrictions/Precautions   Weight Bearing Precautions Per Order No   Other Precautions Fall Risk   General   Chart Reviewed Yes   Response to Previous Treatment Patient with no complaints from previous session  Family/Caregiver Present No   Cognition   Overall Cognitive Status WFL   Arousal/Participation Alert; Cooperative   Attention Within functional limits   Orientation Level Oriented X4   Memory Within functional limits   Following Commands Follows all commands and directions without difficulty   Comments pt very pleasant and cooperative to participate in therapy session   Bed Mobility   Supine to Sit Unable to assess   Sit to Supine Unable to assess   Additional Comments pt sitting OOB in recliner upon arrival  Pt returned to sitting OOB in recliner with all needs within reach   Transfers   Sit to Stand 6  Modified independent   Additional items Armrests   Stand to Sit 6  Modified independent   Additional items Armrests   Additional Comments transfers with RW   Ambulation/Elevation   Gait pattern Excessively slow; Short stride   Gait Assistance 5  Supervision   Assistive Device Rolling walker   Distance 2 x 80ft   Stair Management Assistance 5  Supervision   Stair Management Technique Two rails; Step to pattern; Foreward   Number of Stairs 2  (pt reporting no questions or concerns- limited by lines)   Balance   Static Sitting Fair +   Dynamic Sitting Fair +   Static Standing Fair   Dynamic Standing Fair   Ambulatory Fair   Activity Tolerance   Activity Tolerance Patient tolerated treatment well   Nurse Made Aware RN cleared pt to participate in therapy session   Assessment   Prognosis Good   Assessment Pt seen for PT treatment session this date  Therapy session focused on functional transfers, gait training and stair training in order to improve overall mobility and independence  Pt performing functional transfers at mod I level, ambulation at S level using RW and stair negotiation at S level using b/l handrails to mimic home setup  Pt steady on feet throughout trial- no LOB noted, demonstrates good safety awareness throughout session  Pt was left sitting OOB in recliner at the end of PT session with all needs in reach  Pt with no questions or concerns regarding d/c home reports  and son can provide assistance as needed  Pt appears to be functioning at/ near baseline mobility levels  Pt with no further acute inpatient PT needs at this time- PT to sign off- please re-consult if needed  PT to d/c pt and recommends home with HHPT + family support  The patient's AM-PAC Basic Mobility Inpatient Short Form Raw Score is 19  A Raw score of greater than 16 suggests the patient may benefit from discharge to home  Please also refer to the recommendation of the Physical Therapist for safe discharge planning  Goals   Patient Goals to take a shower   STG Expiration Date 11/28/22   PT Treatment Day 2   Plan   Treatment/Interventions Functional transfer training;LE strengthening/ROM; Elevations; Endurance training;Patient/family training;Equipment eval/education; Bed mobility;Gait training;Spoke to nursing;Spoke to case management   Progress Discontinue PT   Recommendation   PT Discharge Recommendation Home with home health rehabilitation  (+ family support)   AM-PAC Basic Mobility Inpatient   Turning in Bed Without Bedrails 3   Lying on Back to Sitting on Edge of Flat Bed 3   Moving Bed to Chair 4   Standing Up From Chair 3   Walk in Room 3   Climb 3-5 Stairs 3   Basic Mobility Inpatient Raw Score 19   Basic Mobility Standardized Score 42 48   Highest Level Of Mobility   -[facility] Goal 6: Walk 10 steps or more   -HLM Achieved 7: Walk 25 feet or more   Education   Education Provided Mobility training;Assistive device   Patient Demonstrates acceptance/verbal understanding   End of Consult   Patient Position at End of Consult Bedside chair; All needs within reach     Franklin Falcon, HAN, DPT Caprini Score 4 Moderate Risk, surgical team should consider VTE prophylaxis

## 2022-12-06 ENCOUNTER — APPOINTMENT (OUTPATIENT)
Dept: CARDIOLOGY | Facility: CLINIC | Age: 57
End: 2022-12-06

## 2022-12-08 ENCOUNTER — RX RENEWAL (OUTPATIENT)
Age: 57
End: 2022-12-08

## 2023-01-27 ENCOUNTER — OUTPATIENT (OUTPATIENT)
Dept: OUTPATIENT SERVICES | Facility: HOSPITAL | Age: 58
LOS: 1 days | Discharge: HOME | End: 2023-01-27

## 2023-01-27 DIAGNOSIS — Z90.722 ACQUIRED ABSENCE OF OVARIES, BILATERAL: Chronic | ICD-10-CM

## 2023-01-27 DIAGNOSIS — Z87.59 PERSONAL HISTORY OF OTHER COMPLICATIONS OF PREGNANCY, CHILDBIRTH AND THE PUERPERIUM: Chronic | ICD-10-CM

## 2023-01-27 DIAGNOSIS — D72.89 OTHER SPECIFIED DISORDERS OF WHITE BLOOD CELLS: ICD-10-CM

## 2023-02-06 ENCOUNTER — APPOINTMENT (OUTPATIENT)
Dept: HEMATOLOGY ONCOLOGY | Facility: CLINIC | Age: 58
End: 2023-02-06
Payer: MEDICARE

## 2023-02-06 ENCOUNTER — LABORATORY RESULT (OUTPATIENT)
Age: 58
End: 2023-02-06

## 2023-02-06 ENCOUNTER — RESULT REVIEW (OUTPATIENT)
Age: 58
End: 2023-02-06

## 2023-02-06 VITALS
DIASTOLIC BLOOD PRESSURE: 90 MMHG | HEIGHT: 66 IN | HEART RATE: 90 BPM | WEIGHT: 197.01 LBS | BODY MASS INDEX: 31.66 KG/M2 | SYSTOLIC BLOOD PRESSURE: 139 MMHG | OXYGEN SATURATION: 98 %

## 2023-02-06 LAB
BASOPHILS # BLD AUTO: 0.1 K/UL — SIGNIFICANT CHANGE UP (ref 0–0.2)
BASOPHILS NFR BLD AUTO: 0.5 % — SIGNIFICANT CHANGE UP (ref 0–2)
EOSINOPHIL # BLD AUTO: 0.4 K/UL — SIGNIFICANT CHANGE UP (ref 0–0.5)
EOSINOPHIL NFR BLD AUTO: 2.3 % — SIGNIFICANT CHANGE UP (ref 0–6)
HCT VFR BLD CALC: 39.5 % — SIGNIFICANT CHANGE UP (ref 34.5–45)
HGB BLD-MCNC: 13.6 G/DL — SIGNIFICANT CHANGE UP (ref 11.5–15.5)
LYMPHOCYTES # BLD AUTO: 15.7 % — SIGNIFICANT CHANGE UP (ref 13–44)
LYMPHOCYTES # BLD AUTO: 2.5 K/UL — SIGNIFICANT CHANGE UP (ref 1–3.3)
MCHC RBC-ENTMCNC: 32.4 PG — SIGNIFICANT CHANGE UP (ref 27–34)
MCHC RBC-ENTMCNC: 34.5 G/DL — SIGNIFICANT CHANGE UP (ref 32–36)
MCV RBC AUTO: 93.8 FL — SIGNIFICANT CHANGE UP (ref 80–100)
MONOCYTES # BLD AUTO: 0.6 K/UL — SIGNIFICANT CHANGE UP (ref 0–0.9)
MONOCYTES NFR BLD AUTO: 4 % — SIGNIFICANT CHANGE UP (ref 2–14)
NEUTROPHILS # BLD AUTO: 12.3 K/UL — HIGH (ref 1.8–7.4)
NEUTROPHILS NFR BLD AUTO: 77.5 % — HIGH (ref 43–77)
PLATELET # BLD AUTO: 310 K/UL — SIGNIFICANT CHANGE UP (ref 150–400)
RBC # BLD: 4.21 M/UL — SIGNIFICANT CHANGE UP (ref 3.8–5.2)
RBC # FLD: 12.3 % — SIGNIFICANT CHANGE UP (ref 10.3–14.5)
WBC # BLD: 15.8 K/UL — HIGH (ref 3.8–10.5)
WBC # FLD AUTO: 15.8 K/UL — HIGH (ref 3.8–10.5)

## 2023-02-06 PROCEDURE — 99212 OFFICE O/P EST SF 10 MIN: CPT

## 2023-02-06 RX ORDER — MELOXICAM 15 MG/1
15 TABLET ORAL
Qty: 30 | Refills: 2 | Status: DISCONTINUED | COMMUNITY
Start: 2022-05-10 | End: 2023-02-06

## 2023-02-06 RX ORDER — CYCLOBENZAPRINE HYDROCHLORIDE 10 MG/1
10 TABLET, FILM COATED ORAL TWICE DAILY
Qty: 180 | Refills: 0 | Status: DISCONTINUED | COMMUNITY
Start: 2022-05-09 | End: 2023-02-06

## 2023-02-06 NOTE — HISTORY OF PRESENT ILLNESS
[de-identified] : Ms. Dumont is a 56 yo AAF referred by Dr. Kleiner for evaluation of a leucocytosis.Review of prior CBC show that her WBC count has been elevated since at least 2018. WBC count has ranged between 11.6 - 16.8, Always with an elevated neutrophil count. ANC ranges between 8-12. HGB has been normal, HCT has been intermittently elevated ranging between 39-50. Only one platelet count was elevated at 490,000, in Sept 2018.\par  She states that she has recurrent infections, and that she has intermittently been on oral steroids. She is being followed by Dr. Kleiner from Rheumatology for fibromyalgia, currently on NSAIDS. \par  BCR -ABL and flow cytometry were negative. [de-identified] : She is in constant pain, joints, muscle and chronic back pain, sciatica. Being followed by Pain management, was supposed to have an epidural injection but this was postponed. She was using Tramadol, but she ran out and this was not helping.No recent infections.

## 2023-02-06 NOTE — ASSESSMENT
[FreeTextEntry1] : Ms. Dumont is a 56 yo AAF referred by Dr. Kleiner for evaluation of a leucocytosis.Review of prior CBC show that her WBC count has been elevated since at least 2018. WBC count has ranged between 11.6 - 16.8, Always with an elevated neutrophil count. ANC ranges between 8-12. HGB has been normal, HCT has been intermittently elevated ranging between 39-50. Only one platelet count was elevated at 490,000, in Sept 2018.\par  She states that she has recurrent infections, and that she has intermittently been on oral steroids. She is being followed by Dr. Kleiner from Rheumatology for fibromyalgia, currently on NSAIDS. \par  BCR -ABL and flow cytometry were negative.\par  Today her CBC shows a WBC count of 15.8, HGB-13, HCT-39, Plat-310,000, ANC-12.3. Tp complete the work up for leucocytosis, will send off a MPD work up today.\par Patient has concerns about blood clots, because she has pain in her LE, I explained that if it is chroinic pain, it probably is not an acute DVT, but will send off a D. Dimer to R/O acute clot. Has been seen by vascular in the past.\par  For now, RTO in 3-4 months.

## 2023-02-07 ENCOUNTER — APPOINTMENT (OUTPATIENT)
Dept: OBGYN | Facility: CLINIC | Age: 58
End: 2023-02-07
Payer: MEDICARE

## 2023-02-07 VITALS
HEIGHT: 66 IN | BODY MASS INDEX: 31.66 KG/M2 | DIASTOLIC BLOOD PRESSURE: 80 MMHG | WEIGHT: 197 LBS | SYSTOLIC BLOOD PRESSURE: 126 MMHG

## 2023-02-07 DIAGNOSIS — Z12.39 ENCOUNTER FOR OTHER SCREENING FOR MALIGNANT NEOPLASM OF BREAST: ICD-10-CM

## 2023-02-07 DIAGNOSIS — Z87.39 PERSONAL HISTORY OF OTHER DISEASES OF THE MUSCULOSKELETAL SYSTEM AND CONNECTIVE TISSUE: ICD-10-CM

## 2023-02-07 DIAGNOSIS — Z86.018 PERSONAL HISTORY OF OTHER BENIGN NEOPLASM: ICD-10-CM

## 2023-02-07 DIAGNOSIS — Z86.2 PERSONAL HISTORY OF DISEASES OF THE BLOOD AND BLOOD-FORMING ORGANS AND CERTAIN DISORDERS INVOLVING THE IMMUNE MECHANISM: ICD-10-CM

## 2023-02-07 DIAGNOSIS — M15.9 POLYOSTEOARTHRITIS, UNSPECIFIED: ICD-10-CM

## 2023-02-07 PROCEDURE — 99396 PREV VISIT EST AGE 40-64: CPT

## 2023-02-07 PROCEDURE — 99212 OFFICE O/P EST SF 10 MIN: CPT | Mod: 25

## 2023-02-07 RX ORDER — ADHESIVE TAPE 3"X 2.3 YD
50 MCG TAPE, NON-MEDICATED TOPICAL
Qty: 30 | Refills: 0 | Status: DISCONTINUED | COMMUNITY
Start: 2022-10-09 | End: 2023-02-07

## 2023-02-07 NOTE — REASON FOR VISIT
[Annual] : an annual visit. [FreeTextEntry2] : The patient presents complaining of a painful lesion.

## 2023-02-07 NOTE — HISTORY OF PRESENT ILLNESS
[N] : Patient is not sexually active [Menarche Age: ____] : age at menarche was [unfilled] [PGHxTotal] : 4 [[facility]] : 2 [PGHxPremature] : 0 [PGHxAbortions] : 0 [[facility]Living] : 2 [PGHxABInduced] : 0 [PGHxABSpont] : 0 [PGxEctopic] : 2 [PGHxMultBirths] : 0

## 2023-02-08 ENCOUNTER — APPOINTMENT (OUTPATIENT)
Dept: RHEUMATOLOGY | Facility: CLINIC | Age: 58
End: 2023-02-08
Payer: MEDICARE

## 2023-02-08 VITALS
WEIGHT: 197 LBS | HEART RATE: 97 BPM | DIASTOLIC BLOOD PRESSURE: 80 MMHG | TEMPERATURE: 98 F | HEIGHT: 66 IN | BODY MASS INDEX: 31.66 KG/M2 | SYSTOLIC BLOOD PRESSURE: 130 MMHG | OXYGEN SATURATION: 99 % | RESPIRATION RATE: 17 BRPM

## 2023-02-08 DIAGNOSIS — M25.511 PAIN IN RIGHT SHOULDER: ICD-10-CM

## 2023-02-08 DIAGNOSIS — M48.061 SPINAL STENOSIS, LUMBAR REGION WITHOUT NEUROGENIC CLAUDICATION: ICD-10-CM

## 2023-02-08 DIAGNOSIS — M25.512 PAIN IN RIGHT SHOULDER: ICD-10-CM

## 2023-02-08 DIAGNOSIS — G89.29 PAIN IN RIGHT SHOULDER: ICD-10-CM

## 2023-02-08 PROCEDURE — 36415 COLL VENOUS BLD VENIPUNCTURE: CPT

## 2023-02-08 PROCEDURE — G2212 PROLONG OUTPT/OFFICE VIS: CPT

## 2023-02-08 PROCEDURE — 99215 OFFICE O/P EST HI 40 MIN: CPT | Mod: 25

## 2023-02-08 NOTE — REVIEW OF SYSTEMS
[Feeling Tired] : feeling tired [Arthralgias] : arthralgias [Joint Pain] : joint pain [Joint Stiffness] : joint stiffness [As Noted in HPI] : as noted in HPI [Negative] : Heme/Lymph

## 2023-02-09 ENCOUNTER — APPOINTMENT (OUTPATIENT)
Dept: FAMILY MEDICINE | Facility: CLINIC | Age: 58
End: 2023-02-09
Payer: MEDICARE

## 2023-02-09 VITALS
SYSTOLIC BLOOD PRESSURE: 140 MMHG | HEART RATE: 85 BPM | RESPIRATION RATE: 16 BRPM | TEMPERATURE: 97.9 F | BODY MASS INDEX: 31.5 KG/M2 | OXYGEN SATURATION: 98 % | DIASTOLIC BLOOD PRESSURE: 80 MMHG | HEIGHT: 66 IN | WEIGHT: 196 LBS

## 2023-02-09 DIAGNOSIS — Z23 ENCOUNTER FOR IMMUNIZATION: ICD-10-CM

## 2023-02-09 DIAGNOSIS — G89.29 CERVICALGIA: ICD-10-CM

## 2023-02-09 DIAGNOSIS — M54.2 CERVICALGIA: ICD-10-CM

## 2023-02-09 LAB
DEPRECATED D DIMER PPP IA-ACNC: <150 NG/ML DDU
GENE XXX MUT ANL BLD/T: NORMAL
MPL EXON 10 MUTATION: NORMAL

## 2023-02-09 PROCEDURE — 99214 OFFICE O/P EST MOD 30 MIN: CPT | Mod: 25

## 2023-02-09 PROCEDURE — 90471 IMMUNIZATION ADMIN: CPT

## 2023-02-09 PROCEDURE — 90715 TDAP VACCINE 7 YRS/> IM: CPT

## 2023-02-09 RX ORDER — HYDROCORTISONE 25 MG/G
2.5 CREAM TOPICAL
Qty: 2 | Refills: 5 | Status: DISCONTINUED | COMMUNITY
Start: 2022-08-09 | End: 2023-02-09

## 2023-02-09 RX ORDER — PROMETHAZINE HYDROCHLORIDE 6.25 MG/5ML
6.25 SOLUTION ORAL TWICE DAILY
Qty: 1 | Refills: 0 | Status: DISCONTINUED | COMMUNITY
Start: 2022-11-03 | End: 2023-02-09

## 2023-02-09 RX ORDER — FLURBIPROFEN 100 MG
100 TABLET ORAL
Qty: 270 | Refills: 0 | Status: DISCONTINUED | COMMUNITY
Start: 2022-06-05 | End: 2023-02-09

## 2023-02-09 NOTE — HEALTH RISK ASSESSMENT
[Yes] : Yes [No] : In the past 12 months have you used drugs other than those required for medical reasons? No [No falls in past year] : Patient reported no falls in the past year [2] : 2) Feeling down, depressed, or hopeless for more than half of the days (2) [PHQ-2 Positive] : PHQ-2 Positive [I have developed a follow-up plan documented below in the note.] : I have developed a follow-up plan documented below in the note. [de-identified] : GI, Orthopedic, Gyn, Neurology, Rheumatology, Hematology [de-identified] : beer ocassional [[id number]] : 4

## 2023-02-09 NOTE — REVIEW OF SYSTEMS
[Patient Intake Form Reviewed] : Patient intake form was reviewed [de-identified] : lump in left dorsal foot

## 2023-02-09 NOTE — HISTORY OF PRESENT ILLNESS
[FreeTextEntry1] : \par f/up\par meds refills [de-identified] : 58 y/o AAF presents today for f/up.\par Previous PCP: Kenya Cook\par Pt with hx depression , on Vistaril  and Cymbalta, HTN on Diltiazem, Dyslipidemia on Atorvastatin, Asthma on Breo, Chronic Cervicalgia, low back pain and Knee OA. She was seen by Orthopedic for RT elbow pain and knee pain. \par She reports multiple joints aches and was seen by Rheumatology eval.\par She was seen by psychology in [place] and now is awaiting for new placement\par Seen at [facility] for Depression in the past.\par Pt seen by Allergist: Matias Bailey\par Seen by cardiology, Dr Whittaker\par Pulmonology: Dr Morales\par GI: Dr Ritter\par She was seen by Hematology for evaluation of Leukocytosis.\par Pt denies any urinary symptoms.\par \par \par

## 2023-02-09 NOTE — ASSESSMENT
[FreeTextEntry1] : HCM:\par -06/20\par -HIV screening: negative 2020\par \par Gyn: Dr gaitan\par Mammo: 2022\par Colonoscopy: 2019./ F/up with Dr Ritter\par Immunizations: -Shingrix: UTD as per pt.\par -Refused Flu shot\par -Tdap today 2/09/23\par \par # HTN:\par -on Cardizem.\par -F/up with cardiology, Dr medley.\par \par # Dyslipidemia:\par -On Atorvastatin\par \par # Asthma:\par -On Breo.\par -F/up with Pulmonology Dr ariza\par \par # GERD:\par -On Pantoprazole.\par -GI: Dr Ritter\par \par # Chronic lumbalgia/ Cervicalgia/ Knee OA;RT elbow tendinitis/ Polyarthralgia\par -Seen by Orthopedic.\par -On Ibuprofen prn and methocarbamol\par -Rheumatology eval on 10/07/22 appreciated\par \par #  depression with anxiety\par -Continue Cymbalta.\par -Continue Vistaril prn.\par -Seen in the past at family [facility].\par - telephonic eval with Dr Rios for counseling / psychiatry\par \par # Vit B12 def:\par -B12 shot today\par \par # CystIN RT LQ\par -Sx referral\par \par # Leukocytosis\par -hematology eval appreciated\par F/up for annual physical or prn.\par \par

## 2023-02-10 ENCOUNTER — RX RENEWAL (OUTPATIENT)
Age: 58
End: 2023-02-10

## 2023-02-12 ENCOUNTER — RX RENEWAL (OUTPATIENT)
Age: 58
End: 2023-02-12

## 2023-02-12 PROBLEM — M48.061 LUMBAR SPINAL STENOSIS: Status: RESOLVED | Noted: 2022-10-28 | Resolved: 2023-02-12

## 2023-02-12 LAB
ALBUMIN SERPL ELPH-MCNC: 4.6 G/DL
ALP BLD-CCNC: 104 U/L
ALT SERPL-CCNC: 29 U/L
ANION GAP SERPL CALC-SCNC: 16 MMOL/L
AST SERPL-CCNC: 24 U/L
BILIRUB SERPL-MCNC: 0.4 MG/DL
BUN SERPL-MCNC: 9 MG/DL
CALCIUM SERPL-MCNC: 9.9 MG/DL
CHLORIDE SERPL-SCNC: 101 MMOL/L
CK SERPL-CCNC: 40 U/L
CO2 SERPL-SCNC: 23 MMOL/L
CREAT SERPL-MCNC: 0.62 MG/DL
CRP SERPL-MCNC: 7 MG/L
EGFR: 105 ML/MIN/1.73M2
ERYTHROCYTE [SEDIMENTATION RATE] IN BLOOD BY WESTERGREN METHOD: 36 MM/HR
GLUCOSE SERPL-MCNC: 90 MG/DL
LDH SERPL-CCNC: 193 U/L
PHOSPHATE SERPL-MCNC: 3.3 MG/DL
POTASSIUM SERPL-SCNC: 3.5 MMOL/L
PROT SERPL-MCNC: 7.4 G/DL
SODIUM SERPL-SCNC: 140 MMOL/L

## 2023-02-12 RX ORDER — ADHESIVE TAPE 3"X 2.3 YD
50 MCG TAPE, NON-MEDICATED TOPICAL
Qty: 30 | Refills: 0 | Status: ACTIVE | COMMUNITY
Start: 2023-02-12

## 2023-02-12 RX ORDER — MILNACIPRAN HYDROCHLORIDE 50 MG/1
50 TABLET, FILM COATED ORAL
Qty: 180 | Refills: 0 | Status: DISCONTINUED | COMMUNITY
Start: 2022-10-09 | End: 2023-02-12

## 2023-02-12 RX ORDER — MILNACIPRAN HYDROCHLORIDE 12.5-25-5
12.5 & 25 & 5 KIT ORAL
Qty: 1 | Refills: 0 | Status: DISCONTINUED | COMMUNITY
Start: 2022-10-09 | End: 2023-02-12

## 2023-02-12 RX ORDER — ERGOCALCIFEROL 1.25 MG/1
1.25 MG CAPSULE, LIQUID FILLED ORAL
Qty: 24 | Refills: 0 | Status: DISCONTINUED | COMMUNITY
Start: 2022-06-27 | End: 2023-02-12

## 2023-02-12 NOTE — ASSESSMENT
[FreeTextEntry1] : Impression: JAHAIRA MACKEY is a 57 year old woman who presents for follow up office visit for further evaluation of joint symptoms and rheumatic diseases including right carpal tunnel syndrome, fibromyalgia, osteoarthritis and chronic low back pain/lumbar spinal stenosis.\par \par Patient has intermittent arthralgias secondary to a combination of her osteoarthritis and fibromyalgia with much sleep disturbance and fatigue. On exam, patient has right bicipital tendonitis, bilateral greater trochanteric bursitis and anserine bursitis contributing to her joint pain. She has neck pain with radiation to the left upper extremity to the fingers with paresthesias secondary to her osteoarthritis and chronic neck pain with cervical radiculopathy - consider bilateral carpal tunnel syndrome contributing to the symptoms. Recent MRI Cervical Spine revealed osteoarthritis and spinal stenosis. She also has low back pain with radiation to bilateral lower extremities to the knees secondary to her osteoarthritis and chronic low back pain with LS radiculopathy secondary to her spinal stenosis and osteoarthritis. Recent MRI LS Spine revealed osteoarthritis and spinal stenosis. Occasional buckling bilateral knees.  The flurbiprofen and the current dose of Savella are not giving her adequate relief.  Patient denies rash or side effects with current medications. Patient is content with current medication regimen. \par \par Plan: I reviewed her chart and previous records\par  I reviewed recent lab results with patient with extensive discussion\par I reviewed recent MRI Cervical Spine results with patient with extensive discussion\par I reviewed recent MRI LS Spine results with patient with extensive discussion \par Laboratory tests ordered - see list below - with coordination of care \par Diagnosis and prognosis discussed\par Continue current medications (other than those changed below)\par Increase Savella 100 mg b.i.d. end of meals (Possible side effects explained)\par Discontinue Flurbiprofen \par Diclofenac 75 mg b.i.d. end of breakfast and supper (Possible side effects explained including cardiovascular risk/MI/CVA)\par Continue Prophylactic aspirin 81 mg q.d. at end of lunch (possible side effects explained) \par Gradually increase Daily exercise  to at least 30 minutes per day - emphasized --extensive discussion\par Physical Therapy - patient declined \par Return visit 3 months\par Total time for this office visit, including face-to-face time and non-face-to-face time, 85 minutes--- including review of the chart and previous records, review of previous lab results with extensive discussion with the patient, ordering lab tests with coordination of care, review of recent imaging reports/x-ray results with extensive discussion with the patient, review of recent MRI cervical spine results with extensive discussion, review of recent MRI LS spine results with extensive discussion, detailed medication history, review of medications going forward with their possible side effects, reviewed the impact of the patient's rheumatic disease on their other medical problems, reviewed the impact of the patient's other medical problems on their rheumatic disease

## 2023-02-12 NOTE — CONSULT LETTER
[Dear  ___] : Dear  [unfilled], [Consult Letter:] : I had the pleasure of evaluating your patient, [unfilled]. [Please see my note below.] : Please see my note below. [Consult Closing:] : Thank you very much for allowing me to participate in the care of this patient.  If you have any questions, please do not hesitate to contact me. [Sincerely,] : Sincerely, [DrLesly  ___] : Dr. GARCIA [FreeTextEntry3] : Addy\par Myron I. Kleiner, M.D., FACR\par Chief, Division of Rheumatology\par Department of Medicine\par [facility]

## 2023-02-12 NOTE — HISTORY OF PRESENT ILLNESS
[FreeTextEntry1] : JAHAIRA MACKEY is a 57 year old woman who presents for follow up office visit for further evaluation of joint symptoms and rheumatic diseases including right carpal tunnel syndrome, fibromyalgia, osteoarthritis and chronic low back pain.\par \par Patient has intermittent pain "all over" including bilateral shoulders, elbows, hips, knees, feet, neck with radiation to the left upper extremity to the fingers with paresthesias and low back pain with radiation to bilateral lower extremities to the knees. Occasional buckling bilateral knees. Much sleep disturbance and fatigue. Pain management planning to treat with epidural steroid injections. Patient denies rash or side effects with current medications. Patient is content with current medication regimen. \par \par PMH:\par Hematology consultation - monitoring

## 2023-02-24 LAB
EXTRACTION: NORMAL
JAK2 P.V617F BLD/T QL: NORMAL
LAB DIRECTOR NAME PROVIDER: NORMAL
LABORATORY COMMENT REPORT: NORMAL
REFLEX:: NORMAL

## 2023-03-05 ENCOUNTER — RX RENEWAL (OUTPATIENT)
Age: 58
End: 2023-03-05

## 2023-04-11 ENCOUNTER — APPOINTMENT (OUTPATIENT)
Dept: GASTROENTEROLOGY | Facility: CLINIC | Age: 58
End: 2023-04-11

## 2023-05-21 ENCOUNTER — OUTPATIENT (OUTPATIENT)
Dept: OUTPATIENT SERVICES | Facility: HOSPITAL | Age: 58
LOS: 1 days | Discharge: HOME | End: 2023-05-21

## 2023-05-21 DIAGNOSIS — Z87.59 PERSONAL HISTORY OF OTHER COMPLICATIONS OF PREGNANCY, CHILDBIRTH AND THE PUERPERIUM: Chronic | ICD-10-CM

## 2023-05-21 DIAGNOSIS — Z90.722 ACQUIRED ABSENCE OF OVARIES, BILATERAL: Chronic | ICD-10-CM

## 2023-05-21 DIAGNOSIS — D72.89 OTHER SPECIFIED DISORDERS OF WHITE BLOOD CELLS: ICD-10-CM

## 2023-06-05 ENCOUNTER — RESULT REVIEW (OUTPATIENT)
Age: 58
End: 2023-06-05

## 2023-06-05 ENCOUNTER — APPOINTMENT (OUTPATIENT)
Dept: HEMATOLOGY ONCOLOGY | Facility: CLINIC | Age: 58
End: 2023-06-05
Payer: MEDICARE

## 2023-06-05 VITALS
WEIGHT: 195 LBS | BODY MASS INDEX: 31.34 KG/M2 | HEIGHT: 66 IN | SYSTOLIC BLOOD PRESSURE: 118 MMHG | TEMPERATURE: 97.4 F | DIASTOLIC BLOOD PRESSURE: 76 MMHG | HEART RATE: 89 BPM | OXYGEN SATURATION: 98 %

## 2023-06-05 DIAGNOSIS — K08.89 OTHER SPECIFIED DISORDERS OF TEETH AND SUPPORTING STRUCTURES: ICD-10-CM

## 2023-06-05 DIAGNOSIS — D72.829 ELEVATED WHITE BLOOD CELL COUNT, UNSPECIFIED: ICD-10-CM

## 2023-06-05 LAB
BASOPHILS # BLD AUTO: 0 K/UL — SIGNIFICANT CHANGE UP (ref 0–0.2)
BASOPHILS NFR BLD AUTO: 0.4 % — SIGNIFICANT CHANGE UP (ref 0–2)
EOSINOPHIL # BLD AUTO: 0.3 K/UL — SIGNIFICANT CHANGE UP (ref 0–0.5)
EOSINOPHIL NFR BLD AUTO: 2.6 % — SIGNIFICANT CHANGE UP (ref 0–6)
HCT VFR BLD CALC: 42.3 % — SIGNIFICANT CHANGE UP (ref 34.5–45)
HGB BLD-MCNC: 14.3 G/DL — SIGNIFICANT CHANGE UP (ref 11.5–15.5)
LYMPHOCYTES # BLD AUTO: 16 % — SIGNIFICANT CHANGE UP (ref 13–44)
LYMPHOCYTES # BLD AUTO: 2 K/UL — SIGNIFICANT CHANGE UP (ref 1–3.3)
MCHC RBC-ENTMCNC: 32.1 PG — SIGNIFICANT CHANGE UP (ref 27–34)
MCHC RBC-ENTMCNC: 33.8 G/DL — SIGNIFICANT CHANGE UP (ref 32–36)
MCV RBC AUTO: 95.1 FL — SIGNIFICANT CHANGE UP (ref 80–100)
MONOCYTES # BLD AUTO: 0.4 K/UL — SIGNIFICANT CHANGE UP (ref 0–0.9)
MONOCYTES NFR BLD AUTO: 3 % — SIGNIFICANT CHANGE UP (ref 2–14)
NEUTROPHILS # BLD AUTO: 9.9 K/UL — HIGH (ref 1.8–7.4)
NEUTROPHILS NFR BLD AUTO: 78 % — HIGH (ref 43–77)
PLATELET # BLD AUTO: 294 K/UL — SIGNIFICANT CHANGE UP (ref 150–400)
RBC # BLD: 4.45 M/UL — SIGNIFICANT CHANGE UP (ref 3.8–5.2)
RBC # FLD: 12 % — SIGNIFICANT CHANGE UP (ref 10.3–14.5)
WBC # BLD: 12.7 K/UL — HIGH (ref 3.8–10.5)
WBC # FLD AUTO: 12.7 K/UL — HIGH (ref 3.8–10.5)

## 2023-06-05 PROCEDURE — 99212 OFFICE O/P EST SF 10 MIN: CPT

## 2023-06-05 RX ORDER — CEPHALEXIN 500 MG/1
500 CAPSULE ORAL TWICE DAILY
Qty: 14 | Refills: 0 | Status: DISCONTINUED | COMMUNITY
Start: 2023-02-07 | End: 2023-06-05

## 2023-06-05 RX ORDER — DOXYCYCLINE HYCLATE 100 MG/1
100 TABLET ORAL
Qty: 30 | Refills: 0 | Status: DISCONTINUED | COMMUNITY
Start: 2023-02-09

## 2023-06-05 RX ORDER — METRONIDAZOLE 7.5 MG/G
0.75 GEL TOPICAL
Qty: 45 | Refills: 0 | Status: DISCONTINUED | COMMUNITY
Start: 2023-02-09

## 2023-06-05 NOTE — HISTORY OF PRESENT ILLNESS
[de-identified] : \par Ms. Dumont is a 59 yo AAF referred by Dr. Kleiner for evaluation of a leucocytosis.Review of prior CBC show that her WBC count has been elevated since at least 2018. WBC count has ranged between 11.6 - 16.8, Always with an elevated neutrophil count. ANC ranges between 8-12. HGB has been normal, HCT has been intermittently elevated ranging between 39-50. Only one platelet count was elevated at 490,000, in Sept 2018.\par  She states that she has recurrent infections, and that she has intermittently been on oral steroids. She is being followed by Dr. Kleiner from Rheumatology for fibromyalgia, currently on NSAIDS. \par  BCR -ABL and flow cytometry were negative.  [de-identified] : Still has generalized body aches,  no evidence of active infection at present time.She was on antibiotics a few weeks ago for skin abscesses. Currently has dental pain and is scheduled for an extraction.

## 2023-06-05 NOTE — REVIEW OF SYSTEMS
[Diarrhea: Grade 0] : Diarrhea: Grade 0 [Joint Pain] : joint pain [Joint Stiffness] : joint stiffness [Muscle Pain] : muscle pain [Negative] : Allergic/Immunologic [FreeTextEntry4] : dental pain

## 2023-06-05 NOTE — ASSESSMENT
[FreeTextEntry1] : \par Ms. Dumont is a 59 yo AAF referred by Dr. Kleiner for evaluation of a leucocytosis.Review of prior CBC show that her WBC count has been elevated since at least 2018. WBC count has ranged between 11.6 - 16.8, Always with an elevated neutrophil count. ANC ranges between 8-12. HGB has been normal, HCT has been intermittently elevated ranging between 39-50. Only one platelet count was elevated at 490,000, in Sept 2018.\par  She states that she has recurrent infections, and that she has intermittently been on oral steroids. She is being followed by Dr. Kleiner from Rheumatology for fibromyalgia, currently on NSAIDS. \par  BCR -ABL and flow cytometry were negative. \par  Today her CBC shows WBC-12.7, HGB-14.3, HCT-42.3, Plat-294,000. ANC-9.9.\par  I have done a MPD work up, MPL, CALR and Brian v167F were negative, Having issues getting Exon 12/13 results due to lab issues. I am repeating this today.\par If this comes back negative, then leucocytosis is probably reactive to recurrent infections and inflammatory stes.\par I will contact her with results. F/U will depend on results.

## 2023-06-07 ENCOUNTER — APPOINTMENT (OUTPATIENT)
Dept: PULMONOLOGY | Facility: CLINIC | Age: 58
End: 2023-06-07
Payer: MEDICARE

## 2023-06-07 ENCOUNTER — APPOINTMENT (OUTPATIENT)
Dept: FAMILY MEDICINE | Facility: CLINIC | Age: 58
End: 2023-06-07
Payer: MEDICARE

## 2023-06-07 VITALS
OXYGEN SATURATION: 98 % | HEIGHT: 66 IN | RESPIRATION RATE: 16 BRPM | BODY MASS INDEX: 31.34 KG/M2 | WEIGHT: 195 LBS | HEART RATE: 87 BPM | SYSTOLIC BLOOD PRESSURE: 122 MMHG | DIASTOLIC BLOOD PRESSURE: 74 MMHG

## 2023-06-07 VITALS
RESPIRATION RATE: 14 BRPM | SYSTOLIC BLOOD PRESSURE: 130 MMHG | HEIGHT: 66 IN | HEART RATE: 94 BPM | DIASTOLIC BLOOD PRESSURE: 80 MMHG | BODY MASS INDEX: 31.34 KG/M2 | OXYGEN SATURATION: 97 % | WEIGHT: 195 LBS

## 2023-06-07 DIAGNOSIS — M25.50 PAIN IN UNSPECIFIED JOINT: ICD-10-CM

## 2023-06-07 DIAGNOSIS — Z87.891 PERSONAL HISTORY OF NICOTINE DEPENDENCE: ICD-10-CM

## 2023-06-07 DIAGNOSIS — B37.2 CANDIDIASIS OF SKIN AND NAIL: ICD-10-CM

## 2023-06-07 PROCEDURE — 99214 OFFICE O/P EST MOD 30 MIN: CPT

## 2023-06-07 RX ORDER — BECLOMETHASONE DIPROPIONATE HFA 40 UG/1
40 AEROSOL, METERED RESPIRATORY (INHALATION)
Qty: 10.6 | Refills: 3 | Status: DISCONTINUED | COMMUNITY
Start: 2022-11-03 | End: 2023-06-07

## 2023-06-07 NOTE — HEALTH RISK ASSESSMENT
[Yes] : Yes [No] : In the past 12 months have you used drugs other than those required for medical reasons? No [No falls in past year] : Patient reported no falls in the past year [2] : 2) Feeling down, depressed, or hopeless for more than half of the days (2) [PHQ-2 Positive] : PHQ-2 Positive [I have developed a follow-up plan documented below in the note.] : I have developed a follow-up plan documented below in the note. [Former] : Former [de-identified] : GI, Orthopedic, Gyn, Neurology, Rheumatology, Hematology [de-identified] : beer ocassional [[id number]] : 4

## 2023-06-07 NOTE — PHYSICAL EXAM
[General Appearance - Well Developed] : well developed [General Appearance - Well Nourished] : well nourished [General Appearance - In No Acute Distress] : no acute distress [Normal Conjunctiva] : the conjunctiva exhibited no abnormalities [Neck Appearance] : the appearance of the neck was normal [] : the neck was supple [Neck Cervical Mass (___cm)] : no neck mass was observed [Jugular Venous Distention Increased] : there was no jugular-venous distention [Heart Rate And Rhythm] : heart rate and rhythm were normal [Heart Sounds] : normal S1 and S2 [Arterial Pulses Normal] : the arterial pulses were normal [Bowel Sounds] : normal bowel sounds [Abdomen Soft] : soft [Abdomen Tenderness] : non-tender [Abnormal Walk] : normal gait [Skin Color & Pigmentation] : normal skin color and pigmentation [Skin Turgor] : normal skin turgor [Cranial Nerves] : cranial nerves 2-12 were intact [No Focal Deficits] : no focal deficits [Oriented To Time, Place, And Person] : oriented to person, place, and time [Impaired Insight] : insight and judgment were intact [Affect] : the affect was normal [Normal Oral Mucosa] : normal oral mucosa [Normal Oropharynx] : normal oropharynx [Normal Rate] : the respiratory rate was normal [Rate ___] : at [unfilled] breaths per minute [Normal Rhythm/Effort] : normal respiratory rhythm and effort [Clear Bilaterally] : the lungs were clear to auscultation bilaterally [Normal Breath Sounds] : normal bilateral breath sounds [Rales / Crackles Bilateral] : no rales or crackles were heard [Wheezing Bilaterally] : no wheezing was heard [Rhonchi Bilateral] : no rhonchi were heard [Decreased Breath Sounds] : breath sounds were not diminished [Nail Clubbing] : no clubbing of the fingernails [Cyanosis, Localized] : no localized cyanosis [Acc Muscles Use] : no accessory muscle use [Air Hunger] : no air hunger [Dry Cough] : no dry cough [Distress] : no respiratory distress [Wet Cough] : no wet cough [Paroxysmal Cough] : no paroxysmal cough

## 2023-06-07 NOTE — CONSULT LETTER
[Dear  ___] : Dear  [unfilled], [Courtesy Letter:] : I had the pleasure of seeing your patient, [unfilled], in my office today. [Consult Closing:] : Thank you very much for allowing me to participate in the care of this patient.  If you have any questions, please do not hesitate to contact me. [DrLesly  ___] : Dr. GARCIA [Francois Morales MD] : Francois Morales MD

## 2023-06-07 NOTE — REVIEW OF SYSTEMS
[Fatigue] : fatigue [Poor Appetite] : poor appetite [Postnasal Drip] : postnasal drip [Cough] : cough [Dyspnea] : dyspnea [Wheezing] : wheezing [As Noted in HPI] : as noted in HPI [Palpitations] : palpitations [Heartburn] : heartburn [Reflux] : reflux [Negative] : Psychiatric [Fever] : no fever [Chills] : no chills [Dry Eyes] : no dryness of the eyes [Eye Irritation] : no ~T irritation of the eyes

## 2023-06-07 NOTE — ASSESSMENT
[FreeTextEntry1] : HCM:\par -06/20\par -HIV screening: negative 2020\par \par Gyn: Dr gaitan\par Mammo: 2022\par Colonoscopy: 2019./ F/up with Dr Ritter\par Immunizations: -Shingrix: UTD as per pt.\par -Refused Flu shot\par -Tdap  2/09/23\par \par # HTN:\par -on Cardizem.\par -F/up with cardiology, Dr medley.\par \par # Dyslipidemia:\par -On Atorvastatin\par \par # Asthma:> R/O by Pulmonology\par -Off Breo.\par -F/up with Pulmonology Dr ariza\par \par # GERD:\par -On Pantoprazole.\par -GI: Dr Ritter\par \par # Chronic lumbalgia/ Cervicalgia/ Knee OA;RT elbow tendinitis/ Polyarthralgia\par -Seen by Orthopedic.\par -On Diclofenac, Savella.\par -Rheumatology eval on 10/07/22 appreciated\par \par #  depression with anxiety\par -Continue Cymbalta.\par -Continue Vistaril prn.\par -Seen in the past at [facility].\par - telephonic eval with Dr Rios for counseling / psychiatry\par \par # CystIN RT LQ\par -Sx referral\par \par # Skin candidiasis under breast;\par -Nystatin\par \par # Leukocytosis\par -hematology eval appreciated\par F/up for annual physical or prn.\par \par

## 2023-06-07 NOTE — HISTORY OF PRESENT ILLNESS
[FreeTextEntry1] : \par f/up\par meds refills [de-identified] : 59 y/o AAF presents today for f/up.\par Previous PCP: Kenya Cook\par Pt with hx depression , on Vistaril  and Cymbalta, HTN on Diltiazem, Dyslipidemia on Atorvastatin,  Chronic Cervicalgia, low back pain and Knee OA. She was seen by Orthopedic for RT elbow pain and knee pain. \par She reports multiple joints aches,seen by Rheumatology Dr Kleiner.\par She was seen by psychology in [place] and now is awaiting for new placement\par Seen at [facility] for Depression in the past.\par Pt seen by Allergist: Matias Bailey\par Seen by cardiology, Dr Whittaker\par Pulmonology: Dr Morales\par GI: Dr Ritter\par She was seen by Hematology for evaluation of Leukocytosis.\par She reports today a skin rash underneath breast.\par \par \par \par

## 2023-06-07 NOTE — HISTORY OF PRESENT ILLNESS
[Former] : former [TextBox_100] : 1/17/16 [TextBox_108] : 0.7 [FreeTextEntry1] : Hx chronic dyspnea. \par \par PREVIOUS HISTORY: The workup here included a CT angiogram of the chest which showed no PE, normal appearing lungs. PFTs normal. Overnight polysomnogram showed no sleep apnea. She did have an overnight pulse oximetry study which showed desaturations 17-20% of the time but subsequent PSG showed no hypoxia.  She says she quit smoking in September 2014. She had an extensive cardiac evaluation as well. She had a repeat CT angio chest which was negative. Subsequent noct pulse ox study done was normal. All pft/celestine normal even when having symptoms. \par \par I ordered a cardiopulmonary stress test which she had done at [facility]; showed no limitation secondary to cardiac or pulmonary cause.   \par \par Planned on meth challenge at [facility] in February. Tried multiple times to schedule it. Lately told needs new script and new visit here.  \par \par UPDATE:\par No change in SOB. Occasional cough, worse at night, nasal congestion, mucous.  Now off ICS.   Has albuterol. \par \par Meth challenge done in Oct 2022 was negative. \par \par Got rid of O2.  \par  \par Saw allergist, ENT. Allergy testing negative. \par \par Saw GI in the past. Has GERD.\par \par Hx smoking 1/2ppd for 28 yrs quit about 10 y/a. Now started smoking on and off since last visit. Does not buy a pack; just uses from other people.

## 2023-06-07 NOTE — ASSESSMENT
[FreeTextEntry1] : As above, extensive pulmonary w/u showed no lung dz. Multiple spirometry/PFTs over the years have been normal. Meth challenge negative; doubt asthma.

## 2023-06-08 ENCOUNTER — APPOINTMENT (OUTPATIENT)
Dept: CARDIOLOGY | Facility: CLINIC | Age: 58
End: 2023-06-08
Payer: MEDICARE

## 2023-06-08 ENCOUNTER — APPOINTMENT (OUTPATIENT)
Dept: RADIOLOGY | Facility: CLINIC | Age: 58
End: 2023-06-08
Payer: MEDICARE

## 2023-06-08 ENCOUNTER — OUTPATIENT (OUTPATIENT)
Dept: OUTPATIENT SERVICES | Facility: HOSPITAL | Age: 58
LOS: 1 days | End: 2023-06-08
Payer: MEDICARE

## 2023-06-08 ENCOUNTER — NON-APPOINTMENT (OUTPATIENT)
Age: 58
End: 2023-06-08

## 2023-06-08 ENCOUNTER — TRANSCRIPTION ENCOUNTER (OUTPATIENT)
Age: 58
End: 2023-06-08

## 2023-06-08 VITALS
TEMPERATURE: 98.2 F | HEART RATE: 95 BPM | HEIGHT: 66 IN | OXYGEN SATURATION: 92 % | DIASTOLIC BLOOD PRESSURE: 78 MMHG | SYSTOLIC BLOOD PRESSURE: 128 MMHG | BODY MASS INDEX: 30.53 KG/M2 | WEIGHT: 190 LBS

## 2023-06-08 DIAGNOSIS — Z87.59 PERSONAL HISTORY OF OTHER COMPLICATIONS OF PREGNANCY, CHILDBIRTH AND THE PUERPERIUM: Chronic | ICD-10-CM

## 2023-06-08 DIAGNOSIS — Z90.722 ACQUIRED ABSENCE OF OVARIES, BILATERAL: Chronic | ICD-10-CM

## 2023-06-08 DIAGNOSIS — R07.89 OTHER CHEST PAIN: ICD-10-CM

## 2023-06-08 DIAGNOSIS — R06.02 SHORTNESS OF BREATH: ICD-10-CM

## 2023-06-08 PROCEDURE — 71046 X-RAY EXAM CHEST 2 VIEWS: CPT | Mod: 26

## 2023-06-08 PROCEDURE — 99204 OFFICE O/P NEW MOD 45 MIN: CPT | Mod: 25

## 2023-06-08 PROCEDURE — 71046 X-RAY EXAM CHEST 2 VIEWS: CPT

## 2023-06-08 PROCEDURE — 93000 ELECTROCARDIOGRAM COMPLETE: CPT

## 2023-06-08 RX ORDER — TRAMADOL HYDROCHLORIDE 50 MG/1
50 TABLET, COATED ORAL
Qty: 20 | Refills: 0 | Status: DISCONTINUED | COMMUNITY
Start: 2022-11-30 | End: 2023-06-08

## 2023-06-08 RX ORDER — MILNACIPRAN HYDROCHLORIDE 100 MG/1
100 TABLET, FILM COATED ORAL
Qty: 180 | Refills: 0 | Status: DISCONTINUED | COMMUNITY
Start: 2023-02-12 | End: 2023-06-08

## 2023-06-09 ENCOUNTER — APPOINTMENT (OUTPATIENT)
Dept: RHEUMATOLOGY | Facility: CLINIC | Age: 58
End: 2023-06-09
Payer: MEDICARE

## 2023-06-09 ENCOUNTER — RESULT REVIEW (OUTPATIENT)
Age: 58
End: 2023-06-09

## 2023-06-09 VITALS
SYSTOLIC BLOOD PRESSURE: 120 MMHG | DIASTOLIC BLOOD PRESSURE: 77 MMHG | OXYGEN SATURATION: 98 % | TEMPERATURE: 98.1 F | HEART RATE: 94 BPM

## 2023-06-09 DIAGNOSIS — M79.7 FIBROMYALGIA: ICD-10-CM

## 2023-06-09 DIAGNOSIS — M48.061 SPINAL STENOSIS, LUMBAR REGION WITHOUT NEUROGENIC CLAUDICATION: ICD-10-CM

## 2023-06-09 DIAGNOSIS — M54.41 LUMBAGO WITH SCIATICA, LEFT SIDE: ICD-10-CM

## 2023-06-09 DIAGNOSIS — M25.561 PAIN IN RIGHT KNEE: ICD-10-CM

## 2023-06-09 DIAGNOSIS — E55.9 VITAMIN D DEFICIENCY, UNSPECIFIED: ICD-10-CM

## 2023-06-09 DIAGNOSIS — M25.562 PAIN IN RIGHT KNEE: ICD-10-CM

## 2023-06-09 DIAGNOSIS — M48.02 SPINAL STENOSIS, CERVICAL REGION: ICD-10-CM

## 2023-06-09 DIAGNOSIS — Z79.1 LONG TERM (CURRENT) USE OF NON-STEROIDAL ANTI-INFLAMMATORIES (NSAID): ICD-10-CM

## 2023-06-09 DIAGNOSIS — G89.29 PAIN IN RIGHT KNEE: ICD-10-CM

## 2023-06-09 DIAGNOSIS — M54.42 LUMBAGO WITH SCIATICA, LEFT SIDE: ICD-10-CM

## 2023-06-09 DIAGNOSIS — G89.29 LUMBAGO WITH SCIATICA, LEFT SIDE: ICD-10-CM

## 2023-06-09 PROCEDURE — 99214 OFFICE O/P EST MOD 30 MIN: CPT

## 2023-06-11 RX ORDER — DULOXETINE HYDROCHLORIDE 60 MG/1
60 CAPSULE, DELAYED RELEASE PELLETS ORAL
Refills: 0 | Status: DISCONTINUED | COMMUNITY

## 2023-06-11 RX ORDER — MILNACIPRAN HYDROCHLORIDE 50 MG/1
TABLET, FILM COATED ORAL
Refills: 0 | Status: DISCONTINUED | COMMUNITY

## 2023-06-11 RX ORDER — TIZANIDINE 2 MG/1
2 TABLET ORAL
Qty: 360 | Refills: 0 | Status: ACTIVE | COMMUNITY
Start: 2023-06-11 | End: 1900-01-01

## 2023-06-11 RX ORDER — DICLOFENAC SODIUM 75 MG/1
75 TABLET, DELAYED RELEASE ORAL
Qty: 180 | Refills: 0 | Status: DISCONTINUED | COMMUNITY
Start: 2023-02-12 | End: 2023-06-11

## 2023-06-11 RX ORDER — MELOXICAM 15 MG/1
15 TABLET ORAL
Refills: 0 | Status: DISCONTINUED | COMMUNITY

## 2023-06-11 NOTE — HISTORY OF PRESENT ILLNESS
[FreeTextEntry1] : JAHAIRA MACKEY is a 58 year old woman who presents for follow up office visit for further evaluation of joint symptoms and rheumatic diseases including right carpal tunnel syndrome, fibromyalgia, osteoarthritis and chronic low back pain.\par \par Patient feels fairly well. Some pain bilateral knees, especially with prolonged walking. Reports paresthesias bilateral feet, especially when standing and walking, also has low back pain with radiation to the bilateral lower extremities to the ankles. Rare ankles swelling. Some sleep disturbance and fatigue. Denies dry eyes and dry mouth. Patient continues vitamin D supplement. Patient denies rash or side effects with current medications. \par \par PMH\par Cardiology, Dr. Whittaker, to perform cardiac catheterization due to abnormal echocardiogram results \par Pulmonary, Dr. Morales,  work-up negative including chest x-ray - results pending\par Tooth abscess- to have 2 tooth extractions happening in 4 days. \par Pain Management - advised to reschedule epidural steroid injection until after her planned tooth extractions\par Hematology - lab tests for leukocytosis, work-up negative

## 2023-06-11 NOTE — HISTORY OF PRESENT ILLNESS
[FreeTextEntry1] : Patient with a history of hypertension, esophageal reflux, and a family history of coronary artery disease. She reports that for years, she has experienced episodes of left-sided chest pressure and discomfort traveling down her left arm. She also reports episodes of tingling in her finger tips and dyspnea when walking up and down stairs. Previous Left and Right heart cath in 2014 with no coronary disease and normal right heart pressures. Patient presents with c/o chronic SOB at rest and [id number] with mild exertion. Reviewed echo and nuclear stress test results. \par \par 6/2023- Patient with recurrent symptoms of shortness of breath, chest heaviness. With and without exertion\par Seen by pulmonary- no evidence of asthma. \par Chest tightness- substernal, no n, no v.

## 2023-06-11 NOTE — ADDENDUM
[FreeTextEntry1] : I, Rolan Alexandreaa, acted solely as a scribe for Dr. Myron I. Kleiner, MD. on 06/09/2023 .

## 2023-06-11 NOTE — PHYSICAL EXAM
[Normal] : clear lung fields, good air entry, no respiratory distress [de-identified] : Diffuse body aches/pains.

## 2023-06-11 NOTE — CONSULT LETTER
[Dear  ___] : Dear  [unfilled], [Consult Letter:] : I had the pleasure of evaluating your patient, [unfilled]. [Please see my note below.] : Please see my note below. [Consult Closing:] : Thank you very much for allowing me to participate in the care of this patient.  If you have any questions, please do not hesitate to contact me. [Sincerely,] : Sincerely, [DrLesly  ___] : Dr. GARCIA [FreeTextEntry3] : Addy\par Myron I. Kleiner, M.D., FACR\par Chief, Division of Rheumatology\par Department of Medicine\par [facility]

## 2023-06-11 NOTE — PHYSICAL EXAM
[General Appearance - Alert] : alert [General Appearance - In No Acute Distress] : in no acute distress [General Appearance - Well Nourished] : well nourished [General Appearance - Well Developed] : well developed [General Appearance - Well-Appearing] : healthy appearing [] : normal voice and communication [Sclera] : the sclera and conjunctiva were normal [PERRL With Normal Accommodation] : pupils were equal in size, round, and reactive to light [Extraocular Movements] : extraocular movements were intact [Outer Ear] : the ears and nose were normal in appearance [Oropharynx] : the oropharynx was normal [Neck Appearance] : the appearance of the neck was normal [Neck Cervical Mass (___cm)] : no neck mass was observed [Jugular Venous Distention Increased] : there was no jugular-venous distention [Thyroid Diffuse Enlargement] : the thyroid was not enlarged [Thyroid Nodule] : there were no palpable thyroid nodules [No CVA Tenderness] : no ~M costovertebral angle tenderness [No Spinal Tenderness] : no spinal tenderness [Cranial Nerves] : cranial nerves 2-12 were intact [Deep Tendon Reflexes (DTR)] : deep tendon reflexes were 2+ and symmetric [Sensation] : the sensory exam was normal to light touch and pinprick [Motor Exam] : the motor exam was normal [No Focal Deficits] : no focal deficits [FreeTextEntry1] : Strength-5/5

## 2023-06-11 NOTE — DISCUSSION/SUMMARY
[EKG obtained to assist in diagnosis and management of assessed problem(s)] : EKG obtained to assist in diagnosis and management of assessed problem(s) [FreeTextEntry1] : Patient with diffuse pain syndrome and significant shortness of breath with mild to moderate activity. New EKG changes compared to previous 2. Has had some t wave inversions in the past. \par Had a very lengthy discussion with patient regarding possible underlying CAD/pulmonary hypertension. Due to significantly abnormal EKG, will plan for cardiac cath. \par TTE, cardiac cath left and right. \par BNP. \par Follow up in 4-6 weeks.

## 2023-06-11 NOTE — ASSESSMENT
[FreeTextEntry1] : Impression: JAHAIRA MACKEY is a 58 year old woman who presents for follow up office visit for further evaluation of joint symptoms and rheumatic diseases including right carpal tunnel syndrome, fibromyalgia, osteoarthritis and chronic low back pain/lumbar spinal stenosis.\par \par Patient feels fairly well. Some pain bilateral knees, especially with prolonged walking secondary to osteoarthritis and fibromyalgia with some sleep disturbance and fatigue. Reports paresthesias bilateral feet, especially when standing and walking, also has low back pain with radiation to the bilateral lower extremities to the ankles and neck pain radiating to the bilateral upper extremities to the fingers secondary to combination of osteoarthritis, chronic neck pain with cervical radiculopathy, chronic low back pain with LS radiculopathy/spinal stenosis. On exam, patient has bilateral  anserine bursitis contributing to her joint pain. Denies dry eyes and dry mouth.Started physical therapy via pain management. On further questioning, exercises every other day for 30 minutes. Rare ankles swelling.  Patient continues vitamin D supplement for vitamin D deficiency. Patient denies rash or side effects with current medications. Patient is content with current medication regimen.\par \par Plan: I reviewed her chart and previous records\par Laboratory tests ordered - see list below - with coordination of care \par X-rays ordered – see list below – with coordination of care \par Diagnosis and prognosis discussed\par Continue current medications (other than those changed below)\par Discontinue Diclofenac \par Nabumetone 750 mg b.i.d. end of breakfast and supper (Possible side effects explained including cardiovascular risk/MI/CVA) \par Tizanidine 2 mg b.i.d. morning and supper and 4 mg h.s.(Possible side effects explained) \par Gradually increase Daily exercise  to at least 30 minutes per day - emphasized --extensive discussion\par Artificial tears one drop each eye q.i.d. and p.r.n.(Possible side effects explained)\par Biotene mouthwash/spray q.i.d. and p.r.n.(Possible side effects explained) \par Oral Hydration\par Patient declines oral medication for dryness \par Follow- up with Neurologist regarding paresthesias bilateral feet\par Continue physical therapy as ordered by pain management\par Return visit 3 months

## 2023-06-12 ENCOUNTER — APPOINTMENT (OUTPATIENT)
Dept: NEUROLOGY | Facility: CLINIC | Age: 58
End: 2023-06-12
Payer: MEDICARE

## 2023-06-12 VITALS
WEIGHT: 190 LBS | HEIGHT: 66 IN | DIASTOLIC BLOOD PRESSURE: 80 MMHG | BODY MASS INDEX: 30.53 KG/M2 | SYSTOLIC BLOOD PRESSURE: 130 MMHG

## 2023-06-12 PROCEDURE — 99214 OFFICE O/P EST MOD 30 MIN: CPT

## 2023-06-12 NOTE — HISTORY OF PRESENT ILLNESS
[FreeTextEntry1] : Office follow-up June 12, 2023:\par This is a 58-year-old woman who presents today with headaches.  She has previously been seen by my colleague Maryellen Rincon for these headaches.  She describes having headaches about 2 or 3 times per week for the past several years.  The headaches usually start in the back of the head and may migrate up to the front.  They are sharp pains.  There is no associated nausea or vomiting or photophobia with these headaches she does have issues with her neck as well as fibromyalgia at baseline.  She takes Topamax 50 mg daily for the headache as well as as needed ibuprofen.  She is here today for reevaluation and treatment.

## 2023-06-12 NOTE — CONSULT LETTER
[Dear  ___] : Dear  [unfilled], [Courtesy Letter:] : I had the pleasure of seeing your patient, [unfilled], in my office today. [Please see my note below.] : Please see my note below. [Consult Closing:] : Thank you very much for allowing me to participate in the care of this patient.  If you have any questions, please do not hesitate to contact me. [Sincerely,] : Sincerely, [FreeTextEntry3] : Mat Herrera M.D., Ph.D. DPN-N\par [facility]\par Neurology at [place]\par Medical Director of Stroke Services\par [facility]\par

## 2023-06-12 NOTE — ASSESSMENT
[FreeTextEntry1] : This is a 58-year-old woman with cervicogenic tension headache.  At this point I recommend that she revisit with pain management for possible epidural steroid injections into the neck.  I will increase the Topamax to 50 mg twice a day.  She may benefit from physical therapy as well.  I will see her back in 6 months, sooner should the need arise.

## 2023-06-12 NOTE — PHYSICAL EXAM
[General Appearance - Alert] : alert [General Appearance - In No Acute Distress] : in no acute distress [General Appearance - Well Developed] : well developed [General Appearance - Well Nourished] : well nourished [Person] : oriented to person [Place] : oriented to place [Time] : oriented to time [Remote Intact] : remote memory intact [Registration Intact] : recent registration memory intact [Concentration Intact] : normal concentrating ability [Visual Intact] : visual attention was ~T not ~L decreased [Naming Objects] : no difficulty naming common objects [Repeating Phrases] : no difficulty repeating a phrase [Writing A Sentence] : no difficulty writing a sentence [Fluency] : fluency intact [Comprehension] : comprehension intact [Reading] : reading intact [Current Events] : adequate knowledge of current events [Past History] : adequate knowledge of personal past history [Cranial Nerves Optic (II)] : visual acuity intact bilaterally,  visual fields full to confrontation, pupils equal round and reactive to light [Cranial Nerves Oculomotor (III)] : extraocular motion intact [Cranial Nerves Trigeminal (V)] : facial sensation intact symmetrically [Cranial Nerves Facial (VII)] : face symmetrical [Cranial Nerves Vestibulocochlear (VIII)] : hearing was intact bilaterally [Cranial Nerves Glossopharyngeal (IX)] : tongue and palate midline [Cranial Nerves Accessory (XI - Cranial And Spinal)] : head turning and shoulder shrug symmetric [Cranial Nerves Hypoglossal (XII)] : there was no tongue deviation with protrusion [Motor Tone] : muscle tone was normal in all four extremities [Motor Strength] : muscle strength was normal in all four extremities [Involuntary Movements] : no involuntary movements were seen [No Muscle Atrophy] : normal bulk in all four extremities [Paresis Pronator Drift Right-Sided] : no pronator drift on the right [Paresis Pronator Drift Left-Sided] : no pronator drift on the left [Motor Strength Upper Extremities Bilaterally] : strength was normal in both upper extremities [Motor Strength Lower Extremities Bilaterally] : strength was normal in both lower extremities [Sensation Tactile Decrease] : light touch was intact [Sensation Pain / Temperature Decrease] : pain and temperature was intact [Sensation Vibration Decrease] : vibration was intact [Proprioception] : proprioception was intact [Abnormal Walk] : normal gait [Balance] : balance was intact [Tremor] : no tremor present [Coordination - Dysmetria Impaired Finger-to-Nose Bilateral] : not present [1+] : Patella left 1+ [Sclera] : the sclera and conjunctiva were normal [PERRL With Normal Accommodation] : pupils were equal in size, round, reactive to light, with normal accommodation [Extraocular Movements] : extraocular movements were intact [No APD] : no afferent pupillary defect [No DEVON] : no internuclear ophthalmoplegia [Full Visual Field] : full visual field

## 2023-06-12 NOTE — DATA REVIEWED
[de-identified] : There is a report of an MRI in the chart from December 27, 2021.  It is just noted that she had small vessel ischemic changes without acute stroke mass or bleed

## 2023-06-14 ENCOUNTER — NON-APPOINTMENT (OUTPATIENT)
Age: 58
End: 2023-06-14

## 2023-06-14 ENCOUNTER — OUTPATIENT (OUTPATIENT)
Dept: OUTPATIENT SERVICES | Facility: HOSPITAL | Age: 58
LOS: 1 days | End: 2023-06-14
Payer: MEDICARE

## 2023-06-14 ENCOUNTER — APPOINTMENT (OUTPATIENT)
Dept: RADIOLOGY | Facility: CLINIC | Age: 58
End: 2023-06-14
Payer: MEDICARE

## 2023-06-14 DIAGNOSIS — M54.12 RADICULOPATHY, CERVICAL REGION: ICD-10-CM

## 2023-06-14 DIAGNOSIS — E55.9 VITAMIN D DEFICIENCY, UNSPECIFIED: ICD-10-CM

## 2023-06-14 DIAGNOSIS — M54.42 LUMBAGO WITH SCIATICA, LEFT SIDE: ICD-10-CM

## 2023-06-14 DIAGNOSIS — Z79.1 LONG TERM (CURRENT) USE OF NON-STEROIDAL ANTI-INFLAMMATORIES (NSAID): ICD-10-CM

## 2023-06-14 DIAGNOSIS — Z90.722 ACQUIRED ABSENCE OF OVARIES, BILATERAL: Chronic | ICD-10-CM

## 2023-06-14 DIAGNOSIS — Z87.59 PERSONAL HISTORY OF OTHER COMPLICATIONS OF PREGNANCY, CHILDBIRTH AND THE PUERPERIUM: Chronic | ICD-10-CM

## 2023-06-14 LAB
JAK2 P.V617F BLD/T QL: NORMAL
REFLEX:: NORMAL

## 2023-06-14 PROCEDURE — 72050 X-RAY EXAM NECK SPINE 4/5VWS: CPT

## 2023-06-14 PROCEDURE — 72110 X-RAY EXAM L-2 SPINE 4/>VWS: CPT | Mod: 26

## 2023-06-14 PROCEDURE — 72050 X-RAY EXAM NECK SPINE 4/5VWS: CPT | Mod: 26

## 2023-06-14 PROCEDURE — 73562 X-RAY EXAM OF KNEE 3: CPT

## 2023-06-14 PROCEDURE — 73562 X-RAY EXAM OF KNEE 3: CPT | Mod: 26,50

## 2023-06-14 PROCEDURE — 72110 X-RAY EXAM L-2 SPINE 4/>VWS: CPT

## 2023-06-15 LAB — NT-PROBNP SERPL-MCNC: <36 PG/ML

## 2023-06-30 NOTE — ED STATDOCS - GENITOURINARY NEGATIVE STATEMENT, MLM
Well , 24 Months Old  Well-child exams are visits with a health care provider to track your child's growth and development at certain ages. The following information tells you what to expect during this visit and gives you some helpful tips about caring for your child.  What immunizations does my child need?  Influenza vaccine (flu shot). A yearly (annual) flu shot is recommended.  Other vaccines may be suggested to catch up on any missed vaccines or if your child has certain high-risk conditions.  For more information about vaccines, talk to your child's health care provider or go to the Centers for Disease Control and Prevention website for immunization schedules: www.cdc.gov/vaccines/schedules  What tests does my child need?    Your child's health care provider will complete a physical exam of your child.  Your child's health care provider will measure your child's length, weight, and head size. The health care provider will compare the measurements to a growth chart to see how your child is growing.  Depending on your child's risk factors, your child's health care provider may screen for:  Low red blood cell count (anemia).  Lead poisoning.  Hearing problems.  Tuberculosis (TB).  High cholesterol.  Autism spectrum disorder (ASD).  Starting at this age, your child's health care provider will measure body mass index (BMI) annually to screen for obesity. BMI is an estimate of body fat and is calculated from your child's height and weight.  Caring for your child  Parenting tips  Praise your child's good behavior by giving your child your attention.  Spend some one-on-one time with your child daily. Vary activities. Your child's attention span should be getting longer.  Discipline your child consistently and fairly.  Make sure your child's caregivers are consistent with your discipline routines.  Avoid shouting at or spanking your child.  Recognize that your child has a limited ability to understand  "consequences at this age.  When giving your child instructions (not choices), avoid asking yes and no questions (\"Do you want a bath?\"). Instead, give clear instructions (\"Time for a bath.\").  Interrupt your child's inappropriate behavior and show your child what to do instead. You can also remove your child from the situation and move on to a more appropriate activity.  If your child cries to get what he or she wants, wait until your child briefly calms down before you give him or her the item or activity. Also, model the words that your child should use. For example, say \"cookie, please\" or \"climb up.\"  Avoid situations or activities that may cause your child to have a temper tantrum, such as shopping trips.  Oral health    Brush your child's teeth after meals and before bedtime.  Take your child to a dentist to discuss oral health. Ask if you should start using fluoride toothpaste to clean your child's teeth.  Give fluoride supplements or apply fluoride varnish to your child's teeth as told by your child's health care provider.  Provide all beverages in a cup and not in a bottle. Using a cup helps to prevent tooth decay.  Check your child's teeth for brown or white spots. These are signs of tooth decay.  If your child uses a pacifier, try to stop giving it to your child when he or she is awake.  Sleep  Children at this age typically need 12 or more hours of sleep a day and may only take one nap in the afternoon.  Keep naptime and bedtime routines consistent.  Provide a separate sleep space for your child.  Toilet training  When your child becomes aware of wet or soiled diapers and stays dry for longer periods of time, he or she may be ready for toilet training. To toilet train your child:  Let your child see others using the toilet.  Introduce your child to a potty chair.  Give your child lots of praise when he or she successfully uses the potty chair.  Talk with your child's health care provider if you need help " toilet training your child. Do not force your child to use the toilet. Some children will resist toilet training and may not be trained until 3 years of age. It is normal for boys to be toilet trained later than girls.  General instructions  Talk with your child's health care provider if you are worried about access to food or housing.  What's next?  Your next visit will take place when your child is 30 months old.  Summary  Depending on your child's risk factors, your child's health care provider may screen for lead poisoning, hearing problems, as well as other conditions.  Children this age typically need 12 or more hours of sleep a day and may only take one nap in the afternoon.  Your child may be ready for toilet training when he or she becomes aware of wet or soiled diapers and stays dry for longer periods of time.  Take your child to a dentist to discuss oral health. Ask if you should start using fluoride toothpaste to clean your child's teeth.  This information is not intended to replace advice given to you by your health care provider. Make sure you discuss any questions you have with your health care provider.  Document Revised: 12/16/2022 Document Reviewed: 12/16/2022  Elsevier Patient Education © 2023 Elsevier Inc.     no dysuria, no frequency, and no hematuria.

## 2023-07-28 ENCOUNTER — NON-APPOINTMENT (OUTPATIENT)
Age: 58
End: 2023-07-28

## 2023-08-02 ENCOUNTER — TRANSCRIPTION ENCOUNTER (OUTPATIENT)
Age: 58
End: 2023-08-02

## 2023-08-02 ENCOUNTER — NON-APPOINTMENT (OUTPATIENT)
Age: 58
End: 2023-08-02

## 2023-08-02 ENCOUNTER — OUTPATIENT (OUTPATIENT)
Dept: OUTPATIENT SERVICES | Facility: HOSPITAL | Age: 58
LOS: 1 days | Discharge: HOME | End: 2023-08-02
Payer: MEDICARE

## 2023-08-02 VITALS
SYSTOLIC BLOOD PRESSURE: 139 MMHG | DIASTOLIC BLOOD PRESSURE: 73 MMHG | OXYGEN SATURATION: 99 % | HEART RATE: 80 BPM | RESPIRATION RATE: 22 BRPM

## 2023-08-02 VITALS
SYSTOLIC BLOOD PRESSURE: 125 MMHG | OXYGEN SATURATION: 99 % | TEMPERATURE: 98 F | DIASTOLIC BLOOD PRESSURE: 67 MMHG | HEART RATE: 86 BPM | RESPIRATION RATE: 14 BRPM

## 2023-08-02 DIAGNOSIS — Z87.59 PERSONAL HISTORY OF OTHER COMPLICATIONS OF PREGNANCY, CHILDBIRTH AND THE PUERPERIUM: Chronic | ICD-10-CM

## 2023-08-02 DIAGNOSIS — Z90.722 ACQUIRED ABSENCE OF OVARIES, BILATERAL: Chronic | ICD-10-CM

## 2023-08-02 DIAGNOSIS — R06.02 SHORTNESS OF BREATH: ICD-10-CM

## 2023-08-02 LAB
ANION GAP SERPL CALC-SCNC: 18 MMOL/L — HIGH (ref 5–17)
BASOPHILS # BLD AUTO: 0.06 K/UL — SIGNIFICANT CHANGE UP (ref 0–0.2)
BASOPHILS NFR BLD AUTO: 0.4 % — SIGNIFICANT CHANGE UP (ref 0–2)
BUN SERPL-MCNC: 6.2 MG/DL — LOW (ref 8–20)
CALCIUM SERPL-MCNC: 9.4 MG/DL — SIGNIFICANT CHANGE UP (ref 8.4–10.5)
CHLORIDE SERPL-SCNC: 102 MMOL/L — SIGNIFICANT CHANGE UP (ref 96–108)
CO2 SERPL-SCNC: 24 MMOL/L — SIGNIFICANT CHANGE UP (ref 22–29)
CREAT SERPL-MCNC: 0.48 MG/DL — LOW (ref 0.5–1.3)
EGFR: 111 ML/MIN/1.73M2 — SIGNIFICANT CHANGE UP
EOSINOPHIL # BLD AUTO: 0.34 K/UL — SIGNIFICANT CHANGE UP (ref 0–0.5)
EOSINOPHIL NFR BLD AUTO: 2.4 % — SIGNIFICANT CHANGE UP (ref 0–6)
GLUCOSE SERPL-MCNC: 103 MG/DL — HIGH (ref 70–99)
HCT VFR BLD CALC: 41.5 % — SIGNIFICANT CHANGE UP (ref 34.5–45)
HGB BLD-MCNC: 14.1 G/DL — SIGNIFICANT CHANGE UP (ref 11.5–15.5)
IMM GRANULOCYTES NFR BLD AUTO: 0.4 % — SIGNIFICANT CHANGE UP (ref 0–0.9)
LYMPHOCYTES # BLD AUTO: 2.89 K/UL — SIGNIFICANT CHANGE UP (ref 1–3.3)
LYMPHOCYTES # BLD AUTO: 20.7 % — SIGNIFICANT CHANGE UP (ref 13–44)
MAGNESIUM SERPL-MCNC: 2 MG/DL — SIGNIFICANT CHANGE UP (ref 1.6–2.6)
MCHC RBC-ENTMCNC: 31.9 PG — SIGNIFICANT CHANGE UP (ref 27–34)
MCHC RBC-ENTMCNC: 34 GM/DL — SIGNIFICANT CHANGE UP (ref 32–36)
MCV RBC AUTO: 93.9 FL — SIGNIFICANT CHANGE UP (ref 80–100)
MONOCYTES # BLD AUTO: 0.55 K/UL — SIGNIFICANT CHANGE UP (ref 0–0.9)
MONOCYTES NFR BLD AUTO: 3.9 % — SIGNIFICANT CHANGE UP (ref 2–14)
NEUTROPHILS # BLD AUTO: 10.08 K/UL — HIGH (ref 1.8–7.4)
NEUTROPHILS NFR BLD AUTO: 72.2 % — SIGNIFICANT CHANGE UP (ref 43–77)
PLATELET # BLD AUTO: 330 K/UL — SIGNIFICANT CHANGE UP (ref 150–400)
POTASSIUM SERPL-MCNC: 3.8 MMOL/L — SIGNIFICANT CHANGE UP (ref 3.5–5.3)
POTASSIUM SERPL-SCNC: 3.8 MMOL/L — SIGNIFICANT CHANGE UP (ref 3.5–5.3)
RBC # BLD: 4.42 M/UL — SIGNIFICANT CHANGE UP (ref 3.8–5.2)
RBC # FLD: 13 % — SIGNIFICANT CHANGE UP (ref 10.3–14.5)
SODIUM SERPL-SCNC: 143 MMOL/L — SIGNIFICANT CHANGE UP (ref 135–145)
WBC # BLD: 13.97 K/UL — HIGH (ref 3.8–10.5)
WBC # FLD AUTO: 13.97 K/UL — HIGH (ref 3.8–10.5)

## 2023-08-02 PROCEDURE — C1894: CPT

## 2023-08-02 PROCEDURE — 80048 BASIC METABOLIC PNL TOTAL CA: CPT

## 2023-08-02 PROCEDURE — 93460 R&L HRT ART/VENTRICLE ANGIO: CPT

## 2023-08-02 PROCEDURE — 36415 COLL VENOUS BLD VENIPUNCTURE: CPT

## 2023-08-02 PROCEDURE — C1887: CPT

## 2023-08-02 PROCEDURE — 93005 ELECTROCARDIOGRAM TRACING: CPT

## 2023-08-02 PROCEDURE — 93010 ELECTROCARDIOGRAM REPORT: CPT

## 2023-08-02 PROCEDURE — 93571 IV DOP VEL&/PRESS C FLO 1ST: CPT | Mod: 26,52,RI

## 2023-08-02 PROCEDURE — 85025 COMPLETE CBC W/AUTO DIFF WBC: CPT

## 2023-08-02 PROCEDURE — 99152 MOD SED SAME PHYS/QHP 5/>YRS: CPT

## 2023-08-02 PROCEDURE — C1769: CPT

## 2023-08-02 PROCEDURE — 93460 R&L HRT ART/VENTRICLE ANGIO: CPT | Mod: 26

## 2023-08-02 PROCEDURE — 83735 ASSAY OF MAGNESIUM: CPT

## 2023-08-02 RX ORDER — DULOXETINE HYDROCHLORIDE 30 MG/1
1 CAPSULE, DELAYED RELEASE ORAL
Qty: 0 | Refills: 0 | DISCHARGE

## 2023-08-02 RX ORDER — ATORVASTATIN CALCIUM 80 MG/1
1 TABLET, FILM COATED ORAL
Qty: 0 | Refills: 0 | DISCHARGE

## 2023-08-02 RX ORDER — ATORVASTATIN CALCIUM 80 MG/1
1 TABLET, FILM COATED ORAL
Qty: 30 | Refills: 3
Start: 2023-08-02

## 2023-08-02 RX ORDER — RANITIDINE HYDROCHLORIDE 150 MG/1
1 TABLET, FILM COATED ORAL
Qty: 0 | Refills: 0 | DISCHARGE

## 2023-08-02 RX ORDER — SODIUM CHLORIDE 9 MG/ML
250 INJECTION INTRAMUSCULAR; INTRAVENOUS; SUBCUTANEOUS ONCE
Refills: 0 | Status: DISCONTINUED | OUTPATIENT
Start: 2023-08-02 | End: 2023-08-02

## 2023-08-02 RX ORDER — CHLORHEXIDINE GLUCONATE 213 G/1000ML
1 SOLUTION TOPICAL ONCE
Refills: 0 | Status: DISCONTINUED | OUTPATIENT
Start: 2023-08-02 | End: 2023-08-16

## 2023-08-02 RX ORDER — SODIUM CHLORIDE 9 MG/ML
250 INJECTION INTRAMUSCULAR; INTRAVENOUS; SUBCUTANEOUS ONCE
Refills: 0 | Status: COMPLETED | OUTPATIENT
Start: 2023-08-02 | End: 2023-08-02

## 2023-08-02 RX ORDER — ASPIRIN/CALCIUM CARB/MAGNESIUM 324 MG
81 TABLET ORAL ONCE
Refills: 0 | Status: COMPLETED | OUTPATIENT
Start: 2023-08-02 | End: 2023-08-02

## 2023-08-02 RX ORDER — DULOXETINE HYDROCHLORIDE 30 MG/1
40 CAPSULE, DELAYED RELEASE ORAL
Qty: 0 | Refills: 0 | DISCHARGE

## 2023-08-02 RX ORDER — CLOPIDOGREL BISULFATE 75 MG/1
1 TABLET, FILM COATED ORAL
Qty: 90 | Refills: 3
Start: 2023-08-02 | End: 2024-07-26

## 2023-08-02 RX ADMIN — SODIUM CHLORIDE 250 MILLILITER(S): 9 INJECTION INTRAMUSCULAR; INTRAVENOUS; SUBCUTANEOUS at 11:01

## 2023-08-02 RX ADMIN — Medication 81 MILLIGRAM(S): at 11:00

## 2023-08-02 RX ADMIN — SODIUM CHLORIDE 250 MILLILITER(S): 9 INJECTION INTRAMUSCULAR; INTRAVENOUS; SUBCUTANEOUS at 12:51

## 2023-08-02 NOTE — DISCHARGE NOTE PROVIDER - NSDCMRMEDTOKEN_GEN_ALL_CORE_FT
Aspir 81 oral delayed release tablet: 1 orally once a day  atorvastatin 40 mg oral tablet: 1 tab(s) orally once a day (at bedtime)  Cymbalta 60 mg oral delayed release capsule: 1 orally once a day  DilTIAZem Hydrochloride  mg/24 hours oral capsule, extended release: 1 cap(s) orally once a day  metroNIDAZOLE 500 mg oral tablet: 1 tab(s) orally 3 times a day   nabumetone 750 mg oral tablet: 2 orally once a day  omeprazole 40 mg oral delayed release capsule: 1 cap(s) orally once a day  Plavix 75 mg oral tablet: 1 tab(s) orally once a day  Savella 100 mg oral tablet: 1 orally once a day  Vitamin D2 50,000 intl units (1.25 mg) oral capsule: 1 cap(s) orally once a week

## 2023-08-02 NOTE — DISCHARGE NOTE PROVIDER - HOSPITAL COURSE
57 yo female with h/o HTN, GERD and family history of CAD. Patient presents with SOB at rest and RODRIGEZ. She has been having chest pressure and discomfort for years. She had right and left heart cath in 2014 with no CAD and normal right heart pressures. Her recent EKG in the office had new changes and she continues to have symptoms so recommended right and left heart cath.  s/p R/LHC via RBV/RRA with Dr. Whittaker tolerated procedure well. Pt arrived to recovery in NAD and HDS, RRA access site stable, no bleed/hematoma, distal pulse +, RBV sheath in place to be removed x 1 hour, site with no bleeding or hemotoma  Intraprocedurally: Versed 3.5 mg Fentayl 100 ucg, Heparin 7,000 units  Findings: 2 V CAD, IFR LAD 0.86 calcified IFR Ramus 0.83. Patient developed pain/spasm of the radial artery during the procedure and was decided to stage PCI of LAD due to the complexity and requiring roto.

## 2023-08-02 NOTE — DISCHARGE NOTE PROVIDER - NSDCFUSCHEDAPPT_GEN_ALL_CORE_FT
[facility]  CARDIOLOGY [address]  Scheduled Appointment: 08/04/2023    [facility]  BRSTIMAG [address]  Scheduled Appointment: 08/07/2023    [facility]  ULTRASND  [address]  Scheduled Appointment: 08/07/2023    Daisy Alarcon  [facility]  CARDIOLOGY [address]  Scheduled Appointment: 08/10/2023    Dolores Canales  [facility]  FAMILYMED [address]  Scheduled Appointment: 08/16/2023    Kleiner, Myron I  [facility]  RHEUM [address]  Scheduled Appointment: 10/03/2023    Braden Whittaker  [facility]  GASTRO [address]  Scheduled Appointment: 10/04/2023    Dolores Canales  [facility]  FAMILYMED [address]  Scheduled Appointment: 10/12/2023

## 2023-08-02 NOTE — PROGRESS NOTE ADULT - SUBJECTIVE AND OBJECTIVE BOX
Now s/p R/LHC via RBV/RRA with Dr. Whittaker tolerated procedure well. Pt arrived to recovery in NAD and HDS, RRA access site stable, no bleed/hematoma, distal pulse +, RBV sheath in place to be removed x 1 hour, site with no bleeding or hemotoma  Intraprocedurally: Versed 3.5 mg Fentayl 100 ucg  Findings:   Post Cath EKG:     Plan:  -Formal cath report pending  -Post procedure management/monitoring per protocol  -Access site precautions  -Radial compression band removal at ***  -Bedrest x ***hours post procedure  -Labs and EKG in am  -NS 0.9% 250ml/hr x 1 bolus: post procedure KRISTY ppx   -Repeat ECG if any clinical indication or change on tele  -Continue current medical therapy  -Dual anti platelet therapy with aspirin/plavix **  -Cont BB with Toprol 50mg po daily **  -Cont statin therapy with Lipitor 10mg po qHS **  -Educated regarding strict adherence with DAPT   -Educated regarding post procedure management and care  -Discussed the importance of RF modification  -Cardiac rehab info provided/referral and communication to cardiac rehab completed  -F/U outpt in 1-2 weeks with Cardiologist  ***  -DISPO: *** Plan for D/C in am if remains HDS, ECG and labs in am stable and without complications     Now s/p R/LHC via RBV/RRA with Dr. Whittaker tolerated procedure well. Pt arrived to recovery in NAD and HDS, RRA access site stable, no bleed/hematoma, distal pulse +, RBV sheath in place to be removed x 1 hour, site with no bleeding or hemotoma  Intraprocedurally: Versed 3.5 mg Fentayl 100 ucg, Heparin 7,000 units  Findings: 2 V CAD, IFR LAD 0.86 calcified IFR Ramus 0.83. Patient developed pain/spasm of the radial artery during the procedure and was decided to stage PCI of LAD due to the complexity and requiring roto.    Plan:  -Formal cath report pending  -Post procedure management/monitoring per protocol  -Access site precautions  -Radial compression band removal at 1500  -Bedrest while band/sheath in place  -NS 0.9% 250ml/hr x 1 bolus: post procedure KRISYT ppx   -Repeat ECG if any clinical indication or change on tele  -Remove brachial sheath x 1 hour  -Continue current medical therapy  -Start Plavix 75 mg daily  -Dual antiplatelet therapy ASA/Plavix  -Increase Lipitor to 40 mg daily  -Educated regarding strict adherence with DAPT   -Educated regarding post procedure management and care  -Discussed the importance of RF modification  -Cardiac rehab info provided/referral and communication to cardiac rehab completed  -Return for staged PCI as scheduled  -DISPO: Plan for D/C if remains HDS, and radial/brachial site stable and without complications

## 2023-08-02 NOTE — DISCHARGE NOTE NURSING/CASE MANAGEMENT/SOCIAL WORK - NSDCPEFALRISK_GEN_ALL_CORE
For information on Fall & Injury Prevention, visit: [email or web address] OR  [email or web address] OR  https://www.cdc.gov/steadi/patient.html

## 2023-08-02 NOTE — DISCHARGE NOTE NURSING/CASE MANAGEMENT/SOCIAL WORK - PATIENT PORTAL LINK FT
You can access the FollowMyHealth Patient Portal offered by [facility] by registering at the following website: [email or web address]. By joining [employer]’s FollowMyHealth portal, you will also be able to view your health information using other applications (apps) compatible with our system.

## 2023-08-02 NOTE — H&P PST ADULT - ASSESSMENT
Risk Assessments:  ASA: 2  Mallampati:  Bleeding Risk:  Creatinine:  GFR:    Impression:      Plan:  -plan for R/LHC via RA vs FA  -patient seen and examined  -confirmed appropriate NPO duration  -KRISTY prophylaxis 250cc NS bolus ordered  -ECG and Labs reviewed  -Aspirin 81mg po pre-cath  -procedure discussed with patient; risks and benefits explained, questions answered  -consent obtained by attending IC Risk Assessments:  ASA: 2  Mallampati: 2  Bleeding Risk: 1.1%  Creatinine: 0.48  GFR: 111    Impression:  59 yo female with ongoing SOB and new EKG changes here for right and left heart cath.    Plan:  -plan for R/LHC via RA vs FA  -patient seen and examined  -confirmed appropriate NPO duration  -KRISTY prophylaxis 250cc NS bolus ordered  -ECG and Labs reviewed  -Aspirin 81mg po pre-cath  -procedure discussed with patient; risks and benefits explained, questions answered  -consent obtained by attending IC

## 2023-08-02 NOTE — DISCHARGE NOTE PROVIDER - CARE PROVIDER_API CALL
Ron Whittaker  Interventional Cardiology  [address], [address]  [place] [address]  Phone: [phone]  Fax: [phone]  Follow Up Time:

## 2023-08-02 NOTE — DISCHARGE NOTE PROVIDER - NSDCCPTREATMENT_GEN_ALL_CORE_FT
PRINCIPAL PROCEDURE  Procedure: Left heart cardiac cath  Findings and Treatment: Go to the ED with any acute onset of chest pain, palpitations, shortness of breath or dizziness.   Managing risk factors will help prevent future blockages, risk factors may include: high blood pressure, high cholesterol, obesity, sedentary life style and smoking.    Your diet should be low in fat, cholesterol, salt and carbohydrates, increase fruits (caution if diabetic), vegetables and whole grains/fiber rich foods.   Take all your cardiac  medications as prescribed.    Restricted use with no heavy lifting of affected arm for 48 hours.  No submerging the arm in water for 48 hours.  You may start showering today.  Call your doctor for any bleeding, swelling, loss of sensation in the hand or fingers, or fingers turning blue.  If heavy bleeding or large lumps form, hold pressure at the spot and come to the Emergency Room.  Exercise is a very important factor in heart health. Once your post procedure restrictions have passed, you should engage in heart healthy, aerobic exercise. Be sure to have clearance from your cardiologist. Cardiac rehab programs could be extremely beneficial and your cardiologist could help set this up.   Follow up with your cardiologist within 1-2 weeks after your procedure.   Call your cardiologist or our unit ([phone]) with any questions or concerns that may arise.

## 2023-08-02 NOTE — DISCHARGE NOTE PROVIDER - NSDCCPCAREPLAN_GEN_ALL_CORE_FT
PRINCIPAL DISCHARGE DIAGNOSIS  Diagnosis: CAD (coronary artery disease)  Assessment and Plan of Treatment: Coronary artery disease is the buildup of plaque in the arteries that supply oxygen-rich blood to your heart. Plaque causes a narrowing or blockage that could result in a heart attack. Symptoms include chest pain or discomfort, shortness of breath, dizziness, palpitations, fatigue or reduced exercise tolerance. .Go to the ED with any acute onset of chest pain, palpitations, shortness of breath or dizziness. Do NOT miss a dose or stop taking your Aspirin and Plavix these medications prevent a heart attack. If anyone tells you to stop these medications, speak to your cardiologist immediately.Managing risk factors will help keep your stent open and prevent future blockages, risk factors may include: high blood pressure, high cholesterol, diabetes, obesity, sedentary life style and smoking.  Your diet should be low in fat, cholesterol, salt and carbohydrates, increase fruits (caution if diabetic), vegetables and whole grains/fiber rich foods. Take all your cardiac  medications as prescribed.   Exercise is a very important factor in heart health. Once your post procedure restrictions have passed, you should engage in heart healthy, aerobic exercise. Be sure to have clearance from your cardiologist. Cardiac rehab programs could be extremely beneficial and your cardiologist could help set this up. Follow up with your cardiologist within 1-2 weeks after your procedure. Call your cardiologist or our unit ([phone]) with any questions or concerns that may arise.      SECONDARY DISCHARGE DIAGNOSES  Diagnosis: Hyperlipemia  Assessment and Plan of Treatment: Your diet should be low in fat, cholesterol, salt and carbohydrates, increase fruits (caution if diabetic), vegetables and whole grains/fiber rich foods. Take your cholesterol lowering medications as prescribed. Routine follow up lipid panels

## 2023-08-02 NOTE — H&P PST ADULT - HISTORY OF PRESENT ILLNESS
Department of Cardiology                                                                  [facility]/[place] [id number]                                                            Telephone: [phone]. Fax:[phone]                                                                             Pre- Procedure H + P/Progress Note      HPI:  57 yo female with h/o HTN, GERD and family history of CAD. Patient presents with SOB at rest and RODRIGEZ. She has been having chest pressure and discomfort for years. She had right and left heart cath in 2014 with no CAD and normal right heart pressures. Her recent EKG in the office had new changes and she continues to have symptoms so recommended right and left heart cath.      Symptoms:        Angina (Class):        Ischemic Symptoms:     Heart Failure:        Systolic/Diastolic/Combined:        NYHA Class (within 2 weeks):     Assessment of LVEF:       EF:        Assessed by:        Date:     Prior Cardiac Interventions:  Cardiac Cath 9/26/14  VENTRICLES: Global left ventricular function was normal. EF calculated by  contrast ventriculography was 60 %.  VALVES: AORTIC VALVE: There was no aortic stenosis.  CORONARY VESSELS: The coronary circulation is left dominant.  LM:   --  LM: Normal.  LAD:   --  LAD: Normal.  CX:   --  Circumflex: Normal.  RCA:   --  RCA: Normal.  COMPLICATIONS: No complications occurred during the cath lab visit.  DIAGNOSTIC IMPRESSIONS: The coronary anatomy is normal.  Normal LV function by TTE.  Normal right heart pressures.    Noninvasive Testing:   Stress Test: Date:        Protocol:        Duration of Exercise:        Symptoms:        EKG Changes:        DTS:        Myocardial Imaging:        Risk Assessment (Low, Medium, High):     Echo: 6/13/14   IMPRESSION:  Summary:   1. Endocardial visualization was enhanced with intravenous echo contrast.   2. Normal global left ventricular systolic function.   3. Normal right ventricular size and function.   4. Normal mitral valve. Trace mitral regurgitation.   5. Normal trileaflet aortic valve with normal opening.   6. Mildly enlarged left atrium.          Associated Risk Factors:        Cerebrovascular Disease: N/A       Chronic Lung Disease: N/A       Peripheral Arterial Disease: N/A       Chronic Kidney Disease (if yes, what is GFR): N/A       Uncontrolled Diabetes (if yes, what is HgbA1C or FBS): N/A       Poorly Controlled Hypertension (if yes, what is SBP): N/A       Morbid Obesity (if yes, what is BMI): N/A       History of Recent Ventricular Arrhythmia: N/A       Inability to Ambulate Safely: N/A       Need for Therapeutic Anticoagulation: N/A       Antiplatelet or Contrast Allergy: N/A       Fraility: Mild/Moderate/Severe    Antianginal Therapies:        Beta Blockers:         Calcium Channel Blockers:        Long Acting Nitrates:        Ranexa:     	  MEDICATIONS:                    ROS: as stated above, otherwise negative    PHYSICAL EXAM:  Constitutional: A & O x 3  HEENT:   Normal oral mucosa, PERRL, EOMI	  Cardiovascular: Normal S1 S2, No JVD, No murmurs, No edema  Respiratory: Lungs clear to auscultation	  Gastrointestinal:  Soft, Non-tender, + BS	  Skin: No rashes, No ecchymoses, No cyanosis  Neurologic: Non-focal  Extremities: Normal range of motion, No clubbing, cyanosis or edema  Vascular: Peripheral pulses palpable 2+ bilaterally      T(C): --  HR: --  BP: --  RR: --  SpO2: --  Wt(kg): --      I&O's Summary      Daily     Daily     TELEMETRY: 	      ECG:  	    LABS:	 	                        Tnl:    Lipid Profile:   TC  TG  LDL  HDL    HgA1c:     proBNP:     TSH:                                                                                  Department of Cardiology                                                                  [facility]/[place] [id number]                                                            Telephone: [phone]. Fax:[phone]                                                                             Pre- Procedure H + P/Progress Note      HPI:  57 yo female with h/o HTN, GERD and family history of CAD. Patient presents with SOB at rest and RODRIGEZ. She has been having chest pressure and discomfort for years. She had right and left heart cath in 2014 with no CAD and normal right heart pressures. Her recent EKG in the office had new changes and she continues to have symptoms so recommended right and left heart cath.      Symptoms:        Angina (Class):        Ischemic Symptoms:     Heart Failure:        Systolic/Diastolic/Combined:        NYHA Class (within 2 weeks):     Assessment of LVEF:       EF:        Assessed by:        Date:     Prior Cardiac Interventions:  Cardiac Cath 9/26/14  VENTRICLES: Global left ventricular function was normal. EF calculated by  contrast ventriculography was 60 %.  VALVES: AORTIC VALVE: There was no aortic stenosis.  CORONARY VESSELS: The coronary circulation is left dominant.  LM:   --  LM: Normal.  LAD:   --  LAD: Normal.  CX:   --  Circumflex: Normal.  RCA:   --  RCA: Normal.  COMPLICATIONS: No complications occurred during the cath lab visit.  DIAGNOSTIC IMPRESSIONS: The coronary anatomy is normal.  Normal LV function by TTE.  Normal right heart pressures.    Noninvasive Testing:   Stress Test: Date:        Protocol:        Duration of Exercise:        Symptoms:        EKG Changes:        DTS:        Myocardial Imaging:        Risk Assessment (Low, Medium, High):     Echo: 6/13/14   IMPRESSION:  Summary:   1. Endocardial visualization was enhanced with intravenous echo contrast.   2. Normal global left ventricular systolic function.   3. Normal right ventricular size and function.   4. Normal mitral valve. Trace mitral regurgitation.   5. Normal trileaflet aortic valve with normal opening.   6. Mildly enlarged left atrium.          Associated Risk Factors:        Cerebrovascular Disease: N/A       Chronic Lung Disease: N/A       Peripheral Arterial Disease: N/A       Chronic Kidney Disease (if yes, what is GFR): N/A       Uncontrolled Diabetes (if yes, what is HgbA1C or FBS): N/A       Poorly Controlled Hypertension (if yes, what is SBP): N/A       Morbid Obesity (if yes, what is BMI): N/A       History of Recent Ventricular Arrhythmia: N/A       Inability to Ambulate Safely: N/A       Need for Therapeutic Anticoagulation: N/A       Antiplatelet or Contrast Allergy: N/A      Antianginal Therapies:        Beta Blockers:         Calcium Channel Blockers:        Long Acting Nitrates:        Ranexa:     	  MEDICATIONS:                    ROS: as stated above, otherwise negative    PHYSICAL EXAM:  Constitutional: A & O x 3  HEENT:   Normal oral mucosa, PERRL, EOMI	  Cardiovascular: Normal S1 S2, No JVD, No murmurs, No edema  Respiratory: Lungs clear to auscultation	  Gastrointestinal:  Soft, Non-tender, + BS	  Skin: No rashes, No ecchymoses, No cyanosis  Neurologic: Non-focal  Extremities: Normal range of motion, No clubbing, cyanosis or edema  Vascular: Peripheral pulses palpable 2+ bilaterally      T(C): --  HR: --  BP: --  RR: --  SpO2: --  Wt(kg): --      I&O's Summary      Daily     Daily     TELEMETRY: 	      ECG:  	    LABS:	 	                        Tnl:    Lipid Profile:   TC  TG  LDL  HDL    HgA1c:     proBNP:     TSH:

## 2023-08-02 NOTE — PROGRESS NOTE ADULT - SUBJECTIVE AND OBJECTIVE BOX
Now s/p LHC via right radial with Dr. Krueger tolerated procedure well. Pt arrived to recovery in NAD and HDS, RRA access site stable, no bleed/hematoma, distal pulse +,   Intraprocedurally: Versed 1 mg Fentanyl 50 ucg  Findings: normal coronaries, severe AS      Plan:  -Formal cath report pending  -Post procedure management/monitoring per protocol  -Access site precautions  -Radial compression band removal at 1300  -Bedrest while radial band in place  -NS 0.9% 250ml/hr x 1 bolus: post procedure KRISTY ppx   -Repeat ECG if any clinical indication or change on tele  -Continue current medical therapy  -Cont BB with Toprol 25 mg po daily   -Cont statin therapy with Lipitor 40 mg po qHS   -Educated regarding post procedure management and care  -Discussed the importance of RF modification  -Cardiac rehab info provided/referral and communication to cardiac rehab completed  -F/U outpt in 1-2 weeks with Cardiologist Dr. Gross/Dre for TAVR work-up  -DISPO: Plan for D/C if remains HDS and radial site stable and without complications

## 2023-08-04 ENCOUNTER — APPOINTMENT (OUTPATIENT)
Dept: CARDIOLOGY | Facility: CLINIC | Age: 58
End: 2023-08-04
Payer: MEDICARE

## 2023-08-04 DIAGNOSIS — I27.20 PULMONARY HYPERTENSION, UNSPECIFIED: ICD-10-CM

## 2023-08-04 PROCEDURE — 93306 TTE W/DOPPLER COMPLETE: CPT

## 2023-08-07 ENCOUNTER — APPOINTMENT (OUTPATIENT)
Dept: MAMMOGRAPHY | Facility: CLINIC | Age: 58
End: 2023-08-07
Payer: MEDICARE

## 2023-08-07 ENCOUNTER — APPOINTMENT (OUTPATIENT)
Dept: ULTRASOUND IMAGING | Facility: CLINIC | Age: 58
End: 2023-08-07
Payer: MEDICARE

## 2023-08-07 ENCOUNTER — RESULT REVIEW (OUTPATIENT)
Age: 58
End: 2023-08-07

## 2023-08-07 ENCOUNTER — OUTPATIENT (OUTPATIENT)
Dept: OUTPATIENT SERVICES | Facility: HOSPITAL | Age: 58
LOS: 1 days | End: 2023-08-07
Payer: MEDICARE

## 2023-08-07 DIAGNOSIS — Z12.39 ENCOUNTER FOR OTHER SCREENING FOR MALIGNANT NEOPLASM OF BREAST: ICD-10-CM

## 2023-08-07 DIAGNOSIS — Z87.59 PERSONAL HISTORY OF OTHER COMPLICATIONS OF PREGNANCY, CHILDBIRTH AND THE PUERPERIUM: Chronic | ICD-10-CM

## 2023-08-07 DIAGNOSIS — Z90.722 ACQUIRED ABSENCE OF OVARIES, BILATERAL: Chronic | ICD-10-CM

## 2023-08-07 PROCEDURE — 77067 SCR MAMMO BI INCL CAD: CPT

## 2023-08-07 PROCEDURE — 76641 ULTRASOUND BREAST COMPLETE: CPT

## 2023-08-07 PROCEDURE — 77063 BREAST TOMOSYNTHESIS BI: CPT | Mod: 26

## 2023-08-07 PROCEDURE — 77063 BREAST TOMOSYNTHESIS BI: CPT

## 2023-08-07 PROCEDURE — 76641 ULTRASOUND BREAST COMPLETE: CPT | Mod: 26,50

## 2023-08-07 PROCEDURE — 77067 SCR MAMMO BI INCL CAD: CPT | Mod: 26

## 2023-08-08 RX ORDER — NABUMETONE 750 MG/1
750 TABLET, FILM COATED ORAL
Qty: 180 | Refills: 0 | Status: DISCONTINUED | COMMUNITY
Start: 2023-06-11 | End: 2023-08-08

## 2023-08-10 ENCOUNTER — APPOINTMENT (OUTPATIENT)
Dept: CARDIOLOGY | Facility: CLINIC | Age: 58
End: 2023-08-10
Payer: MEDICARE

## 2023-08-10 VITALS
BODY MASS INDEX: 30.53 KG/M2 | HEART RATE: 86 BPM | SYSTOLIC BLOOD PRESSURE: 138 MMHG | DIASTOLIC BLOOD PRESSURE: 70 MMHG | HEIGHT: 66 IN | OXYGEN SATURATION: 99 % | WEIGHT: 190 LBS

## 2023-08-10 DIAGNOSIS — R06.02 SHORTNESS OF BREATH: ICD-10-CM

## 2023-08-10 PROCEDURE — 93000 ELECTROCARDIOGRAM COMPLETE: CPT

## 2023-08-10 PROCEDURE — 99215 OFFICE O/P EST HI 40 MIN: CPT | Mod: 25

## 2023-08-10 RX ORDER — ATORVASTATIN CALCIUM 20 MG/1
20 TABLET, FILM COATED ORAL
Qty: 90 | Refills: 1 | Status: DISCONTINUED | COMMUNITY
Start: 2021-05-24 | End: 2023-08-10

## 2023-08-10 NOTE — DISCUSSION/SUMMARY
[FreeTextEntry1] : Discussed with Dr. Whittaker via telephone.  Plan 1. CAD- recent R/LHC with normal right heart pressures and severe LAD/ramus disease, needs rotational atherectomy and planned for staged PCI 8/30/23. On DAPT- ASA/Plavix and Lipitor 40 mg QD. Has anginal symptoms. Start Toprol 25 mg daily, will follow up in 10-14 days and will increase or add imdur if needed.  2. palpitations- 3 day HM ordered. Starting Toprol 3. BP stable 4. HLD- on lipitor 40 mg QD 5. F/U in 10-14 days, advised to seek emergent care for worsening/persistent symptoms.  MOUSTAPHA AlarconC  [EKG obtained to assist in diagnosis and management of assessed problem(s)] : EKG obtained to assist in diagnosis and management of assessed problem(s)

## 2023-08-10 NOTE — REVIEW OF SYSTEMS
[Dyspnea on exertion] : dyspnea during exertion [Chest Discomfort] : chest discomfort [Palpitations] : palpitations [Joint Pain] : joint pain [Fever] : no fever [Chills] : no chills [Lower Ext Edema] : no extremity edema [Orthopnea] : no orthopnea [PND] : no PND [Syncope] : no syncope [Blood in Stool] : no blood in stool [Dizziness] : no dizziness [Anxiety] : anxiety [Under Stress] : under stress [Easy Bleeding] : no tendency for easy bleeding [FreeTextEntry8] : no hematuria [FreeTextEntry9] : fibromyalgia and OA

## 2023-08-10 NOTE — PHYSICAL EXAM
[Normal Gait] : normal gait [No Edema] : no edema [Normal Radial B/L] : normal radial B/L [Normal] : alert and oriented, normal memory [de-identified] : RRA and RBA sites soft, no bleeding or swelling, healing ecchymosis noted.

## 2023-08-10 NOTE — CARDIOLOGY SUMMARY
[de-identified] : 8/10/2023 SR, nonspecific t abnormality 6/2023- Sinus with anterior t wave inversions.  [de-identified] : TTE 8/4/2023 CONCLUSIONS: 1. The left ventricular systolic function is normal with an ejection fraction of 55 % by Esparza's method of disks with an ejection fraction visually estimated at 55 to 60 %. 2. There is normal left ventricular diastolic function. 3. Normal right ventricular cavity size and normal right ventricular systolic function. 4. Mild tricuspid regurgitation. 5. No pericardial effusion seen. 6. Compared to the transthoracic echocardiogram performed on 6/21/2018 there have been no significant interval changes. [de-identified] : 8/2/2023 R/LHC: Normal right heart pressures, LAD- ostial/proximal 70% heavily calcified lesion (iFR 0.86). Ramus- 70% proximal ramus disease (iFR 0.83). LCX- 40-50% proximal diffuse disease. RCA- diffusely diseased small caliber vessel.

## 2023-08-10 NOTE — HISTORY OF PRESENT ILLNESS
[FreeTextEntry1] : Patient with a history of hypertension, esophageal reflux, and a family history of coronary artery disease. She reports that for years, she has experienced episodes of left-sided chest pressure and discomfort traveling down her left arm. She also reports episodes of tingling in her finger tips and dyspnea when walking up and down stairs. Previous Left and Right heart cath in 2014 with no coronary disease and normal right heart pressures. Patient presents with c/o chronic SOB at rest and RODRIGEZ with mild exertion. Reviewed echo and nuclear stress test results.   6/2023- Patient with recurrent symptoms of shortness of breath, chest heaviness. With and without exertion Seen by pulmonary- no evidence of asthma.  Chest tightness- substernal, no n, no v.   8/10/2023 Patient here for post cath follow up. She is s/p R/LHC 8/2/23 which showed normal right heart pressures and severe LAD and ramus disease. No intervention done due to need for rotational atherectomy. She is scheduled for staged PCI 8/30/23. She was started on plavix and lipitor was increased. She reports compliance with her medications. Denies bleeding on DAPT. She still has exertional SOB and chest tightness. Also has intermittent palpitations, mostly at night. Lasts ~5 mins. No near syncope or syncope.

## 2023-08-20 LAB
ALBUMIN MFR SERPL ELPH: 56.3 %
ALBUMIN SERPL ELPH-MCNC: 4.4 G/DL
ALBUMIN SERPL-MCNC: 4.1 G/DL
ALBUMIN/GLOB SERPL: 1.3 RATIO
ALP BLD-CCNC: 103 U/L
ALPHA1 GLOB MFR SERPL ELPH: 3.9 %
ALPHA1 GLOB SERPL ELPH-MCNC: 0.3 G/DL
ALPHA2 GLOB MFR SERPL ELPH: 10.7 %
ALPHA2 GLOB SERPL ELPH-MCNC: 0.8 G/DL
ALT SERPL-CCNC: 42 U/L
ANION GAP SERPL CALC-SCNC: 18 MMOL/L
APPEARANCE: CLEAR
AST SERPL-CCNC: 36 U/L
B-GLOBULIN MFR SERPL ELPH: 15.2 %
B-GLOBULIN SERPL ELPH-MCNC: 1.1 G/DL
BACTERIA: ABNORMAL /HPF
BILIRUB SERPL-MCNC: 0.5 MG/DL
BILIRUBIN URINE: NEGATIVE
BLOOD URINE: ABNORMAL
BUN SERPL-MCNC: 6 MG/DL
CALCIUM SERPL-MCNC: 9.9 MG/DL
CCP AB SER IA-ACNC: <8 UNITS
CHLORIDE SERPL-SCNC: 107 MMOL/L
CO2 SERPL-SCNC: 20 MMOL/L
COLOR: YELLOW
CREAT SERPL-MCNC: 0.63 MG/DL
CRP SERPL-MCNC: <3 MG/L
DEPRECATED KAPPA LC FREE/LAMBDA SER: 1.21 RATIO
EGFR: 104 ML/MIN/1.73M2
ENA SS-A AB SER IA-ACNC: <0.2 AL
ENA SS-B AB SER IA-ACNC: <0.2 AL
ERYTHROCYTE [SEDIMENTATION RATE] IN BLOOD BY WESTERGREN METHOD: 21 MM/HR
GAMMA GLOB FLD ELPH-MCNC: 1 G/DL
GAMMA GLOB MFR SERPL ELPH: 13.9 %
GLUCOSE QUALITATIVE U: NEGATIVE MG/DL
GLUCOSE SERPL-MCNC: 111 MG/DL
HYALINE CASTS: NORMAL
IGA SER QL IEP: 477 MG/DL
IGG SER QL IEP: 1013 MG/DL
IGM SER QL IEP: 84 MG/DL
INTERPRETATION SERPL IEP-IMP: NORMAL
KAPPA LC CSF-MCNC: 1.26 MG/DL
KAPPA LC SERPL-MCNC: 1.53 MG/DL
KETONES URINE: ABNORMAL MG/DL
LDH SERPL-CCNC: 249 U/L
LEUKOCYTE ESTERASE URINE: NEGATIVE
M PROTEIN SPEC IFE-MCNC: NORMAL
MICROSCOPIC-UA: NORMAL
NITRITE URINE: NEGATIVE
PH URINE: 5.5
PHOSPHATE SERPL-MCNC: 2.4 MG/DL
POTASSIUM SERPL-SCNC: 3.8 MMOL/L
PROT SERPL-MCNC: 7.2 G/DL
PROTEIN URINE: 100 MG/DL
RED BLOOD CELLS URINE: 7 /HPF
RF+CCP IGG SER-IMP: NEGATIVE
RHEUMATOID FACT SER QL: 14 IU/ML
SODIUM SERPL-SCNC: 145 MMOL/L
SPECIFIC GRAVITY URINE: 1.03
SQUAMOUS EPITHELIAL CELLS: PRESENT
TSH SERPL-ACNC: 0.64 UIU/ML
UROBILINOGEN URINE: 1 MG/DL
WHITE BLOOD CELLS URINE: 2 /HPF

## 2023-08-22 ENCOUNTER — APPOINTMENT (OUTPATIENT)
Dept: FAMILY MEDICINE | Facility: CLINIC | Age: 58
End: 2023-08-22
Payer: MEDICARE

## 2023-08-22 VITALS
HEIGHT: 67 IN | HEART RATE: 86 BPM | BODY MASS INDEX: 29.82 KG/M2 | RESPIRATION RATE: 14 BRPM | SYSTOLIC BLOOD PRESSURE: 132 MMHG | WEIGHT: 190 LBS | TEMPERATURE: 98.3 F | DIASTOLIC BLOOD PRESSURE: 72 MMHG | OXYGEN SATURATION: 98 %

## 2023-08-22 PROCEDURE — 99214 OFFICE O/P EST MOD 30 MIN: CPT

## 2023-08-22 NOTE — HEALTH RISK ASSESSMENT
[Yes] : Yes [No] : In the past 12 months have you used drugs other than those required for medical reasons? No [No falls in past year] : Patient reported no falls in the past year [2] : 2) Feeling down, depressed, or hopeless for more than half of the days (2) [PHQ-2 Positive] : PHQ-2 Positive [I have developed a follow-up plan documented below in the note.] : I have developed a follow-up plan documented below in the note. [Former] : Former [de-identified] : GI, Orthopedic, Gyn, Neurology, Rheumatology, Hematology [de-identified] : beer ocassional [[id number]] : 4

## 2023-08-22 NOTE — HISTORY OF PRESENT ILLNESS
[FreeTextEntry1] : \par  f/up\par  meds refills [de-identified] : 59 y/o AAF presents today for f/up. Previous PCP: Kenya Cook Pt with hx depression , on Vistaril  and Cymbalta, HTN on Diltiazem, Dyslipidemia on Atorvastatin,  Chronic Cervicalgia, low back pain and Knee OA. She was seen by Orthopedic for RT elbow pain and knee pain.  She reports multiple joints aches,seen by Rheumatology Dr Kleiner. She was seen by psychology in [place] and now is awaiting for new placement Seen at [facility] for Depression in the past. Pt seen by Allergist: Matias Bailey Seen by cardiology, Dr Whittaker, s/p Cardiac cath 8/2/23> schedule for PCI 8/30/23 Pulmonology: Dr Morales GI: Dr Ritter She was seen by Hematology for evaluation of Leukocytosis. States her anxiety is not well control

## 2023-08-22 NOTE — ASSESSMENT
[FreeTextEntry1] : HCM: -06/20 -HIV screening: negative 2020  Gyn: Dr gaitan Mammo: 2022 Colonoscopy: 2019./ F/up with Dr Ritter Immunizations: -Shingrix: UTD as per pt. -Refused Flu shot -Tdap  2/09/23  # HTN:/ CAD -S/p Cath> schedule for PCI 8/30/23 -on Cardizem. -F/up with cardiology, Dr medley.  # Dyslipidemia: -On Atorvastatin  # Asthma:> R/O by Pulmonology -Off Breo. -F/up with Pulmonology Dr ariza  # GERD: -On Pantoprazole. -GI: Dr Ritter  # Chronic lumbalgia/ Cervicalgia/ Knee OA;RT elbow tendinitis/ Polyarthralgia -Seen by Orthopedic. -On Diclofenac, Savella. -Rheumatology eval on 10/07/22 appreciated  #  depression with anxiety -Continue Cymbalta. -Continue Vistaril prn. -Seen in the past at [facility]. - telephonic eval with Dr Rios for counseling / psychiatry  # CystIN RT LQ -Sx referral  # Skin candidiasis under breast; -Nystatin  # Leukocytosis -hematology eval appreciated F/up for annual physical or prn.

## 2023-08-23 ENCOUNTER — OUTPATIENT (OUTPATIENT)
Dept: OUTPATIENT SERVICES | Facility: HOSPITAL | Age: 58
LOS: 1 days | End: 2023-08-23
Payer: MEDICARE

## 2023-08-23 ENCOUNTER — NON-APPOINTMENT (OUTPATIENT)
Age: 58
End: 2023-08-23

## 2023-08-23 ENCOUNTER — APPOINTMENT (OUTPATIENT)
Dept: CARDIOLOGY | Facility: CLINIC | Age: 58
End: 2023-08-23
Payer: MEDICARE

## 2023-08-23 VITALS
TEMPERATURE: 97 F | RESPIRATION RATE: 12 BRPM | HEART RATE: 76 BPM | SYSTOLIC BLOOD PRESSURE: 140 MMHG | OXYGEN SATURATION: 98 % | DIASTOLIC BLOOD PRESSURE: 80 MMHG

## 2023-08-23 VITALS
DIASTOLIC BLOOD PRESSURE: 70 MMHG | SYSTOLIC BLOOD PRESSURE: 136 MMHG | WEIGHT: 188 LBS | OXYGEN SATURATION: 98 % | BODY MASS INDEX: 29.51 KG/M2 | HEIGHT: 67 IN | HEART RATE: 97 BPM | RESPIRATION RATE: 16 BRPM | TEMPERATURE: 98 F

## 2023-08-23 DIAGNOSIS — Z01.818 ENCOUNTER FOR OTHER PREPROCEDURAL EXAMINATION: ICD-10-CM

## 2023-08-23 DIAGNOSIS — Z87.59 PERSONAL HISTORY OF OTHER COMPLICATIONS OF PREGNANCY, CHILDBIRTH AND THE PUERPERIUM: Chronic | ICD-10-CM

## 2023-08-23 DIAGNOSIS — R07.89 OTHER CHEST PAIN: ICD-10-CM

## 2023-08-23 DIAGNOSIS — R00.2 PALPITATIONS: ICD-10-CM

## 2023-08-23 DIAGNOSIS — Z90.722 ACQUIRED ABSENCE OF OVARIES, BILATERAL: Chronic | ICD-10-CM

## 2023-08-23 DIAGNOSIS — E78.5 HYPERLIPIDEMIA, UNSPECIFIED: ICD-10-CM

## 2023-08-23 DIAGNOSIS — I25.118 ATHEROSCLEROTIC HEART DISEASE OF NATIVE CORONARY ARTERY WITH OTHER FORMS OF ANGINA PECTORIS: ICD-10-CM

## 2023-08-23 LAB
A1C WITH ESTIMATED AVERAGE GLUCOSE RESULT: 5.7 % — HIGH (ref 4–5.6)
ALBUMIN SERPL ELPH-MCNC: 3.8 G/DL — SIGNIFICANT CHANGE UP (ref 3.3–5.2)
ALP SERPL-CCNC: 102 U/L — SIGNIFICANT CHANGE UP (ref 40–120)
ALT FLD-CCNC: 31 U/L — SIGNIFICANT CHANGE UP
ANION GAP SERPL CALC-SCNC: 13 MMOL/L — SIGNIFICANT CHANGE UP (ref 5–17)
AST SERPL-CCNC: 26 U/L — SIGNIFICANT CHANGE UP
BASOPHILS # BLD AUTO: 0.06 K/UL — SIGNIFICANT CHANGE UP (ref 0–0.2)
BASOPHILS NFR BLD AUTO: 0.4 % — SIGNIFICANT CHANGE UP (ref 0–2)
BILIRUB SERPL-MCNC: 0.3 MG/DL — LOW (ref 0.4–2)
BLD GP AB SCN SERPL QL: SIGNIFICANT CHANGE UP
BUN SERPL-MCNC: 11.7 MG/DL — SIGNIFICANT CHANGE UP (ref 8–20)
CALCIUM SERPL-MCNC: 8.6 MG/DL — SIGNIFICANT CHANGE UP (ref 8.4–10.5)
CHLORIDE SERPL-SCNC: 105 MMOL/L — SIGNIFICANT CHANGE UP (ref 96–108)
CO2 SERPL-SCNC: 25 MMOL/L — SIGNIFICANT CHANGE UP (ref 22–29)
CREAT SERPL-MCNC: 0.54 MG/DL — SIGNIFICANT CHANGE UP (ref 0.5–1.3)
EGFR: 108 ML/MIN/1.73M2 — SIGNIFICANT CHANGE UP
EOSINOPHIL # BLD AUTO: 0.41 K/UL — SIGNIFICANT CHANGE UP (ref 0–0.5)
EOSINOPHIL NFR BLD AUTO: 2.8 % — SIGNIFICANT CHANGE UP (ref 0–6)
ESTIMATED AVERAGE GLUCOSE: 117 MG/DL — HIGH (ref 68–114)
GLUCOSE SERPL-MCNC: 91 MG/DL — SIGNIFICANT CHANGE UP (ref 70–99)
HCT VFR BLD CALC: 36.1 % — SIGNIFICANT CHANGE UP (ref 34.5–45)
HGB BLD-MCNC: 12 G/DL — SIGNIFICANT CHANGE UP (ref 11.5–15.5)
IMM GRANULOCYTES NFR BLD AUTO: 0.4 % — SIGNIFICANT CHANGE UP (ref 0–0.9)
INR BLD: 1.06 RATIO — SIGNIFICANT CHANGE UP (ref 0.85–1.18)
LYMPHOCYTES # BLD AUTO: 2.89 K/UL — SIGNIFICANT CHANGE UP (ref 1–3.3)
LYMPHOCYTES # BLD AUTO: 20 % — SIGNIFICANT CHANGE UP (ref 13–44)
MCHC RBC-ENTMCNC: 32 PG — SIGNIFICANT CHANGE UP (ref 27–34)
MCHC RBC-ENTMCNC: 33.2 GM/DL — SIGNIFICANT CHANGE UP (ref 32–36)
MCV RBC AUTO: 96.3 FL — SIGNIFICANT CHANGE UP (ref 80–100)
MONOCYTES # BLD AUTO: 0.66 K/UL — SIGNIFICANT CHANGE UP (ref 0–0.9)
MONOCYTES NFR BLD AUTO: 4.6 % — SIGNIFICANT CHANGE UP (ref 2–14)
NEUTROPHILS # BLD AUTO: 10.4 K/UL — HIGH (ref 1.8–7.4)
NEUTROPHILS NFR BLD AUTO: 71.8 % — SIGNIFICANT CHANGE UP (ref 43–77)
PLATELET # BLD AUTO: 279 K/UL — SIGNIFICANT CHANGE UP (ref 150–400)
POTASSIUM SERPL-MCNC: 3.6 MMOL/L — SIGNIFICANT CHANGE UP (ref 3.5–5.3)
POTASSIUM SERPL-SCNC: 3.6 MMOL/L — SIGNIFICANT CHANGE UP (ref 3.5–5.3)
PROT SERPL-MCNC: 6.6 G/DL — SIGNIFICANT CHANGE UP (ref 6.6–8.7)
PROTHROM AB SERPL-ACNC: 11.7 SEC — SIGNIFICANT CHANGE UP (ref 9.5–13)
RBC # BLD: 3.75 M/UL — LOW (ref 3.8–5.2)
RBC # FLD: 13.4 % — SIGNIFICANT CHANGE UP (ref 10.3–14.5)
SODIUM SERPL-SCNC: 143 MMOL/L — SIGNIFICANT CHANGE UP (ref 135–145)
WBC # BLD: 14.48 K/UL — HIGH (ref 3.8–10.5)
WBC # FLD AUTO: 14.48 K/UL — HIGH (ref 3.8–10.5)

## 2023-08-23 PROCEDURE — 93000 ELECTROCARDIOGRAM COMPLETE: CPT

## 2023-08-23 PROCEDURE — 99212 OFFICE O/P EST SF 10 MIN: CPT | Mod: 25

## 2023-08-23 PROCEDURE — 93010 ELECTROCARDIOGRAM REPORT: CPT

## 2023-08-23 PROCEDURE — G0463: CPT

## 2023-08-23 PROCEDURE — 93005 ELECTROCARDIOGRAM TRACING: CPT

## 2023-08-23 RX ORDER — ATORVASTATIN CALCIUM 40 MG/1
40 TABLET, FILM COATED ORAL
Qty: 30 | Refills: 6 | Status: ACTIVE | COMMUNITY
Start: 1900-01-01 | End: 1900-01-01

## 2023-08-23 NOTE — H&P PST ADULT - NSICDXPASTMEDICALHX_GEN_ALL_CORE_FT
PAST MEDICAL HISTORY:  Asthma controlled on meds    Ectopic pregnancy     Hypertension     Palpitations     Reflux     Seasonal allergies     Sleep apnea does not use the CPAP but use O2 prn    SOB (shortness of breath)     Vitamin B 12 deficiency

## 2023-08-23 NOTE — H&P PST ADULT - NSICDXFAMILYHX_GEN_ALL_CORE_FT
FAMILY HISTORY:  CAD (coronary artery disease)  Family history of CABG  Family history of malignant neoplasm of kidney  Hypertension

## 2023-08-23 NOTE — H&P PST ADULT - HISTORY OF PRESENT ILLNESS
Department of Cardiology                                                                  [facility]/[place] [address] [address] [facility]                                                            Telephone: [phone]. Fax:[phone]                                                                             Pre- Procedure Progress Note        Chief complaint:    Patient is a 58y old  Female who presents with a chief complaint of RODRIGEZ Palpitations. Abnormal angiogram .     HPI:  57 yo female with h/o HTN, GERD and family history of CAD. Patient presents with SOB at rest and RODRIGEZ. She has been having chest pressure and discomfort for years. She had right and left heart cath in 2014 with no CAD and normal right heart pressures. Her recent EKG in the office had new changes and she continues to have symptoms so recommended right and left heart cath.        Symptoms:        Angina (Class): CCS III       Ischemic Symptoms:  RODRIGEZ         Echo: 2014  normal 65% EF no WMA     Prior Cardiac Cath 8/2/23   Diagnostic Conclusions:   Normal right heart pressures.    LAD- iFr- 0.86   Ramus- iFr- 0.83.    Recommendations:   Considering significant radial spasm, need for rotational atherectomy,  will plan for staged PCI of LAD/Ramus. Patient to be  started on plavix.      Associated Risk Factors:        Frailty Assessment: (none/mild/mod/severe)       Cerebrovascular Disease: N/A       Chronic Lung Disease: N/A       Peripheral Arterial Disease: N/A       Chronic Kidney Disease (if yes, what is GFR): N/A       Uncontrolled Diabetes (if yes, what is HgbA1C or FBS): N/A       Poorly Controlled Hypertension (if yes, what is SBP): N/A       Morbid Obesity (if yes, what is BMI): N/A       History of Recent Ventricular Arrhythmia: N/A       Inability to Ambulate Safely: N/A       Need for Therapeutic Anticoagulation: N/A       Antiplatelet or Contrast Allergy: N/A    Antianginal Therapies:        Beta Blockers:         Calcium Channel Blockers:        Long Acting Nitrates:        Ranexa:     	  MEDICATIONS:  Plavix 75 mg daily   Imdur 30 mg po daily   Aspir 81 oral delayed release tablet: 1 orally once a day (02 Aug 2023 10:06)  Cymbalta 60 mg oral delayed release capsule: 1 orally once a day (02 Aug 2023 11:29)  DilTIAZem Hydrochloride  mg/24 hours oral capsule, extended release: 1 cap(s) orally once a day (02 Aug 2023 11:23)  nabumetone 750 mg oral tablet: 2 orally once a day (02 Aug 2023 11:28)  omeprazole 40 mg oral delayed release capsule: 1 cap(s) orally once a day (02 Aug 2023 11:23)  Savella 100 mg oral tablet: 1 orally once a day (02 Aug 2023 11:27)  Vitamin D2 50,000 intl units (1.25 mg) oral capsule: 1 cap(s) orally once a week (02 Aug 2023 10:06)                    ROS: as stated above, otherwise negative    PHYSICAL EXAM:  Constitutional: A & O x 3  HEENT:   Normal oral mucosa, PERRL, EOMI	  Cardiovascular: Normal S1 S2, No JVD, No murmurs  Respiratory: Lungs clear to auscultation bilaterally   Gastrointestinal:  Soft, Non-tender, + BS	  Skin: No rashes, No ecchymoses, No cyanosis  Neurologic: Non-focal  Extremities: Normal range of motion, no edema  Vascular: Peripheral pulses palpable + bilaterally +2 bilat          	      ECG:  	Sinus 74 non specific ST abnormality V1-4     LABS:	  Pending 	                Tnl:    Lipid Profile:   TC  TG  LDL  HDL    HgA1c:     proBNP:                                                                                Department of Cardiology                                                                  [facility]/[place] [address] [address] [facility]                                                            Telephone: [phone]. Fax:[phone]                                                                             Pre- Procedure Progress Note        Chief complaint:    Patient is a 58y old  Female who presents with a chief complaint of RODRIGEZ Palpitations. Abnormal angiogram .     HPI:  57 yo female with h/o HTN, GERD recent chronic leukocytosis being followed by her rheumatology and family history of CAD. Patient presents with SOB at rest and RODRIGEZ. She has been having chest pressure and discomfort for years. She had right and left heart cath in 2014 with no CAD and normal right heart pressures. No recent cardiology follow up until recently in which she had come to the ED with Palpitations and RODRIGEZ LHC revealed significant LAD and RAMUS disease. She continues to have symptoms of RODRIGEZ started on IMDUR while awaiting revascularization of LAD and RAMUS.          Symptoms:        Angina (Class): CCS III       Ischemic Symptoms:  RODRIGEZ         Echo: 2014  normal 65% EF no WMA     Prior Cardiac Cath 8/2/23   Diagnostic Conclusions:   Normal right heart pressures.    LAD- iFr- 0.86   Ramus- iFr- 0.83.    Recommendations:   Considering significant radial spasm, need for rotational atherectomy,  will plan for staged PCI of LAD/Ramus. Patient to be  started on plavix.      Associated Risk Factors:        Frailty Assessment: (none/mild/mod/severe)       Cerebrovascular Disease: N/A       Chronic Lung Disease: N/A       Peripheral Arterial Disease: N/A       Chronic Kidney Disease (if yes, what is GFR): N/A       Uncontrolled Diabetes (if yes, what is HgbA1C or FBS): N/A       Poorly Controlled Hypertension (if yes, what is SBP): N/A       Morbid Obesity (if yes, what is BMI): N/A       History of Recent Ventricular Arrhythmia: N/A       Inability to Ambulate Safely: N/A       Need for Therapeutic Anticoagulation: N/A       Antiplatelet or Contrast Allergy: N/A    Antianginal Therapies:        Beta Blockers:         Calcium Channel Blockers:        Long Acting Nitrates:        Ranexa:     	  MEDICATIONS:  Plavix 75 mg daily   Imdur 30 mg po daily   Aspir 81 oral delayed release tablet: 1 orally once a day (02 Aug 2023 10:06)  Cymbalta 60 mg oral delayed release capsule: 1 orally once a day (02 Aug 2023 11:29)  DilTIAZem Hydrochloride  mg/24 hours oral capsule, extended release: 1 cap(s) orally once a day (02 Aug 2023 11:23)  nabumetone 750 mg oral tablet: 2 orally once a day (02 Aug 2023 11:28)  omeprazole 40 mg oral delayed release capsule: 1 cap(s) orally once a day (02 Aug 2023 11:23)  Savella 100 mg oral tablet: 1 orally once a day (02 Aug 2023 11:27)  Vitamin D2 50,000 intl units (1.25 mg) oral capsule: 1 cap(s) orally once a week (02 Aug 2023 10:06)    ROS: as stated above, otherwise negative    PHYSICAL EXAM:  Constitutional: A & O x 3  HEENT:   Normal oral mucosa, PERRL, EOMI	  Cardiovascular: Normal S1 S2, No JVD, No murmurs  Respiratory: Lungs clear to auscultation bilaterally   Gastrointestinal:  Soft, Non-tender, + BS	  Skin: No rashes, No ecchymoses, No cyanosis  Neurologic: Non-focal  Extremities: Normal range of motion, no edema  Vascular: Peripheral pulses palpable + bilaterally +2 bilat          	      ECG:  	Sinus 74 non specific ST abnormality V1-4     LABS:	                       12.0   14.48 )-----------( 279      ( 23 Aug 2023 17:55 )             36.1   08-23    143  |  105  |  11.7  ----------------------------<  91  3.6   |  25.0  |  0.54    Ca    8.6      23 Aug 2023 17:55    TPro  6.6  /  Alb  3.8  /  TBili  0.3<L>  /  DBili  x   /  AST  26  /  ALT  31  /  AlkPhos  102  08-23  	                Tnl:    Lipid Profile:   TC  TG  LDL  HDL    HgA1c:     proBNP:

## 2023-08-23 NOTE — H&P PST ADULT - ASSESSMENT
Indication:     Risk Stratification:  ASA:  Mallampati:  Bleeding Risk:  Creatinine:  GFR:    Assessment: 57 yo female recent  R/LHC via RBV/RRA with Dr. Whittaker 8/2/23 with  2 V CAD, IFR LAD 0.86 calcified IFR Ramus 0.83. Patient developed pain/spasm of the radial artery during the procedure and was decided to stage PCI of LAD due to the complexity and requiring roto.    Plan/Recommendations:   -plan for PCI LAD RAMUS arthrectomy   -preferred access: RFA  -patient seen and examined  -confirmed appropriate NPO duration  -ECG and Labs reviewed  -Aspirin 81mg and Plavix 75mg po pre-cath advised pt   -NS 250mL IV bolus pre-cath ordered   -procedure discussed with patient; risks and benefits explained, questions answered  -consent obtained by attending IC      Risks, benefits, and alternatives reviewed.  Risks including but not limited to MI, death, stroke, bleeding, infection, vessel injury, hematoma, renal failure, allergic reaction, urgent open heart surgery, restenosis and stent thrombosis were reviewed.  All questions answered.  Patient is agreeable to proceed.        Indication:     Risk Stratification:  ASA: 3  Mallampati: 3  Bleeding Risk: 1.4  Creatinine: 0.54  GFR:  108     Assessment: 59 yo female recent  R/LHC via RBV/RRA with Dr. Whittaker 8/2/23 with  2 V CAD, IFR LAD 0.86 calcified IFR Ramus 0.83. Patient developed pain/spasm of the radial artery during the procedure and was decided to stage PCI of LAD due to the complexity and requiring roto.    Plan/Recommendations:   -plan for PCI LAD RAMUS arthrectomy   -preferred access: RFA  -patient seen and examined  -confirmed appropriate NPO duration  -ECG and Labs reviewed  -Aspirin 81mg and Plavix 75mg po pre-cath advised pt   -NS 250mL IV bolus pre-cath ordered   -procedure discussed with patient; risks and benefits explained, questions answered  -consent obtained by attending IC      Risks, benefits, and alternatives reviewed.  Risks including but not limited to MI, death, stroke, bleeding, infection, vessel injury, hematoma, renal failure, allergic reaction, urgent open heart surgery, restenosis and stent thrombosis were reviewed.  All questions answered.  Patient is agreeable to proceed.

## 2023-08-24 ENCOUNTER — RX CHANGE (OUTPATIENT)
Age: 58
End: 2023-08-24

## 2023-08-27 ENCOUNTER — APPOINTMENT (OUTPATIENT)
Dept: MRI IMAGING | Facility: CLINIC | Age: 58
End: 2023-08-27

## 2023-08-30 ENCOUNTER — INPATIENT (INPATIENT)
Facility: HOSPITAL | Age: 58
LOS: 1 days | Discharge: HOME | DRG: 247 | End: 2023-09-01
Attending: INTERNAL MEDICINE | Admitting: INTERNAL MEDICINE
Payer: MEDICARE

## 2023-08-30 ENCOUNTER — TRANSCRIPTION ENCOUNTER (OUTPATIENT)
Age: 58
End: 2023-08-30

## 2023-08-30 VITALS
RESPIRATION RATE: 16 BRPM | SYSTOLIC BLOOD PRESSURE: 129 MMHG | OXYGEN SATURATION: 97 % | TEMPERATURE: 98 F | DIASTOLIC BLOOD PRESSURE: 82 MMHG | HEART RATE: 90 BPM

## 2023-08-30 DIAGNOSIS — Z90.722 ACQUIRED ABSENCE OF OVARIES, BILATERAL: Chronic | ICD-10-CM

## 2023-08-30 DIAGNOSIS — I10 ESSENTIAL (PRIMARY) HYPERTENSION: ICD-10-CM

## 2023-08-30 DIAGNOSIS — I25.118 ATHEROSCLEROTIC HEART DISEASE OF NATIVE CORONARY ARTERY WITH OTHER FORMS OF ANGINA PECTORIS: ICD-10-CM

## 2023-08-30 DIAGNOSIS — K21.9 GASTRO-ESOPHAGEAL REFLUX DISEASE WITHOUT ESOPHAGITIS: ICD-10-CM

## 2023-08-30 DIAGNOSIS — Z87.59 PERSONAL HISTORY OF OTHER COMPLICATIONS OF PREGNANCY, CHILDBIRTH AND THE PUERPERIUM: Chronic | ICD-10-CM

## 2023-08-30 PROCEDURE — 93010 ELECTROCARDIOGRAM REPORT: CPT | Mod: 76,59

## 2023-08-30 PROCEDURE — 92978 ENDOLUMINL IVUS OCT C 1ST: CPT | Mod: 26,RI

## 2023-08-30 PROCEDURE — 93308 TTE F-UP OR LMTD: CPT | Mod: 26

## 2023-08-30 PROCEDURE — 99152 MOD SED SAME PHYS/QHP 5/>YRS: CPT

## 2023-08-30 PROCEDURE — 92928 PRQ TCAT PLMT NTRAC ST 1 LES: CPT | Mod: LD

## 2023-08-30 PROCEDURE — 99223 1ST HOSP IP/OBS HIGH 75: CPT | Mod: 25

## 2023-08-30 RX ORDER — SODIUM CHLORIDE 9 MG/ML
250 INJECTION INTRAMUSCULAR; INTRAVENOUS; SUBCUTANEOUS ONCE
Refills: 0 | Status: COMPLETED | OUTPATIENT
Start: 2023-08-30 | End: 2023-08-30

## 2023-08-30 RX ORDER — ONDANSETRON 8 MG/1
4 TABLET, FILM COATED ORAL ONCE
Refills: 0 | Status: COMPLETED | OUTPATIENT
Start: 2023-08-30 | End: 2023-08-30

## 2023-08-30 RX ORDER — HYDROXYZINE HCL 10 MG
1 TABLET ORAL
Refills: 0 | DISCHARGE

## 2023-08-30 RX ORDER — ASPIRIN/CALCIUM CARB/MAGNESIUM 324 MG
81 TABLET ORAL DAILY
Refills: 0 | Status: DISCONTINUED | OUTPATIENT
Start: 2023-08-30 | End: 2023-09-01

## 2023-08-30 RX ORDER — DILTIAZEM HCL 120 MG
1 CAPSULE, EXT RELEASE 24 HR ORAL
Qty: 0 | Refills: 0 | DISCHARGE

## 2023-08-30 RX ORDER — ISOSORBIDE MONONITRATE 60 MG/1
30 TABLET, EXTENDED RELEASE ORAL DAILY
Refills: 0 | Status: DISCONTINUED | OUTPATIENT
Start: 2023-08-30 | End: 2023-09-01

## 2023-08-30 RX ORDER — NYSTATIN CREAM 100000 [USP'U]/G
1 CREAM TOPICAL
Refills: 0 | DISCHARGE

## 2023-08-30 RX ORDER — KETOROLAC TROMETHAMINE 30 MG/ML
30 SYRINGE (ML) INJECTION ONCE
Refills: 0 | Status: DISCONTINUED | OUTPATIENT
Start: 2023-08-30 | End: 2023-08-30

## 2023-08-30 RX ORDER — TOPIRAMATE 25 MG
50 TABLET ORAL
Refills: 0 | Status: DISCONTINUED | OUTPATIENT
Start: 2023-08-30 | End: 2023-09-01

## 2023-08-30 RX ORDER — ATORVASTATIN CALCIUM 80 MG/1
40 TABLET, FILM COATED ORAL AT BEDTIME
Refills: 0 | Status: DISCONTINUED | OUTPATIENT
Start: 2023-08-30 | End: 2023-09-01

## 2023-08-30 RX ORDER — MILNACIPRAN HYDROCHLORIDE 50 MG/1
1 TABLET, FILM COATED ORAL
Refills: 0 | DISCHARGE

## 2023-08-30 RX ORDER — LANOLIN ALCOHOL/MO/W.PET/CERES
5 CREAM (GRAM) TOPICAL ONCE
Refills: 0 | Status: COMPLETED | OUTPATIENT
Start: 2023-08-30 | End: 2023-08-30

## 2023-08-30 RX ORDER — NABUMETONE 750 MG
2 TABLET ORAL
Refills: 0 | DISCHARGE

## 2023-08-30 RX ORDER — KETOROLAC TROMETHAMINE 30 MG/ML
30 SYRINGE (ML) INJECTION DAILY
Refills: 0 | Status: DISCONTINUED | OUTPATIENT
Start: 2023-08-30 | End: 2023-08-30

## 2023-08-30 RX ORDER — DULOXETINE HYDROCHLORIDE 30 MG/1
60 CAPSULE, DELAYED RELEASE ORAL DAILY
Refills: 0 | Status: DISCONTINUED | OUTPATIENT
Start: 2023-08-30 | End: 2023-09-01

## 2023-08-30 RX ORDER — TIZANIDINE 4 MG/1
2 TABLET ORAL
Refills: 0 | DISCHARGE

## 2023-08-30 RX ORDER — ACETAMINOPHEN 500 MG
1000 TABLET ORAL ONCE
Refills: 0 | Status: COMPLETED | OUTPATIENT
Start: 2023-08-30 | End: 2023-08-30

## 2023-08-30 RX ORDER — PANTOPRAZOLE SODIUM 20 MG/1
40 TABLET, DELAYED RELEASE ORAL
Refills: 0 | Status: DISCONTINUED | OUTPATIENT
Start: 2023-08-30 | End: 2023-09-01

## 2023-08-30 RX ORDER — DILTIAZEM HCL 120 MG
90 CAPSULE, EXT RELEASE 24 HR ORAL
Refills: 0 | Status: DISCONTINUED | OUTPATIENT
Start: 2023-08-30 | End: 2023-08-31

## 2023-08-30 RX ORDER — CLOPIDOGREL BISULFATE 75 MG/1
75 TABLET, FILM COATED ORAL DAILY
Refills: 0 | Status: DISCONTINUED | OUTPATIENT
Start: 2023-08-30 | End: 2023-09-01

## 2023-08-30 RX ORDER — SULINDAC 200 MG/1
1 TABLET ORAL
Refills: 0 | DISCHARGE

## 2023-08-30 RX ORDER — METOPROLOL TARTRATE 50 MG
25 TABLET ORAL DAILY
Refills: 0 | Status: DISCONTINUED | OUTPATIENT
Start: 2023-08-30 | End: 2023-08-31

## 2023-08-30 RX ORDER — ACETAMINOPHEN 500 MG
650 TABLET ORAL EVERY 6 HOURS
Refills: 0 | Status: DISCONTINUED | OUTPATIENT
Start: 2023-08-30 | End: 2023-09-01

## 2023-08-30 RX ORDER — HYDROCORTISONE 1 %
0 OINTMENT (GRAM) TOPICAL
Refills: 0 | DISCHARGE

## 2023-08-30 RX ORDER — OMEPRAZOLE 10 MG/1
1 CAPSULE, DELAYED RELEASE ORAL
Qty: 0 | Refills: 0 | DISCHARGE

## 2023-08-30 RX ORDER — HYDROXYZINE HCL 10 MG
50 TABLET ORAL DAILY
Refills: 0 | Status: DISCONTINUED | OUTPATIENT
Start: 2023-08-30 | End: 2023-08-30

## 2023-08-30 RX ORDER — FAMOTIDINE 10 MG/ML
20 INJECTION INTRAVENOUS ONCE
Refills: 0 | Status: COMPLETED | OUTPATIENT
Start: 2023-08-30 | End: 2023-08-30

## 2023-08-30 RX ORDER — ERGOCALCIFEROL 1.25 MG/1
1 CAPSULE ORAL
Qty: 0 | Refills: 0 | DISCHARGE

## 2023-08-30 RX ADMIN — ONDANSETRON 4 MILLIGRAM(S): 8 TABLET, FILM COATED ORAL at 07:45

## 2023-08-30 RX ADMIN — Medication 90 MILLIGRAM(S): at 12:39

## 2023-08-30 RX ADMIN — Medication 50 MILLIGRAM(S): at 17:26

## 2023-08-30 RX ADMIN — Medication 400 MILLIGRAM(S): at 11:06

## 2023-08-30 RX ADMIN — SODIUM CHLORIDE 250 MILLILITER(S): 9 INJECTION INTRAMUSCULAR; INTRAVENOUS; SUBCUTANEOUS at 07:31

## 2023-08-30 RX ADMIN — Medication 1000 MILLIGRAM(S): at 11:06

## 2023-08-30 RX ADMIN — Medication 5 MILLIGRAM(S): at 21:13

## 2023-08-30 RX ADMIN — SODIUM CHLORIDE 250 MILLILITER(S): 9 INJECTION INTRAMUSCULAR; INTRAVENOUS; SUBCUTANEOUS at 10:31

## 2023-08-30 RX ADMIN — Medication 30 MILLIGRAM(S): at 20:10

## 2023-08-30 RX ADMIN — Medication 650 MILLIGRAM(S): at 18:58

## 2023-08-30 RX ADMIN — FAMOTIDINE 20 MILLIGRAM(S): 10 INJECTION INTRAVENOUS at 21:11

## 2023-08-30 RX ADMIN — Medication 90 MILLIGRAM(S): at 23:37

## 2023-08-30 RX ADMIN — Medication 30 MILLIGRAM(S): at 19:55

## 2023-08-30 RX ADMIN — Medication 30 MILLILITER(S): at 19:58

## 2023-08-30 RX ADMIN — Medication 90 MILLIGRAM(S): at 17:25

## 2023-08-30 RX ADMIN — DULOXETINE HYDROCHLORIDE 60 MILLIGRAM(S): 30 CAPSULE, DELAYED RELEASE ORAL at 12:41

## 2023-08-30 RX ADMIN — Medication 650 MILLIGRAM(S): at 17:25

## 2023-08-30 RX ADMIN — ATORVASTATIN CALCIUM 40 MILLIGRAM(S): 80 TABLET, FILM COATED ORAL at 21:14

## 2023-08-30 NOTE — CHART NOTE - NSCHARTNOTEFT_GEN_A_CORE
Assessment: Presents today for [facility] staged PCI with atherectomy of Ramus and LAD  to be performed by Dr. Whittaker   PST done on 8/23/2023, no interval change, reviewed labs and ECG   Indication: known CAD     ROS: as stated above, otherwise negative    PHYSICAL EXAM:  Constitutional: A & O x 3, NAD  HEENT:  Normal oral mucosa, PERRL, EOMI	  Cardiovascular: S1 S2, III/VI, no murmur, No JVD  Respiratory: Lungs clear to auscultation	  Gastrointestinal:  Soft, Non-tender, + BS	  Skin: No rashes or cyanosis  Neurologic: No deficit appreciated  Extremities: Normal range of motion, no edema  Vascular: distal pulses +     Risk Stratification:  ASA: 3  Mallampati: 3  Bleeding Risk: 1.4  Creatinine: 0.54  GFR: 108    Plan/Recommendations:   -plan for [facility] staged PCI of Ramus/Lad   -preferred access: RFA for atherectomy   -patient seen and examined  -confirmed appropriate NPO duration  -ECG and Labs reviewed  -Aspirin 81mg po pre-cath/Plavix 75mg daily   -NS 250mL IV bolus pre-cath   -procedure discussed with patient; risks and benefits explained, questions answered  -consent obtained by attending IC    Risks, benefits, and alternatives reviewed.  Risks including but not limited to MI, death, stroke, bleeding, infection, vessel injury, hematoma, renal failure, allergic reaction, urgent open heart surgery, restenosis and stent thrombosis were reviewed.  All questions answered.  Patient is agreeable to proceed.

## 2023-08-30 NOTE — DISCHARGE NOTE PROVIDER - CARE PROVIDER_API CALL
Rose Araya  Physician Assistant Services  [address], [address]  [place] [address]  Phone: [phone]  Fax: [phone]  Scheduled Appointment: 09/06/2023 04:00 PM

## 2023-08-30 NOTE — CHART NOTE - NSCHARTNOTEFT_GEN_A_CORE
Interventional cardiology ACP note    CC: Epigastric pain  Notified by RN that patient c/o chest pain  Evaluated the patient at bedside. Patient lying in bed, VSS  Patient had CPm post procedure, received Maalox and Toradol at 8 pm,   Patient states that Maalox helped her to Burp, Pointing to epigastric area, non radiating  pain,  states that she feels better when she sits up, lying down in bed cause lower chest discomfort  Denies HA, dizziness, SOB, Palpitations, light headedness, diaphoresis.    Vital Signs Last 24 Hrs  T(C): 36.8 (30 Aug 2023 07:22), Max: 36.8 (30 Aug 2023 07:22)  T(F): 98.2 (30 Aug 2023 07:22), Max: 98.2 (30 Aug 2023 07:22)  HR: 70 (30 Aug 2023 20:00) (68 - 90)  BP: 146/67 (30 Aug 2023 20:00) (124/71 - 159/78)  BP(mean): --  RR: 16 (30 Aug 2023 20:00) (15 - 16)  SpO2: 98% (30 Aug 2023 20:00) (95% - 100%)    Parameters below as of 30 Aug 2023 20:00  Patient On (Oxygen Delivery Method): room air        A/P    57 yo female with h/o HTN, GERD recent chronic leukocytosis being followed by her rheumatology and family history of CAD. Patient presents with SOB at rest and RODRIGEZ. She has been having chest pressure and discomfort for years. She had right and left heart cath in 2014 with no CAD and normal right heart pressures. No recent cardiology follow up until recently in which she had come to the ED with Palpitations and RODRIGEZ LHC revealed significant LAD and RAMUS disease. She continues to have symptoms of RODRIGEZ started on IMDUR while awaiting revascularization of LAD and RAMUS.    s/p left heart catheterization via RFA approach with Dr. Whittaker   Intraprocedural: Pt rec'd IV sedation, heparin 10,000 units, Plavix oral loading dose 300mg, and omnipaque 193ml   Findings: 70% Ramus tx with [place] 3.0X18MM, 70% mLAD [place] x 2 3.5x34mm and 3.0x15MM IVUS guided   Post Cath EKG: SR with PVC no acute ischemic changes     # Epigastric pain   Patient with c/o epigastric pain posrt procedure, Hx of GERD, burping a lot per pt, received maalox and toradol earlier with only mild pain relief  Description of pt;s pain likely GI in nature  12 lead EKG done with SR, No acute ST/T changes  Pepcid 20mg ivp x1 ordered  Instructed patient to sit up in bed and hydrate to have good bowel motility   C/W telemetry  Reassess PAIN status in an hour  Melatonin 5mg pox1 ordered as pt c/o insomnia  Signed to night Provider to follow up    Noah SUTTON-BC  Available via teams (9am -9: 30 pm) Interventional cardiology ACP note    CC: Epigastric pain  Notified by RN that patient c/o chest pain  Evaluated the patient at bedside. Patient lying in bed, VSS  Patient had CPm post procedure, received Maalox and Toradol at 8 pm,   Patient states that Maalox helped her to Burp, Pointing to epigastric area, non radiating  pain,  states that she feels better when she sits up, lying down in bed cause lower chest discomfort  Denies HA, dizziness, SOB, Palpitations, light headedness, diaphoresis.    Vital Signs Last 24 Hrs  T(C): 36.8 (30 Aug 2023 07:22), Max: 36.8 (30 Aug 2023 07:22)  T(F): 98.2 (30 Aug 2023 07:22), Max: 98.2 (30 Aug 2023 07:22)  HR: 70 (30 Aug 2023 20:00) (68 - 90)  BP: 146/67 (30 Aug 2023 20:00) (124/71 - 159/78)  BP(mean): --  RR: 16 (30 Aug 2023 20:00) (15 - 16)  SpO2: 98% (30 Aug 2023 20:00) (95% - 100%)    Parameters below as of 30 Aug 2023 20:00  Patient On (Oxygen Delivery Method): room air        A/P    59 yo female with h/o HTN, GERD recent chronic leukocytosis being followed by her rheumatology and family history of CAD. Patient presents with SOB at rest and RODRIGEZ. She has been having chest pressure and discomfort for years. She had right and left heart cath in 2014 with no CAD and normal right heart pressures. No recent cardiology follow up until recently in which she had come to the ED with Palpitations and RODRIGEZ LHC revealed significant LAD and RAMUS disease. She continues to have symptoms of RODRIGEZ started on IMDUR while awaiting revascularization of LAD and RAMUS.    s/p left heart catheterization via RFA approach with Dr. Whittaker   Intraprocedural: Pt rec'd IV sedation, heparin 10,000 units, Plavix oral loading dose 300mg, and omnipaque 193ml   Findings: 70% Ramus tx with [place] 3.0X18MM, 70% mLAD [place] x 2 3.5x34mm and 3.0x15MM IVUS guided   Post Cath EKG: SR with PVC no acute ischemic changes     # Epigastric/lower chest  pain   Patient with c/o epigastric pain post procedure, Hx of GERD, burping a lot per pt, received maalox and toradol earlier with only mild pain relief  Description of pt;s pain likely  R/O pericardial effusion ? GI in nature  12 lead EKG done with SR, Noted TWI in V3, which was not present in previous ekg , EKG reviewed by DR Whittaker, plan as below  Limited ECho  stat ordered to R/O pericardial effusion   Pepcid 20mg ivp x1 ordered  Instructed patient to sit up in bed and hydrate to have good bowel motility   C/W telemetry  Reassess PAIN status in an hour  Melatonin 5mg pox1 ordered as pt c/o insomnia  Signed to night Provider to follow up Patient and Echo results     Noah SUTTON-BC  Available via teams (9am -9: 30 pm) Interventional cardiology ACP note    CC: Epigastric pain  Notified by RN that patient c/o chest pain  Evaluated the patient at bedside. Patient lying in bed, VSS  Patient had CPm post procedure, received Maalox and Toradol at 8 pm,   Patient states that Maalox helped her to Burp, Pointing to epigastric area, non radiating  pain,  states that she feels better when she sits up, lying down in bed nad changing position in bedcause lower chest discomfort  Denies HA, dizziness, SOB, Palpitations, light headedness, diaphoresis.    Vital Signs Last 24 Hrs  T(C): 36.8 (30 Aug 2023 07:22), Max: 36.8 (30 Aug 2023 07:22)  T(F): 98.2 (30 Aug 2023 07:22), Max: 98.2 (30 Aug 2023 07:22)  HR: 70 (30 Aug 2023 20:00) (68 - 90)  BP: 146/67 (30 Aug 2023 20:00) (124/71 - 159/78)  BP(mean): --  RR: 16 (30 Aug 2023 20:00) (15 - 16)  SpO2: 98% (30 Aug 2023 20:00) (95% - 100%)    Parameters below as of 30 Aug 2023 20:00  Patient On (Oxygen Delivery Method): room air        A/P    57 yo female with h/o HTN, GERD recent chronic leukocytosis being followed by her rheumatology and family history of CAD. Patient presents with SOB at rest and RODRIGEZ. She has been having chest pressure and discomfort for years. She had right and left heart cath in 2014 with no CAD and normal right heart pressures. No recent cardiology follow up until recently in which she had come to the ED with Palpitations and RODRIGEZ LHC revealed significant LAD and RAMUS disease. She continues to have symptoms of RODRIGEZ started on IMDUR while awaiting revascularization of LAD and RAMUS.    s/p left heart catheterization via RFA approach with Dr. Whittaker   Intraprocedural: Pt rec'd IV sedation, heparin 10,000 units, Plavix oral loading dose 300mg, and omnipaque 193ml   Findings: 70% Ramus tx with [place] 3.0X18MM, 70% mLAD [place] x 2 3.5x34mm and 3.0x15MM IVUS guided   Post Cath EKG: SR with PVC no acute ischemic changes     # Epigastric/lower chest  pain   Patient with c/o epigastric pain post procedure, Hx of GERD, burping a lot per pt, received Maalox and Toradol earlier with only mild pain relief  Description of pt;s pain likely  R/O pericardial effusion ?/?pericarditis/? GI in nature  12 lead EKG done with SR, Noted TWI in V3, which was not present in previous ekg , EKG reviewed by DR Whittaker, plan as below  Limited ECho  stat ordered to R/O pericardial effusion   Pepcid 20mg ivp x1 ordered  Instructed patient to sit up in bed and hydrate to have good bowel motility   C/W telemetry  Reassess PAIN status in an hour  Melatonin 5mg pox1 ordered as pt c/o insomnia  Signed to night Provider to follow up Patient and Echo results   -Discussed with DR Remedios ANGELO-BC  Available via teams (9am -9: 30 pm)

## 2023-08-30 NOTE — PROGRESS NOTE ADULT - ASSESSMENT
Patient is a 58y old  Female who presents with a chief complaint of   58y  Female is now s/p left heart catheterization via RFA approach with Dr. Whittaker :  Intraprocedurally: Pt rec'd IV sedation, heparin 10,000 units, Plavix oral loading dose 300mg, and omnipaque 193ml   Findings: 70% Ramus tx with [place] 3.0X18MM, 70% mLAD [place] x 2 3.5x34mm and 3.0x15MM IVUS guided   Post Cath EKG: SR with PVC no acute ischemic changes     -ADMIT due to complex lesions with multiple stents   -post cardiac cath orders  -remove right femoral sheath at 1030, may raise HOB 1130 if right femoral access remains free of hematoma or bleeding   -bedrest x 4 hours   -EKG post cath  -labs and EKG in am  -NS 0.9% 250ml/hr x 1 bolus: post procedure KRISTY ppx   -continue current medical therapy  -Dual anti platelet therapy with aspirin/ plavix, reinforced importance of strict adherence to DAPT   -statin therapy: lipitor 40mg   -metoprolol/diltiazem/imdur  -follow up outpt in 2 weeks with Cardiologist Dr. Whittaker   -[facility] following during hospitalization  -Lifestyle modifications discussed to reduce cardiovascular risk factors including weight reduction, smoking cessation, medication compliance, and routine follow up with Cardiologist to track your BMI, cholesterol, and glucose levels.   - cardiac rehab info provided/referral and communication to cardiac rehab completed   -Management per Hospitalist / ICU  -Discharge in am if overnight tele, EKG, labs in am all remain WNL

## 2023-08-30 NOTE — DISCHARGE NOTE PROVIDER - HOSPITAL COURSE
Brief Hospital Course:  59 yo female with h/o HTN, GERD recent chronic leukocytosis being followed by her rheumatology and family history of CAD. Patient presents with SOB at rest and RODRIGEZ. She has been having chest pressure and discomfort for years. She had right and left heart cath in 2014 with no CAD and normal right heart pressures. No recent cardiology follow up until recently in which she had come to the ED with Palpitations and RODRIGEZ LHC revealed significant LAD and RAMUS disease. She continues to have symptoms of RODRIGEZ started on IMDUR while awaiting revascularization of LAD and RAMUS.  now s/p left heart catheterization via RFA approach with Dr. Whittaker :  Intraprocedurally: Pt rec'd IV sedation, heparin 10,000 units, Plavix oral loading dose 300mg, and omnipaque 193ml   Findings: 70% Ramus tx with MON[place] 3.0X18MM, 70% mLAD MON[place] x 2 3.5x34mm and 3.0x15MM IVUS guided   Post Cath EKG: SR with PVC no acute ischemic changes     -ADMIT due to complex lesions with multiple stents   -post cardiac cath orders  -remove right femoral sheath at 1030, may raise HOB 1130 if right femoral access remains free of hematoma or bleeding   -bedrest x 4 hours   -EKG post cath  -labs and EKG in am  -NS 0.9% 250ml/hr x 1 bolus: post procedure KRISTY ppx   -continue current medical therapy  -Dual anti platelet therapy with aspirin/ plavix, reinforced importance of strict adherence to DAPT   -statin therapy: lipitor 40mg   -metoprolol/diltiazem/imdur  -follow up outpt in 2 weeks with Cardiologist Dr. Whittaker   -[facility] following during hospitalization  -Lifestyle modifications discussed to reduce cardiovascular risk factors including weight reduction, smoking cessation, medication compliance, and routine follow up with Cardiologist to track your BMI, cholesterol, and glucose levels.   - cardiac rehab info provided/referral and communication to cardiac rehab completed   -Management per Hospitalist / ICU  -Discharge in am if overnight tele, EKG, labs in am all remain WNL        Parameters below as of 30 Aug 202At the time of discharge patient was hemodynamically stable and amenable to all terms of discharge. The patient has received verbal instructions from myself regarding discharge plans.     Length of Discharge: 45MIN    Patient is medically stable and cleared for discharge to ____ with outpatient follow up.     Brief Hospital Course:  59 yo female with h/o HTN, GERD recent chronic leukocytosis being followed by her rheumatology and family history of CAD. Patient presents with SOB at rest and RODRIGEZ. She has been having chest pressure and discomfort for years. She had right and left heart cath in 2014 with no CAD and normal right heart pressures. No recent cardiology follow up until recently in which she had come to the ED with Palpitations and RODRIGEZ LHC revealed significant LAD and RAMUS disease. She continues to have symptoms of RODRIGEZ started on IMDUR while awaiting revascularization of LAD and RAMUS.  now s/p left heart catheterization via RFA approach with Dr. Whittaker :  Intraprocedurally: Pt rec'd IV sedation, heparin 10,000 units, Plavix oral loading dose 300mg, and omnipaque 193ml   Findings: 70% Ramus tx with MONTANA 3.0X18MM, 70% mLAD [place] x 2 3.5x34mm and 3.0x15MM IVUS guided   Post Cath EKG: SR with PVC no acute ischemic changes     -ADMIT due to complex lesions with multiple stents   -post cardiac cath orders  -remove right femoral sheath at 1030, may raise HOB 1130 if right femoral access remains free of hematoma or bleeding   -bedrest x 4 hours   -EKG post cath  -labs and EKG in am  -NS 0.9% 250ml/hr x 1 bolus: post procedure KRISTY ppx   -continue current medical therapy  -Dual anti platelet therapy with aspirin/ plavix, reinforced importance of strict adherence to DAPT   -statin therapy: lipitor 40mg   -metoprolol/diltiazem/imdur  -follow up outpt in 2 weeks with Cardiologist Dr. Whittaker   -[facility] following during hospitalization  -Lifestyle modifications discussed to reduce cardiovascular risk factors including weight reduction, smoking cessation, medication compliance, and routine follow up with Cardiologist to track your BMI, cholesterol, and glucose levels.   - cardiac rehab info provided/referral and communication to cardiac rehab completed   -Management per Hospitalist / ICU  -Discharge in am if overnight tele, EKG, labs in am all remain WNL        Parameters below as of 30 Aug 202At the time of discharge patient was hemodynamically stable and amenable to all terms of discharge. The patient has received verbal instructions from myself regarding discharge plans.     Length of Discharge: 45MIN    Patient is medically stable and cleared for discharge to home with outpatient follow up.     Brief Hospital Course:  59 yo female with h/o HTN, GERD recent chronic leukocytosis being followed by her rheumatology and family history of CAD. Patient presents with SOB at rest and RODRIGEZ. She has been having chest pressure and discomfort for years. She had right and left heart cath in 2014 with no CAD and normal right heart pressures. No recent cardiology follow up until recently in which she had come to the ED with Palpitations and RODRIGEZ LHC revealed significant LAD and RAMUS disease. She continues to have symptoms of RODRIGEZ started on IMDUR while awaiting revascularization of LAD and RAMUS.  now s/p left heart catheterization via RFA approach with Dr. Whittaker :  Intraprocedurally: Pt rec'd IV sedation, heparin 10,000 units, Plavix oral loading dose 300mg, and omnipaque 193ml   Findings: 70% Ramus tx with MONTANA 3.0X18MM, 70% mLAD MON[place] x 2 3.5x34mm and 3.0x15MM IVUS guided   Initial Post Cath EKG: SR with PVC no acute ischemic changes     post procedure c/b patient with c/o epigastric pain post procedure, Hx of GERD, burping a lot per pt, received Maalox and Toradol earlier with only mild pain relief; 12 lead EKG revealed SR and Noted TWI in V3, which was not present in previous ekg.  Limited TTE revealed no pericardial effusion.  Patient treated with Pepcid 20mg ivp x1 and Toradol IV with good relief of pain.  Yesterday patient expressed concern of going home with continued chest discomfort.  EKG today remains with TWI V3.  Patient expressed good relief of pain with Toradol IV.  Admitted  patient to tele for monitoring  secondary to continued chest pain-->?pericarditic in nature; V3 TWI concerning for distal emobolization post PCI  trop, CKMB trending down, CK normalized, patient currently with no Chest pain Trop 0.27 and BNP 248yesterday eveninfg,    # S/P LHC/ obstructive CAD/ S/P PCI/MONTANA to Ramus and mLAD  -post cardiac cath orders  -NS 0.9% 250ml/hr x 1 bolus: post procedure KRISTY ppx   -continue current medical therapy  -Maintain DAPT (Aspirin and Plavix) and statin; maintain cardizem (on at home for HTN) and Imdur; no beta blocker secondary to pt  on calcium channel blocker  -statin therapy: lipitor 40mg   -Add colchicine 0.6mg PO BID x 10 days for chest pain  -Hypokalemia treated with Potassium oral supplementation--repeat BMP in am  Lifestyle modifications discussed to reduce cardiovascular risk factors including weight reduction, smoking cessation, medication compliance, and routine follow up with Cardiologist to track your BMI, cholesterol, and glucose levels.   - cardiac rehab info provided/referral and communication to cardiac rehab completed   EKG in am with SR,   discharge to home   -Follow up outpatient appointment prescheduled for 9/6/23 with ACP in [place] office  -Discussed with Dr. Whittaker                       57 yo female with h/o HTN, GERD recent chronic leukocytosis being followed by her rheumatology and family history of CAD. Patient presents with SOB at rest and RODRIGEZ. She has been having chest pressure and discomfort for years. She had right and left heart cath in 2014 with no CAD and normal right heart pressures. No recent cardiology follow up until recently in which she had come to the ED with Palpitations and RODRIGEZ LHC revealed significant LAD and RAMUS disease. She continues to have symptoms of RODRIGEZ started on IMDUR while awaiting revascularization of LAD and RAMUS.  now s/p left heart catheterization via RFA approach with Dr. Whittaker :  Intraprocedurally: Pt rec'd IV sedation, heparin 10,000 units, Plavix oral loading dose 300mg, and omnipaque 193ml   Findings: 70% Ramus tx with MONTANA 3.0X18MM, 70% mLAD MON[place] x 2 3.5x34mm and 3.0x15MM IVUS guided   Initial Post Cath EKG: SR with PVC no acute ischemic changes     post procedure c/b patient with c/o epigastric pain post procedure, Hx of GERD, burping a lot per pt, received Maalox and Toradol earlier with only mild pain relief; 12 lead EKG revealed SR and Noted TWI in V3, which was not present in previous ekg.  Limited TTE revealed no pericardial effusion.  Patient treated with Pepcid 20mg ivp x1 and Toradol IV with good relief of pain.  Yesterday patient expressed concern of going home with continued chest discomfort.  EKG today remains with TWI V3.  Patient expressed good relief of pain with Toradol IV.    08/01/23  Admitted  patient to tele for monitoring  secondary to continued chest pain-->?pericarditic in nature; V3 TWI concerning for distal emobolization post PCI  trop, CKMB trending down, CK normalized,  Trop 0.27 and     09/01/23 Evaluated the patient at bedside, CP resolved no CP overnight,  Patient now c/o dizziness and blurry vision, states that when she sits up and ambulates she feels very dizzy, room is spinning around her,  associated with blurry vision, states that symptoms are new started last night. she states that she is afraid to ambulate and go to bathroom due to her dizziness and blurry vision.  Denies HA, SOB, CP, Palpitations, fever, chills, abdominal pain.          # S/P LHC/ obstructive CAD/ S/P PCI/MONTANA to Ramus and mLAD  # post procedure CP, Resolved  # post procedure Dizziness/Vertigo/ Blurry vision     Post procedure   12 lead EKG with SR, TWI in V3,   12 lead EKG on 09/01/23: SR, TWI in V3 resolved, No acute ST/T changes    -Noted SBp in low 100;s, labs stable except for chronic leuks  -No acute focal deficits  -AM trop pending  -CT Head to R/O Stroke   -CXR started to R/O Lung etiology in setting of new symptoms  -carotid doppler ordered  -Vitals q4 hourly for next 12 hours   Normal saline 100ml/hr for 6 hours started  -Medicine consult for dizziness  -Cardiology consult for post procedure symptoms of CP, dizziness  -Maintain DAPT (Aspirin and Plavix) and statin; maintain cardizem (on at home for HTN) and Imdur; no beta blocker secondary to pt  on calcium channel blocker  -statin therapy: lipitor 40mg hs  -C/W colchicine 0.6mg PO BID x 10 days for chest pain  -C/W Cardizem   Lifestyle modifications discussed to reduce cardiovascular risk factors including weight reduction, smoking cessation, medication compliance, and routine follow up with Cardiologist to track your BMI, cholesterol, and glucose levels.   - cardiac rehab info provided/referral and communication to cardiac rehab completed   -Discussed with Dr. Whittaker                                   59 yo female with h/o HTN, GERD recent chronic leukocytosis being followed by her rheumatology and family history of CAD. Patient presents with SOB at rest and RODRIGEZ. She has been having chest pressure and discomfort for years. She had right and left heart cath in 2014 with no CAD and normal right heart pressures. No recent cardiology follow up until recently in which she had come to the ED with Palpitations and RODRIGEZ LHC revealed significant LAD and RAMUS disease. She continues to have symptoms of RODRIGEZ started on IMDUR while awaiting revascularization of LAD and RAMUS.  now s/p left heart catheterization via RFA approach with Dr. Whittaker :  Intraprocedurally: Pt rec'd IV sedation, heparin 10,000 units, Plavix oral loading dose 300mg, and omnipaque 193ml   Findings: 70% Ramus tx with MONTANA 3.0X18MM, 70% mLAD MONTANA x 2 3.5x34mm and 3.0x15MM IVUS guided   Initial Post Cath EKG: SR with PVC no acute ischemic changes     post procedure c/b patient with c/o epigastric pain post procedure, Hx of GERD, burping a lot per pt, received Maalox and Toradol earlier with only mild pain relief; 12 lead EKG revealed SR and Noted TWI in V3, which was not present in previous ekg.  Limited TTE revealed no pericardial effusion.  Patient treated with Pepcid 20mg ivp x1 and Toradol IV with good relief of pain.  Yesterday patient expressed concern of going home with continued chest discomfort.  EKG today remains with TWI V3.  Patient expressed good relief of pain with Toradol IV.    08/01/23  Admitted  patient to tele for monitoring  secondary to continued chest pain-->?pericarditic in nature; V3 TWI concerning for distal emobolization post PCI  trop, CKMB trending down, CK normalized,  Trop 0.27 and     09/01/23 Evaluated the patient at bedside, CP resolved no CP overnight,  Patient  c/o dizziness and blurry vision, states that when she sits up and ambulates she feels very dizzy, room is spinning around her,  associated with blurry vision, states that symptoms are new started last night. she states that she is afraid to ambulate and go to bathroom due to her dizziness and blurry vision.  Denies HA, SOB, CP, Palpitations, fever, chills, abdominal pain.    patient orthostatic positive with SBP in 100's standing, 90's sitting, and 70's   CT head negative  for acute infarct or bleed  CT A/P negative for RP bleed/hematoma  carotid US Negative   CXR Negative for lung etiology  Isosorbide d/cd  patient s/p 2L IV  ns bolus  Medicine on board        # S/P LHC/ obstructive CAD/ S/P PCI/MONTANA to Ramus and mLAD  # post procedure CP, Resolved  # post procedure Dizziness/Vertigo/ Blurry vision / Improved after 2L IV ns bolus        Post procedure   12 lead EKG with SR, TWI in V3,   12 lead EKG on 09/01/23: SR, TWI in V3 resolved, No acute ST/T changes    -Noted SBp in low 100;s, labs stable except for chronic leuks, Orthostatics negative post NS iv bolus  -No acute focal deficits  Trop fluctuating, likley due to post acth, no acute elevation of trop (0.17-> 0.27-> 0.22)  -CT Head ruled out Stroke  -CXR with no lung pathology  -carotid doppler unremarkable  -Vitals q4 hourly for next 12 hours   -S/P 2l NS iv bolus  -Medicine consult for dizziness called, on board  -Maintain DAPT (Aspirin and Plavix) and statin; maintain cardizem (on at home for HTN) and Imdur; no beta blocker needed  -isosorbide d/cd in setting of positive orthostatics  -statin therapy: lipitor 40mg hs  -C/W colchicine 0.6mg PO BID x 10 days for chest pain  Lifestyle modifications discussed to reduce cardiovascular risk factors including weight reduction, smoking cessation, medication compliance, and routine follow up with Cardiologist to track your BMI, cholesterol, and glucose levels.   - cardiac rehab info provided/referral and communication to cardiac rehab completed   Will discharge the patient post iv fluid bolus if post IV bolus orthostatics negative  Discussed with DR Whittaker and Medicine Consultant)  Agreeable above                                     57 yo female with h/o HTN, GERD recent chronic leukocytosis being followed by her rheumatology and family history of CAD. Patient presents with SOB at rest and RODRIGEZ. She has been having chest pressure and discomfort for years. She had right and left heart cath in 2014 with no CAD and normal right heart pressures. No recent cardiology follow up until recently in which she had come to the ED with Palpitations and RODRIGEZ LHC revealed significant LAD and RAMUS disease. She continues to have symptoms of RODRIGEZ started on IMDUR while awaiting revascularization of LAD and RAMUS.  now s/p left heart catheterization via RFA approach with Dr. Whittaker :  Intraprocedurally: Pt rec'd IV sedation, heparin 10,000 units, Plavix oral loading dose 300mg, and omnipaque 193ml   Findings: 70% Ramus tx with MONTANA 3.0X18MM, 70% mLAD MON[place]NA x 2 3.5x34mm and 3.0x15MM IVUS guided   Initial Post Cath EKG: SR with PVC no acute ischemic changes     post procedure c/b patient with c/o epigastric pain post procedure, Hx of GERD, burping a lot per pt, received Maalox and Toradol earlier with only mild pain relief; 12 lead EKG revealed SR and Noted TWI in V3, which was not present in previous ekg.  Limited TTE revealed no pericardial effusion.  Patient treated with Pepcid 20mg ivp x1 and Toradol IV with good relief of pain.  Yesterday patient expressed concern of going home with continued chest discomfort.  EKG today remains with TWI V3.  Patient expressed good relief of pain with Toradol IV.    08/01/23  Admitted  patient to tele for monitoring  secondary to continued chest pain-->?pericarditic in nature; V3 TWI concerning for distal emobolization post PCI  trop, CKMB trending down, CK normalized,  Trop 0.27 and     09/01/23 Evaluated the patient at bedside, CP resolved no CP overnight,  Patient  c/o dizziness and blurry vision, states that when she sits up and ambulates she feels very dizzy, room is spinning around her,  associated with blurry vision, states that symptoms are new started last night. she states that she is afraid to ambulate and go to bathroom due to her dizziness and blurry vision.  Denies HA, SOB, CP, Palpitations, fever, chills, abdominal pain.    patient orthostatic positive with SBP in 100's standing, 90's sitting, and 70's   CT head negative  for acute infarct or bleed  CT A/P negative for RP bleed/hematoma  carotid US Negative   CXR Negative for lung etiology  Isosorbide d/cd  patient s/p 2L IV  ns bolus  Medicine on board        # S/P LHC/ obstructive CAD/ S/P PCI/MONTANA to Ramus and mLAD  # post procedure CP, Resolved  # post procedure Dizziness/Vertigo/ Blurry vision / Improved after 2L IV ns bolus        Post procedure   12 lead EKG with SR, TWI in V3,   12 lead EKG on 09/01/23: SR, TWI in V3 resolved, No acute ST/T changes    -Noted SBp in low 100;s, labs stable except for chronic leuks, Orthostatics negative post NS iv bolus  -No acute focal deficits  Trop fluctuating, likley due to post acth, no acute elevation of trop (0.17-> 0.27-> 0.22)  -CT Head ruled out Stroke  -CXR with no lung pathology  -carotid doppler unremarkable  -Vitals q4 hourly for next 12 hours   -S/P 2l NS iv bolus  -Medicine consult for dizziness called, on board  -Maintain DAPT (Aspirin and Plavix) and statin; maintain cardizem (on at home for HTN) and Imdur; no beta blocker needed, 	RESUME CARDIZEM FROM SUNDAY ONLY (09/03/23)  -isosorbide d/cd in setting of positive orthostatics  -statin therapy: lipitor 40mg hs  -C/W colchicine 0.6mg PO BID x 10 days for chest pain  Lifestyle modifications discussed to reduce cardiovascular risk factors including weight reduction, smoking cessation, medication compliance, and routine follow up with Cardiologist to track your BMI, cholesterol, and glucose levels.   - cardiac rehab info provided/referral and communication to cardiac rehab completed   Will discharge the patient post iv fluid bolus if post IV bolus orthostatics negative  Discussed with DR Whittaker and Medicine Consultant)  Agreeable above                                     59 yo female with h/o HTN, GERD recent chronic leukocytosis being followed by her rheumatology and family history of CAD. Patient presents with SOB at rest and RODRIGEZ. She has been having chest pressure and discomfort for years. She had right and left heart cath in 2014 with no CAD and normal right heart pressures. No recent cardiology follow up until recently in which she had come to the ED with Palpitations and RODRIGEZ LHC revealed significant LAD and RAMUS disease. She continues to have symptoms of RODRIGEZ started on IMDUR while awaiting revascularization of LAD and RAMUS.  now s/p left heart catheterization via RFA approach with Dr. Whittaker :  Intraprocedurally: Pt rec'd IV sedation, heparin 10,000 units, Plavix oral loading dose 300mg, and omnipaque 193ml   Findings: 70% Ramus tx with MONTANA 3.0X18MM, 70% mLAD MON[place]NA x 2 3.5x34mm and 3.0x15MM IVUS guided   Initial Post Cath EKG: SR with PVC no acute ischemic changes     post procedure c/b patient with c/o epigastric pain post procedure, Hx of GERD, burping a lot per pt, received Maalox and Toradol earlier with only mild pain relief; 12 lead EKG revealed SR and Noted TWI in V3, which was not present in previous ekg.  Limited TTE revealed no pericardial effusion.  Patient treated with Pepcid 20mg ivp x1 and Toradol IV with good relief of pain.  Yesterday patient expressed concern of going home with continued chest discomfort.  EKG today remains with TWI V3.  Patient expressed good relief of pain with Toradol IV.    08/01/23  Admitted  patient to tele for monitoring  secondary to continued chest pain-->?pericarditic in nature; V3 TWI concerning for distal emobolization post PCI  trop, CKMB trending down, CK normalized,  Trop 0.27 and     09/01/23 Evaluated the patient at bedside, CP resolved no CP overnight,  Patient  c/o dizziness and blurry vision, states that when she sits up and ambulates she feels very dizzy, room is spinning around her,  associated with blurry vision, states that symptoms are new started last night. she states that she is afraid to ambulate and go to bathroom due to her dizziness and blurry vision.  Denies HA, SOB, CP, Palpitations, fever, chills, abdominal pain.    patient orthostatic positive with SBP in 100's standing, 90's sitting, and 70's   CT head negative  for acute infarct or bleed  CT A/P negative for RP bleed/hematoma  carotid US Negative   CXR Negative for lung etiology  Isosorbide d/cd  patient s/p 2L IV  ns bolus  Medicine on board        # S/P LHC/ obstructive CAD/ S/P PCI/MONTANA to Ramus and mLAD  # post procedure CP, Resolved  # post procedure Dizziness/Vertigo/ Blurry vision / Improved after 2L IV ns bolus        Post procedure   12 lead EKG with SR, TWI in V3,   12 lead EKG on 09/01/23: SR, TWI in V3 resolved, No acute ST/T changes    -Noted SBp in low 100;s, labs stable except for chronic leuks, Orthostatics negative post NS iv bolus  -No acute focal deficits  Trop fluctuating, likley due to post acth, no acute elevation of trop (0.17-> 0.27-> 0.22)  -CT Head ruled out Stroke  -CXR with no lung pathology  -carotid doppler unremarkable  -Vitals q4 hourly for next 12 hours   -S/P 2l NS iv bolus  -Medicine consult for dizziness called, on board  -Maintain DAPT (Aspirin and Plavix) and statin; maintain cardizem (on at home for HTN) and Imdur; no beta blocker needed, 	RESUME CARDIZEM FROM SUNDAY ONLY (09/03/23)  -isosorbide d/cd in setting of positive orthostatics  -statin therapy: lipitor 40mg hs  -C/W colchicine 0.6mg PO BID x 10 days for chest pain  Lifestyle modifications discussed to reduce cardiovascular risk factors including weight reduction, smoking cessation, medication compliance, and routine follow up with Cardiologist to track your BMI, cholesterol, and glucose levels.   - cardiac rehab info provided/referral and communication to cardiac rehab completed   post IV bolus orthostatics negative  Discussed with DR Whittaker and Medicine Consultant)  Agreeable above

## 2023-08-30 NOTE — DISCHARGE NOTE PROVIDER - NSDCCPCAREPLAN_GEN_ALL_CORE_FT
PRINCIPAL DISCHARGE DIAGNOSIS  Diagnosis: CAD (coronary artery disease)  Assessment and Plan of Treatment:      PRINCIPAL DISCHARGE DIAGNOSIS  Diagnosis: CAD (coronary artery disease)  Assessment and Plan of Treatment: You had a cardiac catheterization which revealed 70% ramus and 70% mid LAD revascularization and successful placement of 3 drug eluding stents   It is IMPERATIVE to continue Dual Antiplatelet therapy with Asprin 81mg and Plavix without interrruption to prevent clot formation inside stent.   -Please continue high intensity statin: Lipitor 40mg   -Please continue BB: metoprolol   - cardiac rehab info provided/referral and communication to cardiac rehab completed   -follow up with Dr. Whittaker   -follow up with cardiologist

## 2023-08-30 NOTE — DISCHARGE NOTE PROVIDER - NSDCCPTREATMENT_GEN_ALL_CORE_FT
PRINCIPAL PROCEDURE  Procedure: Left heart cardiac cath  Findings and Treatment: No heavy lifting, driving, sex, tub baths, swimming, or any activity that submerges the lower half of the body in water for 48 hours.  Limited walking and stairs for 48 hours.    Change the bandaid after 24 hours and every 24 hours after that.  Keep the puncture site dry and covered with a bandaid until a scab forms.    Observe the site frequently.  If bleeding or a large lump (the size of a golf ball or bigger) occurs lie flat, apply continuous direct pressure just above the puncture site for at least 10 minutes, and notify your physician immediately.  If the bleeding cannot be controlled, call 911 immediately for assistance.  Notify your physician of pain, swelling or any drainage.    Notify your physician immediately if coldness, numbness, discoloration or pain in your foot occurs.     PRINCIPAL PROCEDURE  Procedure: Left heart cardiac cath  Findings and Treatment: No heavy lifting, driving, sex, tub baths, swimming, or any activity that submerges the lower half of the body in water for 48 hours.  Limited walking and stairs for 48 hours.    Change the bandaid after 24 hours and every 24 hours after that.  Keep the puncture site dry and covered with a bandaid until a scab forms.    Observe the site frequently.  If bleeding or a large lump (the size of a golf ball or bigger) occurs lie flat, apply continuous direct pressure just above the puncture site for at least 10 minutes, and notify your physician immediately.  If the bleeding cannot be controlled, call 911 immediately for assistance.  Notify your physician of pain, swelling or any drainage.    Notify your physician immediately if coldness, numbness, discoloration or pain in your foot occurs.      SECONDARY PROCEDURE  Procedure: Insertion of coronary artery stent  Findings and Treatment: Patient teaching done about DAPT  patient  is made aware to take  antiplatelet therapy as prescribed ( Statin and Aspirin and Plavix- as they are needed to keep your stent/s OPEN  patient is aware to take them every day, do not miss or double up on any doses  these medications can only be discontinued by your cardiologist   TEACH BACK used to verify patient's understanding; pateint verbalizes understanding and gives positive feedback .

## 2023-08-30 NOTE — DISCHARGE NOTE PROVIDER - NSDCMRMEDTOKEN_GEN_ALL_CORE_FT
Aspir 81 oral delayed release tablet: 1 orally once a day  atorvastatin 40 mg oral tablet: 1 tab(s) orally once a day (at bedtime)  Cymbalta 60 mg oral delayed release capsule: 1 orally once a day  DilTIAZem Hydrochloride  mg/24 hours oral capsule, extended release: 1 cap(s) orally once a day  hydrocortisone 2.5% rectal cream with applicator: rectal 2 times a day  hydrOXYzine hydrochloride 50 mg oral tablet: 1 orally once a day   mg oral tablet: 1 orally as needed for  mild pain  isosorbide mononitrate 30 mg oral tablet, extended release: 1 orally once a day  metoprolol succinate 25 mg oral tablet, extended release: 1 orally once a day  nystatin 100,000 units/g topical ointment: Apply topically to affected area  omeprazole 40 mg oral delayed release capsule: 1 cap(s) orally once a day  Plavix 75 mg oral tablet: 1 tab(s) orally once a day  sulindac 200 mg oral tablet: 1 orally 2 times a day  tiZANidine 2 mg oral tablet: 2 orally 2 times a day  topiramate 50 mg oral tablet: 1 orally 2 times a day  Vitamin D2 50,000 intl units (1.25 mg) oral capsule: 1 cap(s) orally once a week   Aspirin Enteric Coated 81 mg oral delayed release tablet: 1 tab(s) orally once a day  atorvastatin 40 mg oral tablet: 1 tab(s) orally once a day (at bedtime)  clopidogrel 75 mg oral tablet: 1 tab(s) orally once a day  clopidogrel 75 mg oral tablet: 1 tab(s) orally once a day  colchicine 0.6 mg oral tablet: 1 tab(s) orally every 12 hours  DilTIAZem Hydrochloride  mg/24 hours oral capsule, extended release: 1 cap(s) orally once a day  DULoxetine 60 mg oral delayed release capsule: 1 cap(s) orally once a day  hydrocortisone 2.5% rectal cream with applicator: rectal 2 times a day  hydrOXYzine hydrochloride 50 mg oral tablet: 1 orally once a day  isosorbide mononitrate 30 mg oral tablet, extended release: 1 tab(s) orally once a day  nystatin 100,000 units/g topical ointment: Apply topically to affected area  omeprazole 40 mg oral delayed release capsule: 1 cap(s) orally once a day  pantoprazole 40 mg oral delayed release tablet: 1 tab(s) orally once a day (before a meal)  sulindac 200 mg oral tablet: 1 orally 2 times a day  tiZANidine 2 mg oral tablet: 2 orally 2 times a day  topiramate 50 mg oral tablet: 1 tab(s) orally 2 times a day  Vitamin D2 50,000 intl units (1.25 mg) oral capsule: 1 cap(s) orally once a week  zolpidem 5 mg oral tablet: 1 tab(s) orally once a day (at bedtime) As needed Insomnia   Aspirin Enteric Coated 81 mg oral delayed release tablet: 1 tab(s) orally once a day  atorvastatin 40 mg oral tablet: 1 tab(s) orally once a day (at bedtime)  colchicine 0.6 mg oral tablet: 1 tab(s) orally every 12 hours  DilTIAZem Hydrochloride  mg/24 hours oral capsule, extended release: 1 cap(s) orally once a day  DULoxetine 60 mg oral delayed release capsule: 1 cap(s) orally once a day  hydrocortisone 2.5% rectal cream with applicator: rectal 2 times a day  hydrOXYzine hydrochloride 50 mg oral tablet: 1 orally once a day  isosorbide mononitrate 30 mg oral tablet, extended release: 1 tab(s) orally once a day  nystatin 100,000 units/g topical ointment: Apply topically to affected area  omeprazole 40 mg oral delayed release capsule: 1 cap(s) orally once a day  pantoprazole 40 mg oral delayed release tablet: 1 tab(s) orally once a day (before a meal)  Plavix 75 mg oral tablet: 1 tab(s) orally once a day  sulindac 200 mg oral tablet: 1 orally 2 times a day  tiZANidine 2 mg oral tablet: 2 orally 2 times a day  topiramate 50 mg oral tablet: 1 tab(s) orally 2 times a day  Vitamin D2 50,000 intl units (1.25 mg) oral capsule: 1 cap(s) orally once a week  zolpidem 5 mg oral tablet: 1 tab(s) orally once a day (at bedtime) As needed Insomnia   Aspirin Enteric Coated 81 mg oral delayed release tablet: 1 tab(s) orally once a day  atorvastatin 40 mg oral tablet: 1 tab(s) orally once a day (at bedtime)  colchicine 0.6 mg oral tablet: 1 tab(s) orally every 12 hours  DilTIAZem Hydrochloride  mg/24 hours oral capsule, extended release: 1 cap(s) orally once a day  DULoxetine 60 mg oral delayed release capsule: 1 cap(s) orally once a day  hydrocortisone 2.5% rectal cream with applicator: rectal 2 times a day  hydrOXYzine hydrochloride 50 mg oral tablet: 1 orally once a day  nystatin 100,000 units/g topical ointment: Apply topically to affected area  omeprazole 40 mg oral delayed release capsule: 1 cap(s) orally once a day  pantoprazole 40 mg oral delayed release tablet: 1 tab(s) orally once a day (before a meal)  Plavix 75 mg oral tablet: 1 tab(s) orally once a day  sulindac 200 mg oral tablet: 1 orally 2 times a day  tiZANidine 2 mg oral tablet: 2 orally 2 times a day  topiramate 50 mg oral tablet: 1 tab(s) orally 2 times a day  Vitamin D2 50,000 intl units (1.25 mg) oral capsule: 1 cap(s) orally once a week  zolpidem 5 mg oral tablet: 1 tab(s) orally once a day (at bedtime) As needed Insomnia

## 2023-08-30 NOTE — DISCHARGE NOTE PROVIDER - NSDCFUSCHEDAPPT_GEN_ALL_CORE_FT
[facility]  CARDIOLOGY [address]  Scheduled Appointment: 09/06/2023    Kleiner, Myron I  [facility] [address]  Scheduled Appointment: 11/03/2023    Braden Whittaker  [facility]  GASTRO [address]  Scheduled Appointment: 11/28/2023

## 2023-08-31 LAB
ALBUMIN SERPL ELPH-MCNC: 3.9 G/DL — SIGNIFICANT CHANGE UP (ref 3.3–5.2)
ALP SERPL-CCNC: 100 U/L — SIGNIFICANT CHANGE UP (ref 40–120)
ALT FLD-CCNC: 28 U/L — SIGNIFICANT CHANGE UP
ANION GAP SERPL CALC-SCNC: 16 MMOL/L — SIGNIFICANT CHANGE UP (ref 5–17)
AST SERPL-CCNC: 39 U/L — HIGH
BILIRUB SERPL-MCNC: 0.6 MG/DL — SIGNIFICANT CHANGE UP (ref 0.4–2)
BUN SERPL-MCNC: 5.2 MG/DL — LOW (ref 8–20)
CALCIUM SERPL-MCNC: 8.9 MG/DL — SIGNIFICANT CHANGE UP (ref 8.4–10.5)
CHLORIDE SERPL-SCNC: 102 MMOL/L — SIGNIFICANT CHANGE UP (ref 96–108)
CHOLEST SERPL-MCNC: 85 MG/DL — SIGNIFICANT CHANGE UP
CK MB CFR SERPL CALC: 13.1 NG/ML — HIGH (ref 0–6.7)
CK MB CFR SERPL CALC: 24.3 NG/ML — HIGH (ref 0–6.7)
CK SERPL-CCNC: 158 U/L — SIGNIFICANT CHANGE UP (ref 25–170)
CK SERPL-CCNC: 196 U/L — HIGH (ref 25–170)
CO2 SERPL-SCNC: 21 MMOL/L — LOW (ref 22–29)
CREAT SERPL-MCNC: 0.48 MG/DL — LOW (ref 0.5–1.3)
EGFR: 111 ML/MIN/1.73M2 — SIGNIFICANT CHANGE UP
GLUCOSE SERPL-MCNC: 125 MG/DL — HIGH (ref 70–99)
HCT VFR BLD CALC: 35.9 % — SIGNIFICANT CHANGE UP (ref 34.5–45)
HDLC SERPL-MCNC: 43 MG/DL — LOW
HGB BLD-MCNC: 12.3 G/DL — SIGNIFICANT CHANGE UP (ref 11.5–15.5)
LIPID PNL WITH DIRECT LDL SERPL: 17 MG/DL — SIGNIFICANT CHANGE UP
MAGNESIUM SERPL-MCNC: 1.9 MG/DL — SIGNIFICANT CHANGE UP (ref 1.6–2.6)
MCHC RBC-ENTMCNC: 31.9 PG — SIGNIFICANT CHANGE UP (ref 27–34)
MCHC RBC-ENTMCNC: 34.3 GM/DL — SIGNIFICANT CHANGE UP (ref 32–36)
MCV RBC AUTO: 93.2 FL — SIGNIFICANT CHANGE UP (ref 80–100)
NON HDL CHOLESTEROL: 42 MG/DL — SIGNIFICANT CHANGE UP
NT-PROBNP SERPL-SCNC: 248 PG/ML — SIGNIFICANT CHANGE UP (ref 0–300)
PLATELET # BLD AUTO: 278 K/UL — SIGNIFICANT CHANGE UP (ref 150–400)
POTASSIUM SERPL-MCNC: 3.3 MMOL/L — LOW (ref 3.5–5.3)
POTASSIUM SERPL-SCNC: 3.3 MMOL/L — LOW (ref 3.5–5.3)
PROT SERPL-MCNC: 6.7 G/DL — SIGNIFICANT CHANGE UP (ref 6.6–8.7)
RBC # BLD: 3.85 M/UL — SIGNIFICANT CHANGE UP (ref 3.8–5.2)
RBC # FLD: 13.3 % — SIGNIFICANT CHANGE UP (ref 10.3–14.5)
SODIUM SERPL-SCNC: 139 MMOL/L — SIGNIFICANT CHANGE UP (ref 135–145)
TRIGL SERPL-MCNC: 125 MG/DL — SIGNIFICANT CHANGE UP
TROPONIN T SERPL-MCNC: 0.17 NG/ML — HIGH (ref 0–0.06)
TROPONIN T SERPL-MCNC: 0.27 NG/ML — HIGH (ref 0–0.06)
WBC # BLD: 12.38 K/UL — HIGH (ref 3.8–10.5)
WBC # FLD AUTO: 12.38 K/UL — HIGH (ref 3.8–10.5)

## 2023-08-31 PROCEDURE — 93010 ELECTROCARDIOGRAM REPORT: CPT

## 2023-08-31 RX ORDER — KETOROLAC TROMETHAMINE 30 MG/ML
30 SYRINGE (ML) INJECTION ONCE
Refills: 0 | Status: DISCONTINUED | OUTPATIENT
Start: 2023-08-31 | End: 2023-08-31

## 2023-08-31 RX ORDER — POTASSIUM CHLORIDE 20 MEQ
40 PACKET (EA) ORAL ONCE
Refills: 0 | Status: COMPLETED | OUTPATIENT
Start: 2023-08-31 | End: 2023-08-31

## 2023-08-31 RX ORDER — ZOLPIDEM TARTRATE 10 MG/1
5 TABLET ORAL AT BEDTIME
Refills: 0 | Status: DISCONTINUED | OUTPATIENT
Start: 2023-08-31 | End: 2023-09-01

## 2023-08-31 RX ORDER — LOPERAMIDE HCL 2 MG
2 TABLET ORAL EVERY 12 HOURS
Refills: 0 | Status: DISCONTINUED | OUTPATIENT
Start: 2023-08-31 | End: 2023-09-01

## 2023-08-31 RX ORDER — COLCHICINE 0.6 MG
0.6 TABLET ORAL EVERY 12 HOURS
Refills: 0 | Status: DISCONTINUED | OUTPATIENT
Start: 2023-08-31 | End: 2023-09-01

## 2023-08-31 RX ORDER — DILTIAZEM HCL 120 MG
300 CAPSULE, EXT RELEASE 24 HR ORAL DAILY
Refills: 0 | Status: DISCONTINUED | OUTPATIENT
Start: 2023-08-31 | End: 2023-09-01

## 2023-08-31 RX ADMIN — DULOXETINE HYDROCHLORIDE 60 MILLIGRAM(S): 30 CAPSULE, DELAYED RELEASE ORAL at 11:48

## 2023-08-31 RX ADMIN — Medication 50 MILLIGRAM(S): at 05:56

## 2023-08-31 RX ADMIN — ISOSORBIDE MONONITRATE 30 MILLIGRAM(S): 60 TABLET, EXTENDED RELEASE ORAL at 11:36

## 2023-08-31 RX ADMIN — Medication 50 MILLIGRAM(S): at 17:38

## 2023-08-31 RX ADMIN — Medication 90 MILLIGRAM(S): at 05:56

## 2023-08-31 RX ADMIN — Medication 25 MILLIGRAM(S): at 05:56

## 2023-08-31 RX ADMIN — PANTOPRAZOLE SODIUM 40 MILLIGRAM(S): 20 TABLET, DELAYED RELEASE ORAL at 05:56

## 2023-08-31 RX ADMIN — Medication 40 MILLIEQUIVALENT(S): at 11:53

## 2023-08-31 RX ADMIN — ZOLPIDEM TARTRATE 5 MILLIGRAM(S): 10 TABLET ORAL at 21:29

## 2023-08-31 RX ADMIN — Medication 30 MILLIGRAM(S): at 04:34

## 2023-08-31 RX ADMIN — CLOPIDOGREL BISULFATE 75 MILLIGRAM(S): 75 TABLET, FILM COATED ORAL at 11:36

## 2023-08-31 RX ADMIN — Medication 81 MILLIGRAM(S): at 11:36

## 2023-08-31 RX ADMIN — Medication 30 MILLIGRAM(S): at 04:50

## 2023-08-31 RX ADMIN — Medication 0.6 MILLIGRAM(S): at 11:36

## 2023-08-31 RX ADMIN — ATORVASTATIN CALCIUM 40 MILLIGRAM(S): 80 TABLET, FILM COATED ORAL at 21:29

## 2023-08-31 NOTE — PROGRESS NOTE ADULT - ASSESSMENT
A/P: 59 yo female with h/o HTN, GERD recent chronic leukocytosis being followed by her rheumatology and family history of CAD. Patient presents with SOB at rest and RODRIGEZ. She has been having chest pressure and discomfort for years. She had right and left heart cath in 2014 with no CAD and normal right heart pressures. No cardiology follow up until recently in which she had come to the ED with Palpitations and RODRIGEZ LHC revealed significant LAD and RAMUS with Dr. Whittaker 8/2/23.  Patient developed pain/spasm of the radial artery during the procedure and was decided to stage PCI of LAD due to the complexity and possibly requiring rotational atherectomy.   She continues to have symptoms of RODRIGEZ started on IMDUR while awaiting revascularization of LAD and RAMUS.  Now s/p LHC/PCI via RFA(RFV 7Fr sheath for TVP removed at end of procedure) approach with Dr. Whittaker yesterday:  Severe 70% heavily calcified diffuse stenosis of the ostial-proximal LAD s/p successful IVUS guided PCI with [place] implantationX2 ([place] 3.5 x 34 mm and [place]  3.0 x 15 mm); Severe 70% stenosis of the proximal ramus intermedius s/p successful IVUS guided PCI with [place] x1 implantation ([place] 3.0 x 18 mm); post procedure c/b patient with c/o epigastric pain post procedure, Hx of GERD, burping a lot per pt, received Maalox and Toradol earlier with only mild pain relief; 12 lead EKG revealed SR and Noted TWI in V3, which was not present in previous ekg.  Limited TTE revealed no pericardial effusion.  Patient treated with Pepcid 20mg ivp x1 and Toradol IV with good relief of pain.  This am patient expressed concern of going home with continued chest discomfort.  EKG today remains with TWI V3.  Patient expressed good relief of pain with Toradol IV.    -Admit to tele for monitoring overnight secondary to continued chest pain-->?pericarditic in nature; V3 TWI concerning for distal emobolization post PCI  -Maintain DAPT and statin; maintain cardizem (on at home for HTN) and Imdur; no beta blocker secondary to on calcium channel blocker  -Add colchicine 0.6mg PO BID x 10 days for chest pain  -Hypokalemia treated with Potassium oral supplementation--repeat BMP in am  -Trop 0.27 and  this am; will repeat later today and in am; added on CPK pending results  -Check EKG in am  -Hopeful discharge to home in the am  -Follow up outpatient appointment prescheduled for 9/6/23 with ACP in [place] office  -Discussed with Dr. Whittaker

## 2023-09-01 ENCOUNTER — TRANSCRIPTION ENCOUNTER (OUTPATIENT)
Age: 58
End: 2023-09-01

## 2023-09-01 VITALS
HEART RATE: 78 BPM | TEMPERATURE: 98 F | SYSTOLIC BLOOD PRESSURE: 125 MMHG | DIASTOLIC BLOOD PRESSURE: 80 MMHG | RESPIRATION RATE: 17 BRPM | OXYGEN SATURATION: 95 %

## 2023-09-01 LAB
ANION GAP SERPL CALC-SCNC: 15 MMOL/L — SIGNIFICANT CHANGE UP (ref 5–17)
BASOPHILS # BLD AUTO: 0.03 K/UL — SIGNIFICANT CHANGE UP (ref 0–0.2)
BASOPHILS NFR BLD AUTO: 0.2 % — SIGNIFICANT CHANGE UP (ref 0–2)
BUN SERPL-MCNC: 6.8 MG/DL — LOW (ref 8–20)
CALCIUM SERPL-MCNC: 9.3 MG/DL — SIGNIFICANT CHANGE UP (ref 8.4–10.5)
CHLORIDE SERPL-SCNC: 101 MMOL/L — SIGNIFICANT CHANGE UP (ref 96–108)
CK SERPL-CCNC: 99 U/L — SIGNIFICANT CHANGE UP (ref 25–170)
CO2 SERPL-SCNC: 20 MMOL/L — LOW (ref 22–29)
CREAT SERPL-MCNC: 0.52 MG/DL — SIGNIFICANT CHANGE UP (ref 0.5–1.3)
EGFR: 109 ML/MIN/1.73M2 — SIGNIFICANT CHANGE UP
EOSINOPHIL # BLD AUTO: 0.5 K/UL — SIGNIFICANT CHANGE UP (ref 0–0.5)
EOSINOPHIL NFR BLD AUTO: 4.1 % — SIGNIFICANT CHANGE UP (ref 0–6)
GLUCOSE SERPL-MCNC: 112 MG/DL — HIGH (ref 70–99)
HCT VFR BLD CALC: 37.7 % — SIGNIFICANT CHANGE UP (ref 34.5–45)
HGB BLD-MCNC: 12.2 G/DL — SIGNIFICANT CHANGE UP (ref 11.5–15.5)
IMM GRANULOCYTES NFR BLD AUTO: 0.5 % — SIGNIFICANT CHANGE UP (ref 0–0.9)
LYMPHOCYTES # BLD AUTO: 1.65 K/UL — SIGNIFICANT CHANGE UP (ref 1–3.3)
LYMPHOCYTES # BLD AUTO: 13.6 % — SIGNIFICANT CHANGE UP (ref 13–44)
MAGNESIUM SERPL-MCNC: 2 MG/DL — SIGNIFICANT CHANGE UP (ref 1.6–2.6)
MCHC RBC-ENTMCNC: 31.2 PG — SIGNIFICANT CHANGE UP (ref 27–34)
MCHC RBC-ENTMCNC: 32.4 GM/DL — SIGNIFICANT CHANGE UP (ref 32–36)
MCV RBC AUTO: 96.4 FL — SIGNIFICANT CHANGE UP (ref 80–100)
MONOCYTES # BLD AUTO: 0.64 K/UL — SIGNIFICANT CHANGE UP (ref 0–0.9)
MONOCYTES NFR BLD AUTO: 5.3 % — SIGNIFICANT CHANGE UP (ref 2–14)
NEUTROPHILS # BLD AUTO: 9.28 K/UL — HIGH (ref 1.8–7.4)
NEUTROPHILS NFR BLD AUTO: 76.3 % — SIGNIFICANT CHANGE UP (ref 43–77)
PLATELET # BLD AUTO: 296 K/UL — SIGNIFICANT CHANGE UP (ref 150–400)
POTASSIUM SERPL-MCNC: 4 MMOL/L — SIGNIFICANT CHANGE UP (ref 3.5–5.3)
POTASSIUM SERPL-SCNC: 4 MMOL/L — SIGNIFICANT CHANGE UP (ref 3.5–5.3)
RBC # BLD: 3.91 M/UL — SIGNIFICANT CHANGE UP (ref 3.8–5.2)
RBC # FLD: 13.2 % — SIGNIFICANT CHANGE UP (ref 10.3–14.5)
SODIUM SERPL-SCNC: 136 MMOL/L — SIGNIFICANT CHANGE UP (ref 135–145)
TROPONIN T SERPL-MCNC: 0.22 NG/ML — HIGH (ref 0–0.06)
WBC # BLD: 12.16 K/UL — HIGH (ref 3.8–10.5)
WBC # FLD AUTO: 12.16 K/UL — HIGH (ref 3.8–10.5)

## 2023-09-01 PROCEDURE — 93880 EXTRACRANIAL BILAT STUDY: CPT | Mod: 26

## 2023-09-01 PROCEDURE — 80048 BASIC METABOLIC PNL TOTAL CA: CPT

## 2023-09-01 PROCEDURE — C9600: CPT | Mod: LD

## 2023-09-01 PROCEDURE — C1753: CPT

## 2023-09-01 PROCEDURE — C1889: CPT

## 2023-09-01 PROCEDURE — 85025 COMPLETE CBC W/AUTO DIFF WBC: CPT

## 2023-09-01 PROCEDURE — 93005 ELECTROCARDIOGRAM TRACING: CPT

## 2023-09-01 PROCEDURE — 74176 CT ABD & PELVIS W/O CONTRAST: CPT

## 2023-09-01 PROCEDURE — 71045 X-RAY EXAM CHEST 1 VIEW: CPT | Mod: 26

## 2023-09-01 PROCEDURE — 80053 COMPREHEN METABOLIC PANEL: CPT

## 2023-09-01 PROCEDURE — 36415 COLL VENOUS BLD VENIPUNCTURE: CPT

## 2023-09-01 PROCEDURE — 70450 CT HEAD/BRAIN W/O DYE: CPT

## 2023-09-01 PROCEDURE — C1725: CPT

## 2023-09-01 PROCEDURE — 84484 ASSAY OF TROPONIN QUANT: CPT

## 2023-09-01 PROCEDURE — 70450 CT HEAD/BRAIN W/O DYE: CPT | Mod: 26

## 2023-09-01 PROCEDURE — C1887: CPT

## 2023-09-01 PROCEDURE — 74176 CT ABD & PELVIS W/O CONTRAST: CPT | Mod: 26

## 2023-09-01 PROCEDURE — 83880 ASSAY OF NATRIURETIC PEPTIDE: CPT

## 2023-09-01 PROCEDURE — C1894: CPT

## 2023-09-01 PROCEDURE — 93010 ELECTROCARDIOGRAM REPORT: CPT

## 2023-09-01 PROCEDURE — C1769: CPT

## 2023-09-01 PROCEDURE — 83735 ASSAY OF MAGNESIUM: CPT

## 2023-09-01 PROCEDURE — 82553 CREATINE MB FRACTION: CPT

## 2023-09-01 PROCEDURE — C1760: CPT

## 2023-09-01 PROCEDURE — 71045 X-RAY EXAM CHEST 1 VIEW: CPT

## 2023-09-01 PROCEDURE — 92978 ENDOLUMINL IVUS OCT C 1ST: CPT | Mod: LD

## 2023-09-01 PROCEDURE — 93308 TTE F-UP OR LMTD: CPT

## 2023-09-01 PROCEDURE — 80061 LIPID PANEL: CPT

## 2023-09-01 PROCEDURE — 82550 ASSAY OF CK (CPK): CPT

## 2023-09-01 PROCEDURE — 99222 1ST HOSP IP/OBS MODERATE 55: CPT

## 2023-09-01 PROCEDURE — 93880 EXTRACRANIAL BILAT STUDY: CPT

## 2023-09-01 PROCEDURE — 85027 COMPLETE CBC AUTOMATED: CPT

## 2023-09-01 PROCEDURE — C1874: CPT

## 2023-09-01 RX ORDER — ISOSORBIDE MONONITRATE 60 MG/1
1 TABLET, EXTENDED RELEASE ORAL
Refills: 0 | DISCHARGE

## 2023-09-01 RX ORDER — ASPIRIN/CALCIUM CARB/MAGNESIUM 324 MG
1 TABLET ORAL
Qty: 90 | Refills: 3
Start: 2023-09-01

## 2023-09-01 RX ORDER — DULOXETINE HYDROCHLORIDE 30 MG/1
1 CAPSULE, DELAYED RELEASE ORAL
Refills: 0 | DISCHARGE

## 2023-09-01 RX ORDER — ZOLPIDEM TARTRATE 10 MG/1
1 TABLET ORAL
Qty: 0 | Refills: 0 | DISCHARGE
Start: 2023-09-01

## 2023-09-01 RX ORDER — DULOXETINE HYDROCHLORIDE 30 MG/1
1 CAPSULE, DELAYED RELEASE ORAL
Qty: 0 | Refills: 0 | DISCHARGE
Start: 2023-09-01

## 2023-09-01 RX ORDER — ATORVASTATIN CALCIUM 80 MG/1
1 TABLET, FILM COATED ORAL
Qty: 90 | Refills: 3
Start: 2023-09-01

## 2023-09-01 RX ORDER — MECLIZINE HCL 12.5 MG
12.5 TABLET ORAL ONCE
Refills: 0 | Status: COMPLETED | OUTPATIENT
Start: 2023-09-01 | End: 2023-09-01

## 2023-09-01 RX ORDER — ISOSORBIDE MONONITRATE 60 MG/1
1 TABLET, EXTENDED RELEASE ORAL
Qty: 0 | Refills: 0 | DISCHARGE
Start: 2023-09-01

## 2023-09-01 RX ORDER — CLOPIDOGREL BISULFATE 75 MG/1
1 TABLET, FILM COATED ORAL
Qty: 90 | Refills: 3
Start: 2023-09-01

## 2023-09-01 RX ORDER — SODIUM CHLORIDE 9 MG/ML
1000 INJECTION INTRAMUSCULAR; INTRAVENOUS; SUBCUTANEOUS
Refills: 0 | Status: DISCONTINUED | OUTPATIENT
Start: 2023-09-01 | End: 2023-09-01

## 2023-09-01 RX ORDER — PANTOPRAZOLE SODIUM 20 MG/1
1 TABLET, DELAYED RELEASE ORAL
Qty: 0 | Refills: 0 | DISCHARGE
Start: 2023-09-01

## 2023-09-01 RX ORDER — TOPIRAMATE 25 MG
1 TABLET ORAL
Qty: 0 | Refills: 0 | DISCHARGE
Start: 2023-09-01

## 2023-09-01 RX ORDER — SODIUM CHLORIDE 9 MG/ML
250 INJECTION INTRAMUSCULAR; INTRAVENOUS; SUBCUTANEOUS ONCE
Refills: 0 | Status: COMPLETED | OUTPATIENT
Start: 2023-09-01 | End: 2023-09-01

## 2023-09-01 RX ORDER — TOPIRAMATE 25 MG
1 TABLET ORAL
Refills: 0 | DISCHARGE

## 2023-09-01 RX ORDER — ASPIRIN/CALCIUM CARB/MAGNESIUM 324 MG
1 TABLET ORAL
Refills: 0 | DISCHARGE

## 2023-09-01 RX ORDER — IBUPROFEN 200 MG
1 TABLET ORAL
Refills: 0 | DISCHARGE

## 2023-09-01 RX ORDER — METOPROLOL TARTRATE 50 MG
1 TABLET ORAL
Refills: 0 | DISCHARGE

## 2023-09-01 RX ORDER — SODIUM CHLORIDE 9 MG/ML
1000 INJECTION INTRAMUSCULAR; INTRAVENOUS; SUBCUTANEOUS ONCE
Refills: 0 | Status: COMPLETED | OUTPATIENT
Start: 2023-09-01 | End: 2023-09-01

## 2023-09-01 RX ORDER — COLCHICINE 0.6 MG
1 TABLET ORAL
Qty: 10 | Refills: 0
Start: 2023-09-01

## 2023-09-01 RX ORDER — SODIUM CHLORIDE 9 MG/ML
500 INJECTION INTRAMUSCULAR; INTRAVENOUS; SUBCUTANEOUS ONCE
Refills: 0 | Status: DISCONTINUED | OUTPATIENT
Start: 2023-09-01 | End: 2023-09-01

## 2023-09-01 RX ADMIN — CLOPIDOGREL BISULFATE 75 MILLIGRAM(S): 75 TABLET, FILM COATED ORAL at 12:00

## 2023-09-01 RX ADMIN — Medication 0.6 MILLIGRAM(S): at 05:56

## 2023-09-01 RX ADMIN — PANTOPRAZOLE SODIUM 40 MILLIGRAM(S): 20 TABLET, DELAYED RELEASE ORAL at 06:20

## 2023-09-01 RX ADMIN — Medication 0.6 MILLIGRAM(S): at 17:04

## 2023-09-01 RX ADMIN — Medication 300 MILLIGRAM(S): at 05:56

## 2023-09-01 RX ADMIN — SODIUM CHLORIDE 1000 MILLILITER(S): 9 INJECTION INTRAMUSCULAR; INTRAVENOUS; SUBCUTANEOUS at 15:47

## 2023-09-01 RX ADMIN — Medication 50 MILLIGRAM(S): at 05:56

## 2023-09-01 RX ADMIN — Medication 12.5 MILLIGRAM(S): at 11:59

## 2023-09-01 RX ADMIN — Medication 50 MILLIGRAM(S): at 17:03

## 2023-09-01 RX ADMIN — Medication 81 MILLIGRAM(S): at 12:01

## 2023-09-01 RX ADMIN — SODIUM CHLORIDE 250 MILLILITER(S): 9 INJECTION INTRAMUSCULAR; INTRAVENOUS; SUBCUTANEOUS at 17:07

## 2023-09-01 RX ADMIN — DULOXETINE HYDROCHLORIDE 60 MILLIGRAM(S): 30 CAPSULE, DELAYED RELEASE ORAL at 12:01

## 2023-09-01 NOTE — PROGRESS NOTE ADULT - ASSESSMENT
57 yo female with h/o HTN, GERD recent chronic leukocytosis being followed by her rheumatology and family history of CAD. Patient presents with SOB at rest and RODRIGEZ. She has been having chest pressure and discomfort for years. She had right and left heart cath in 2014 with no CAD and normal right heart pressures. No recent cardiology follow up until recently in which she had come to the ED with Palpitations and RODRIGEZ LHC revealed significant LAD and RAMUS disease. She continues to have symptoms of RODRIGEZ started on IMDUR while awaiting revascularization of LAD and RAMUS.  now s/p left heart catheterization via RFA approach with Dr. Whittaker :  Intraprocedurally: Pt rec'd IV sedation, heparin 10,000 units, Plavix oral loading dose 300mg, and omnipaque 193ml   Findings: 70% Ramus tx with MONTANA 3.0X18MM, 70% mLAD MON[place] x 2 3.5x34mm and 3.0x15MM IVUS guided   Initial Post Cath EKG: SR with PVC no acute ischemic changes     post procedure c/b patient with c/o epigastric pain post procedure, Hx of GERD, burping a lot per pt, received Maalox and Toradol earlier with only mild pain relief; 12 lead EKG revealed SR and Noted TWI in V3, which was not present in previous ekg.  Limited TTE revealed no pericardial effusion.  Patient treated with Pepcid 20mg ivp x1 and Toradol IV with good relief of pain.  Yesterday patient expressed concern of going home with continued chest discomfort.  EKG today remains with TWI V3.  Patient expressed good relief of pain with Toradol IV.    08/01/23  Admitted  patient to tele for monitoring  secondary to continued chest pain-->?pericarditic in nature; V3 TWI concerning for distal emobolization post PCI  trop, CKMB trending down, CK normalized,  Trop 0.27 and     09/01/23 Evaluated the patient at bedside, CP resolved no CP overnight,  Patient now c/o dizziness and blurry vision, states that when she sits up and ambulates she feels very dizzy, room is spinning around her,  associated with blurry vision, states that symptoms are new started last night. she states that she is afraid to ambulate and go to bathroom due to her dizziness and blurry vision.  Denies HA, SOB, CP, Palpitations, fever, chills, abdominal pain.        # S/P LHC/ obstructive CAD/ S/P PCI/MONTANA to Ramus and mLAD  # post procedure CP, Resolved  # post procedure Dizziness/Vertigo/ Blurry vision     Post procedure   12 lead EKG with SR, TWI in V3,   12 lead EKG on 09/01/23: SR, TWI in V3 resolved, No acute ST/T changes    -Noted SBp in low 100;s, labs stable except for chronic leuks  -AM trop pending  -CXR started to R/O Lung etiology in setting of new symptoms  -carotid doppler ordered  -Vitals q4 hourly for next 12 hours   Normal saline 100ml/hr for 6 hours started  -Medicine consult for dizziness  -Cardiology consult for post procedure symptoms of CP, dizziness  -Maintain DAPT (Aspirin and Plavix) and statin; maintain cardizem (on at home for HTN) and Imdur; no beta blocker secondary to pt  on calcium channel blocker  -statin therapy: lipitor 40mg hs  -C/W colchicine 0.6mg PO BID x 10 days for chest pain  -C/W Cardizem   Lifestyle modifications discussed to reduce cardiovascular risk factors including weight reduction, smoking cessation, medication compliance, and routine follow up with Cardiologist to track your BMI, cholesterol, and glucose levels.   - cardiac rehab info provided/referral and communication to cardiac rehab completed   -Discussed with Dr. Whittaker       57 yo female with h/o HTN, GERD recent chronic leukocytosis being followed by her rheumatology and family history of CAD. Patient presents with SOB at rest and RODRIGEZ. She has been having chest pressure and discomfort for years. She had right and left heart cath in 2014 with no CAD and normal right heart pressures. No recent cardiology follow up until recently in which she had come to the ED with Palpitations and RODRIGEZ LHC revealed significant LAD and RAMUS disease. She continues to have symptoms of RODRIGEZ started on IMDUR while awaiting revascularization of LAD and RAMUS.  now s/p left heart catheterization via RFA approach with Dr. Whittaker :  Intraprocedurally: Pt rec'd IV sedation, heparin 10,000 units, Plavix oral loading dose 300mg, and omnipaque 193ml   Findings: 70% Ramus tx with MONTANA 3.0X18MM, 70% mLAD MONTANA x 2 3.5x34mm and 3.0x15MM IVUS guided   Initial Post Cath EKG: SR with PVC no acute ischemic changes     post procedure c/b patient with c/o epigastric pain post procedure, Hx of GERD, burping a lot per pt, received Maalox and Toradol earlier with only mild pain relief; 12 lead EKG revealed SR and Noted TWI in V3, which was not present in previous ekg.  Limited TTE revealed no pericardial effusion.  Patient treated with Pepcid 20mg ivp x1 and Toradol IV with good relief of pain.  Yesterday patient expressed concern of going home with continued chest discomfort.  EKG today remains with TWI V3.  Patient expressed good relief of pain with Toradol IV.    08/01/23  Admitted  patient to tele for monitoring  secondary to continued chest pain-->?pericarditic in nature; V3 TWI concerning for distal emobolization post PCI  trop, CKMB trending down, CK normalized,  Trop 0.27 and     09/01/23 Evaluated the patient at bedside, CP resolved no CP overnight,  Patient now c/o dizziness and blurry vision, states that when she sits up and ambulates she feels very dizzy, room is spinning around her,  associated with blurry vision, states that symptoms are new started last night. she states that she is afraid to ambulate and go to bathroom due to her dizziness and blurry vision.  Denies HA, SOB, CP, Palpitations, fever, chills, abdominal pain.        # S/P LHC/ obstructive CAD/ S/P PCI/MONTANA to Ramus and mLAD  # post procedure CP, Resolved  # post procedure Dizziness/Vertigo/ Blurry vision     Post procedure   12 lead EKG with SR, TWI in V3,   12 lead EKG on 09/01/23: SR, TWI in V3 resolved, No acute ST/T changes    -Noted SBp in low 100;s, labs stable except for chronic leuks  -No acute focal deficits  -AM trop pending  -CT Head to R/O Stroke   -CXR started to R/O Lung etiology in setting of new symptoms  -carotid doppler ordered  -Vitals q4 hourly for next 12 hours   Normal saline 100ml/hr for 6 hours started  -Medicine consult for dizziness  -Cardiology consult for post procedure symptoms of CP, dizziness  -Maintain DAPT (Aspirin and Plavix) and statin; maintain cardizem (on at home for HTN) and Imdur; no beta blocker secondary to pt  on calcium channel blocker  -statin therapy: lipitor 40mg hs  -C/W colchicine 0.6mg PO BID x 10 days for chest pain  -C/W Cardizem   Lifestyle modifications discussed to reduce cardiovascular risk factors including weight reduction, smoking cessation, medication compliance, and routine follow up with Cardiologist to track your BMI, cholesterol, and glucose levels.   - cardiac rehab info provided/referral and communication to cardiac rehab completed   -Discussed with Dr. Whittaker       57 yo female with h/o HTN, GERD recent chronic leukocytosis being followed by her rheumatology and family history of CAD. Patient presents with SOB at rest and RODRIGEZ. She has been having chest pressure and discomfort for years. She had right and left heart cath in 2014 with no CAD and normal right heart pressures. No recent cardiology follow up until recently in which she had come to the ED with Palpitations and RODRIGEZ LHC revealed significant LAD and RAMUS disease. She continues to have symptoms of RODRIGEZ started on IMDUR while awaiting revascularization of LAD and RAMUS.  now s/p left heart catheterization via RFA approach with Dr. Whittaker :  Intraprocedurally: Pt rec'd IV sedation, heparin 10,000 units, Plavix oral loading dose 300mg, and omnipaque 193ml   Findings: 70% Ramus tx with MONTANA 3.0X18MM, 70% mLAD MON[place] x 2 3.5x34mm and 3.0x15MM IVUS guided   Initial Post Cath EKG: SR with PVC no acute ischemic changes     post procedure c/b patient with c/o epigastric pain post procedure, Hx of GERD, burping a lot per pt, received Maalox and Toradol earlier with only mild pain relief; 12 lead EKG revealed SR and Noted TWI in V3, which was not present in previous ekg.  Limited TTE revealed no pericardial effusion.  Patient treated with Pepcid 20mg ivp x1 and Toradol IV with good relief of pain.  Yesterday patient expressed concern of going home with continued chest discomfort.  EKG today remains with TWI V3.  Patient expressed good relief of pain with Toradol IV.    08/01/23  Admitted  patient to tele for monitoring  secondary to continued chest pain-->?pericarditic in nature; V3 TWI concerning for distal emobolization post PCI  trop, CKMB trending down, CK normalized,  Trop 0.27 and     09/01/23 Evaluated the patient at bedside, CP resolved no CP overnight,  Patient now c/o dizziness and blurry vision, states that when she sits up and ambulates she feels very dizzy, room is spinning around her,  associated with blurry vision, states that symptoms are new started last night. she states that she is afraid to ambulate and go to bathroom due to her dizziness and blurry vision.  Denies HA, SOB, CP, Palpitations, fever, chills, abdominal pain.        # S/P LHC/ obstructive CAD/ S/P PCI/MONTANA to Ramus and mLAD  # post procedure CP, Resolved  # post procedure Dizziness/Vertigo/ Blurry vision     Post procedure   12 lead EKG with SR, TWI in V3,   12 lead EKG on 09/01/23: SR, TWI in V3 resolved, No acute ST/T changes    -Noted SBp in low 100;s, labs stable except for chronic leuks  -No acute focal deficits  -AM trop pending  -CT Head to R/O Stroke   -CXR started to R/O Lung etiology in setting of new symptoms  -carotid doppler ordered  -Vitals q4 hourly for next 12 hours   Normal saline 100ml/hr for 6 hours started  -Medicine consult for dizziness  -Cardiology consult for post procedure symptoms of CP, dizziness  -Maintain DAPT (Aspirin and Plavix) and statin; maintain cardizem (on at home for HTN) and Imdur; no beta blocker secondary to pt  on calcium channel blocker  -statin therapy: lipitor 40mg hs  -C/W colchicine 0.6mg PO BID x 10 days for chest pain  -C/W Cardizem   Lifestyle modifications discussed to reduce cardiovascular risk factors including weight reduction, smoking cessation, medication compliance, and routine follow up with Cardiologist to track your BMI, cholesterol, and glucose levels.   - cardiac rehab info provided/referral and communication to cardiac rehab completed   -Discussed with Dr. Whittaker    interventional cardiology Np addendum    patient orthostatic positive with SBP in 100's standing, 90's sitting, and 70's     CT head negative  for acute infarct or bleed  CT A/P negative for RP bleed/hematoma  carotid US Negative   CXR Negative for lung etiology  Isosorbide d/cd    patient to receive 2 L iv NS ordered  Medicine on board  Patient feels better  Will discharge the patient post iv fluid bolus if post IV bolus orthostatics negative         57 yo female with h/o HTN, GERD recent chronic leukocytosis being followed by her rheumatology and family history of CAD. Patient presents with SOB at rest and RODRIGEZ. She has been having chest pressure and discomfort for years. She had right and left heart cath in 2014 with no CAD and normal right heart pressures. No recent cardiology follow up until recently in which she had come to the ED with Palpitations and RODRIGEZ LHC revealed significant LAD and RAMUS disease. She continues to have symptoms of RODRIGEZ started on IMDUR while awaiting revascularization of LAD and RAMUS.  now s/p left heart catheterization via RFA approach with Dr. Whittaker :  Intraprocedurally: Pt rec'd IV sedation, heparin 10,000 units, Plavix oral loading dose 300mg, and omnipaque 193ml   Findings: 70% Ramus tx with MONTANA 3.0X18MM, 70% mLAD MON[place] x 2 3.5x34mm and 3.0x15MM IVUS guided   Initial Post Cath EKG: SR with PVC no acute ischemic changes     post procedure c/b patient with c/o epigastric pain post procedure, Hx of GERD, burping a lot per pt, received Maalox and Toradol earlier with only mild pain relief; 12 lead EKG revealed SR and Noted TWI in V3, which was not present in previous ekg.  Limited TTE revealed no pericardial effusion.  Patient treated with Pepcid 20mg ivp x1 and Toradol IV with good relief of pain.  Yesterday patient expressed concern of going home with continued chest discomfort.  EKG today remains with TWI V3.  Patient expressed good relief of pain with Toradol IV.    08/01/23  Admitted  patient to tele for monitoring  secondary to continued chest pain-->?pericarditic in nature; V3 TWI concerning for distal emobolization post PCI  trop, CKMB trending down, CK normalized,  Trop 0.27 and     09/01/23 Evaluated the patient at bedside, CP resolved no CP overnight,  Patient now c/o dizziness and blurry vision, states that when she sits up and ambulates she feels very dizzy, room is spinning around her,  associated with blurry vision, states that symptoms are new started last night. she states that she is afraid to ambulate and go to bathroom due to her dizziness and blurry vision.  Denies HA, SOB, CP, Palpitations, fever, chills, abdominal pain.        # S/P LHC/ obstructive CAD/ S/P PCI/MONTANA to Ramus and mLAD  # post procedure CP, Resolved  # post procedure Dizziness/Vertigo/ Blurry vision     Post procedure   12 lead EKG with SR, TWI in V3,   12 lead EKG on 09/01/23: SR, TWI in V3 resolved, No acute ST/T changes    -Noted SBp in low 100;s, labs stable except for chronic leuks  -No acute focal deficits  -AM trop pending  -CT Head to R/O Stroke   -CXR started to R/O Lung etiology in setting of new symptoms  -carotid doppler ordered  -Vitals q4 hourly for next 12 hours   Normal saline 100ml/hr for 6 hours started  -Medicine consult for dizziness  -Cardiology consult for post procedure symptoms of CP, dizziness  -Maintain DAPT (Aspirin and Plavix) and statin; maintain cardizem (on at home for HTN) and Imdur; no beta blocker secondary to pt  on calcium channel blocker  -statin therapy: lipitor 40mg hs  -C/W colchicine 0.6mg PO BID x 10 days for chest pain  -Resume Cardizem on sunday 09/03/23  Lifestyle modifications discussed to reduce cardiovascular risk factors including weight reduction, smoking cessation, medication compliance, and routine follow up with Cardiologist to track your BMI, cholesterol, and glucose levels.   - cardiac rehab info provided/referral and communication to cardiac rehab completed   -Discussed with Dr. Whittaker    interventional cardiology Np addendum    patient orthostatic positive with SBP in 100's standing, 90's sitting, and 70's     CT head negative  for acute infarct or bleed  CT A/P negative for RP bleed/hematoma  carotid US Negative   CXR Negative for lung etiology  Isosorbide d/cd    patient to receive 2 L iv NS ordered  Medicine on board  Patient feels better  Will discharge the patient post iv fluid bolus if post IV bolus orthostatics negative         59 yo female with h/o HTN, GERD recent chronic leukocytosis being followed by her rheumatology and family history of CAD. Patient presents with SOB at rest and RODRIGEZ. She has been having chest pressure and discomfort for years. She had right and left heart cath in 2014 with no CAD and normal right heart pressures. No recent cardiology follow up until recently in which she had come to the ED with Palpitations and RODRIGEZ LHC revealed significant LAD and RAMUS disease. She continues to have symptoms of RODRIGEZ started on IMDUR while awaiting revascularization of LAD and RAMUS.  now s/p left heart catheterization via RFA approach with Dr. Whittaker :  Intraprocedurally: Pt rec'd IV sedation, heparin 10,000 units, Plavix oral loading dose 300mg, and omnipaque 193ml   Findings: 70% Ramus tx with MON[place]NA 3.0X18MM, 70% mLAD MON[place] x 2 3.5x34mm and 3.0x15MM IVUS guided   Initial Post Cath EKG: SR with PVC no acute ischemic changes     post procedure c/b patient with c/o epigastric pain post procedure, Hx of GERD, burping a lot per pt, received Maalox and Toradol earlier with only mild pain relief; 12 lead EKG revealed SR and Noted TWI in V3, which was not present in previous ekg.  Limited TTE revealed no pericardial effusion.  Patient treated with Pepcid 20mg ivp x1 and Toradol IV with good relief of pain.  Yesterday patient expressed concern of going home with continued chest discomfort.  EKG today remains with TWI V3.  Patient expressed good relief of pain with Toradol IV.    08/01/23  Admitted  patient to tele for monitoring  secondary to continued chest pain-->?pericarditic in nature; V3 TWI concerning for distal emobolization post PCI  trop, CKMB trending down, CK normalized,  Trop 0.27 and     09/01/23 Evaluated the patient at bedside, CP resolved no CP overnight,  Patient now c/o dizziness and blurry vision, states that when she sits up and ambulates she feels very dizzy, room is spinning around her,  associated with blurry vision, states that symptoms are new started last night. she states that she is afraid to ambulate and go to bathroom due to her dizziness and blurry vision.  Denies HA, SOB, CP, Palpitations, fever, chills, abdominal pain.        # S/P LHC/ obstructive CAD/ S/P PCI/MONTANA to Ramus and mLAD  # post procedure CP, Resolved  # post procedure Dizziness/Vertigo/ Blurry vision     Post procedure   12 lead EKG with SR, TWI in V3,   12 lead EKG on 09/01/23: SR, TWI in V3 resolved, No acute ST/T changes    -Noted SBp in low 100;s, labs stable except for chronic leuks  -No acute focal deficits  -AM trop pending  -CT Head to R/O Stroke   -CXR started to R/O Lung etiology in setting of new symptoms  -carotid doppler ordered  -Vitals q4 hourly for next 12 hours   Normal saline 100ml/hr for 6 hours started  -Medicine consult for dizziness  -Cardiology consult for post procedure symptoms of CP, dizziness  -Maintain DAPT (Aspirin and Plavix) and statin; maintain cardizem (on at home for HTN) and Imdur; no beta blocker secondary to pt  on calcium channel blocker  -statin therapy: lipitor 40mg hs  -C/W colchicine 0.6mg PO BID x 10 days for chest pain  -Resume Cardizem on sunday 09/03/23  Lifestyle modifications discussed to reduce cardiovascular risk factors including weight reduction, smoking cessation, medication compliance, and routine follow up with Cardiologist to track your BMI, cholesterol, and glucose levels.   - cardiac rehab info provided/referral and communication to cardiac rehab completed   -Discussed with Dr. Whittaker    interventional cardiology Np addendum    patient orthostatic positive with SBP in 100's standing, 90's sitting, and 70's     CT head negative  for acute infarct or bleed  CT A/P negative for RP bleed/hematoma  carotid US Negative   CXR Negative for lung etiology  Isosorbide d/cd    patient to receive 2 L iv NS ordered  Medicine on board  Patient feels better  Post IV bolus Orthostatic BPs negative Pt may be discharged to home         57 yo female with h/o HTN, GERD recent chronic leukocytosis being followed by her rheumatology and family history of CAD. Patient presents with SOB at rest and RODRIGEZ. She has been having chest pressure and discomfort for years. She had right and left heart cath in 2014 with no CAD and normal right heart pressures. No recent cardiology follow up until recently in which she had come to the ED with Palpitations and RODRIGEZ LHC revealed significant LAD and RAMUS disease. She continues to have symptoms of RODRIGEZ started on IMDUR while awaiting revascularization of LAD and RAMUS.  now s/p left heart catheterization via RFA approach with Dr. Whittaker :  Intraprocedurally: Pt rec'd IV sedation, heparin 10,000 units, Plavix oral loading dose 300mg, and omnipaque 193ml   Findings: 70% Ramus tx with MONTANA 3.0X18MM, 70% mLAD MON[place]NA x 2 3.5x34mm and 3.0x15MM IVUS guided   Initial Post Cath EKG: SR with PVC no acute ischemic changes     post procedure c/b patient with c/o epigastric pain post procedure, Hx of GERD, burping a lot per pt, received Maalox and Toradol earlier with only mild pain relief; 12 lead EKG revealed SR and Noted TWI in V3, which was not present in previous ekg.  Limited TTE revealed no pericardial effusion.  Patient treated with Pepcid 20mg ivp x1 and Toradol IV with good relief of pain.  Yesterday patient expressed concern of going home with continued chest discomfort.  EKG today remains with TWI V3.  Patient expressed good relief of pain with Toradol IV.    08/01/23  Admitted  patient to tele for monitoring  secondary to continued chest pain-->?pericarditic in nature; V3 TWI concerning for distal emobolization post PCI  trop, CKMB trending down, CK normalized,  Trop 0.27 and     09/01/23 Evaluated the patient at bedside, CP resolved no CP overnight,  Patient now c/o dizziness and blurry vision, states that when she sits up and ambulates she feels very dizzy, room is spinning around her,  associated with blurry vision, states that symptoms are new started last night. she states that she is afraid to ambulate and go to bathroom due to her dizziness and blurry vision.  Denies HA, SOB, CP, Palpitations, fever, chills, abdominal pain.        # S/P LHC/ obstructive CAD/ S/P PCI/MONTANA to Ramus and mLAD  # post procedure CP, Resolved  # post procedure Dizziness/Vertigo/ Blurry vision     Post procedure   12 lead EKG with SR, TWI in V3,   12 lead EKG on 09/01/23: SR, TWI in V3 resolved, No acute ST/T changes    -Noted SBp in low 100;s, labs stable except for chronic leuks  -No acute focal deficits  -AM trop pending  -CT Head to R/O Stroke   -CXR started to R/O Lung etiology in setting of new symptoms  -carotid doppler ordered  -Vitals q4 hourly for next 12 hours   Normal saline 100ml/hr for 6 hours started  -Medicine consult for dizziness  -Cardiology consult for post procedure symptoms of CP, dizziness  -Maintain DAPT (Aspirin and Plavix) and statin; maintain cardizem (on at home for HTN) and Imdur; no beta blocker secondary to pt  on calcium channel blocker  -statin therapy: lipitor 40mg hs  -C/W colchicine 0.6mg PO BID x 10 days for chest pain  -Resume Cardizem on sunday 09/03/23  Lifestyle modifications discussed to reduce cardiovascular risk factors including weight reduction, smoking cessation, medication compliance, and routine follow up with Cardiologist to track your BMI, cholesterol, and glucose levels.   - cardiac rehab info provided/referral and communication to cardiac rehab completed   -Discussed with Dr. Whittaker    interventional cardiology Np addendum    patient orthostatic positive with SBP in 100's standing, 90's sitting, and 70's     CT head negative  for acute infarct or bleed  CT A/P negative for RP bleed/hematoma  carotid US Negative   CXR Negative for lung etiology  Isosorbide d/cd    patient to receive 2 L iv NS ordered  Medicine on board  Patient feels better  If Post IV bolus Orthostatic BPs negative Pt may be discharged to home

## 2023-09-01 NOTE — DISCHARGE NOTE NURSING/CASE MANAGEMENT/SOCIAL WORK - NSDCPEFALRISK_GEN_ALL_CORE
For information on Fall & Injury Prevention, visit: [email or web address]  [email or web address] OR  https://www.cdc.gov/steadi/patient.html

## 2023-09-01 NOTE — DISCHARGE NOTE NURSING/CASE MANAGEMENT/SOCIAL WORK - PATIENT PORTAL LINK FT
You can access the FollowMyHealth Patient Portal offered by [facility] by registering at the following website: [email or web address]. By joining [employer]’s FollowMyHealth portal, you will also be able to view your health information using other applications (apps) compatible with our system.

## 2023-09-01 NOTE — CONSULT NOTE ADULT - SUBJECTIVE AND OBJECTIVE BOX
57 y/o female with Hx of HTN, GERD, chronic leukocytosis, she has family history of CAD, she presents with SOB at rest and RODRIGEZ. She has been having chest pressure and discomfort for years. She had right and left heart cath in 2014 with no CAD and normal right heart pressures. No recent cardiology follow up until recently in which she had come to the ED with Palpitations and RODRIGEZ LHC revealed significant LAD and RAMUS disease. She continues to have symptoms of RODRIGEZ started on IMDUR while awaiting revascularization of LAD and RAMUS.  now s/p left heart catheterization via RFA approach with Dr. Whittaker, post procedure c/b patient with c/o epigastric pain post procedure, Hx of GERD, burping a lot per pt, received Maalox and Toradol earlier with only mild pain relief; 12 lead EKG revealed SR and Noted TWI in V3, which was not present in previous ekg.  Limited TTE revealed no pericardial effusion.  Patient treated with Pepcid 20mg ivp x1 and Toradol IV with good relief of pain, tele monitoring  dome secondary to continued chest pain, it was likely pericarditic in nature; V3 TWI concerning for distal emobolization post PCI, trop has been stable, she has been started on colchicine, her pain is better, she has been complaining about dizziness and blurry vision, states that when she sits up and ambulates she feels very dizzy, room is spinning around her,  associated with blurry vision, states that symptoms are new started last night, she denies, earache, headache, Palpitations, fever, chills, abdominal pain, she feel some stuffy nose, she has chronic neck pain as well, now feels like worsening, medicine consulted for evaluation of dizziness.                                                                                 PAST MEDICAL & SURGICAL HISTORY:  Hypertension      Ectopic pregnancy      Seasonal allergies      Vitamin B 12 deficiency      SOB (shortness of breath)      Palpitations      Reflux      Asthma  controlled on meds      Sleep apnea  does not use the CPAP but use O2 prn      History of bilateral oophorectomies      History of ectopic pregnancy          RADIOLOGY & ADDITIONAL STUDIES/DIAGNOSTIC TESTING:        Allergies    No Known Allergies    Intolerances; None        MEDICATIONS  (STANDING):  aspirin enteric coated 81 milliGRAM(s) Oral daily  atorvastatin 40 milliGRAM(s) Oral at bedtime  clopidogrel Tablet 75 milliGRAM(s) Oral daily  colchicine 0.6 milliGRAM(s) Oral every 12 hours  diltiazem    milliGRAM(s) Oral daily  DULoxetine 60 milliGRAM(s) Oral daily  isosorbide   mononitrate ER Tablet (IMDUR) 30 milliGRAM(s) Oral daily  pantoprazole    Tablet 40 milliGRAM(s) Oral before breakfast  sodium chloride 0.9%. 1000 milliLiter(s) (100 mL/Hr) IV Continuous <Continuous>  topiramate 50 milliGRAM(s) Oral two times a day    MEDICATIONS  (PRN):  acetaminophen     Tablet .. 650 milliGRAM(s) Oral every 6 hours PRN Mild Pain (1 - 3), Moderate Pain (4 - 6)  aluminum hydroxide/magnesium hydroxide/simethicone Suspension 30 milliLiter(s) Oral every 6 hours PRN Dyspepsia  loperamide 2 milliGRAM(s) Oral every 12 hours PRN Diarrhea  zolpidem 5 milliGRAM(s) Oral at bedtime PRN Insomnia      FAMILY HISTORY:  CAD (coronary artery disease)    Family history of malignant neoplasm of kidney    Hypertension    Family history of CABG      Vital Signs Last 24 Hrs  T(C): 36.5 (01 Sep 2023 08:30), Max: 36.9 (31 Aug 2023 16:27)  T(F): 97.7 (01 Sep 2023 08:30), Max: 98.4 (31 Aug 2023 16:27)  HR: 85 (01 Sep 2023 08:30) (65 - 85)  BP: 107/69 (01 Sep 2023 08:30) (102/55 - 151/70)  BP(mean): 89 (31 Aug 2023 18:08) (89 - 89)  RR: 15 (01 Sep 2023 08:30) (15 - 18)  SpO2: 99% (01 Sep 2023 08:30) (95% - 99%)    Parameters below as of 01 Sep 2023 08:30  Patient On (Oxygen Delivery Method): room air        PHYSICAL EXAM:    GENERAL: Elderly female looking comfortable   HEENT: PERRL, +EOMI  NECK: soft, Supple, No JVD   CHEST/LUNG: Clear to auscultate bilaterally; No wheezing  HEART: S1S2+, Regular rate and rhythm; No murmurs  ABDOMEN: Soft, Nontender, Nondistended; Bowel sounds present  EXTREMITIES:  1+ Peripheral Pulses, No edema  SKIN: No rashes or lesions  NEURO: AAOX3  PSYCH: normal mood        LABS:                        12.2   12.16 )-----------( 296      ( 01 Sep 2023 05:21 )             37.7     09-01    136  |  101  |  6.8<L>  ----------------------------<  112<H>  4.0   |  20.0<L>  |  0.52    Ca    9.3      01 Sep 2023 05:21  Mg     2.0     09-01    TPro  6.7  /  Alb  3.9  /  TBili  0.6  /  DBili  x   /  AST  39<H>  /  ALT  28  /  AlkPhos  100  08-31    CARDIAC MARKERS ( 01 Sep 2023 05:21 )  x     / x     / 99 U/L / x     / x      CARDIAC MARKERS ( 31 Aug 2023 19:10 )  x     / 0.17 ng/mL / 158 U/L / x     / 13.1 ng/mL  CARDIAC MARKERS ( 31 Aug 2023 10:30 )  x     / 0.27 ng/mL / 196 U/L / x     / 24.3 ng/mL        Urinalysis Basic - ( 01 Sep 2023 05:21 )    Color: x / Appearance: x / SG: x / pH: x  Gluc: 112 mg/dL / Ketone: x  / Bili: x / Urobili: x   Blood: x / Protein: x / Nitrite: x   Leuk Esterase: x / RBC: x / WBC x   Sq Epi: x / Non Sq Epi: x / Bacteria: x      I&O's Summary    31 Aug 2023 07:01  -  01 Sep 2023 07:00  --------------------------------------------------------  IN: 180 mL / OUT: 0 mL / NET: 180 mL

## 2023-09-01 NOTE — PROGRESS NOTE ADULT - SUBJECTIVE AND OBJECTIVE BOX
Department of Cardiology                                                                  [facility]/[place] [address] [place] [address]                                                            Telephone: [phone]. Fax:[phone]                                                    Post- Procedure Note: Left Heart Cardiac Catheterization       Narrative:   58y  Female is now s/p left heart catheterization via RFA(RFV 7Fr sheath for TVP not needed) approach with Dr. Whittaker :, tolerated procedure well without complications. Arrived to recovery room NAD and hemodynamically stable, distal pulse +, neurovascular intact          PAST MEDICAL & SURGICAL HISTORY:  Hypertension  Ectopic pregnancy  Seasonal allergies  Vitamin B 12 deficiency  SOB (shortness of breath)  Palpitations  Reflux  Asthma  controlled on meds  CAD    Sleep apnea  does not use the CPAP but use O2 prn      History of bilateral oophorectomies      History of ectopic pregnancy        Home Medications:  Aspir 81 oral delayed release tablet: 1 orally once a day (30 Aug 2023 07:16)  Cymbalta 60 mg oral delayed release capsule: 1 orally once a day (30 Aug 2023 07:16)  DilTIAZem Hydrochloride  mg/24 hours oral capsule, extended release: 1 cap(s) orally once a day (30 Aug 2023 07:16)  hydrocortisone 2.5% rectal cream with applicator: rectal 2 times a day (30 Aug 2023 07:14)  hydrOXYzine hydrochloride 50 mg oral tablet: 1 orally once a day (30 Aug 2023 07:14)   mg oral tablet: 1 orally as needed for  mild pain (30 Aug 2023 07:15)  isosorbide mononitrate 30 mg oral tablet, extended release: 1 orally once a day (30 Aug 2023 07:15)  metoprolol succinate 25 mg oral tablet, extended release: 1 orally once a day (30 Aug 2023 07:28)  nystatin 100,000 units/g topical ointment: Apply topically to affected area (30 Aug 2023 07:15)  omeprazole 40 mg oral delayed release capsule: 1 cap(s) orally once a day (30 Aug 2023 07:16)  sulindac 200 mg oral tablet: 1 orally 2 times a day (30 Aug 2023 07:15)  tiZANidine 2 mg oral tablet: 2 orally 2 times a day (30 Aug 2023 07:15)  topiramate 50 mg oral tablet: 1 orally 2 times a day (30 Aug 2023 07:15)  Vitamin D2 50,000 intl units (1.25 mg) oral capsule: 1 cap(s) orally once a week (30 Aug 2023 07:16)        No Known Allergies      Objective:  Vital Signs Last 24 Hrs  T(C): 36.8 (30 Aug 2023 07:22), Max: 36.8 (30 Aug 2023 07:22)  T(F): 98.2 (30 Aug 2023 07:22), Max: 98.2 (30 Aug 2023 07:22)  HR: 90 (30 Aug 2023 07:22) (90 - 90)  BP: 129/82 (30 Aug 2023 07:22) (129/82 - 129/82)  RR: 16 (30 Aug 2023 07:22) (16 - 16)  SpO2: 97% (30 Aug 2023 07:22) (97% - 97%)    Parameters below as of 30 Aug 2023 07:22  Patient On (Oxygen Delivery Method): room air        GENERAL: Pt lying comfortably, NAD.  ENMT: PERRL, +EOMI.  NECK: soft, Supple, No JVD,   CHEST/LUNG: Clear to auscultatation bilaterally; No wheezing.  HEART: S1S2+, Regular rate and rhythm; No murmurs.  ABDOMEN: Soft, Nontender, Nondistended; Bowel sounds present.  MUSCULOSKELETAL: Normal range of motion.  SKIN: No rashes or lesions.  NEURO: AAOX3, no focal deficits, no motor r sensory loss.  PSYCH: normal mood.  Procedure site:no signs of bleeding or hematoma, neurovascular intact   VASCULAR: right femoral vein 7Fr sheath in place                   
      Department of Cardiology                                                                  [facility]/[place] [address] [address] [facility]                                                            Telephone: [phone]. Fax:[phone]                                                                                      Cardiac Cath NP Note       Overnight events: Dizziness, Blurry vision    HPI    57 yo female with h/o HTN, GERD recent chronic leukocytosis being followed by her rheumatology and family history of CAD. Patient presents with SOB at rest and RODRIGEZ. She has been having chest pressure and discomfort for years. She had right and left heart cath in  with no CAD and normal right heart pressures. No recent cardiology follow up until recently in which she had come to the ED with Palpitations and RODRIGEZ LHC revealed significant LAD and RAMUS disease. She continues to have symptoms of RODRIGEZ started on IMDUR while awaiting revascularization of LAD and RAMUS.  now s/p left heart catheterization via RFA approach with Dr. Whittaker :  Intraprocedurally: Pt rec'd IV sedation, heparin 10,000 units, Plavix oral loading dose 300mg, and omnipaque 193ml   Findings: 70% Ramus tx with MONTANA 3.0X18MM, 70% mLAD [place] x 2 3.5x34mm and 3.0x15MM IVUS guided   Initial Post Cath EKG: SR with PVC no acute ischemic changes     post procedure c/b patient with c/o epigastric pain post procedure, Hx of GERD, burping a lot per pt, received Maalox and Toradol earlier with only mild pain relief; 12 lead EKG revealed SR and Noted TWI in V3, which was not present in previous ekg.  Limited TTE revealed no pericardial effusion.  Patient treated with Pepcid 20mg ivp x1 and Toradol IV with good relief of pain.  Yesterday patient expressed concern of going home with continued chest discomfort.  EKG today remains with TWI V3.  Patient expressed good relief of pain with Toradol IV.    23  Admitted  patient to tele for monitoring  secondary to continued chest pain-->?pericarditic in nature; V3 TWI concerning for distal emobolization post PCI  trop, CKMB trending down, CK normalized,  Trop 0.27 and     23 Evaluated the patient at bedside, CP resolved no CP overnight,  Patient now c/o dizziness and blurry vision, states that when she sits up and ambulates she feels very dizzy, room is spinning around her,  associated with blurry vision, states that symptoms are new started last night. she states that she is afraid to ambulate and go to bathroom due to her dizziness and blurry vision.  Denies HA, SOB, CP, Palpitations, fever, chills, abdominal pain.                                                                                 PAST MEDICAL & SURGICAL HISTORY:  Hypertension      Ectopic pregnancy      Seasonal allergies      Vitamin B 12 deficiency      SOB (shortness of breath)      Palpitations      Reflux      Asthma  controlled on meds      Sleep apnea  does not use the CPAP but use O2 prn      History of bilateral oophorectomies      History of ectopic pregnancy          RADIOLOGY & ADDITIONAL STUDIES/DIAGNOSTIC TESTING:      ECHO  FINDINGS:    < from: TTE Echo Limited or F/U (23 @ 22:13) >  1. Normal left ventricular internal cavity size.   2. Normal global left ventricular systolic function.   3. Left ventricular ejection fraction, by visual estimation, is 55 to   60%.   4. Normal right ventricular size and function.   5. The left atrium is normal in size.   6. The right atrium is normal in size.   7. There is no evidence of pericardial effusion.   8. Sclerotic aortic valve with normal opening.   9. Mild mitral valve regurgitation.  10. Mild thickening of the anterior and posterior mitral valve leaflets.  11. Mild mitral annular calcification.  12. Mild tricuspid regurgitation.        CATHETERIZATION  FINDINGS:    < from: Cardiac Catheterization (23 @ 08:10) >  Indications:  Significant (50% or greater) lesion in a  major coronary artery  Lab Visit Indication:      other   PCI Status:                elective     Conclusions:   - Severe 70% heavily calcified diffuse stenosis of the ostial-proximal  LAD s/p successful IVUS guided PCI with MONTANA    implantationX2 ([place] 3.5 x 34 mm and [place]  3.0 x 15  mm)  - Severe 70% stenosis of the proximal ramus intermedius s/p successful  IVUS guided PCI with MON[place]NA implantation ([place] 3.0 x 18 mm)   s/p successful temporary pace maker implantation, removed at the end  of the procedure    < end of copied text >  < from: Cardiac Catheterization (23 @ 08:10) >  Ramus intermedius: This was a 70 % [place] stenosis. The lesion length  was 18 mm. This was an ACC/AHA High/C lesion for  intervention. This is not the culprit lesion. IVUS was performed.  Imaging shows plaque is fibrotic. Guidewire crossing was  successful.        < end of copied text >  < from: Cardiac Catheterization (23 @ 08:10) >    A successful Balloon angioplasty was performed using a EUPHORA NC 3.0  X 15MM during setup, a 6F EBU3.5 LAUNCHER  During Procedure, and a RENEA BLUE 190CM During Procedure.      The inflation pressure was 14 pedrito for the duration of 7.0 seconds.     Theinflation pressure was 12 pedrito for the duration of 10.0 seconds.     A successful Drug Eluting Stent was deployed using a NASIR FRONTIER 3.0  X 18MM During Procedure.    The inflation pressure was 12 pedrito for the duration of 13.0 seconds.       < from: Cardiac Catheterization (23 @ 08:10) >  Mid left anterior descending: This was a 70 % calcified stenosis. The  lesion length was 49 mm. This was an ACC/AHA High/C  lesion for intervention. This is the culprit lesion. IVUS was  performed. Imaging shows plaque is fibrotic. Imaging shows a calcified  lesion. Guidewire crossing was successful.      A successful IVUS was performed using a EAGLE EYE PLATINUM SHORT TIP  During Procedure.    A successful Balloon angioplasty was performed using a EUPHORA NC 3.0  X 15MMDuring Procedure.    The inflation pressure was 8 pedrito for the duration of 15.0 seconds.     The inflation pressure was 16 pedrito for the duration of 14.0 seconds.     The inflation pressure was 14 pedrito for the duration of 8.0 seconds.     A successful Drug Eluting Stent was deployed using a NASIR FRONTIER  3.50 X 34MM During Procedure.    The inflation pressure was 14 pedrito for the duration of 11.0 seconds.       A successful Drug Eluting Stent was deployed using a NASIR FRONTIER 3.0  X 15MM During Procedure.    The inflation pressure was 14 pedrito for the duration of 8.0 seconds.         Allergies    No Known Allergies    Intolerances; None        MEDICATIONS  (STANDING):  aspirin enteric coated 81 milliGRAM(s) Oral daily  atorvastatin 40 milliGRAM(s) Oral at bedtime  clopidogrel Tablet 75 milliGRAM(s) Oral daily  colchicine 0.6 milliGRAM(s) Oral every 12 hours  diltiazem    milliGRAM(s) Oral daily  DULoxetine 60 milliGRAM(s) Oral daily  isosorbide   mononitrate ER Tablet (IMDUR) 30 milliGRAM(s) Oral daily  pantoprazole    Tablet 40 milliGRAM(s) Oral before breakfast  sodium chloride 0.9%. 1000 milliLiter(s) (100 mL/Hr) IV Continuous <Continuous>  topiramate 50 milliGRAM(s) Oral two times a day    MEDICATIONS  (PRN):  acetaminophen     Tablet .. 650 milliGRAM(s) Oral every 6 hours PRN Mild Pain (1 - 3), Moderate Pain (4 - 6)  aluminum hydroxide/magnesium hydroxide/simethicone Suspension 30 milliLiter(s) Oral every 6 hours PRN Dyspepsia  loperamide 2 milliGRAM(s) Oral every 12 hours PRN Diarrhea  zolpidem 5 milliGRAM(s) Oral at bedtime PRN Insomnia      FAMILY HISTORY:  CAD (coronary artery disease)    Family history of malignant neoplasm of kidney    Hypertension    Family history of CABG        REVIEW OF SYSTEMS:  General: No fevers/chills, or fatigue  HEENT: No visual disturbances, no hearing loss, no headaches, no epistaxis.  CV: No chest pain,no palpitations, no edema, no orthopnea, no PND, or RODRIGEZ  Respiratory: No dyspnea, no wheeze, no cough.  GI: no nausea/vomiting, no black/bloody stools.  : No hematuria  Musculoskeletal: No myalgias, no arthralgias, no back pain.    Vital Signs Last 24 Hrs  T(C): 36.5 (01 Sep 2023 08:30), Max: 36.9 (31 Aug 2023 16:27)  T(F): 97.7 (01 Sep 2023 08:30), Max: 98.4 (31 Aug 2023 16:27)  HR: 85 (01 Sep 2023 08:30) (65 - 85)  BP: 107/69 (01 Sep 2023 08:30) (102/55 - 151/70)  BP(mean): 89 (31 Aug 2023 18:08) (89 - 89)  RR: 15 (01 Sep 2023 08:30) (15 - 18)  SpO2: 99% (01 Sep 2023 08:30) (97% - 99%)    Parameters below as of 01 Sep 2023 08:30  Patient On (Oxygen Delivery Method): room air        Daily     Daily Weight in k.1 (01 Sep 2023 05:30)    I&O's Detail    31 Aug 2023 07:01  -  01 Sep 2023 07:00  --------------------------------------------------------  IN:    Oral Fluid: 180 mL  Total IN: 180 mL    OUT:  Total OUT: 0 mL    Total NET: 180 mL          PHYSICAL EXAM:   GENERAL: Pt lying comfortably, NAD.  ENMT: PERRL, +EOMI.  NECK: soft, Supple, No JVD,   CHEST/LUNG: Clear to auscultatation bilaterally; No wheezing.  HEART: S1S2+, Regular rate and rhythm; No murmurs.  ABDOMEN: Soft, Nontender, Nondistended; Bowel sounds present.  MUSCULOSKELETAL: Normal range of motion.  SKIN: No rashes or lesions.  NEURO: AAOX3, no focal deficits, no motor r sensory loss.  PSYCH: normal mood.  VASCULAR:   Radial +2 R/+2 L  Femoral +2 R/+2 L  PT +2 R/+2 L  DP +2 R/+2 L      INTERPRETATION OF TELEMETRY:    ECG:    LABS:                        12.2   12.16 )-----------( 296      ( 01 Sep 2023 05:21 )             37.7     09-    136  |  101  |  6.8<L>  ----------------------------<  112<H>  4.0   |  20.0<L>  |  0.52    Ca    9.3      01 Sep 2023 05:21  Mg     2.0     09-    TPro  6.7  /  Alb  3.9  /  TBili  0.6  /  DBili  x   /  AST  39<H>  /  ALT  28  /  AlkPhos  100  08-31    CARDIAC MARKERS ( 01 Sep 2023 05:21 )  x     / x     / 99 U/L / x     / x      CARDIAC MARKERS ( 31 Aug 2023 19:10 )  x     / 0.17 ng/mL / 158 U/L / x     / 13.1 ng/mL  CARDIAC MARKERS ( 31 Aug 2023 10:30 )  x     / 0.27 ng/mL / 196 U/L / x     / 24.3 ng/mL        Urinalysis Basic - ( 01 Sep 2023 05:21 )    Color: x / Appearance: x / SG: x / pH: x  Gluc: 112 mg/dL / Ketone: x  / Bili: x / Urobili: x   Blood: x / Protein: x / Nitrite: x   Leuk Esterase: x / RBC: x / WBC x   Sq Epi: x / Non Sq Epi: x / Bacteria: x      I&O's Summary    31 Aug 2023 07:01  -  01 Sep 2023 07:00  --------------------------------------------------------  IN: 180 mL / OUT: 0 mL / NET: 180 mL             
Interventional Cardiology NP note:     -s/p LHC/PCI via RFA(RFV 7Fr sheath for TVP removed at end of procedure) approach with Dr. Whittaker yesterday:  Severe 70% heavily calcified diffuse stenosis of the ostial-proximal LAD s/p successful IVUS guided PCI with MONTANA implantationX2 ([place] 3.5 x 34 mm and [place]  3.0 x 15 mm); Severe 70% stenosis of the proximal ramus intermedius s/p successful IVUS guided PCI with MONTANA x1 implantation (Nas[place] 3.0 x 18 mm); post procedure c/b patient with c/o epigastric pain post procedure, Hx of GERD, burping a lot per pt, received Maalox and Toradol earlier with only mild pain relief; 12 lead EKG revealed SR and Noted TWI in V3, which was not present in previous ekg.  Limited TTE revealed no pericardial effusion.  Patient treated with Pepcid 20mg ivp x1 and Toradol IV with good relief of pain    EKG this am: NSB 56 bpm TWI V3 unchanged from last evening EKG   TELE: NSB 50s    MEDICATIONS  (STANDING):  aspirin enteric coated 81 milliGRAM(s) Oral daily  atorvastatin 40 milliGRAM(s) Oral at bedtime  clopidogrel Tablet 75 milliGRAM(s) Oral daily  colchicine 0.6 milliGRAM(s) Oral every 12 hours  diltiazem    milliGRAM(s) Oral daily  DULoxetine 60 milliGRAM(s) Oral daily  isosorbide   mononitrate ER Tablet (IMDUR) 30 milliGRAM(s) Oral daily  pantoprazole    Tablet 40 milliGRAM(s) Oral before breakfast  topiramate 50 milliGRAM(s) Oral two times a day    MEDICATIONS  (PRN):  acetaminophen     Tablet .. 650 milliGRAM(s) Oral every 6 hours PRN Mild Pain (1 - 3), Moderate Pain (4 - 6)  aluminum hydroxide/magnesium hydroxide/simethicone Suspension 30 milliLiter(s) Oral every 6 hours PRN Dyspepsia  loperamide 2 milliGRAM(s) Oral every 12 hours PRN Diarrhea      Allergies:  No Known Allergies      PAST MEDICAL & SURGICAL HISTORY:  Hypertension      Ectopic pregnancy      Seasonal allergies      Vitamin B 12 deficiency      SOB (shortness of breath)      Palpitations      Reflux      Asthma  controlled on meds      Sleep apnea  does not use the CPAP but use O2 prn      History of bilateral oophorectomies      History of ectopic pregnancy          Vital Signs Last 24 Hrs  T(C): 36.5 (31 Aug 2023 04:26), Max: 36.5 (31 Aug 2023 04:26)  T(F): 97.7 (31 Aug 2023 04:26), Max: 97.7 (31 Aug 2023 04:26)  HR: 67 (31 Aug 2023 10:14) (62 - 83)  BP: 108/63 (31 Aug 2023 10:14) (108/63 - 146/67)  BP(mean): --  RR: 16 (31 Aug 2023 10:14) (15 - 18)  SpO2: 98% (31 Aug 2023 04:26) (95% - 100%)    Parameters below as of 31 Aug 2023 10:14  Patient On (Oxygen Delivery Method): room air        Physical Exam:  Constitutional: AAOx3; c/o 7/10 chest discomfort  Cardiovascular: +S1S2 RRR  Pulmonary: CTA b/l, unlabored  GI: soft NTND +BS  Extremities: no pedal edema, +distal pulses b/l  Neuro: non focal, HARRELL x4  Procedure site: RFA angioseal/RFV site benign without hematoma/bleeding; no bruit; + right PP    LABS:                        12.3   12.38 )-----------( 278      ( 31 Aug 2023 04:30 )             35.9     08-31    139  |  102  |  5.2<L>  ----------------------------<  125<H>  3.3<L>   |  21.0<L>  |  0.48<L>    Ca    8.9      31 Aug 2023 04:30  Mg     1.9     08-31    TPro  6.7  /  Alb  3.9  /  TBili  0.6  /  DBili  x   /  AST  39<H>  /  ALT  28  /  AlkPhos  100  08-31      Urinalysis Basic - ( 31 Aug 2023 04:30 )    Color: x / Appearance: x / SG: x / pH: x  Gluc: 125 mg/dL / Ketone: x  / Bili: x / Urobili: x   Blood: x / Protein: x / Nitrite: x   Leuk Esterase: x / RBC: x / WBC x   Sq Epi: x / Non Sq Epi: x / Bacteria: x    RADIOLOGY & ADDITIONAL TESTS:  < from: Cardiac Catheterization (08.30.23 @ 08:10) >  Cath Lab Report    InterventionalCardiologist:   Ron Whittaker Dr.   Fellow:                        Hadley Sosa, Fellow   Referring Physician:           Ron Whittaker Dr.   Referring Physician:           Dolores Chicas MD     Procedures Performed   Procedures:     1.    Ultrasound Guided Access     2.    Arterial Access - Right Femoral   3.    Venous Access - Right Femoral   4.    Temporary Pacemaker   5.    IVUS   6.    PCI: MONTANA   7.    IVUS   8.    PCI: MONTANA   9.    AngioSeal     Indications:  Significant (50% or greater) lesion in a  major coronary artery  Lab Visit Indication:      other   PCI Status:                elective     Conclusions:   - Severe 70% heavily calcified diffuse stenosis of the ostial-proximal  LAD s/p successful IVUS guided PCI with MONTANA    implantationX2 ([place] 3.5 x 34 mm and [place]  3.0 x 15  mm)  - Severe 70% stenosis of the proximal ramus intermedius s/p successful  IVUS guided PCI with MON[place]NA implantation ([place] 3.0 x 18 mm)   s/p successful temporary pace maker implantation, removed at the end  of the procedure  Normal LV systolic function with LVEF of 65% on echocardiogram 2023   Recommendations:     Continue with DAPTs (ASA 81 mg po QD, Clopidogrel 75 mg po QD, loaded  with 300 mg po once at the cath lab)  Continue with BBs and high intensity statins    Agressive risk factor modifications for secondary prevention of CAD   Post cath care to prevent KRISTY   Resume home medications on discharge   Acute complication:    No complications   < end of copied text >    < from: TTE Echo Limited or F/U (08.30.23 @ 22:13) >  Summary:   1. Normal left ventricular internal cavity size.   2. Normal global left ventricular systolic function.   3. Left ventricular ejection fraction, by visual estimation, is 55 to   60%.   4. Normal right ventricular size and function.   5. The left atrium is normal in size.   6. The right atrium is normal in size.   7. There is no evidence of pericardial effusion.   8. Sclerotic aortic valve with normal opening.   9. Mild mitral valve regurgitation.  10. Mild thickening of the anterior and posterior mitral valve leaflets.  11. Mild mitral annular calcification.  12. Mild tricuspid regurgitation.    < end of copied text >

## 2023-09-01 NOTE — CONSULT NOTE ADULT - ASSESSMENT
59 y/o female with Hx of HTN, GERD, chronic leukocytosis, she has family history of CAD, she presents with SOB at rest and RODRIGEZ. She has been having chest pressure and discomfort for years. She had right and left heart cath in 2014 with no CAD and normal right heart pressures. No recent cardiology follow up until recently in which she had come to the ED with Palpitations and RODRIGEZ LHC revealed significant LAD and RAMUS disease. She continues to have symptoms of RODRIGEZ started on IMDUR while awaiting revascularization of LAD and RAMUS.  now s/p left heart catheterization via RFA approach with Dr. Whittaker, post procedure c/b patient with c/o epigastric pain post procedure, Hx of GERD, burping a lot per pt, received Maalox and Toradol earlier with only mild pain relief; 12 lead EKG revealed SR and Noted TWI in V3, which was not present in previous ekg.  Limited TTE revealed no pericardial effusion.  Patient treated with Pepcid 20mg ivp x1 and Toradol IV with good relief of pain, tele monitoring  dome secondary to continued chest pain, it was likely pericarditic in nature; V3 TWI concerning for distal emobolization post PCI, trop has been stable, she has been started on colchicine, her pain is better, she has been complaining about dizziness and blurry vision, states that when she sits up and ambulates she feels very dizzy, room is spinning around her,  associated with blurry vision, states that symptoms are new started last night, she denies, earache, headache, Palpitations, fever, chills, abdominal pain, she feel some stuffy nose, she has chronic neck pain as well, now feels like worsening, medicine consulted for evaluation of dizziness.     Plan:     CAD: S/P LHC/ obstructive CAD/ S/P PCI/MONTANA to Ramus and mLAD, post procedure CP, Resolved  Management as per Cardiology, continuer with Aspirin, Plavix and statin; Cardizem and imdur  possible pericarditis, she is being given colchicine 0.6mg PO BID x 10 days for chest pain    Dizziness and Blurry vision:   CT Head being done  repeat TTE came ok  US carotid being done  she has been given iv fluids  will do orthostatics  could be from benign positional vertigo   will do PT eval  Would recommend [employer] Maneuver teaching.       GERD: Protonix    Anxiety: Will continue with topiramate 50mg Oral two times a day and DULoxetine 60mg Oral daily.     Insomnia: Ambien at hs               59 y/o female with Hx of HTN, GERD, chronic leukocytosis, she has family history of CAD, she presents with SOB at rest and RODRIGEZ. She has been having chest pressure and discomfort for years. She had right and left heart cath in 2014 with no CAD and normal right heart pressures. No recent cardiology follow up until recently in which she had come to the ED with Palpitations and RODRIGEZ LHC revealed significant LAD and RAMUS disease. She continues to have symptoms of RODRIGEZ started on IMDUR while awaiting revascularization of LAD and RAMUS.  now s/p left heart catheterization via RFA approach with Dr. Whittaker, post procedure c/b patient with c/o epigastric pain post procedure, Hx of GERD, burping a lot per pt, received Maalox and Toradol earlier with only mild pain relief; 12 lead EKG revealed SR and Noted TWI in V3, which was not present in previous ekg.  Limited TTE revealed no pericardial effusion.  Patient treated with Pepcid 20mg ivp x1 and Toradol IV with good relief of pain, tele monitoring  dome secondary to continued chest pain, it was likely pericarditic in nature; V3 TWI concerning for distal emobolization post PCI, trop has been stable, she has been started on colchicine, her pain is better, she has been complaining about dizziness and blurry vision, states that when she sits up and ambulates she feels very dizzy, room is spinning around her,  associated with blurry vision, states that symptoms are new started last night, she denies, earache, headache, Palpitations, fever, chills, abdominal pain, she feel some stuffy nose, she has chronic neck pain as well, now feels like worsening, medicine consulted for evaluation of dizziness.     Plan:     CAD: S/P LHC/ obstructive CAD/ S/P PCI/MONTANA to Ramus and mLAD, post procedure CP, Resolved  Management as per Cardiology, continuer with Aspirin, Plavix and statin; Cardizem and imdur  possible pericarditis, she is being given colchicine 0.6mg PO BID x 10 days for chest pain    Dizziness and Blurry vision:   CT Head being done  repeat TTE came ok  US carotid being done  she has been given iv fluids  she is positive for orthostatics, will monitor as she is getting iv fluids .    could be from benign positional vertigo   will do PT eval  Would recommend Epley Maneuver teaching if dizziness is not improving.       GERD: Protonix    Anxiety: Will continue with topiramate 50mg Oral two times a day and DULoxetine 60mg Oral daily.     Insomnia: Ambien at hs

## 2023-09-06 ENCOUNTER — APPOINTMENT (OUTPATIENT)
Dept: CARDIOLOGY | Facility: CLINIC | Age: 58
End: 2023-09-06
Payer: MEDICARE

## 2023-09-06 ENCOUNTER — NON-APPOINTMENT (OUTPATIENT)
Age: 58
End: 2023-09-06

## 2023-09-06 VITALS
OXYGEN SATURATION: 96 % | BODY MASS INDEX: 29.35 KG/M2 | WEIGHT: 187 LBS | SYSTOLIC BLOOD PRESSURE: 128 MMHG | DIASTOLIC BLOOD PRESSURE: 76 MMHG | HEIGHT: 67 IN | HEART RATE: 100 BPM

## 2023-09-06 DIAGNOSIS — R07.9 CHEST PAIN, UNSPECIFIED: ICD-10-CM

## 2023-09-06 DIAGNOSIS — S30.1XXA CONTUSION OF ABDOMINAL WALL, INITIAL ENCOUNTER: ICD-10-CM

## 2023-09-06 PROCEDURE — 93000 ELECTROCARDIOGRAM COMPLETE: CPT

## 2023-09-06 PROCEDURE — 99215 OFFICE O/P EST HI 40 MIN: CPT | Mod: 25

## 2023-09-06 RX ORDER — CLOPIDOGREL BISULFATE 75 MG/1
75 TABLET, FILM COATED ORAL
Qty: 90 | Refills: 3 | Status: ACTIVE | COMMUNITY
Start: 2023-08-10 | End: 1900-01-01

## 2023-09-06 RX ORDER — IBUPROFEN 800 MG
800 TABLET ORAL
Refills: 0 | Status: DISCONTINUED | COMMUNITY
End: 2023-09-06

## 2023-09-06 RX ORDER — MILNACIPRAN HYDROCHLORIDE 100 MG/1
100 TABLET, FILM COATED ORAL
Qty: 180 | Refills: 0 | Status: DISCONTINUED | COMMUNITY
End: 2023-09-06

## 2023-09-07 ENCOUNTER — NON-APPOINTMENT (OUTPATIENT)
Age: 58
End: 2023-09-07

## 2023-09-07 NOTE — CURRENT MEDS
[Takes medication as prescribed] : takes [None] : Patient does not have any barriers to medication adherence socially/Yes

## 2023-09-12 ENCOUNTER — NON-APPOINTMENT (OUTPATIENT)
Age: 58
End: 2023-09-12

## 2023-09-12 LAB
CRP SERPL HS-MCNC: 0.58 MG/L
ERYTHROCYTE [SEDIMENTATION RATE] IN BLOOD BY WESTERGREN METHOD: 10 MM/HR

## 2023-09-15 ENCOUNTER — APPOINTMENT (OUTPATIENT)
Dept: FAMILY MEDICINE | Facility: CLINIC | Age: 58
End: 2023-09-15
Payer: MEDICARE

## 2023-09-15 VITALS
BODY MASS INDEX: 30.45 KG/M2 | SYSTOLIC BLOOD PRESSURE: 144 MMHG | RESPIRATION RATE: 14 BRPM | HEART RATE: 80 BPM | WEIGHT: 194 LBS | HEIGHT: 67 IN | TEMPERATURE: 98 F | DIASTOLIC BLOOD PRESSURE: 70 MMHG | OXYGEN SATURATION: 98 %

## 2023-09-15 DIAGNOSIS — H53.8 OTHER VISUAL DISTURBANCES: ICD-10-CM

## 2023-09-15 DIAGNOSIS — Z09 ENCOUNTER FOR FOLLOW-UP EXAMINATION AFTER COMPLETED TREATMENT FOR CONDITIONS OTHER THAN MALIGNANT NEOPLASM: ICD-10-CM

## 2023-09-15 PROCEDURE — 99495 TRANSJ CARE MGMT MOD F2F 14D: CPT

## 2023-09-22 ENCOUNTER — APPOINTMENT (OUTPATIENT)
Dept: ULTRASOUND IMAGING | Facility: CLINIC | Age: 58
End: 2023-09-22

## 2023-09-22 ENCOUNTER — APPOINTMENT (OUTPATIENT)
Dept: MRI IMAGING | Facility: CLINIC | Age: 58
End: 2023-09-22

## 2023-09-26 ENCOUNTER — NON-APPOINTMENT (OUTPATIENT)
Age: 58
End: 2023-09-26

## 2023-11-06 ENCOUNTER — RX RENEWAL (OUTPATIENT)
Age: 58
End: 2023-11-06

## 2023-11-09 ENCOUNTER — APPOINTMENT (OUTPATIENT)
Dept: CARDIOLOGY | Facility: CLINIC | Age: 58
End: 2023-11-09

## 2023-11-13 ENCOUNTER — NON-APPOINTMENT (OUTPATIENT)
Age: 58
End: 2023-11-13

## 2023-11-16 ENCOUNTER — NON-APPOINTMENT (OUTPATIENT)
Age: 58
End: 2023-11-16

## 2023-11-16 ENCOUNTER — APPOINTMENT (OUTPATIENT)
Dept: CARDIOLOGY | Facility: CLINIC | Age: 58
End: 2023-11-16
Payer: MEDICARE

## 2023-11-16 VITALS — HEART RATE: 75 BPM | SYSTOLIC BLOOD PRESSURE: 126 MMHG | OXYGEN SATURATION: 98 % | DIASTOLIC BLOOD PRESSURE: 72 MMHG

## 2023-11-16 VITALS — WEIGHT: 201 LBS | BODY MASS INDEX: 31.48 KG/M2

## 2023-11-16 PROCEDURE — 93000 ELECTROCARDIOGRAM COMPLETE: CPT

## 2023-11-16 PROCEDURE — 99214 OFFICE O/P EST MOD 30 MIN: CPT | Mod: 25

## 2023-11-16 RX ORDER — COLCHICINE 0.6 MG/1
0.6 TABLET ORAL TWICE DAILY
Refills: 0 | Status: DISCONTINUED | COMMUNITY
End: 2023-11-16

## 2023-11-16 RX ORDER — ZOLPIDEM TARTRATE 5 MG/1
5 TABLET ORAL
Refills: 0 | Status: DISCONTINUED | COMMUNITY
End: 2023-11-16

## 2023-11-17 ENCOUNTER — RX RENEWAL (OUTPATIENT)
Age: 58
End: 2023-11-17

## 2023-11-28 ENCOUNTER — APPOINTMENT (OUTPATIENT)
Dept: GASTROENTEROLOGY | Facility: CLINIC | Age: 58
End: 2023-11-28
Payer: MEDICARE

## 2023-11-28 ENCOUNTER — RX RENEWAL (OUTPATIENT)
Age: 58
End: 2023-11-28

## 2023-11-28 VITALS
WEIGHT: 203 LBS | HEART RATE: 67 BPM | HEIGHT: 66 IN | OXYGEN SATURATION: 94 % | RESPIRATION RATE: 14 BRPM | BODY MASS INDEX: 32.62 KG/M2 | SYSTOLIC BLOOD PRESSURE: 103 MMHG | DIASTOLIC BLOOD PRESSURE: 60 MMHG

## 2023-11-28 DIAGNOSIS — F41.8 OTHER SPECIFIED ANXIETY DISORDERS: ICD-10-CM

## 2023-11-28 DIAGNOSIS — L29.0 PRURITUS ANI: ICD-10-CM

## 2023-11-28 DIAGNOSIS — M15.9 POLYOSTEOARTHRITIS, UNSPECIFIED: ICD-10-CM

## 2023-11-28 DIAGNOSIS — K57.30 DIVERTICULOSIS OF LARGE INTESTINE W/OUT PERFORATION OR ABSCESS W/OUT BLEEDING: ICD-10-CM

## 2023-11-28 DIAGNOSIS — K64.4 RESIDUAL HEMORRHOIDAL SKIN TAGS: ICD-10-CM

## 2023-11-28 DIAGNOSIS — K59.04 CHRONIC IDIOPATHIC CONSTIPATION: ICD-10-CM

## 2023-11-28 DIAGNOSIS — Z80.0 FAMILY HISTORY OF MALIGNANT NEOPLASM OF DIGESTIVE ORGANS: ICD-10-CM

## 2023-11-28 DIAGNOSIS — G44.209 TENSION-TYPE HEADACHE, UNSPECIFIED, NOT INTRACTABLE: ICD-10-CM

## 2023-11-28 DIAGNOSIS — K21.9 GASTRO-ESOPHAGEAL REFLUX DISEASE W/OUT ESOPHAGITIS: ICD-10-CM

## 2023-11-28 DIAGNOSIS — Z12.11 ENCOUNTER FOR SCREENING FOR MALIGNANT NEOPLASM OF COLON: ICD-10-CM

## 2023-11-28 DIAGNOSIS — E66.9 OBESITY, UNSPECIFIED: ICD-10-CM

## 2023-11-28 DIAGNOSIS — I10 ESSENTIAL (PRIMARY) HYPERTENSION: ICD-10-CM

## 2023-11-28 DIAGNOSIS — Z86.010 PERSONAL HISTORY OF COLONIC POLYPS: ICD-10-CM

## 2023-11-28 PROCEDURE — 99214 OFFICE O/P EST MOD 30 MIN: CPT

## 2023-11-28 RX ORDER — TOPIRAMATE 50 MG/1
50 TABLET, FILM COATED ORAL TWICE DAILY
Qty: 90 | Refills: 1 | Status: ACTIVE | COMMUNITY
Start: 2021-12-16 | End: 1900-01-01

## 2023-11-28 RX ORDER — PANTOPRAZOLE 40 MG/1
40 TABLET, DELAYED RELEASE ORAL
Qty: 90 | Refills: 3 | Status: ACTIVE | COMMUNITY
Start: 2023-11-28 | End: 1900-01-01

## 2023-11-29 PROBLEM — M15.9 OSTEOARTHRITIS, MULTIPLE SITES: Status: ACTIVE | Noted: 2023-02-08

## 2023-11-29 PROBLEM — Z86.010 HISTORY OF COLON POLYPS: Status: ACTIVE | Noted: 2019-02-04

## 2023-11-29 PROBLEM — L29.0 ANAL PRURITUS: Status: ACTIVE | Noted: 2020-11-20

## 2023-11-29 PROBLEM — G44.209 TENSION HEADACHE: Status: ACTIVE | Noted: 2023-06-12

## 2023-11-29 PROBLEM — F41.8 ANXIETY WITH DEPRESSION: Status: ACTIVE | Noted: 2021-07-14

## 2023-11-29 PROBLEM — E66.9 OBESITY (BMI 30-39.9): Status: ACTIVE | Noted: 2022-05-11

## 2023-11-29 PROBLEM — Z12.11 COLON CANCER SCREENING: Status: ACTIVE | Noted: 2018-02-06

## 2023-11-29 PROBLEM — Z80.0 FAMILY HISTORY OF COLON CANCER: Status: INACTIVE | Noted: 2017-11-01

## 2023-12-01 ENCOUNTER — APPOINTMENT (OUTPATIENT)
Dept: FAMILY MEDICINE | Facility: CLINIC | Age: 58
End: 2023-12-01

## 2023-12-13 ENCOUNTER — NON-APPOINTMENT (OUTPATIENT)
Age: 58
End: 2023-12-13

## 2024-01-16 ENCOUNTER — RX RENEWAL (OUTPATIENT)
Age: 59
End: 2024-01-16

## 2024-01-16 RX ORDER — ISOSORBIDE MONONITRATE 30 MG/1
30 TABLET, EXTENDED RELEASE ORAL
Qty: 90 | Refills: 3 | Status: ACTIVE | COMMUNITY
Start: 2023-08-23 | End: 1900-01-01

## 2024-02-01 RX ORDER — DULOXETINE HYDROCHLORIDE 60 MG/1
60 CAPSULE, DELAYED RELEASE PELLETS ORAL
Qty: 60 | Refills: 3 | Status: ACTIVE | COMMUNITY
Start: 2020-02-19 | End: 1900-01-01

## 2024-03-04 ENCOUNTER — APPOINTMENT (OUTPATIENT)
Dept: RHEUMATOLOGY | Facility: CLINIC | Age: 59
End: 2024-03-04

## 2024-03-18 ENCOUNTER — APPOINTMENT (OUTPATIENT)
Dept: NEUROLOGY | Facility: CLINIC | Age: 59
End: 2024-03-18

## 2024-03-19 ENCOUNTER — APPOINTMENT (OUTPATIENT)
Dept: OBGYN | Facility: CLINIC | Age: 59
End: 2024-03-19
Payer: MEDICARE

## 2024-03-19 VITALS
BODY MASS INDEX: 32.78 KG/M2 | SYSTOLIC BLOOD PRESSURE: 124 MMHG | DIASTOLIC BLOOD PRESSURE: 78 MMHG | HEIGHT: 66 IN | WEIGHT: 204 LBS

## 2024-03-19 DIAGNOSIS — M43.02 SPONDYLOLYSIS, CERVICAL REGION: ICD-10-CM

## 2024-03-19 DIAGNOSIS — Z78.0 ASYMPTOMATIC MENOPAUSAL STATE: ICD-10-CM

## 2024-03-19 DIAGNOSIS — Z86.79 PERSONAL HISTORY OF OTHER DISEASES OF THE CIRCULATORY SYSTEM: ICD-10-CM

## 2024-03-19 DIAGNOSIS — Z87.09 PERSONAL HISTORY OF OTHER DISEASES OF THE RESPIRATORY SYSTEM: ICD-10-CM

## 2024-03-19 DIAGNOSIS — M54.12 RADICULOPATHY, CERVICAL REGION: ICD-10-CM

## 2024-03-19 DIAGNOSIS — Z01.419 ENCOUNTER FOR GYNECOLOGICAL EXAMINATION (GENERAL) (ROUTINE) W/OUT ABNORMAL FINDINGS: ICD-10-CM

## 2024-03-19 DIAGNOSIS — Z13.820 ENCOUNTER FOR SCREENING FOR OSTEOPOROSIS: ICD-10-CM

## 2024-03-19 PROCEDURE — 99396 PREV VISIT EST AGE 40-64: CPT

## 2024-03-19 RX ORDER — PANTOPRAZOLE 40 MG/1
40 TABLET, DELAYED RELEASE ORAL
Qty: 90 | Refills: 3 | Status: DISCONTINUED | COMMUNITY
Start: 2022-08-09 | End: 2024-03-19

## 2024-03-19 NOTE — HISTORY OF PRESENT ILLNESS
[Y] : Positive pregnancy history [Menarche Age: ____] : age at menarche was [unfilled] [Menopause Age: ____] : age at menopause was [unfilled] [N] : Patient is not sexually active [PGHxTotal] : 4 [[facility]] : 2 [PGHxAbortions] : 0 [PGHxPremature] : 0 [[facility]] : 2 [PGHxABInduced] : 0 [PGHxABSpont] : 0 [PGxEctopic] : 2 [PGHxMultBirths] : 0

## 2024-03-19 NOTE — PHYSICAL EXAM
[Chaperone Present] : A chaperone was present in the examining room during all aspects of the physical examination [Appropriately responsive] : appropriately responsive [Alert] : alert [No Acute Distress] : no acute distress [Soft] : soft [Non-tender] : non-tender [Non-distended] : non-distended [No HSM] : No HSM [No Lesions] : no lesions [Oriented x3] : oriented x3 [No Mass] : no mass [Examination Of The Breasts] : a normal appearance [No Masses] : no breast masses were palpable [Labia Majora] : normal [Labia Minora] : normal [Normal] : normal [Absent] : absent [Uterine Adnexae] : normal [No Tenderness] : no tenderness [Nl Sphincter Tone] : normal sphincter tone

## 2024-03-25 ENCOUNTER — APPOINTMENT (OUTPATIENT)
Dept: FAMILY MEDICINE | Facility: CLINIC | Age: 59
End: 2024-03-25
Payer: MEDICARE

## 2024-03-25 VITALS
HEIGHT: 66 IN | BODY MASS INDEX: 33.43 KG/M2 | SYSTOLIC BLOOD PRESSURE: 142 MMHG | OXYGEN SATURATION: 98 % | TEMPERATURE: 97.3 F | RESPIRATION RATE: 16 BRPM | HEART RATE: 89 BPM | WEIGHT: 208 LBS | DIASTOLIC BLOOD PRESSURE: 82 MMHG

## 2024-03-25 DIAGNOSIS — Z11.3 ENCOUNTER FOR SCREENING FOR INFECTIONS WITH A PREDOMINANTLY SEXUAL MODE OF TRANSMISSION: ICD-10-CM

## 2024-03-25 DIAGNOSIS — Z00.00 ENCOUNTER FOR GENERAL ADULT MEDICAL EXAMINATION W/OUT ABNORMAL FINDINGS: ICD-10-CM

## 2024-03-25 DIAGNOSIS — L73.9 FOLLICULAR DISORDER, UNSPECIFIED: ICD-10-CM

## 2024-03-25 PROCEDURE — 36415 COLL VENOUS BLD VENIPUNCTURE: CPT

## 2024-03-25 PROCEDURE — 99396 PREV VISIT EST AGE 40-64: CPT

## 2024-03-25 PROCEDURE — G0439: CPT

## 2024-03-25 RX ORDER — ELECTROLYTES/DEXTROSE
10 SOLUTION, ORAL ORAL
Qty: 90 | Refills: 0 | Status: ACTIVE | COMMUNITY
Start: 2024-03-25 | End: 1900-01-01

## 2024-03-25 RX ORDER — TRIAMCINOLONE ACETONIDE 1 MG/G
0.1 CREAM TOPICAL TWICE DAILY
Qty: 1 | Refills: 0 | Status: ACTIVE | COMMUNITY
Start: 2024-03-25 | End: 1900-01-01

## 2024-03-25 RX ORDER — DOXYCYCLINE HYCLATE 100 MG/1
100 CAPSULE ORAL
Qty: 20 | Refills: 0 | Status: ACTIVE | COMMUNITY
Start: 2024-03-25 | End: 1900-01-01

## 2024-03-25 RX ORDER — METOPROLOL SUCCINATE 25 MG/1
25 TABLET, EXTENDED RELEASE ORAL
Qty: 90 | Refills: 3 | Status: ACTIVE | COMMUNITY
Start: 2023-08-10

## 2024-03-25 NOTE — HEALTH RISK ASSESSMENT
[Poor] : ~his/her~ current health as poor [Fair] :  ~his/her~ mood as fair [No falls in past year] : Patient reported no falls in the past year [3] : 2) Feeling down, depressed, or hopeless for nearly every day (3) [0] : 1) Little interest or pleasure doing things: Not at all (0) [PHQ-2 Positive] : PHQ-2 Positive [Several Days (1)] : 4.) Feeling tired or having little energy? Several days [Nearly Every Day (3)] : 3.) Trouble falling asleep, or sleeping too much? Nearly every day [Not at All (0)] : 9.) Thoughts that you would be off dead or of hurting yourself in some way? Not at all [Patient reported mammogram was normal] : Patient reported mammogram was normal [HIV Test offered] : HIV Test offered [Patient reported PAP Smear was normal] : Patient reported PAP Smear was normal [Change in mental status noted] : Change in mental status noted [Hepatitis C test offered] : Hepatitis C test offered [None] : None [Alone] : lives alone [# of Members in Household ___] :  household currently consist of [unfilled] member(s) [On disability] : on disability [# Of Children ___] : has [unfilled] children [] :  [Sexually Active] : sexually active [High Risk Behavior] : high risk behavior [Feels Safe at Home] : Feels safe at home [Fully functional (bathing, dressing, toileting, transferring, walking, feeding)] : Fully functional (bathing, dressing, toileting, transferring, walking, feeding) [Fully functional (using the telephone, shopping, preparing meals, housekeeping, doing laundry, using] : Fully functional and needs no help or supervision to perform IADLs (using the telephone, shopping, preparing meals, housekeeping, doing laundry, using transportation, managing medications and managing finances) [Reports changes in hearing] : Reports changes in hearing [Reports changes in vision] : Reports changes in vision [Reports normal functional visual acuity (ie: able to read med bottle)] : Reports normal functional visual acuity [With Patient/Caregiver] : , with patient/caregiver [Relationship: ___] : Relationship: [unfilled] [Reviewed no changes] : Reviewed, no changes [I will adhere to the patient's wishes.] : I will adhere to the patient's wishes. [Aggressive treatment] : aggressive treatment [< 15 Years] : < 15 Years [Former] : Former [Intercurrent ED visits] : went to ED [Intercurrent hospitalizations] : was admitted to the hospital  [No] : In the past 12 months have you used drugs other than those required for medical reasons? No [Somewhat Difficult] : How difficult have these problems made it for you to do your work, take care of things at home, or get along with people? Somewhat difficult [Mild] : Severity of Depression is Mild [PHQ-9 Positive] : PHQ-9 Positive [Reports changes in dental health] : Reports changes in dental health [Designated Healthcare Proxy] : Designated healthcare proxy [FreeTextEntry1] : "all kinds of different things wrong with me," pain [de-identified] : some walking [de-identified] : skip breakfast; mostly takeout for food; occasional home cooking [GIO1Loeuc] : 3 [[id number]] : 7 [Behavior] : denies difficulty with behavior [Language] : denies difficulty with language [Handling Complex Tasks] : denies difficulty handling complex tasks [Learning/Retaining New Information] : denies difficulty learning/retaining new information [Reasoning] : denies difficulty with reasoning [Spatial Ability and Orientation] : denies difficulty with spatial ability and orientation [MammogramDate] : 06/23 [PapSmearDate] : 03/24 [MammogramComments] : scheduled again 06/24 [ColonoscopyComments] : scheduled 09/24 [de-identified] : Pt notes improvement  [FreeTextEntry3] : seperated [de-identified] : slight hearing loss, seeing ENT [de-identified] : needs to make optho appointment [de-identified] : waiting on cardio clearing for dental work [AdvancecareDate] : 03/25/24 [FreeTextEntry4] : daughter

## 2024-03-25 NOTE — CURRENT MEDS
[Takes medication as prescribed] : takes [Blood Thinners] : blood thinners [None] : Patient does not have any barriers to medication adherence

## 2024-03-25 NOTE — PHYSICAL EXAM
[Normal] : soft, non-tender, non-distended, no masses palpated, no HSM and normal bowel sounds [No Respiratory Distress] : no respiratory distress  [Clear to Auscultation] : lungs were clear to auscultation bilaterally [Normal Rate] : normal rate  [Regular Rhythm] : with a regular rhythm [Soft] : abdomen soft [No Masses] : no abdominal mass palpated [Non Tender] : non-tender [Normal Bowel Sounds] : normal bowel sounds [de-identified] : multiple boils in groin and B/L axillary area/ Indurated plaque 3x3cm in Center RT palm.

## 2024-03-25 NOTE — HISTORY OF PRESENT ILLNESS
[FreeTextEntry1] : Annual Wellness Visit; boils under arm and between thigh (has antibiotic prescription, derm office is backed up) itchy discoloration on palm; increased distension on left side above hip. [de-identified] : DAVIN is a 57 y/o female patient with a medical history of anxiety with depression, skin cysts, fibromyalgia, HLD, hypertension, and cardiac stent placement here for her annual wellness visit as well as chief concerns of refilling an antibiotic prescription for boils on her skin and groin, assessing an itchy discoloration on her palm, and assessing an area of abdominal distension over her left hip. Pt recently noted cyst-like boils under her arms and in her groin region; she has seen a dermatologist regarding them and was recommended antibiotics. Pt seeking prescription as her current order was backed up till May. Pt palm wound was first noted a few months ago with pain, and since then has progressed to become darker and itchy with no pain. No change in size or shape. Pt also remarks on increased distension over hip over the last "few months," no other noticeable symptoms. Patient concerns include trouble sleeping and depressed mood. Pt lives at home with sister and is currently on disability.  Pt request STD screening

## 2024-03-25 NOTE — ASSESSMENT
[FreeTextEntry1] : DAVIN is a 59 y/o female patient with HX of anxiety, depression, fibromyalgia, cardiac stent placement, sciatica, and folliculitis presenting to clinic for her annual wellness visit. Pt also wanted to assess an itchy skin discoloration on her arm, increased distension in her abdomen superior to left hip, boils under arms and between legs, STD testing, and difficulty sleeping.   Annual Wellness Visit - CBC, A1C, TSH, Lipid panel ordered - STD panel urinalysis ordered  # Recurrent Folliculitis: - 10 day antibiotics (doxycycline) ordered   # Anxiety/ Depression/ Insomnia -Continue Vistaril and Duloxetine -Start  melatonin ordered  - 6 week F/U  # Induration/ Skin discoloration on palm - TRiamcinolone ordered

## 2024-03-25 NOTE — REVIEW OF SYSTEMS
[Vision Problems] : vision problems [Hearing Loss] : hearing loss [Negative] : Neurological [de-identified] : skin boils in groin and axillary/ induration in RT palm

## 2024-03-27 LAB
ALBUMIN SERPL ELPH-MCNC: 4.4 G/DL
ALP BLD-CCNC: 120 U/L
ALT SERPL-CCNC: 17 U/L
ANION GAP SERPL CALC-SCNC: 17 MMOL/L
APPEARANCE: CLEAR
AST SERPL-CCNC: 15 U/L
BASOPHILS # BLD AUTO: 0.02 K/UL
BASOPHILS NFR BLD AUTO: 0.1 %
BILIRUB SERPL-MCNC: <0.2 MG/DL
BILIRUBIN URINE: NEGATIVE
BLOOD URINE: NEGATIVE
BUN SERPL-MCNC: 18 MG/DL
C TRACH RRNA SPEC QL NAA+PROBE: NOT DETECTED
CALCIUM SERPL-MCNC: 9.9 MG/DL
CHLORIDE SERPL-SCNC: 105 MMOL/L
CHOLEST SERPL-MCNC: 241 MG/DL
CO2 SERPL-SCNC: 18 MMOL/L
COLOR: YELLOW
CREAT SERPL-MCNC: 0.64 MG/DL
EGFR: 104 ML/MIN/1.73M2
EOSINOPHIL # BLD AUTO: 0.2 K/UL
EOSINOPHIL NFR BLD AUTO: 1.4 %
ESTIMATED AVERAGE GLUCOSE: 114 MG/DL
GLUCOSE QUALITATIVE U: NEGATIVE MG/DL
GLUCOSE SERPL-MCNC: 106 MG/DL
HBA1C MFR BLD HPLC: 5.6 %
HBV CORE IGG+IGM SER QL: NONREACTIVE
HBV CORE IGM SER QL: NONREACTIVE
HBV SURFACE AB SER QL: REACTIVE
HBV SURFACE AG SER QL: NONREACTIVE
HCT VFR BLD CALC: 38.4 %
HCV AB SER QL: NONREACTIVE
HCV S/CO RATIO: 0.08 S/CO
HDLC SERPL-MCNC: 48 MG/DL
HGB BLD-MCNC: 12.3 G/DL
HIV1+2 AB SPEC QL IA.RAPID: NONREACTIVE
HSV 1+2 IGG SER IA-IMP: POSITIVE
HSV 1+2 IGG SER IA-IMP: POSITIVE
HSV1 IGG SER QL: 9.7 INDEX
HSV2 IGG SER QL: 7.22 INDEX
IMM GRANULOCYTES NFR BLD AUTO: 0.4 %
KETONES URINE: NEGATIVE MG/DL
LDLC SERPL CALC-MCNC: 143 MG/DL
LEUKOCYTE ESTERASE URINE: NEGATIVE
LYMPHOCYTES # BLD AUTO: 2.15 K/UL
LYMPHOCYTES NFR BLD AUTO: 14.7 %
MAN DIFF?: NORMAL
MCHC RBC-ENTMCNC: 28.9 PG
MCHC RBC-ENTMCNC: 32 GM/DL
MCV RBC AUTO: 90.4 FL
MONOCYTES # BLD AUTO: 0.5 K/UL
MONOCYTES NFR BLD AUTO: 3.4 %
N GONORRHOEA RRNA SPEC QL NAA+PROBE: NOT DETECTED
NEUTROPHILS # BLD AUTO: 11.65 K/UL
NEUTROPHILS NFR BLD AUTO: 80 %
NITRITE URINE: NEGATIVE
NONHDLC SERPL-MCNC: 193 MG/DL
PH URINE: 5.5
PLATELET # BLD AUTO: 348 K/UL
POTASSIUM SERPL-SCNC: 4.1 MMOL/L
PROT SERPL-MCNC: 7.4 G/DL
PROTEIN URINE: NEGATIVE MG/DL
RBC # BLD: 4.25 M/UL
RBC # FLD: 16.9 %
SODIUM SERPL-SCNC: 140 MMOL/L
SOURCE AMPLIFICATION: NORMAL
SPECIFIC GRAVITY URINE: 1.02
T PALLIDUM AB SER QL IA: NEGATIVE
TRIGL SERPL-MCNC: 278 MG/DL
TSH SERPL-ACNC: 2.35 UIU/ML
UROBILINOGEN URINE: 0.2 MG/DL
WBC # FLD AUTO: 14.58 K/UL

## 2024-03-28 RX ORDER — HYDROCORTISONE 25 MG/G
2.5 CREAM TOPICAL TWICE DAILY
Qty: 60 | Refills: 3 | Status: ACTIVE | COMMUNITY
Start: 2021-07-22 | End: 1900-01-01

## 2024-03-28 RX ORDER — HYDROCORTISONE 25 MG/G
2.5 CREAM TOPICAL
Qty: 2 | Refills: 3 | Status: ACTIVE | COMMUNITY
Start: 2023-11-28 | End: 1900-01-01

## 2024-03-29 ENCOUNTER — APPOINTMENT (OUTPATIENT)
Dept: CARDIOLOGY | Facility: CLINIC | Age: 59
End: 2024-03-29
Payer: MEDICARE

## 2024-03-29 ENCOUNTER — NON-APPOINTMENT (OUTPATIENT)
Age: 59
End: 2024-03-29

## 2024-03-29 VITALS
HEART RATE: 96 BPM | DIASTOLIC BLOOD PRESSURE: 71 MMHG | OXYGEN SATURATION: 96 % | WEIGHT: 207 LBS | BODY MASS INDEX: 33.27 KG/M2 | SYSTOLIC BLOOD PRESSURE: 109 MMHG | HEIGHT: 66 IN

## 2024-03-29 VITALS — WEIGHT: 207 LBS | BODY MASS INDEX: 33.27 KG/M2 | HEIGHT: 66 IN

## 2024-03-29 DIAGNOSIS — Z98.61 ATHEROSCLEROTIC HEART DISEASE OF NATIVE CORONARY ARTERY W/OUT ANGINA PECTORIS: ICD-10-CM

## 2024-03-29 DIAGNOSIS — I25.10 ATHEROSCLEROTIC HEART DISEASE OF NATIVE CORONARY ARTERY W/OUT ANGINA PECTORIS: ICD-10-CM

## 2024-03-29 PROCEDURE — 93000 ELECTROCARDIOGRAM COMPLETE: CPT

## 2024-03-29 PROCEDURE — 99214 OFFICE O/P EST MOD 30 MIN: CPT | Mod: 25

## 2024-04-21 NOTE — ED STATDOCS - NS_EDPROVIDERDISPOUSERTYPE_ED_A_ED
Bilateral side pain started about a hour ago.denies fever 9/10 pain     Triage Assessment (Adult)       Row Name 04/20/24 [id number]          Triage Assessment    Airway WDL WDL        Respiratory WDL    Respiratory WDL WDL        Skin Circulation/Temperature WDL    Skin Circulation/Temperature WDL WDL        Cardiac WDL    Cardiac WDL WDL        Peripheral/Neurovascular WDL    Peripheral Neurovascular WDL WDL        Cognitive/Neuro/Behavioral WDL    Cognitive/Neuro/Behavioral WDL WDL                      Scribe Attestation (For Scribes USE Only)... Scribe Attestation (For Scribes USE Only).../Attending Attestation (For Attendings USE Only)...

## 2024-04-30 ENCOUNTER — APPOINTMENT (OUTPATIENT)
Dept: FAMILY MEDICINE | Facility: CLINIC | Age: 59
End: 2024-04-30

## 2024-05-16 RX ORDER — SULINDAC 200 MG/1
200 TABLET ORAL
Qty: 180 | Refills: 0 | Status: ACTIVE | COMMUNITY
Start: 2023-08-08 | End: 1900-01-01

## 2024-06-05 RX ORDER — ASPIRIN ENTERIC COATED TABLETS 81 MG 81 MG/1
81 TABLET, DELAYED RELEASE ORAL
Qty: 90 | Refills: 1 | Status: ACTIVE | COMMUNITY
Start: 2022-05-09 | End: 1900-01-01

## 2024-06-05 RX ORDER — NYSTATIN 100000 U/G
100000 OINTMENT TOPICAL 3 TIMES DAILY
Qty: 1 | Refills: 1 | Status: ACTIVE | COMMUNITY
Start: 2023-06-07 | End: 1900-01-01

## 2024-06-05 RX ORDER — HYDROXYZINE PAMOATE 50 MG/1
50 CAPSULE ORAL
Qty: 90 | Refills: 0 | Status: ACTIVE | COMMUNITY
Start: 2022-03-23 | End: 1900-01-01

## 2024-06-06 ENCOUNTER — RX RENEWAL (OUTPATIENT)
Age: 59
End: 2024-06-06

## 2024-07-22 ENCOUNTER — APPOINTMENT (OUTPATIENT)
Dept: CARDIOLOGY | Facility: CLINIC | Age: 59
End: 2024-07-22
Payer: MEDICARE

## 2024-07-22 VITALS
SYSTOLIC BLOOD PRESSURE: 104 MMHG | DIASTOLIC BLOOD PRESSURE: 60 MMHG | OXYGEN SATURATION: 99 % | WEIGHT: 211 LBS | BODY MASS INDEX: 33.91 KG/M2 | HEIGHT: 66 IN | HEART RATE: 69 BPM

## 2024-07-22 DIAGNOSIS — I25.10 ATHEROSCLEROTIC HEART DISEASE OF NATIVE CORONARY ARTERY W/OUT ANGINA PECTORIS: ICD-10-CM

## 2024-07-22 DIAGNOSIS — I10 ESSENTIAL (PRIMARY) HYPERTENSION: ICD-10-CM

## 2024-07-22 DIAGNOSIS — Z98.61 ATHEROSCLEROTIC HEART DISEASE OF NATIVE CORONARY ARTERY W/OUT ANGINA PECTORIS: ICD-10-CM

## 2024-07-22 DIAGNOSIS — Z01.810 ENCOUNTER FOR PREPROCEDURAL CARDIOVASCULAR EXAMINATION: ICD-10-CM

## 2024-07-22 PROCEDURE — 99214 OFFICE O/P EST MOD 30 MIN: CPT | Mod: 25

## 2024-07-22 PROCEDURE — 93000 ELECTROCARDIOGRAM COMPLETE: CPT

## 2024-07-22 NOTE — PHYSICAL EXAM
[No Acute Distress] : no acute distress [Obese] : obese [No Carotid Bruit] : no carotid bruit [Normal S1, S2] : normal S1, S2 [No Murmur] : no murmur [Clear Lung Fields] : clear lung fields [No Respiratory Distress] : no respiratory distress  [Normal Gait] : normal gait [No Edema] : no edema [Moves all extremities] : moves all extremities [Normal Speech] : normal speech [Alert and Oriented] : alert and oriented [Normal memory] : normal memory [Appears Anxious] : appears anxious

## 2024-07-22 NOTE — CARDIOLOGY SUMMARY
[de-identified] : 7/22/2024: NSR, no ST/T changes   3/29/2024: Sinus Rhythm  - Diffuse negative T-waves 11/16/2023: Sinus Rhythm, Nonspecific T-abnormality. 9/6/2023 Sinus non specific t abnormality  8/23/2023SR, nonspecific t abnormality 8/10/2023 SR, nonspecific t abnormality 6/2023- Sinus with anterior t wave inversions.  [de-identified] : TTE 8/4/2023 CONCLUSIONS: 1. The left ventricular systolic function is normal with an ejection fraction of 55 % by Esparza's method of disks with an ejection fraction visually estimated at 55 to 60 %. 2. There is normal left ventricular diastolic function. 3. Normal right ventricular cavity size and normal right ventricular systolic function. 4. Mild tricuspid regurgitation. 5. No pericardial effusion seen. 6. Compared to the transthoracic echocardiogram performed on 6/21/2018 there have been no significant interval changes. [de-identified] : 8/2/2023 R/LHC: Normal right heart pressures, LAD- ostial/proximal 70% heavily calcified lesion (iFR 0.86). Ramus- 70% proximal ramus disease (iFR 0.83). LCX- 40-50% proximal diffuse disease. RCA- diffusely diseased small caliber vessel.

## 2024-07-22 NOTE — CARDIOLOGY SUMMARY
[de-identified] : 7/22/2024: NSR, no ST/T changes   3/29/2024: Sinus Rhythm  - Diffuse negative T-waves 11/16/2023: Sinus Rhythm, Nonspecific T-abnormality. 9/6/2023 Sinus non specific t abnormality  8/23/2023SR, nonspecific t abnormality 8/10/2023 SR, nonspecific t abnormality 6/2023- Sinus with anterior t wave inversions.  [de-identified] : TTE 8/4/2023 CONCLUSIONS: 1. The left ventricular systolic function is normal with an ejection fraction of 55 % by Esparza's method of disks with an ejection fraction visually estimated at 55 to 60 %. 2. There is normal left ventricular diastolic function. 3. Normal right ventricular cavity size and normal right ventricular systolic function. 4. Mild tricuspid regurgitation. 5. No pericardial effusion seen. 6. Compared to the transthoracic echocardiogram performed on 6/21/2018 there have been no significant interval changes. [de-identified] : 8/2/2023 R/LHC: Normal right heart pressures, LAD- ostial/proximal 70% heavily calcified lesion (iFR 0.86). Ramus- 70% proximal ramus disease (iFR 0.83). LCX- 40-50% proximal diffuse disease. RCA- diffusely diseased small caliber vessel.

## 2024-07-22 NOTE — DISCUSSION/SUMMARY
[FreeTextEntry1] : Patient presents to the office for preoperative cardiac assessment for upcoming tooth extraction and possibly other dental work.   Assessment/ Plan: Presently denied chest pain, RODRIGEZ is at baseline. EKG is WNL today. BP stable. No cardiac contraindication to the planned dental work. Patient advised to hold Plavix for 5 days prior to dental procedure, then resume asap after procedure when deemed safe by surgical team. Advised to remain on Aspirin 81mg daily and other cardiac meds during perioperative period. Patient was advised to contact the office for any new, worsening or concerning symptoms. Follow up with Dr. Whittaker in 5-6 months or sooner as needed. Patient verbalized understanding and is in agreement with the above plan.  Tina DUENAS [EKG obtained to assist in diagnosis and management of assessed problem(s)] : EKG obtained to assist in diagnosis and management of assessed problem(s)

## 2024-07-22 NOTE — HISTORY OF PRESENT ILLNESS
[FreeTextEntry1] : Old note: Patient with a history of hypertension, esophageal reflux, and a family history of coronary artery disease. She reports that for years, she has experienced episodes of left-sided chest pressure and discomfort traveling down her left arm. She also reports episodes of tingling in her finger tips and dyspnea when walking up and down stairs. Previous Left and Right heart cath in  with no coronary disease and normal right heart pressures. Patient presents with c/o chronic SOB at rest and RODRIGEZ with mild exertion. Reviewed echo and nuclear stress test results.   2023- Patient with recurrent symptoms of shortness of breath, chest heaviness. With and without exertion Seen by pulmonary- no evidence of asthma.  Chest tightness- substernal, no n, no v.   8/10/2023 Patient here for post cath follow up. She is s/p R/LHC 23 which showed normal right heart pressures and severe LAD and ramus disease. No intervention done due to need for rotational atherectomy. She is scheduled for staged PCI 23. She was started on plavix and lipitor was increased. She reports compliance with her medications. Denies bleeding on DAPT. She still has exertional SOB and chest tightness. Also has intermittent palpitations, mostly at night. Lasts ~5 mins. No near syncope or syncope.   2023 Patient here for follow up. She is still having anginal symptoms, mild improvement with Toprol. She is currently wearing 3 day HM. She reports compliance with her medications, including DAPT.   2023 Presents post staged PCI 70% Ramus MONTANA x 1 ,  and severe 70% ostial-proximal LAD MONTANA x 2 , pt developed CP post procedure EKg showed TWI in V# which ws not present prior, limited TTE no effusion, had pain relief with toradol, discharged on colchicine x 10 days She also experienced blurred vision and dizziness, was found to be orthostatic and IVF were given. Pt states she has had continued blurred vision in both eyes since the procedure, which was not present prior to the procedure She states she is not having the chest pain or tightness she had prior to stents but on occasion feels a brief split second of a sharp chest pain -which she has had in the past which she attributes to fibromyalgia.  She is not checking her BP at home, this morning felt dizzy after sweeping due to repeated bending and standing  R groin is painful and limiting her walking, denies any bleeding episodes, SOB, LE edema, palpitations, falls/ syncope   2023 Returns for scheduled office visit. Has occasional episodes of palpitations. Denies chest pain, shortness of breath, syncope or near syncope, orthopnea and PND. Compliant with medications. No leg edema. Will see her PCP later this month. Will have a Lumbar Medial Branch Block when cardiology okays.   3/29/2024:  Returns for follow up office visit. Reports of feeling good. Has occasional palpitations with activities. Minimal walking exercise currently due to knee pain. Denies chest pain, shortness of breath, syncope or near syncope, orthopnea and PND. Compliant with medications. Pain management won't do injection while she's on aspirin; willing to wait until 1 year post PCI - 2024. She also planning to have Dental implants after 1 year post PCI. No leg edema.  2024: Patient presents to the office for preoperative cardiac assessment for upcoming tooth extraction and possibly other dental work. Date TBD. Reports feeling ok. She has a cold recently.  Reports RODRIGEZ with walking, no change from usual baseline. Her father  in April which has been hard on her.  Reports intermittent palpitations, heart beating fast when she wakes up suddenly from nightmare in the night, subsides after a few minutes. Denies chest pain, SOB at rest, lightheadedness, dizziness, fatigue, syncope, near syncope and LE edema. Denies smoking, excessive alcohol and illicit drug use.

## 2024-07-22 NOTE — REVIEW OF SYSTEMS
[Feeling Fatigued] : not feeling fatigued [SOB] : no shortness of breath [Dyspnea on exertion] : dyspnea during exertion [Chest Discomfort] : no chest discomfort [Lower Ext Edema] : no extremity edema [Palpitations] : palpitations [Orthopnea] : no orthopnea [PND] : no PND [Syncope] : no syncope [Dizziness] : no dizziness [Under Stress] : under stress

## 2024-08-05 ENCOUNTER — APPOINTMENT (OUTPATIENT)
Dept: FAMILY MEDICINE | Facility: CLINIC | Age: 59
End: 2024-08-05

## 2024-08-05 PROBLEM — J06.9 URI WITH COUGH AND CONGESTION: Status: ACTIVE | Noted: 2024-08-05

## 2024-08-05 PROCEDURE — 36415 COLL VENOUS BLD VENIPUNCTURE: CPT

## 2024-08-05 PROCEDURE — G2211 COMPLEX E/M VISIT ADD ON: CPT

## 2024-08-05 PROCEDURE — 99214 OFFICE O/P EST MOD 30 MIN: CPT

## 2024-08-05 NOTE — ASSESSMENT
[FreeTextEntry1] : DAVIN is a 59 y/o female patient with HX of anxiety, depression, fibromyalgia, cardiac stent placement, sciatica, Recurrent Folliculitis  # URI: -Nasal swab -CBC order. -Symptomatic treatment advise  Annual Wellness Visit - 03/2024 - STD panel urinalysis ordered  # Hx  Recurrent Folliculitis:  # Anxiety/ Depression/ Insomnia -Continue Vistaril and Duloxetine -Start melatonin ordered - 6 week F/U  # CAD/ HTN: -Continue current meds -F/up with cardiology

## 2024-08-05 NOTE — HISTORY OF PRESENT ILLNESS
[FreeTextEntry1] : Not feeling well meds refill [de-identified] : DAVIN is a 60 y/o female patient with a medical history of anxiety with depression, skin cysts, fibromyalgia, HLD, hypertension, and cardiac stent placement. Hx  recurrent Folliculitis.  Today c/o Body aches, sore throat, runny nose, wet cough, upper airway congestion, chest tightness. + chills, no fever for aprox 1 month. try several OTC meds with no benefits. States was in [place] 7/4th. Prior took flights. Pt states is on Amoxicillin x 1 week for a recent dental work.

## 2024-08-08 ENCOUNTER — NON-APPOINTMENT (OUTPATIENT)
Age: 59
End: 2024-08-08

## 2024-08-08 ENCOUNTER — APPOINTMENT (OUTPATIENT)
Dept: CARDIOLOGY | Facility: CLINIC | Age: 59
End: 2024-08-08

## 2024-08-08 PROCEDURE — 99214 OFFICE O/P EST MOD 30 MIN: CPT | Mod: 25

## 2024-08-08 PROCEDURE — 93000 ELECTROCARDIOGRAM COMPLETE: CPT

## 2024-08-08 NOTE — CARDIOLOGY SUMMARY
[de-identified] : 8/2/2024: Sinus Rhythm  - Nonspecific T-abnormality. 7/22/2024: NSR, no ST/T changes   3/29/2024: Sinus Rhythm  - Diffuse negative T-waves 11/16/2023: Sinus Rhythm, Nonspecific T-abnormality. 9/6/2023 Sinus non specific t abnormality  8/23/2023SR, nonspecific t abnormality 8/10/2023 SR, nonspecific t abnormality 6/2023- Sinus with anterior t wave inversions.  [de-identified] : TTE 8/4/2023 CONCLUSIONS: 1. The left ventricular systolic function is normal with an ejection fraction of 55 % by Esparza's method of disks with an ejection fraction visually estimated at 55 to 60 %. 2. There is normal left ventricular diastolic function. 3. Normal right ventricular cavity size and normal right ventricular systolic function. 4. Mild tricuspid regurgitation. 5. No pericardial effusion seen. 6. Compared to the transthoracic echocardiogram performed on 6/21/2018 there have been no significant interval changes. [de-identified] : 8/2/2023 R/LHC: Normal right heart pressures, LAD- ostial/proximal 70% heavily calcified lesion (iFR 0.86). Ramus- 70% proximal ramus disease (iFR 0.83). LCX- 40-50% proximal diffuse disease. RCA- diffusely diseased small caliber vessel.

## 2024-08-08 NOTE — DISCUSSION/SUMMARY
[Patient] : the patient [Risks] : risks [Benefits] : benefits [Alternatives] : alternatives [FreeTextEntry1] : Discussed with and seen by Dr. Whittaker  A/P  1. CAD: s/p staged PCI 8/30/23 [place] ramus x 1, [place] LAD x 2. compliant with DAPT- ASA/Plavix, BB and atorvastatin 40 mg Q HS. LDL = 143 (3/25/2024). Will d/c Plavix and maintain on aspirin 81 mg QD. Will also d/c atorvastatin and change to rosuvastatin 40 mg Q HS. Will repeat fasting lipid panel before her next visit. 2. HTN - hypertensive today but has URI symptoms and muscle aches. Will continue with current meds for now: diltiazem, Toprol and isosorbide. Low salt diet 3. Palpitations/hx of SVT - currently on diltiazem 360 mg QD and Toprol 25 mg QD. Asymptomatic currently. Planned HM x 1-week last 3/2024 was not done, will defer for now.  4. Stable from cardiology point of view for upcoming Lumbar Spine SHIREEN. She may hold the aspirin for 5-7 days prior to the procedure and resume asap. 5. f/u with Dr. Whittaker as scheduled or sooner if needed   Patient was advised to contact the office or seek emergency medical care for any new, worsening or concerning symptoms. Patient verbalized understanding and is in agreement with the above plan.  Óscar Phillips, NP. [EKG obtained to assist in diagnosis and management of assessed problem(s)] : EKG obtained to assist in diagnosis and management of assessed problem(s)

## 2024-08-08 NOTE — REVIEW OF SYSTEMS
[SOB] : shortness of breath [Chest Discomfort] : no chest discomfort [Palpitations] : no palpitations [Syncope] : no syncope

## 2024-08-08 NOTE — HISTORY OF PRESENT ILLNESS
[FreeTextEntry1] : Old note: Patient with a history of hypertension, esophageal reflux, and a family history of coronary artery disease. She reports that for years, she has experienced episodes of left-sided chest pressure and discomfort traveling down her left arm. She also reports episodes of tingling in her finger tips and dyspnea when walking up and down stairs. Previous Left and Right heart cath in  with no coronary disease and normal right heart pressures. Patient presents with c/o chronic SOB at rest and RODRIGEZ with mild exertion. Reviewed echo and nuclear stress test results.   2023- Patient with recurrent symptoms of shortness of breath, chest heaviness. With and without exertion Seen by pulmonary- no evidence of asthma.  Chest tightness- substernal, no n, no v.   8/10/2023 Patient here for post cath follow up. She is s/p R/LHC 23 which showed normal right heart pressures and severe LAD and ramus disease. No intervention done due to need for rotational atherectomy. She is scheduled for staged PCI 23. She was started on plavix and lipitor was increased. She reports compliance with her medications. Denies bleeding on DAPT. She still has exertional SOB and chest tightness. Also has intermittent palpitations, mostly at night. Lasts ~5 mins. No near syncope or syncope.   2023 Patient here for follow up. She is still having anginal symptoms, mild improvement with Toprol. She is currently wearing 3 day HM. She reports compliance with her medications, including DAPT.   2023 Presents post staged PCI 70% Ramus [place] x 1 ,  and severe 70% ostial-proximal LAD [place] x 2 , pt developed CP post procedure EKg showed TWI in V# which ws not present prior, limited TTE no effusion, had pain relief with toradol, discharged on colchicine x 10 days She also experienced blurred vision and dizziness, was found to be orthostatic and IVF were given. Pt states she has had continued blurred vision in both eyes since the procedure, which was not present prior to the procedure She states she is not having the chest pain or tightness she had prior to stents but on occasion feels a brief split second of a sharp chest pain -which she has had in the past which she attributes to fibromyalgia.  She is not checking her BP at home, this morning felt dizzy after sweeping due to repeated bending and standing  R groin is painful and limiting her walking, denies any bleeding episodes, SOB, LE edema, palpitations, falls/ syncope   2023 Returns for scheduled office visit. Has occasional episodes of palpitations. Denies chest pain, shortness of breath, syncope or near syncope, orthopnea and PND. Compliant with medications. No leg edema. Will see her PCP later this month. Will have a Lumbar Medial Branch Block when cardiology okays.   3/29/2024:  Returns for follow up office visit. Reports of feeling good. Has occasional palpitations with activities. Minimal walking exercise currently due to knee pain. Denies chest pain, shortness of breath, syncope or near syncope, orthopnea and PND. Compliant with medications. Pain management won't do injection while she's on aspirin; willing to wait until 1 year post PCI - 2024. She also planning to have Dental implants after 1 year post PCI. No leg edema.  2024: Patient presents to the office for preoperative cardiac assessment for upcoming tooth extraction and possibly other dental work. Date TBD. Reports feeling ok. She has a cold recently.  Reports RODRIGEZ with walking, no change from usual baseline. Her father  in April which has been hard on her.  Reports intermittent palpitations, heart beating fast when she wakes up suddenly from nightmare in the night, subsides after a few minutes. Denies chest pain, SOB at rest, lightheadedness, dizziness, fatigue, syncope, near syncope and LE edema. Denies smoking, excessive alcohol and illicit drug use.   2024 Returns for follow up office visit. Has not been feeling well for about a month with symptoms of URI but negative respiratory viral panel recently from her PCP. Has chest tightness at times with breathing. Denies chest pain, palpitations, syncope or near syncope, orthopnea and PND. Compliant with medications.  (3/25/2024). Hypertensive today.

## 2024-08-09 ENCOUNTER — APPOINTMENT (OUTPATIENT)
Dept: RADIOLOGY | Facility: CLINIC | Age: 59
End: 2024-08-09

## 2024-08-09 ENCOUNTER — OUTPATIENT (OUTPATIENT)
Dept: OUTPATIENT SERVICES | Facility: HOSPITAL | Age: 59
LOS: 1 days | End: 2024-08-09
Payer: MEDICARE

## 2024-08-09 ENCOUNTER — RESULT REVIEW (OUTPATIENT)
Age: 59
End: 2024-08-09

## 2024-08-09 ENCOUNTER — APPOINTMENT (OUTPATIENT)
Dept: MAMMOGRAPHY | Facility: CLINIC | Age: 59
End: 2024-08-09

## 2024-08-09 DIAGNOSIS — Z90.722 ACQUIRED ABSENCE OF OVARIES, BILATERAL: Chronic | ICD-10-CM

## 2024-08-09 DIAGNOSIS — Z12.39 ENCOUNTER FOR OTHER SCREENING FOR MALIGNANT NEOPLASM OF BREAST: ICD-10-CM

## 2024-08-09 PROCEDURE — 77063 BREAST TOMOSYNTHESIS BI: CPT | Mod: 26

## 2024-08-09 PROCEDURE — 77067 SCR MAMMO BI INCL CAD: CPT

## 2024-08-09 PROCEDURE — 77080 DXA BONE DENSITY AXIAL: CPT | Mod: 26

## 2024-08-09 PROCEDURE — 77063 BREAST TOMOSYNTHESIS BI: CPT

## 2024-08-09 PROCEDURE — 77067 SCR MAMMO BI INCL CAD: CPT | Mod: 26

## 2024-08-09 PROCEDURE — 77080 DXA BONE DENSITY AXIAL: CPT

## 2024-08-13 ENCOUNTER — APPOINTMENT (OUTPATIENT)
Dept: RHEUMATOLOGY | Facility: CLINIC | Age: 59
End: 2024-08-13
Payer: MEDICARE

## 2024-08-13 VITALS
HEART RATE: 86 BPM | HEIGHT: 66 IN | DIASTOLIC BLOOD PRESSURE: 86 MMHG | OXYGEN SATURATION: 99 % | SYSTOLIC BLOOD PRESSURE: 142 MMHG

## 2024-08-13 DIAGNOSIS — M25.512 PAIN IN RIGHT SHOULDER: ICD-10-CM

## 2024-08-13 DIAGNOSIS — G89.29 PAIN IN RIGHT SHOULDER: ICD-10-CM

## 2024-08-13 DIAGNOSIS — G89.29 LUMBAGO WITH SCIATICA, LEFT SIDE: ICD-10-CM

## 2024-08-13 DIAGNOSIS — M54.41 LUMBAGO WITH SCIATICA, LEFT SIDE: ICD-10-CM

## 2024-08-13 DIAGNOSIS — M48.02 SPINAL STENOSIS, CERVICAL REGION: ICD-10-CM

## 2024-08-13 DIAGNOSIS — E55.9 VITAMIN D DEFICIENCY, UNSPECIFIED: ICD-10-CM

## 2024-08-13 DIAGNOSIS — R53.83 OTHER FATIGUE: ICD-10-CM

## 2024-08-13 DIAGNOSIS — M79.7 FIBROMYALGIA: ICD-10-CM

## 2024-08-13 DIAGNOSIS — Z79.1 LONG TERM (CURRENT) USE OF NON-STEROIDAL ANTI-INFLAMMATORIES (NSAID): ICD-10-CM

## 2024-08-13 DIAGNOSIS — M85.80 OTHER SPECIFIED DISORDERS OF BONE DENSITY AND STRUCTURE, UNSPECIFIED SITE: ICD-10-CM

## 2024-08-13 DIAGNOSIS — M54.42 LUMBAGO WITH SCIATICA, LEFT SIDE: ICD-10-CM

## 2024-08-13 DIAGNOSIS — M48.061 SPINAL STENOSIS, LUMBAR REGION WITHOUT NEUROGENIC CLAUDICATION: ICD-10-CM

## 2024-08-13 DIAGNOSIS — M25.511 PAIN IN RIGHT SHOULDER: ICD-10-CM

## 2024-08-13 DIAGNOSIS — M15.9 POLYOSTEOARTHRITIS, UNSPECIFIED: ICD-10-CM

## 2024-08-13 PROCEDURE — G2212 PROLONG OUTPT/OFFICE VIS: CPT

## 2024-08-13 PROCEDURE — G2211 COMPLEX E/M VISIT ADD ON: CPT

## 2024-08-13 PROCEDURE — 99215 OFFICE O/P EST HI 40 MIN: CPT

## 2024-08-18 PROBLEM — M85.80 OSTEOPENIA, UNSPECIFIED LOCATION: Status: ACTIVE | Noted: 2024-08-18

## 2024-08-18 RX ORDER — CALCIUM 500 MG
500 TABLET ORAL
Qty: 90 | Refills: 3 | Status: ACTIVE | COMMUNITY
Start: 2024-08-18

## 2024-08-18 NOTE — ASSESSMENT
[FreeTextEntry1] : Impression: JAHAIRA MACKEY is a 59 year old woman who presents for follow up office visit for further evaluation of joint symptoms and rheumatic diseases including osteoarthritis, osteopenia, right carpal tunnel syndrome, fibromyalgia, and chronic low back pain/lumbar spinal stenosis.  Note: Patient last seen 06/2023  Some pain bilateral knees - yesterday and today knee buckling, shoulders, elbows, and hips Secondary to osteoarthritis. Low back pain radiating to the bilateral lower extremities to the toes with paresthesias Secondary to a combination of osteoarthritis and chronic low back pain/lumbar spinal stenosis consider LS radiculopathy versus neuropathy. Sulindac is not giving the pt adequate relief. Denies joint swelling, erythema and heat. Ankles swelling especially at the end of day. Paresthesias bilateral fingers Secondary to right carpal tunnel syndrome. From previous physical exam tender bilateral trapezius area, bilateral anserine bursitis - resolved. On exam pt has mild diffuse swelling bilateral fingers - I will evaluate for Scleroderma and MCTD. Some recent sleep disturbance and fatigue Secondary to fibromyalgia. Denies any undercooked pork or raw beef consumption. On exam pt has dry eyes and tongue moist - I will continue to monitor for Sjogren Syndrome. Recent bone densitometry results revealed mild osteopenia -- with extensive discussion. Previous xray results from July 2023 revealed osteoarthritis in the cervical spine with straightening lordosis and osteoarthritis in the LS spine -- with extensive discussion. Patient continues vitamin D supplement for vitamin D deficiency. Patient denies rash or side effects with current medications. Patient is content with current medication regimen.  Plan: I reviewed her chart and previous records I reviewed recent Bone densitometry results with patient including my analysis of raw data with extensive discussion I reviewed previous x-ray results with the patient with extensive discussion Laboratory tests ordered - see list below - with coordination of care  X-rays ordered  see list below  with coordination of care  After Xray's results consider MRI of LS spine (no contraindications) - with coordination of care Diagnosis and prognosis discussed For next venipuncture, withhold vitamin D supplements the day of the encounter and  the 2 days prior--to recheck vitamin D level Continue current medications (other than those changed below) Discontinue Sulindac Naproxen 500 mg b.i.d end of breakfast and supper (possible side effects explained) Change timing of Prophylactic aspirin 81 mg q.d. end of lunch (Possible side effects explained) Discontinue OTC Vitamin D Vitamin D 50,000 units once a week (possible side effects explained) Calcium 500 mg t.i.d. at the end of meals or 600 mg b.i.d end of meals (Possible side effects explained) Gabapentin 300 mg q.d suppertime. ; if no better/side effects 7 days, increase 300 mg b.i.d. ;if no better/side effects 7 days, increase 600 mg b.i.d.(Possible side effects explained) Gradually increase Daily exercise  to at least 30 minutes per day - emphasized --extensive discussion Artificial tears one drop each eye q.i.d. and p.r.n.(Possible side effects explained) Biotene mouthwash/spray q.i.d. and p.r.n.(Possible side effects explained)  Oral Hydration Patient declines oral medication for dryness  Bilateral knee exercises--instruction sheet given and discussed - exercise demonstrated with extensive discussion Back exercises--instruction sheet given and discussed  Physical therapy--patient declined--extensive discussion Return visit 3 months All questions and concerns were addressed Total time for this office visit, including face-to-face time and non-face-to-face time, 87 minutes--- including review of the chart and previous records, detailed review of her medical history, review of previous lab results with extensive discussion with the patient, ordering lab tests with coordination of care, review of previous imaging reports/x-ray results with extensive discussion with the patient, ordering of new x-rays with coordination of care, depending on the x-ray results consider MRI LS spine with extensive discussion with the patient, review of her bone densitometry results including my analysis of the raw data with extensive discussion with the patient, detailed medication history, review of medications going forward with their possible side effects, discussion regarding starting physical therapy which she declined, I recommended and provided back exercises and knee exercises which I demonstrated for the patient with extensive discussions, reviewed the impact of the patient's rheumatic disease on their other medical problems, reviewed the impact of the patient's other medical problems on their rheumatic disease

## 2024-08-18 NOTE — CONSULT LETTER
[Dear  ___] : Dear  [unfilled], [Consult Letter:] : I had the pleasure of evaluating your patient, [unfilled]. [Please see my note below.] : Please see my note below. [Consult Closing:] : Thank you very much for allowing me to participate in the care of this patient.  If you have any questions, please do not hesitate to contact me. [Sincerely,] : Sincerely, [DrLesly  ___] : Dr. GARCIA [FreeTextEntry3] : Addy\par  Myron I. Kleiner, M.D., FACR\par  Chief, Division of Rheumatology\par  Department of Medicine\par  [facility]

## 2024-08-18 NOTE — ADDENDUM
[FreeTextEntry1] :  I, Alphonso Peace, acted solely as a scribe for Dr. Myron I. Kleiner, MD. on 08/13/2024. I personally performed the services described in the documentation, reviewed the documentation recorded by the scribe in my presence, and it accurately and completely records my words and actions.

## 2024-08-18 NOTE — HISTORY OF PRESENT ILLNESS
[FreeTextEntry1] : JAHAIRA MACKEY is a 59 year old woman who presents for follow up office visit for further evaluation of joint symptoms and rheumatic diseases including right carpal tunnel syndrome, fibromyalgia, osteoarthritis and chronic low back pain/lumbar spinal stenosis.  Note: Patient last seen 06/2023  Some pain bilateral knees - yesterday and today knee buckling, shoulders, elbows, and hips. Low back pain radiating to the bilateral lower extremities to the toes with paresthesias, Denies joint swelling, erythema and heat. Ankles swelling especially at the end of day. Paresthesias bilateral fingers and toes. Some recent sleep disturbance and fatigue. Denies any undercooked pork or raw beef consumption.Patient continues vitamin D supplement. Patient denies rash or side effects with current medications. Patient is content with current medication regimen.  PMH One month hx of persistent productive cough with green/yellow phlegm PCP dx "viral infection". August 2023 s/p 3 coronary stents

## 2024-08-18 NOTE — CONSULT LETTER
[Dear  ___] : Dear  [unfilled], [Consult Letter:] : I had the pleasure of evaluating your patient, [unfilled]. [Please see my note below.] : Please see my note below. [Consult Closing:] : Thank you very much for allowing me to participate in the care of this patient.  If you have any questions, please do not hesitate to contact me. [Sincerely,] : Sincerely, [DrLesly  ___] : Dr. GARCIA [FreeTextEntry3] : Addy\par  Myron I. Kleiner, M.D., FACR\par  Chief, Division of Rheumatology\par  Department of Medicine\par  [facility]

## 2024-08-19 ENCOUNTER — APPOINTMENT (OUTPATIENT)
Dept: RADIOLOGY | Facility: CLINIC | Age: 59
End: 2024-08-19
Payer: MEDICARE

## 2024-08-19 ENCOUNTER — OUTPATIENT (OUTPATIENT)
Dept: OUTPATIENT SERVICES | Facility: HOSPITAL | Age: 59
LOS: 1 days | End: 2024-08-19
Payer: MEDICARE

## 2024-08-19 DIAGNOSIS — Z90.722 ACQUIRED ABSENCE OF OVARIES, BILATERAL: Chronic | ICD-10-CM

## 2024-08-19 DIAGNOSIS — Z87.59 PERSONAL HISTORY OF OTHER COMPLICATIONS OF PREGNANCY, CHILDBIRTH AND THE PUERPERIUM: Chronic | ICD-10-CM

## 2024-08-19 DIAGNOSIS — Z00.8 ENCOUNTER FOR OTHER GENERAL EXAMINATION: ICD-10-CM

## 2024-08-19 PROCEDURE — 71046 X-RAY EXAM CHEST 2 VIEWS: CPT | Mod: 26

## 2024-08-19 PROCEDURE — 72110 X-RAY EXAM L-2 SPINE 4/>VWS: CPT | Mod: 26

## 2024-08-19 PROCEDURE — 73030 X-RAY EXAM OF SHOULDER: CPT | Mod: 26,50

## 2024-08-19 PROCEDURE — 71046 X-RAY EXAM CHEST 2 VIEWS: CPT

## 2024-08-19 PROCEDURE — 72110 X-RAY EXAM L-2 SPINE 4/>VWS: CPT

## 2024-08-19 PROCEDURE — 73030 X-RAY EXAM OF SHOULDER: CPT

## 2024-08-19 PROCEDURE — 73562 X-RAY EXAM OF KNEE 3: CPT | Mod: 26,50

## 2024-08-19 PROCEDURE — 73562 X-RAY EXAM OF KNEE 3: CPT

## 2024-08-20 RX ORDER — GABAPENTIN 300 MG/1
300 CAPSULE ORAL
Qty: 90 | Refills: 0 | Status: ACTIVE | COMMUNITY
Start: 2024-08-18 | End: 1900-01-01

## 2024-08-20 RX ORDER — ERGOCALCIFEROL 1.25 MG/1
1.25 MG CAPSULE, LIQUID FILLED ORAL
Qty: 12 | Refills: 0 | Status: ACTIVE | COMMUNITY
Start: 2024-08-18 | End: 1900-01-01

## 2024-08-20 RX ORDER — NAPROXEN 500 MG/1
500 TABLET ORAL
Qty: 180 | Refills: 0 | Status: ACTIVE | COMMUNITY
Start: 2024-08-18 | End: 1900-01-01

## 2024-08-21 ENCOUNTER — APPOINTMENT (OUTPATIENT)
Dept: PULMONOLOGY | Facility: CLINIC | Age: 59
End: 2024-08-21

## 2024-10-01 ENCOUNTER — APPOINTMENT (OUTPATIENT)
Dept: GERIATRICS | Facility: CLINIC | Age: 59
End: 2024-10-01

## 2024-11-11 ENCOUNTER — NON-APPOINTMENT (OUTPATIENT)
Age: 59
End: 2024-11-11

## 2024-11-13 ENCOUNTER — APPOINTMENT (OUTPATIENT)
Dept: FAMILY MEDICINE | Facility: CLINIC | Age: 59
End: 2024-11-13
Payer: MEDICARE

## 2024-11-13 VITALS
HEART RATE: 109 BPM | DIASTOLIC BLOOD PRESSURE: 64 MMHG | OXYGEN SATURATION: 99 % | TEMPERATURE: 97.2 F | SYSTOLIC BLOOD PRESSURE: 136 MMHG | HEIGHT: 66 IN | WEIGHT: 214 LBS | RESPIRATION RATE: 14 BRPM | BODY MASS INDEX: 34.39 KG/M2

## 2024-11-13 DIAGNOSIS — L72.9 FOLLICULAR CYST OF THE SKIN AND SUBCUTANEOUS TISSUE, UNSPECIFIED: ICD-10-CM

## 2024-11-13 DIAGNOSIS — L73.9 FOLLICULAR DISORDER, UNSPECIFIED: ICD-10-CM

## 2024-11-13 PROCEDURE — G2211 COMPLEX E/M VISIT ADD ON: CPT

## 2024-11-13 PROCEDURE — 99214 OFFICE O/P EST MOD 30 MIN: CPT

## 2024-11-13 RX ORDER — DOXYCYCLINE HYCLATE 100 MG/1
100 CAPSULE ORAL
Qty: 20 | Refills: 0 | Status: ACTIVE | COMMUNITY
Start: 2024-11-13 | End: 1900-01-01

## 2024-11-18 ENCOUNTER — APPOINTMENT (OUTPATIENT)
Dept: SURGERY | Facility: CLINIC | Age: 59
End: 2024-11-18
Payer: MEDICARE

## 2024-11-18 VITALS
HEART RATE: 112 BPM | TEMPERATURE: 98.3 F | DIASTOLIC BLOOD PRESSURE: 80 MMHG | SYSTOLIC BLOOD PRESSURE: 137 MMHG | OXYGEN SATURATION: 97 % | HEIGHT: 65 IN | RESPIRATION RATE: 14 BRPM | BODY MASS INDEX: 35.65 KG/M2 | WEIGHT: 214 LBS

## 2024-11-18 DIAGNOSIS — D17.1 BENIGN LIPOMATOUS NEOPLASM OF SKIN AND SUBCUTANEOUS TISSUE OF TRUNK: ICD-10-CM

## 2024-11-18 PROCEDURE — 99203 OFFICE O/P NEW LOW 30 MIN: CPT

## 2024-11-18 RX ORDER — CHLORHEXIDINE GLUCONATE 4 %
LIQUID (ML) TOPICAL
Refills: 0 | Status: ACTIVE | COMMUNITY

## 2024-11-18 RX ORDER — ATORVASTATIN CALCIUM 40 MG/1
40 TABLET, FILM COATED ORAL
Refills: 0 | Status: ACTIVE | COMMUNITY

## 2024-11-18 RX ORDER — CLOPIDOGREL BISULFATE 75 MG/1
75 TABLET, FILM COATED ORAL
Refills: 0 | Status: ACTIVE | COMMUNITY

## 2024-11-18 RX ORDER — SULINDAC 200 MG/1
200 TABLET ORAL
Refills: 0 | Status: ACTIVE | COMMUNITY

## 2024-11-22 ENCOUNTER — OUTPATIENT (OUTPATIENT)
Dept: OUTPATIENT SERVICES | Facility: HOSPITAL | Age: 59
LOS: 1 days | Discharge: HOME | End: 2024-11-22

## 2024-11-22 DIAGNOSIS — D72.89 OTHER SPECIFIED DISORDERS OF WHITE BLOOD CELLS: ICD-10-CM

## 2024-11-22 DIAGNOSIS — Z90.722 ACQUIRED ABSENCE OF OVARIES, BILATERAL: Chronic | ICD-10-CM

## 2024-11-22 DIAGNOSIS — Z87.59 PERSONAL HISTORY OF OTHER COMPLICATIONS OF PREGNANCY, CHILDBIRTH AND THE PUERPERIUM: Chronic | ICD-10-CM

## 2024-11-26 ENCOUNTER — RESULT REVIEW (OUTPATIENT)
Age: 59
End: 2024-11-26

## 2024-11-26 ENCOUNTER — APPOINTMENT (OUTPATIENT)
Dept: HEMATOLOGY ONCOLOGY | Facility: CLINIC | Age: 59
End: 2024-11-26
Payer: MEDICARE

## 2024-11-26 ENCOUNTER — LABORATORY RESULT (OUTPATIENT)
Age: 59
End: 2024-11-26

## 2024-11-26 VITALS
WEIGHT: 214 LBS | SYSTOLIC BLOOD PRESSURE: 132 MMHG | OXYGEN SATURATION: 97 % | HEIGHT: 66 IN | HEART RATE: 85 BPM | TEMPERATURE: 98 F | BODY MASS INDEX: 34.39 KG/M2 | DIASTOLIC BLOOD PRESSURE: 83 MMHG

## 2024-11-26 DIAGNOSIS — D72.829 ELEVATED WHITE BLOOD CELL COUNT, UNSPECIFIED: ICD-10-CM

## 2024-11-26 LAB
BASOPHILS # BLD AUTO: 0.1 K/UL — SIGNIFICANT CHANGE UP (ref 0–0.2)
BASOPHILS NFR BLD AUTO: 0.5 % — SIGNIFICANT CHANGE UP (ref 0–2)
EOSINOPHIL # BLD AUTO: 0.3 K/UL — SIGNIFICANT CHANGE UP (ref 0–0.5)
EOSINOPHIL NFR BLD AUTO: 2.4 % — SIGNIFICANT CHANGE UP (ref 0–6)
HCT VFR BLD CALC: 38.5 % — SIGNIFICANT CHANGE UP (ref 34.5–45)
HGB BLD-MCNC: 12.4 G/DL — SIGNIFICANT CHANGE UP (ref 11.5–15.5)
LYMPHOCYTES # BLD AUTO: 2.7 K/UL — SIGNIFICANT CHANGE UP (ref 1–3.3)
LYMPHOCYTES # BLD AUTO: 21.6 % — SIGNIFICANT CHANGE UP (ref 13–44)
MCHC RBC-ENTMCNC: 29 PG — SIGNIFICANT CHANGE UP (ref 27–34)
MCHC RBC-ENTMCNC: 32.3 G/DL — SIGNIFICANT CHANGE UP (ref 32–36)
MCV RBC AUTO: 89.9 FL — SIGNIFICANT CHANGE UP (ref 80–100)
MONOCYTES # BLD AUTO: 0.4 K/UL — SIGNIFICANT CHANGE UP (ref 0–0.9)
MONOCYTES NFR BLD AUTO: 2.8 % — SIGNIFICANT CHANGE UP (ref 2–14)
NEUTROPHILS # BLD AUTO: 9.2 K/UL — HIGH (ref 1.8–7.4)
NEUTROPHILS NFR BLD AUTO: 72.8 % — SIGNIFICANT CHANGE UP (ref 43–77)
PLATELET # BLD AUTO: 338 K/UL — SIGNIFICANT CHANGE UP (ref 150–400)
RBC # BLD: 4.28 M/UL — SIGNIFICANT CHANGE UP (ref 3.8–5.2)
RBC # FLD: 14.4 % — SIGNIFICANT CHANGE UP (ref 10.3–14.5)
WBC # BLD: 12.6 K/UL — HIGH (ref 3.8–10.5)
WBC # FLD AUTO: 12.6 K/UL — HIGH (ref 3.8–10.5)

## 2024-11-26 PROCEDURE — 99204 OFFICE O/P NEW MOD 45 MIN: CPT

## 2024-11-27 DIAGNOSIS — E61.1 IRON DEFICIENCY: ICD-10-CM

## 2024-11-27 LAB
ALBUMIN SERPL ELPH-MCNC: 4 G/DL
ALP BLD-CCNC: 111 U/L
ALT SERPL-CCNC: 15 U/L
ANION GAP SERPL CALC-SCNC: 15 MMOL/L
APTT BLD: 30.7 SEC
AST SERPL-CCNC: 12 U/L
BILIRUB SERPL-MCNC: 0.3 MG/DL
BUN SERPL-MCNC: 10 MG/DL
CALCIUM SERPL-MCNC: 9.7 MG/DL
CHLORIDE SERPL-SCNC: 106 MMOL/L
CO2 SERPL-SCNC: 21 MMOL/L
CREAT SERPL-MCNC: 0.56 MG/DL
EGFR: 106 ML/MIN/1.73M2
FERRITIN SERPL-MCNC: 15 NG/ML
FOLATE SERPL-MCNC: 5 NG/ML
GLUCOSE SERPL-MCNC: 132 MG/DL
INR PPP: 1 RATIO
IRON SATN MFR SERPL: 13 %
IRON SERPL-MCNC: 47 UG/DL
LDH SERPL-CCNC: 156 U/L
POTASSIUM SERPL-SCNC: 3.8 MMOL/L
PROT SERPL-MCNC: 7.3 G/DL
PT BLD: 11.8 SEC
SODIUM SERPL-SCNC: 142 MMOL/L
TIBC SERPL-MCNC: 368 UG/DL
UIBC SERPL-MCNC: 321 UG/DL
VIT B12 SERPL-MCNC: 331 PG/ML

## 2024-11-27 RX ORDER — FERROUS SULFATE TAB 325 MG (65 MG ELEMENTAL FE) 325 (65 FE) MG
325 (65 FE) TAB ORAL DAILY
Qty: 90 | Refills: 1 | Status: ACTIVE | COMMUNITY
Start: 2024-11-27 | End: 1900-01-01

## 2024-11-27 RX ORDER — FERROUS SULFATE TAB EC 325 MG (65 MG FE EQUIVALENT) 325 (65 FE) MG
325 (65 FE) TABLET DELAYED RESPONSE ORAL
Qty: 90 | Refills: 1 | Status: COMPLETED | COMMUNITY
Start: 2024-11-27 | End: 2024-11-27

## 2024-12-03 ENCOUNTER — OUTPATIENT (OUTPATIENT)
Dept: OUTPATIENT SERVICES | Facility: HOSPITAL | Age: 59
LOS: 1 days | End: 2024-12-03
Payer: MEDICARE

## 2024-12-03 ENCOUNTER — APPOINTMENT (OUTPATIENT)
Dept: CARDIOLOGY | Facility: CLINIC | Age: 59
End: 2024-12-03

## 2024-12-03 VITALS
WEIGHT: 209 LBS | HEART RATE: 79 BPM | SYSTOLIC BLOOD PRESSURE: 150 MMHG | RESPIRATION RATE: 18 BRPM | OXYGEN SATURATION: 98 % | DIASTOLIC BLOOD PRESSURE: 78 MMHG | TEMPERATURE: 98 F | HEIGHT: 66 IN

## 2024-12-03 DIAGNOSIS — I10 ESSENTIAL (PRIMARY) HYPERTENSION: ICD-10-CM

## 2024-12-03 DIAGNOSIS — Z29.9 ENCOUNTER FOR PROPHYLACTIC MEASURES, UNSPECIFIED: ICD-10-CM

## 2024-12-03 DIAGNOSIS — Z90.722 ACQUIRED ABSENCE OF OVARIES, BILATERAL: Chronic | ICD-10-CM

## 2024-12-03 DIAGNOSIS — Z87.59 PERSONAL HISTORY OF OTHER COMPLICATIONS OF PREGNANCY, CHILDBIRTH AND THE PUERPERIUM: Chronic | ICD-10-CM

## 2024-12-03 DIAGNOSIS — Z13.89 ENCOUNTER FOR SCREENING FOR OTHER DISORDER: ICD-10-CM

## 2024-12-03 DIAGNOSIS — Z98.890 OTHER SPECIFIED POSTPROCEDURAL STATES: Chronic | ICD-10-CM

## 2024-12-03 DIAGNOSIS — D17.1 BENIGN LIPOMATOUS NEOPLASM OF SKIN AND SUBCUTANEOUS TISSUE OF TRUNK: ICD-10-CM

## 2024-12-03 DIAGNOSIS — Z01.818 ENCOUNTER FOR OTHER PREPROCEDURAL EXAMINATION: ICD-10-CM

## 2024-12-03 LAB
A1C WITH ESTIMATED AVERAGE GLUCOSE RESULT: 5.7 % — HIGH (ref 4–5.6)
ANION GAP SERPL CALC-SCNC: 12 MMOL/L — SIGNIFICANT CHANGE UP (ref 5–17)
BASOPHILS # BLD AUTO: 0.07 K/UL — SIGNIFICANT CHANGE UP (ref 0–0.2)
BASOPHILS NFR BLD AUTO: 0.4 % — SIGNIFICANT CHANGE UP (ref 0–2)
BUN SERPL-MCNC: 10.3 MG/DL — SIGNIFICANT CHANGE UP (ref 8–20)
CALCIUM SERPL-MCNC: 9.6 MG/DL — SIGNIFICANT CHANGE UP (ref 8.4–10.5)
CHLORIDE SERPL-SCNC: 101 MMOL/L — SIGNIFICANT CHANGE UP (ref 96–108)
CO2 SERPL-SCNC: 26 MMOL/L — SIGNIFICANT CHANGE UP (ref 22–29)
CREAT SERPL-MCNC: 0.6 MG/DL — SIGNIFICANT CHANGE UP (ref 0.5–1.3)
EGFR: 105 ML/MIN/1.73M2 — SIGNIFICANT CHANGE UP
EOSINOPHIL # BLD AUTO: 0.34 K/UL — SIGNIFICANT CHANGE UP (ref 0–0.5)
EOSINOPHIL NFR BLD AUTO: 1.9 % — SIGNIFICANT CHANGE UP (ref 0–6)
ESTIMATED AVERAGE GLUCOSE: 117 MG/DL — HIGH (ref 68–114)
GLUCOSE SERPL-MCNC: 109 MG/DL — HIGH (ref 70–99)
HCT VFR BLD CALC: 34.6 % — SIGNIFICANT CHANGE UP (ref 34.5–45)
HGB BLD-MCNC: 11.4 G/DL — LOW (ref 11.5–15.5)
IMM GRANULOCYTES NFR BLD AUTO: 0.5 % — SIGNIFICANT CHANGE UP (ref 0–0.9)
LYMPHOCYTES # BLD AUTO: 13.6 % — SIGNIFICANT CHANGE UP (ref 13–44)
LYMPHOCYTES # BLD AUTO: 2.41 K/UL — SIGNIFICANT CHANGE UP (ref 1–3.3)
MCHC RBC-ENTMCNC: 29.2 PG — SIGNIFICANT CHANGE UP (ref 27–34)
MCHC RBC-ENTMCNC: 32.9 G/DL — SIGNIFICANT CHANGE UP (ref 32–36)
MCV RBC AUTO: 88.5 FL — SIGNIFICANT CHANGE UP (ref 80–100)
MONOCYTES # BLD AUTO: 0.8 K/UL — SIGNIFICANT CHANGE UP (ref 0–0.9)
MONOCYTES NFR BLD AUTO: 4.5 % — SIGNIFICANT CHANGE UP (ref 2–14)
NEUTROPHILS # BLD AUTO: 14.03 K/UL — HIGH (ref 1.8–7.4)
NEUTROPHILS NFR BLD AUTO: 79.1 % — HIGH (ref 43–77)
PLATELET # BLD AUTO: 355 K/UL — SIGNIFICANT CHANGE UP (ref 150–400)
POTASSIUM SERPL-MCNC: 3.9 MMOL/L — SIGNIFICANT CHANGE UP (ref 3.5–5.3)
POTASSIUM SERPL-SCNC: 3.9 MMOL/L — SIGNIFICANT CHANGE UP (ref 3.5–5.3)
RBC # BLD: 3.91 M/UL — SIGNIFICANT CHANGE UP (ref 3.8–5.2)
RBC # FLD: 15.4 % — HIGH (ref 10.3–14.5)
SODIUM SERPL-SCNC: 139 MMOL/L — SIGNIFICANT CHANGE UP (ref 135–145)
WBC # BLD: 17.74 K/UL — HIGH (ref 3.8–10.5)
WBC # FLD AUTO: 17.74 K/UL — HIGH (ref 3.8–10.5)

## 2024-12-03 PROCEDURE — 93010 ELECTROCARDIOGRAM REPORT: CPT

## 2024-12-03 PROCEDURE — 80048 BASIC METABOLIC PNL TOTAL CA: CPT

## 2024-12-03 PROCEDURE — 93005 ELECTROCARDIOGRAM TRACING: CPT

## 2024-12-03 PROCEDURE — 83036 HEMOGLOBIN GLYCOSYLATED A1C: CPT

## 2024-12-03 PROCEDURE — 36415 COLL VENOUS BLD VENIPUNCTURE: CPT

## 2024-12-03 PROCEDURE — 85025 COMPLETE CBC W/AUTO DIFF WBC: CPT

## 2024-12-03 PROCEDURE — G0463: CPT

## 2024-12-03 NOTE — H&P PST ADULT - ASSESSMENT
This is a pleasant  59 year old female in NAD  presenting today for PST, PMH includes HTN, chronic back pain and cardiac cath s/p 3 stents. Patient c/o mass on right hip for about 2 years. States it has gotten progressively larger. Initially  it was not bothersome, but since it has grown it is bothersome especially when she lays on her right side. Denies oozing or drainage from mass.  The patient reports that she has been having a burning pain along the right lower back and is s/p nerve block injections with pain management. She si now scheduled for excision of right hip lipoma on 24 with Dr. Mahmood pending medical and cardiac optimization.          Labs, and  EKG performed. Written and verbal instructions provided. Patient educated on surgical scrub, preadmission instructions, clearance and day of procedure medications, verbalizes understanding.  Patient instructed to stop vitamins/supplements/herbal medications/NSAIDS for one week prior to surgery and discuss with PMD, verbalized understanding.  Asked the patient to consult with cardiologist about holding ASA and the patient  agreed. Patient verbalized understanding of instructions and was given the opportunity to ask questions. Out patient medications reviewed and verified with patient.        CAPRINI SCORE    AGE RELATED RISK FACTORS                                                             [X ] Age 41-60 years                                            (1 Point)  [ ] Age: 61-74 years                                           (2 Points)                 [ ] Age= 75 years                                                (3 Points)             DISEASE RELATED RISK FACTORS                                                       [ ] Edema in the lower extremities                 (1 Point)                     [ ] Varicose veins                                               (1 Point)                                 [X ] BMI > 25 Kg/m2                                            (1 Point)                                  [ ] Serious infection (ie PNA)                            (1 Point)                     [ ] Lung disease ( COPD, Emphysema)            (1 Point)                                                                          [ ] Acute myocardial infarction                         (1 Point)                  [ ] Congestive heart failure (in the previous month)  (1 Point)         [ ] Inflammatory bowel disease                            (1 Point)                  [ ] Central venous access, PICC or Port               (2 points)       (within the last month)                                                                [ ] Stroke (in the previous month)                        (5 Points)    [ ] Previous or present malignancy                       (2 points)                                                                                                                                                         HEMATOLOGY RELATED FACTORS                                                         [ ] Prior episodes of VTE                                     (3 Points)                     [ ] Positive family history for VTE                      (3 Points)                  [ ] Prothrombin 20773 A                                     (3 Points)                     [ ] Factor V Leiden                                                (3 Points)                        [ ] Lupus anticoagulants                                      (3 Points)                                                           [ ] Anticardiolipin antibodies                              (3 Points)                                                       [ ] High homocysteine in the blood                   (3 Points)                                             [ ] Other congenital or acquired thrombophilia      (3 Points)                                                [ ] Heparin induced thrombocytopenia                  (3 Points)                                        MOBILITY RELATED FACTORS  [ ] Bed rest                                                         (1 Point)  [ ] Plaster cast                                                    (2 points)  [ ] Bed bound for more than 72 hours           (2 Points)    GENDER SPECIFIC FACTORS  [ ] Pregnancy or had a baby within the last month   (1 Point)  [ ] Post-partum < 6 weeks                                   (1 Point)  [ ] Hormonal therapy  or oral contraception   (1 Point)  [ ] History of pregnancy complications              (1 point)  [ ] Unexplained or recurrent              (1 Point)    OTHER RISK FACTORS                                           (1 Point)  [ ] BMI >40, smoking, diabetes requiring insulin, chemotherapy  blood transfusions and length of surgery over 2 hours    SURGERY RELATED RISK FACTORS  [ ]  Section within the last month     (1 Point)  [ X] Minor surgery                                                  (1 Point)  [ ] Arthroscopic surgery                                       (2 Points)  [ ] Planned major surgery lasting more            (2 Points)      than 45 minutes     [ ] Elective hip or knee joint replacement       (5 points)       surgery                                                TRAUMA RELATED RISK FACTORS  [ ] Fracture of the hip, pelvis, or leg                       (5 Points)  [ ] Spinal cord injury resulting in paralysis             (5 points)       (in the previous month)    [ ] Paralysis  (less than 1 month)                             (5 Points)  [ ] Multiple Trauma within 1 month                        (5 Points)    Total Score [  3      ]    Caprini Score 0-2: Low Risk, NO VTE prophylaxis required for most patients, encourage ambulation  Caprini Score 3-6: Moderate Risk , pharmacologic VTE prophylaxis is indicated for most patients (in the absence of contraindications)  Caprini Score Greater than or =7: High risk, pharmocologic VTE prophylaxis indicated for most patients (in the absence of contraindications)      OPIOID RISK TOOL    ASCENCION EACH BOX THAT APPLIES AND ADD TOTALS AT THE END    FAMILY HISTORY OF SUBSTANCE ABUSE                 FEMALE         MALE                                                Alcohol                             [  ]1 pt          [  ]3pts                                               Illegal Durgs                     [  ]2 pts        [  ]3pts                                               Rx Drugs                           [  ]4 pts        [  ]4 pts    PERSONAL HISTORY OF SUBSTANCE ABUSE                                                                                          Alcohol                             [  ]3 pts       [  ]3 pts                                               Illegal Drugs                     [  ]4 pts        [  ]4 pts                                               Rx Drugs                           [  ]5 pts        [  ]5 pts    AGE BETWEEN 16-45 YEARS                                      [  ]1 pt         [  ]1 pt    HISTORY OF PREADOLESCENT   SEXUAL ABUSE                                                             [  ]3 pts        [  ]0pts    PSYCHOLOGICAL DISEASE                     ADD, OCD, Bipolar, Schizophrenia        [  ]2 pts         [  ]2 pts                      Depression                                               [X  ]1 pt           [  ]1 pt           SCORING TOTAL   (add numbers and type here)              (**1*)                                     A score of 3 or lower indicated LOW risk for future opioid abuse  A score of 4 to 7 indicated moderate risk for future opioid abuse  A score of 8 or higher indicates a high risk for opioid abuse

## 2024-12-03 NOTE — H&P PST ADULT - NSICDXPASTMEDICALHX_GEN_ALL_CORE_FT
PAST MEDICAL HISTORY:  Asthma controlled on meds    Ectopic pregnancy     Hypertension     Palpitations     Reflux     Seasonal allergies     Sleep apnea does not use the CPAP but use O2 prn    SOB (shortness of breath)     Vitamin B 12 deficiency      PAST MEDICAL HISTORY:  Asthma controlled on meds    Chronic back pain     Ectopic pregnancy     HTN (hypertension)     Hypertension     Palpitations     Reflux     Seasonal allergies     Sleep apnea does not use the CPAP but use O2 prn    SOB (shortness of breath)     Vitamin B 12 deficiency

## 2024-12-03 NOTE — H&P PST ADULT - HISTORY OF PRESENT ILLNESS
mass on right hip for about 2 years  states it has gotten progressively larger  inititally it was not bothersome, but since it has grown it is bothersome especially when she lays on her right side  denies drainage   injection in back with pain managemnet   Patient is a  59 year old female presenting today for PST, PMH includes HTN, chronic back pain and cardiac cath s/p 3 stents. Patient c/o mass on right hip for about 2 years. States it has gotten progressively larger. Initially  it was not bothersome, but since it has grown it is bothersome especially when she lays on her right side. Denies oozing or drainage from mass.  The patient reports that she has been having a burning pain along the right lower back and is s/p nerve block injections with pain management. She si now scheduled for excision of right hip lipoma on 12/13/24 with Dr. Mahmood pending medical and cardiac optimization.

## 2024-12-03 NOTE — H&P PST ADULT - NSICDXPASTSURGICALHX_GEN_ALL_CORE_FT
PAST SURGICAL HISTORY:  History of bilateral oophorectomies     History of ectopic pregnancy      PAST SURGICAL HISTORY:  History of bilateral oophorectomies     History of cardiac cath     History of ectopic pregnancy

## 2024-12-03 NOTE — H&P PST ADULT - ATTENDING COMMENTS
The patient has been advised of the risks, benefits, and alternatives including the option of doing nothing to excision of a right hip lipoma.  The potential complications including but not limited to infection, bleeding, recurrence of the lipoma, wound dehiscence, seroma/hematoma, and possible chronic pain were discussed.  The patient admitted understanding and agrees to proceed as planned.

## 2024-12-03 NOTE — H&P PST ADULT - PROBLEM SELECTOR PLAN 1
Scheduled for excision of right hip lipoma on 12/13/24 with Dr. Mahmood pending medical and cardiac optimization.

## 2024-12-03 NOTE — H&P PST ADULT - PROBLEM SELECTOR PLAN 2
BP today 150/78  Continue medication.   EKG performed.  Patient instructed to take morning blood pressure medications with a sip of water, verbalized understanding.   Cardiac optimization pending.

## 2024-12-04 ENCOUNTER — APPOINTMENT (OUTPATIENT)
Dept: FAMILY MEDICINE | Facility: CLINIC | Age: 59
End: 2024-12-04
Payer: MEDICARE

## 2024-12-04 VITALS
BODY MASS INDEX: 33.43 KG/M2 | HEART RATE: 78 BPM | OXYGEN SATURATION: 97 % | RESPIRATION RATE: 14 BRPM | SYSTOLIC BLOOD PRESSURE: 130 MMHG | HEIGHT: 66 IN | DIASTOLIC BLOOD PRESSURE: 84 MMHG | WEIGHT: 208 LBS

## 2024-12-04 DIAGNOSIS — L73.9 FOLLICULAR DISORDER, UNSPECIFIED: ICD-10-CM

## 2024-12-04 DIAGNOSIS — L72.9 FOLLICULAR CYST OF THE SKIN AND SUBCUTANEOUS TISSUE, UNSPECIFIED: ICD-10-CM

## 2024-12-04 DIAGNOSIS — Z01.818 ENCOUNTER FOR OTHER PREPROCEDURAL EXAMINATION: ICD-10-CM

## 2024-12-04 PROCEDURE — 99214 OFFICE O/P EST MOD 30 MIN: CPT

## 2024-12-04 PROCEDURE — G2211 COMPLEX E/M VISIT ADD ON: CPT

## 2024-12-13 ENCOUNTER — TRANSCRIPTION ENCOUNTER (OUTPATIENT)
Age: 59
End: 2024-12-13

## 2024-12-13 ENCOUNTER — OUTPATIENT (OUTPATIENT)
Dept: INPATIENT UNIT | Facility: HOSPITAL | Age: 59
LOS: 1 days | End: 2024-12-13
Payer: MEDICARE

## 2024-12-13 ENCOUNTER — APPOINTMENT (OUTPATIENT)
Dept: SURGERY | Facility: HOSPITAL | Age: 59
End: 2024-12-13

## 2024-12-13 VITALS
OXYGEN SATURATION: 100 % | DIASTOLIC BLOOD PRESSURE: 75 MMHG | SYSTOLIC BLOOD PRESSURE: 136 MMHG | RESPIRATION RATE: 16 BRPM | HEIGHT: 66 IN | TEMPERATURE: 98 F | WEIGHT: 214.07 LBS | HEART RATE: 99 BPM

## 2024-12-13 VITALS
RESPIRATION RATE: 18 BRPM | TEMPERATURE: 98 F | OXYGEN SATURATION: 99 % | DIASTOLIC BLOOD PRESSURE: 90 MMHG | SYSTOLIC BLOOD PRESSURE: 155 MMHG | HEART RATE: 92 BPM

## 2024-12-13 DIAGNOSIS — Z87.59 PERSONAL HISTORY OF OTHER COMPLICATIONS OF PREGNANCY, CHILDBIRTH AND THE PUERPERIUM: Chronic | ICD-10-CM

## 2024-12-13 DIAGNOSIS — Z90.722 ACQUIRED ABSENCE OF OVARIES, BILATERAL: Chronic | ICD-10-CM

## 2024-12-13 DIAGNOSIS — Z98.890 OTHER SPECIFIED POSTPROCEDURAL STATES: Chronic | ICD-10-CM

## 2024-12-13 DIAGNOSIS — D17.1 BENIGN LIPOMATOUS NEOPLASM OF SKIN AND SUBCUTANEOUS TISSUE OF TRUNK: ICD-10-CM

## 2024-12-13 PROCEDURE — 27043 EXC HIP PELVIS LES SC 3 CM/>: CPT | Mod: RT

## 2024-12-13 PROCEDURE — 88304 TISSUE EXAM BY PATHOLOGIST: CPT

## 2024-12-13 PROCEDURE — 88304 TISSUE EXAM BY PATHOLOGIST: CPT | Mod: 26

## 2024-12-13 RX ORDER — ROSUVASTATIN CALCIUM 5 MG/1
1 TABLET, FILM COATED ORAL
Refills: 0 | DISCHARGE

## 2024-12-13 RX ORDER — 0.9 % SODIUM CHLORIDE 0.9 %
1000 INTRAVENOUS SOLUTION INTRAVENOUS
Refills: 0 | Status: DISCONTINUED | OUTPATIENT
Start: 2024-12-13 | End: 2024-12-13

## 2024-12-13 RX ORDER — CEFAZOLIN SODIUM 10 G
2000 VIAL (EA) INJECTION ONCE
Refills: 0 | Status: DISCONTINUED | OUTPATIENT
Start: 2024-12-13 | End: 2024-12-13

## 2024-12-13 RX ORDER — ACETAMINOPHEN 500MG 500 MG/1
975 TABLET, COATED ORAL ONCE
Refills: 0 | Status: COMPLETED | OUTPATIENT
Start: 2024-12-13 | End: 2024-12-13

## 2024-12-13 RX ORDER — ONDANSETRON HYDROCHLORIDE 4 MG/1
4 TABLET, FILM COATED ORAL ONCE
Refills: 0 | Status: DISCONTINUED | OUTPATIENT
Start: 2024-12-13 | End: 2024-12-13

## 2024-12-13 RX ORDER — ISOSORBIDE DINITRATE 40 MG/1
1 TABLET ORAL
Refills: 0 | DISCHARGE

## 2024-12-13 RX ORDER — IBUPROFEN 200 MG
1 TABLET ORAL
Refills: 0 | DISCHARGE

## 2024-12-13 RX ORDER — METOPROLOL TARTRATE 100 MG/1
1 TABLET, FILM COATED ORAL
Refills: 0 | DISCHARGE

## 2024-12-13 RX ORDER — SODIUM CHLORIDE 9 MG/ML
3 INJECTION, SOLUTION INTRAMUSCULAR; INTRAVENOUS; SUBCUTANEOUS ONCE
Refills: 0 | Status: DISCONTINUED | OUTPATIENT
Start: 2024-12-13 | End: 2024-12-13

## 2024-12-13 RX ORDER — FLUTICASONE PROPIONATE 50 MCG
0 SPRAY, SUSPENSION NASAL
Refills: 0 | DISCHARGE

## 2024-12-13 RX ORDER — OXYCODONE HYDROCHLORIDE 30 MG/1
5 TABLET ORAL ONCE
Refills: 0 | Status: DISCONTINUED | OUTPATIENT
Start: 2024-12-13 | End: 2024-12-13

## 2024-12-13 RX ORDER — BUPIVACAINE 13.3 MG/ML
20 INJECTION, SUSPENSION, LIPOSOMAL INFILTRATION ONCE
Refills: 0 | Status: DISCONTINUED | OUTPATIENT
Start: 2024-12-13 | End: 2024-12-13

## 2024-12-13 RX ORDER — FENTANYL 12 UG/H
25 PATCH, EXTENDED RELEASE TRANSDERMAL
Refills: 0 | Status: DISCONTINUED | OUTPATIENT
Start: 2024-12-13 | End: 2024-12-13

## 2024-12-13 RX ADMIN — FENTANYL 25 MICROGRAM(S): 12 PATCH, EXTENDED RELEASE TRANSDERMAL at 17:09

## 2024-12-13 RX ADMIN — FENTANYL 25 MICROGRAM(S): 12 PATCH, EXTENDED RELEASE TRANSDERMAL at 17:21

## 2024-12-13 RX ADMIN — ACETAMINOPHEN 500MG 975 MILLIGRAM(S): 500 TABLET, COATED ORAL at 12:58

## 2024-12-13 NOTE — BRIEF OPERATIVE NOTE - NSICDXBRIEFPROCEDURE_GEN_ALL_CORE_FT
PROCEDURES:  Surgical removal of subcutaneous neoplasm of soft tissue of abdominal wall, greater than or equal to 3 cm in diameter 13-Dec-2024 16:46:14 right lower abdominal wall and hip area 6 by 4 by 3 cm Eliazar Mahmood

## 2024-12-13 NOTE — ASU DISCHARGE PLAN (ADULT/PEDIATRIC) - FINANCIAL ASSISTANCE
[facility] provides services at a reduced cost to those who are determined to be eligible through [facility]’s financial assistance program. Information regarding [facility]’s financial assistance program can be found by going to [email or web address] or by calling [phone].

## 2024-12-13 NOTE — ASU DISCHARGE PLAN (ADULT/PEDIATRIC) - CARE PROVIDER_API CALL
Eliazar Mahmood [facility]  [address] [place] [address], [address]  [place] [address]  Phone: [phone]  Fax: [phone]  Follow Up Time:

## 2024-12-13 NOTE — ASU DISCHARGE PLAN (ADULT/PEDIATRIC) - COMMENTS
Please call the office at [phone] for a post op appointment upon return from travel to [place].  The patient has been advised that if there are any concerns while she is away she should go to the nearest urgent care center or emergency room.  She admitted understanding.

## 2024-12-17 ENCOUNTER — APPOINTMENT (OUTPATIENT)
Dept: GASTROENTEROLOGY | Facility: CLINIC | Age: 59
End: 2024-12-17

## 2024-12-18 PROBLEM — I10 ESSENTIAL (PRIMARY) HYPERTENSION: Chronic | Status: ACTIVE | Noted: 2024-12-03

## 2024-12-24 LAB — SURGICAL PATHOLOGY STUDY: SIGNIFICANT CHANGE UP

## 2024-12-26 ENCOUNTER — APPOINTMENT (OUTPATIENT)
Dept: CARDIOLOGY | Facility: CLINIC | Age: 59
End: 2024-12-26

## 2025-01-09 ENCOUNTER — APPOINTMENT (OUTPATIENT)
Dept: RHEUMATOLOGY | Facility: CLINIC | Age: 60
End: 2025-01-09

## 2025-01-13 ENCOUNTER — APPOINTMENT (OUTPATIENT)
Dept: GASTROENTEROLOGY | Facility: CLINIC | Age: 60
End: 2025-01-13

## 2025-02-03 ENCOUNTER — APPOINTMENT (OUTPATIENT)
Dept: SURGERY | Facility: CLINIC | Age: 60
End: 2025-02-03
Payer: MEDICARE

## 2025-02-03 VITALS
HEART RATE: 91 BPM | BODY MASS INDEX: 34.39 KG/M2 | RESPIRATION RATE: 16 BRPM | SYSTOLIC BLOOD PRESSURE: 118 MMHG | TEMPERATURE: 98.3 F | DIASTOLIC BLOOD PRESSURE: 79 MMHG | HEIGHT: 66 IN | WEIGHT: 214 LBS | OXYGEN SATURATION: 97 %

## 2025-02-03 DIAGNOSIS — D17.1 BENIGN LIPOMATOUS NEOPLASM OF SKIN AND SUBCUTANEOUS TISSUE OF TRUNK: ICD-10-CM

## 2025-02-03 PROCEDURE — 99213 OFFICE O/P EST LOW 20 MIN: CPT | Mod: 24

## 2025-02-06 ENCOUNTER — APPOINTMENT (OUTPATIENT)
Dept: CARDIOLOGY | Facility: CLINIC | Age: 60
End: 2025-02-06

## 2025-02-10 NOTE — H&P PST ADULT - PRO TOBACCO TYPE
Patient's daughter Tatyana calling. She wants to know if patient can have her INR drawn at the same time as some there labs she needs for Dr. Shields. Please call her at [phone].   cigarettes

## 2025-02-19 NOTE — H&P PST ADULT - NSSUBSTANCEUSE_GEN_ALL_CORE_SD
Spoke with patient and patient's wife Terri regarding palliative care. They informed me that they needed someone to stay with him while she at work. I informed patient that they would need a sitter. They mention home health but I explained to them that they home health nurse sees patient then leave. She was informed that they stay about 30 minutes. She informed me that she needed someone to be there encased his oxygen level drop. I spoke to her about the medical alert necklace. Most of all the thing that I researched was for patient that is receiving Medicaid. Will continue to follow-up with patient.     never used

## 2025-03-12 ENCOUNTER — APPOINTMENT (OUTPATIENT)
Dept: FAMILY MEDICINE | Facility: CLINIC | Age: 60
End: 2025-03-12

## 2025-03-17 ENCOUNTER — APPOINTMENT (OUTPATIENT)
Dept: FAMILY MEDICINE | Facility: CLINIC | Age: 60
End: 2025-03-17
Payer: MEDICARE

## 2025-03-17 VITALS
HEART RATE: 79 BPM | RESPIRATION RATE: 14 BRPM | OXYGEN SATURATION: 99 % | HEIGHT: 66 IN | BODY MASS INDEX: 34.23 KG/M2 | TEMPERATURE: 97.5 F | DIASTOLIC BLOOD PRESSURE: 82 MMHG | SYSTOLIC BLOOD PRESSURE: 118 MMHG | WEIGHT: 213 LBS

## 2025-03-17 DIAGNOSIS — L73.9 FOLLICULAR DISORDER, UNSPECIFIED: ICD-10-CM

## 2025-03-17 PROCEDURE — G2211 COMPLEX E/M VISIT ADD ON: CPT

## 2025-03-17 PROCEDURE — 99213 OFFICE O/P EST LOW 20 MIN: CPT

## 2025-03-21 ENCOUNTER — APPOINTMENT (OUTPATIENT)
Dept: CARDIOLOGY | Facility: CLINIC | Age: 60
End: 2025-03-21

## 2025-03-21 ENCOUNTER — NON-APPOINTMENT (OUTPATIENT)
Age: 60
End: 2025-03-21

## 2025-03-21 DIAGNOSIS — I25.10 ATHEROSCLEROTIC HEART DISEASE OF NATIVE CORONARY ARTERY W/OUT ANGINA PECTORIS: ICD-10-CM

## 2025-03-21 DIAGNOSIS — Z98.61 ATHEROSCLEROTIC HEART DISEASE OF NATIVE CORONARY ARTERY W/OUT ANGINA PECTORIS: ICD-10-CM

## 2025-03-21 DIAGNOSIS — R07.9 CHEST PAIN, UNSPECIFIED: ICD-10-CM

## 2025-03-21 PROCEDURE — 93000 ELECTROCARDIOGRAM COMPLETE: CPT

## 2025-03-21 PROCEDURE — 99214 OFFICE O/P EST MOD 30 MIN: CPT | Mod: 25

## 2025-03-21 RX ORDER — IBUPROFEN 800 MG/1
800 TABLET, FILM COATED ORAL 3 TIMES DAILY
Refills: 0 | Status: ACTIVE | COMMUNITY

## 2025-03-24 ENCOUNTER — APPOINTMENT (OUTPATIENT)
Dept: CARDIOLOGY | Facility: CLINIC | Age: 60
End: 2025-03-24

## 2025-04-03 ENCOUNTER — NON-APPOINTMENT (OUTPATIENT)
Age: 60
End: 2025-04-03

## 2025-04-03 ENCOUNTER — APPOINTMENT (OUTPATIENT)
Dept: RHEUMATOLOGY | Facility: CLINIC | Age: 60
End: 2025-04-03
Payer: MEDICARE

## 2025-04-03 VITALS
TEMPERATURE: 97.2 F | RESPIRATION RATE: 17 BRPM | OXYGEN SATURATION: 98 % | DIASTOLIC BLOOD PRESSURE: 80 MMHG | BODY MASS INDEX: 34.23 KG/M2 | SYSTOLIC BLOOD PRESSURE: 150 MMHG | HEART RATE: 92 BPM | HEIGHT: 66 IN | WEIGHT: 213 LBS

## 2025-04-03 DIAGNOSIS — M54.12 RADICULOPATHY, CERVICAL REGION: ICD-10-CM

## 2025-04-03 DIAGNOSIS — Z79.1 LONG TERM (CURRENT) USE OF NON-STEROIDAL ANTI-INFLAMMATORIES (NSAID): ICD-10-CM

## 2025-04-03 DIAGNOSIS — M54.42 OTHER CHRONIC PAIN: ICD-10-CM

## 2025-04-03 DIAGNOSIS — G89.29 LUMBAGO WITH SCIATICA, LEFT SIDE: ICD-10-CM

## 2025-04-03 DIAGNOSIS — M79.7 FIBROMYALGIA: ICD-10-CM

## 2025-04-03 DIAGNOSIS — M54.41 LUMBAGO WITH SCIATICA, LEFT SIDE: ICD-10-CM

## 2025-04-03 DIAGNOSIS — R53.83 OTHER FATIGUE: ICD-10-CM

## 2025-04-03 DIAGNOSIS — G47.62 SLEEP RELATED LEG CRAMPS: ICD-10-CM

## 2025-04-03 DIAGNOSIS — M15.9 POLYOSTEOARTHRITIS, UNSPECIFIED: ICD-10-CM

## 2025-04-03 DIAGNOSIS — M54.42 LUMBAGO WITH SCIATICA, LEFT SIDE: ICD-10-CM

## 2025-04-03 DIAGNOSIS — M54.2 CERVICALGIA: ICD-10-CM

## 2025-04-03 DIAGNOSIS — M85.80 OTHER SPECIFIED DISORDERS OF BONE DENSITY AND STRUCTURE, UNSPECIFIED SITE: ICD-10-CM

## 2025-04-03 DIAGNOSIS — M48.061 SPINAL STENOSIS, LUMBAR REGION WITHOUT NEUROGENIC CLAUDICATION: ICD-10-CM

## 2025-04-03 DIAGNOSIS — M79.89 OTHER SPECIFIED SOFT TISSUE DISORDERS: ICD-10-CM

## 2025-04-03 DIAGNOSIS — G89.29 CERVICALGIA: ICD-10-CM

## 2025-04-03 DIAGNOSIS — H04.123 DRY EYE SYNDROME OF BILATERAL LACRIMAL GLANDS: ICD-10-CM

## 2025-04-03 DIAGNOSIS — G89.29 OTHER CHRONIC PAIN: ICD-10-CM

## 2025-04-03 PROCEDURE — G2211 COMPLEX E/M VISIT ADD ON: CPT

## 2025-04-03 PROCEDURE — 99215 OFFICE O/P EST HI 40 MIN: CPT

## 2025-04-03 PROCEDURE — G2212 PROLONG OUTPT/OFFICE VIS: CPT

## 2025-04-04 ENCOUNTER — APPOINTMENT (OUTPATIENT)
Dept: OBGYN | Facility: CLINIC | Age: 60
End: 2025-04-04
Payer: MEDICARE

## 2025-04-04 VITALS
BODY MASS INDEX: 34.07 KG/M2 | DIASTOLIC BLOOD PRESSURE: 84 MMHG | SYSTOLIC BLOOD PRESSURE: 130 MMHG | HEIGHT: 66 IN | WEIGHT: 212 LBS

## 2025-04-04 DIAGNOSIS — M48.02 SPINAL STENOSIS, CERVICAL REGION: ICD-10-CM

## 2025-04-04 DIAGNOSIS — E66.9 OBESITY, UNSPECIFIED: ICD-10-CM

## 2025-04-04 DIAGNOSIS — Z78.0 ASYMPTOMATIC MENOPAUSAL STATE: ICD-10-CM

## 2025-04-04 DIAGNOSIS — Z01.419 ENCOUNTER FOR GYNECOLOGICAL EXAMINATION (GENERAL) (ROUTINE) W/OUT ABNORMAL FINDINGS: ICD-10-CM

## 2025-04-04 PROCEDURE — 99459 PELVIC EXAMINATION: CPT

## 2025-04-04 PROCEDURE — 99213 OFFICE O/P EST LOW 20 MIN: CPT | Mod: 25

## 2025-04-04 PROCEDURE — G0101: CPT

## 2025-04-06 PROBLEM — M79.89 SWELLING OF FINGER OF BOTH HANDS: Status: ACTIVE | Noted: 2025-04-06

## 2025-04-06 PROBLEM — M54.12 CERVICAL RADICULOPATHY, CHRONIC: Status: ACTIVE | Noted: 2025-04-06

## 2025-04-06 PROBLEM — H04.123 BILATERAL DRY EYES: Status: ACTIVE | Noted: 2025-04-06

## 2025-04-06 PROBLEM — G47.62 NOCTURNAL LEG CRAMPS: Status: ACTIVE | Noted: 2025-04-06

## 2025-04-06 RX ORDER — IBUPROFEN 800 MG/1
800 TABLET, FILM COATED ORAL
Qty: 270 | Refills: 0 | Status: ACTIVE | COMMUNITY
Start: 1900-01-01 | End: 1900-01-01

## 2025-04-06 RX ORDER — GABAPENTIN 300 MG/1
300 CAPSULE ORAL
Qty: 180 | Refills: 0 | Status: ACTIVE | COMMUNITY
Start: 2025-04-06 | End: 1900-01-01

## 2025-04-06 RX ORDER — GABAPENTIN 300 MG
300 TABLET ORAL
Refills: 0 | Status: ACTIVE | COMMUNITY

## 2025-04-11 ENCOUNTER — APPOINTMENT (OUTPATIENT)
Dept: FAMILY MEDICINE | Facility: CLINIC | Age: 60
End: 2025-04-11

## 2025-04-18 ENCOUNTER — APPOINTMENT (OUTPATIENT)
Dept: FAMILY MEDICINE | Facility: CLINIC | Age: 60
End: 2025-04-18

## 2025-04-24 ENCOUNTER — APPOINTMENT (OUTPATIENT)
Dept: CARDIOLOGY | Facility: CLINIC | Age: 60
End: 2025-04-24
Payer: MEDICARE

## 2025-04-24 PROCEDURE — A9502: CPT

## 2025-04-24 PROCEDURE — 78452 HT MUSCLE IMAGE SPECT MULT: CPT

## 2025-04-24 PROCEDURE — 93015 CV STRESS TEST SUPVJ I&R: CPT

## 2025-04-25 ENCOUNTER — NON-APPOINTMENT (OUTPATIENT)
Age: 60
End: 2025-04-25

## 2025-05-19 ENCOUNTER — LABORATORY RESULT (OUTPATIENT)
Age: 60
End: 2025-05-19

## 2025-05-19 ENCOUNTER — NON-APPOINTMENT (OUTPATIENT)
Age: 60
End: 2025-05-19

## 2025-05-22 ENCOUNTER — APPOINTMENT (OUTPATIENT)
Dept: CARDIOLOGY | Facility: CLINIC | Age: 60
End: 2025-05-22
Payer: MEDICARE

## 2025-05-22 ENCOUNTER — LABORATORY RESULT (OUTPATIENT)
Age: 60
End: 2025-05-22

## 2025-05-22 DIAGNOSIS — I25.10 ATHEROSCLEROTIC HEART DISEASE OF NATIVE CORONARY ARTERY W/OUT ANGINA PECTORIS: ICD-10-CM

## 2025-05-22 DIAGNOSIS — R06.02 SHORTNESS OF BREATH: ICD-10-CM

## 2025-05-22 DIAGNOSIS — Z98.61 ATHEROSCLEROTIC HEART DISEASE OF NATIVE CORONARY ARTERY W/OUT ANGINA PECTORIS: ICD-10-CM

## 2025-05-22 PROCEDURE — G2211 COMPLEX E/M VISIT ADD ON: CPT | Mod: 93

## 2025-05-22 PROCEDURE — 99202 OFFICE O/P NEW SF 15 MIN: CPT | Mod: 93

## 2025-05-22 RX ORDER — EZETIMIBE 10 MG/1
10 TABLET ORAL
Qty: 90 | Refills: 1 | Status: ACTIVE | COMMUNITY
Start: 2025-05-22 | End: 1900-01-01

## 2025-05-23 ENCOUNTER — NON-APPOINTMENT (OUTPATIENT)
Age: 60
End: 2025-05-23

## 2025-05-23 ENCOUNTER — APPOINTMENT (OUTPATIENT)
Dept: HEMATOLOGY ONCOLOGY | Facility: CLINIC | Age: 60
End: 2025-05-23

## 2025-06-17 ENCOUNTER — NON-APPOINTMENT (OUTPATIENT)
Age: 60
End: 2025-06-17

## 2025-06-17 ENCOUNTER — TRANSCRIPTION ENCOUNTER (OUTPATIENT)
Age: 60
End: 2025-06-17

## 2025-06-26 ENCOUNTER — TRANSCRIPTION ENCOUNTER (OUTPATIENT)
Age: 60
End: 2025-06-26

## 2025-07-28 ENCOUNTER — APPOINTMENT (OUTPATIENT)
Dept: FAMILY MEDICINE | Facility: CLINIC | Age: 60
End: 2025-07-28
Payer: MEDICARE

## 2025-07-28 VITALS
BODY MASS INDEX: 34.55 KG/M2 | WEIGHT: 215 LBS | HEIGHT: 66 IN | HEART RATE: 80 BPM | TEMPERATURE: 97.2 F | RESPIRATION RATE: 14 BRPM | SYSTOLIC BLOOD PRESSURE: 132 MMHG | OXYGEN SATURATION: 98 % | DIASTOLIC BLOOD PRESSURE: 78 MMHG

## 2025-07-28 DIAGNOSIS — L05.91 PILONIDAL CYST W/OUT ABSCESS: ICD-10-CM

## 2025-07-28 DIAGNOSIS — L72.9 FOLLICULAR CYST OF THE SKIN AND SUBCUTANEOUS TISSUE, UNSPECIFIED: ICD-10-CM

## 2025-07-28 PROCEDURE — 99214 OFFICE O/P EST MOD 30 MIN: CPT

## 2025-07-28 PROCEDURE — G2211 COMPLEX E/M VISIT ADD ON: CPT

## 2025-08-06 ENCOUNTER — APPOINTMENT (OUTPATIENT)
Dept: GASTROENTEROLOGY | Facility: CLINIC | Age: 60
End: 2025-08-06
Payer: MEDICARE

## 2025-08-06 ENCOUNTER — APPOINTMENT (OUTPATIENT)
Dept: PULMONOLOGY | Facility: CLINIC | Age: 60
End: 2025-08-06
Payer: MEDICARE

## 2025-08-06 VITALS
SYSTOLIC BLOOD PRESSURE: 122 MMHG | OXYGEN SATURATION: 95 % | HEIGHT: 65 IN | RESPIRATION RATE: 16 BRPM | WEIGHT: 215 LBS | BODY MASS INDEX: 35.82 KG/M2 | DIASTOLIC BLOOD PRESSURE: 78 MMHG | HEART RATE: 97 BPM

## 2025-08-06 DIAGNOSIS — R06.02 SHORTNESS OF BREATH: ICD-10-CM

## 2025-08-06 DIAGNOSIS — Z87.891 PERSONAL HISTORY OF NICOTINE DEPENDENCE: ICD-10-CM

## 2025-08-06 DIAGNOSIS — M25.50 PAIN IN UNSPECIFIED JOINT: ICD-10-CM

## 2025-08-06 DIAGNOSIS — G47.33 OBSTRUCTIVE SLEEP APNEA (ADULT) (PEDIATRIC): ICD-10-CM

## 2025-08-06 DIAGNOSIS — M15.9 POLYOSTEOARTHRITIS, UNSPECIFIED: ICD-10-CM

## 2025-08-06 DIAGNOSIS — I25.10 ATHEROSCLEROTIC HEART DISEASE OF NATIVE CORONARY ARTERY W/OUT ANGINA PECTORIS: ICD-10-CM

## 2025-08-06 DIAGNOSIS — K21.00 GASTRO-ESOPHAGEAL REFLUX DISEASE WITH ESOPHAGITIS, WITHOUT BLEEDING: ICD-10-CM

## 2025-08-06 DIAGNOSIS — E66.9 OBESITY, UNSPECIFIED: ICD-10-CM

## 2025-08-06 DIAGNOSIS — E55.9 VITAMIN D DEFICIENCY, UNSPECIFIED: ICD-10-CM

## 2025-08-06 DIAGNOSIS — M54.12 RADICULOPATHY, CERVICAL REGION: ICD-10-CM

## 2025-08-06 DIAGNOSIS — K57.30 DIVERTICULOSIS OF LARGE INTESTINE W/OUT PERFORATION OR ABSCESS W/OUT BLEEDING: ICD-10-CM

## 2025-08-06 DIAGNOSIS — M48.061 SPINAL STENOSIS, LUMBAR REGION WITHOUT NEUROGENIC CLAUDICATION: ICD-10-CM

## 2025-08-06 DIAGNOSIS — Z98.61 ATHEROSCLEROTIC HEART DISEASE OF NATIVE CORONARY ARTERY W/OUT ANGINA PECTORIS: ICD-10-CM

## 2025-08-06 DIAGNOSIS — L05.91 PILONIDAL CYST W/OUT ABSCESS: ICD-10-CM

## 2025-08-06 DIAGNOSIS — I47.10 SUPRAVENTRICULAR TACHYCARDIA, UNSPECIFIED: ICD-10-CM

## 2025-08-06 DIAGNOSIS — K59.04 CHRONIC IDIOPATHIC CONSTIPATION: ICD-10-CM

## 2025-08-06 DIAGNOSIS — M79.7 FIBROMYALGIA: ICD-10-CM

## 2025-08-06 DIAGNOSIS — I25.118 ATHEROSCLEROTIC HEART DISEASE OF NATIVE CORONARY ARTERY WITH OTHER FORMS OF ANGINA PECTORIS: ICD-10-CM

## 2025-08-06 DIAGNOSIS — Z12.11 ENCOUNTER FOR SCREENING FOR MALIGNANT NEOPLASM OF COLON: ICD-10-CM

## 2025-08-06 DIAGNOSIS — E53.8 DEFICIENCY OF OTHER SPECIFIED B GROUP VITAMINS: ICD-10-CM

## 2025-08-06 DIAGNOSIS — F41.8 OTHER SPECIFIED ANXIETY DISORDERS: ICD-10-CM

## 2025-08-06 DIAGNOSIS — R20.2 PARESTHESIA OF SKIN: ICD-10-CM

## 2025-08-06 DIAGNOSIS — Z78.0 ASYMPTOMATIC MENOPAUSAL STATE: ICD-10-CM

## 2025-08-06 DIAGNOSIS — Z95.818 PRESENCE OF OTHER CARDIAC IMPLANTS AND GRAFTS: ICD-10-CM

## 2025-08-06 DIAGNOSIS — I10 ESSENTIAL (PRIMARY) HYPERTENSION: ICD-10-CM

## 2025-08-06 DIAGNOSIS — Z83.719 FAMILY HISTORY OF COLON POLYPS, UNSPECIFIED: ICD-10-CM

## 2025-08-06 PROCEDURE — 99214 OFFICE O/P EST MOD 30 MIN: CPT

## 2025-08-06 PROCEDURE — G2211 COMPLEX E/M VISIT ADD ON: CPT

## 2025-08-06 RX ORDER — POLYETHYLENE GLYCOL 3350, SODIUM SULFATE, POTASSIUM CHLORIDE, MAGNESIUM SULFATE, AND SODIUM CHLORIDE FOR ORAL SOLUTION 178.7-7.3G
178.7 KIT ORAL
Qty: 1 | Refills: 0 | Status: ACTIVE | COMMUNITY
Start: 2025-08-06 | End: 1900-01-01

## 2025-08-07 ENCOUNTER — RX RENEWAL (OUTPATIENT)
Age: 60
End: 2025-08-07

## 2025-08-25 ENCOUNTER — APPOINTMENT (OUTPATIENT)
Dept: SURGERY | Facility: CLINIC | Age: 60
End: 2025-08-25
Payer: MEDICARE

## 2025-08-25 VITALS
HEIGHT: 65 IN | SYSTOLIC BLOOD PRESSURE: 164 MMHG | BODY MASS INDEX: 36.32 KG/M2 | RESPIRATION RATE: 16 BRPM | HEART RATE: 86 BPM | OXYGEN SATURATION: 96 % | WEIGHT: 218 LBS | DIASTOLIC BLOOD PRESSURE: 96 MMHG | TEMPERATURE: 97.6 F

## 2025-08-25 PROCEDURE — 99213 OFFICE O/P EST LOW 20 MIN: CPT

## 2025-08-26 ENCOUNTER — OUTPATIENT (OUTPATIENT)
Dept: OUTPATIENT SERVICES | Facility: HOSPITAL | Age: 60
LOS: 1 days | End: 2025-08-26
Payer: MEDICARE

## 2025-08-26 ENCOUNTER — APPOINTMENT (OUTPATIENT)
Dept: RHEUMATOLOGY | Facility: CLINIC | Age: 60
End: 2025-08-26
Payer: MEDICARE

## 2025-08-26 ENCOUNTER — APPOINTMENT (OUTPATIENT)
Dept: MAMMOGRAPHY | Facility: CLINIC | Age: 60
End: 2025-08-26
Payer: MEDICARE

## 2025-08-26 ENCOUNTER — RESULT REVIEW (OUTPATIENT)
Age: 60
End: 2025-08-26

## 2025-08-26 VITALS
TEMPERATURE: 97.2 F | SYSTOLIC BLOOD PRESSURE: 138 MMHG | BODY MASS INDEX: 36.32 KG/M2 | HEIGHT: 65 IN | WEIGHT: 218 LBS | DIASTOLIC BLOOD PRESSURE: 80 MMHG

## 2025-08-26 DIAGNOSIS — Z79.1 LONG TERM (CURRENT) USE OF NON-STEROIDAL ANTI-INFLAMMATORIES (NSAID): ICD-10-CM

## 2025-08-26 DIAGNOSIS — M85.80 OTHER SPECIFIED DISORDERS OF BONE DENSITY AND STRUCTURE, UNSPECIFIED SITE: ICD-10-CM

## 2025-08-26 DIAGNOSIS — M54.2 CERVICALGIA: ICD-10-CM

## 2025-08-26 DIAGNOSIS — Z12.39 ENCOUNTER FOR OTHER SCREENING FOR MALIGNANT NEOPLASM OF BREAST: ICD-10-CM

## 2025-08-26 DIAGNOSIS — M48.02 SPINAL STENOSIS, CERVICAL REGION: ICD-10-CM

## 2025-08-26 DIAGNOSIS — Z98.890 OTHER SPECIFIED POSTPROCEDURAL STATES: Chronic | ICD-10-CM

## 2025-08-26 DIAGNOSIS — M54.42 OTHER CHRONIC PAIN: ICD-10-CM

## 2025-08-26 DIAGNOSIS — M79.89 OTHER SPECIFIED SOFT TISSUE DISORDERS: ICD-10-CM

## 2025-08-26 DIAGNOSIS — M54.12 RADICULOPATHY, CERVICAL REGION: ICD-10-CM

## 2025-08-26 DIAGNOSIS — R53.83 OTHER FATIGUE: ICD-10-CM

## 2025-08-26 DIAGNOSIS — R20.2 PARESTHESIA OF SKIN: ICD-10-CM

## 2025-08-26 DIAGNOSIS — L73.2 HIDRADENITIS SUPPURATIVA: ICD-10-CM

## 2025-08-26 DIAGNOSIS — M15.9 POLYOSTEOARTHRITIS, UNSPECIFIED: ICD-10-CM

## 2025-08-26 DIAGNOSIS — M48.061 SPINAL STENOSIS, LUMBAR REGION WITHOUT NEUROGENIC CLAUDICATION: ICD-10-CM

## 2025-08-26 DIAGNOSIS — E55.9 VITAMIN D DEFICIENCY, UNSPECIFIED: ICD-10-CM

## 2025-08-26 DIAGNOSIS — Z87.59 PERSONAL HISTORY OF OTHER COMPLICATIONS OF PREGNANCY, CHILDBIRTH AND THE PUERPERIUM: Chronic | ICD-10-CM

## 2025-08-26 DIAGNOSIS — M51.26 OTHER INTERVERTEBRAL DISC DISPLACEMENT, LUMBAR REGION: ICD-10-CM

## 2025-08-26 DIAGNOSIS — M48.07 SPINAL STENOSIS, LUMBOSACRAL REGION: ICD-10-CM

## 2025-08-26 DIAGNOSIS — Z90.722 ACQUIRED ABSENCE OF OVARIES, BILATERAL: Chronic | ICD-10-CM

## 2025-08-26 DIAGNOSIS — G47.62 SLEEP RELATED LEG CRAMPS: ICD-10-CM

## 2025-08-26 DIAGNOSIS — M79.7 FIBROMYALGIA: ICD-10-CM

## 2025-08-26 DIAGNOSIS — M50.20 OTHER CERVICAL DISC DISPLACEMENT, UNSPECIFIED CERVICAL REGION: ICD-10-CM

## 2025-08-26 DIAGNOSIS — H04.123 DRY EYE SYNDROME OF BILATERAL LACRIMAL GLANDS: ICD-10-CM

## 2025-08-26 DIAGNOSIS — G89.29 CERVICALGIA: ICD-10-CM

## 2025-08-26 DIAGNOSIS — G89.29 OTHER CHRONIC PAIN: ICD-10-CM

## 2025-08-26 PROCEDURE — 99215 OFFICE O/P EST HI 40 MIN: CPT

## 2025-08-26 PROCEDURE — G2212 PROLONG OUTPT/OFFICE VIS: CPT

## 2025-08-26 PROCEDURE — 77063 BREAST TOMOSYNTHESIS BI: CPT

## 2025-08-26 PROCEDURE — 77067 SCR MAMMO BI INCL CAD: CPT

## 2025-08-26 PROCEDURE — G2211 COMPLEX E/M VISIT ADD ON: CPT

## 2025-08-26 PROCEDURE — 77063 BREAST TOMOSYNTHESIS BI: CPT | Mod: 26

## 2025-08-26 PROCEDURE — 77067 SCR MAMMO BI INCL CAD: CPT | Mod: 26

## 2025-08-26 RX ORDER — TIZANIDINE 4 MG/1
4 TABLET ORAL
Qty: 90 | Refills: 0 | Status: ACTIVE | COMMUNITY
Start: 2025-08-26 | End: 1900-01-01

## 2025-08-26 RX ORDER — DIFLUNISAL 500 MG/1
500 TABLET, FILM COATED ORAL
Qty: 300 | Refills: 0 | Status: ACTIVE | COMMUNITY
Start: 2025-08-26 | End: 1900-01-01

## 2025-08-26 RX ORDER — PREDNISONE 10 MG/1
10 TABLET ORAL
Qty: 32 | Refills: 0 | Status: ACTIVE | COMMUNITY
Start: 2025-08-26 | End: 1900-01-01

## 2025-08-29 PROBLEM — L73.2 HIDRADENITIS SUPPURATIVA: Status: ACTIVE | Noted: 2025-08-25

## 2025-08-29 LAB
ALBUMIN MFR SERPL ELPH: 53.1 %
ALBUMIN SERPL ELPH-MCNC: 4.3 G/DL
ALBUMIN SERPL-MCNC: 3.9 G/DL
ALBUMIN/GLOB SERPL: 1.1 RATIO
ALP BLD-CCNC: 112 U/L
ALPHA1 GLOB MFR SERPL ELPH: 4 %
ALPHA1 GLOB SERPL ELPH-MCNC: 0.3 G/DL
ALPHA2 GLOB MFR SERPL ELPH: 10.7 %
ALPHA2 GLOB SERPL ELPH-MCNC: 0.8 G/DL
ALT SERPL-CCNC: 35 U/L
ANION GAP SERPL CALC-SCNC: 16 MMOL/L
APPEARANCE: ABNORMAL
AST SERPL-CCNC: 26 U/L
B-GLOBULIN MFR SERPL ELPH: 16.7 %
B-GLOBULIN SERPL ELPH-MCNC: 1.2 G/DL
BACTERIA: ABNORMAL /HPF
BASOPHILS # BLD AUTO: 0.04 K/UL
BASOPHILS NFR BLD AUTO: 0.3 %
BILIRUB SERPL-MCNC: 0.4 MG/DL
BILIRUBIN URINE: NEGATIVE
BLOOD URINE: ABNORMAL
BUN SERPL-MCNC: 6 MG/DL
CALCIUM OXALATE CRYSTALS: PRESENT
CALCIUM SERPL-MCNC: 9.7 MG/DL
CAST: 0 /LPF
CHLORIDE SERPL-SCNC: 104 MMOL/L
CK SERPL-CCNC: 43 U/L
CO2 SERPL-SCNC: 23 MMOL/L
COLOR: YELLOW
CREAT SERPL-MCNC: 0.55 MG/DL
CRP SERPL-MCNC: 7 MG/L
DEPRECATED KAPPA LC FREE/LAMBDA SER: 1.17 RATIO
EGFRCR SERPLBLD CKD-EPI 2021: 106 ML/MIN/1.73M2
ENA SS-A AB SER-ACNC: <0.2 AL
ENA SS-B AB SER-ACNC: <0.2 AL
EOSINOPHIL # BLD AUTO: 0.28 K/UL
EOSINOPHIL NFR BLD AUTO: 2.3 %
EPITHELIAL CELLS: 13 /HPF
ERYTHROCYTE [SEDIMENTATION RATE] IN BLOOD BY WESTERGREN METHOD: 43 MM/HR
GAMMA GLOB FLD ELPH-MCNC: 1.1 G/DL
GAMMA GLOB MFR SERPL ELPH: 15.5 %
GLUCOSE QUALITATIVE U: NEGATIVE MG/DL
GLUCOSE SERPL-MCNC: 127 MG/DL
HCT VFR BLD CALC: 40.3 %
HGB BLD-MCNC: 13.1 G/DL
IGA SERPL-MCNC: 446 MG/DL
IGG SERPL-MCNC: 1043 MG/DL
IGM SERPL-MCNC: 78 MG/DL
IMM GRANULOCYTES NFR BLD AUTO: 0.2 %
INTERPRETATION SERPL IEP-IMP: NORMAL
KAPPA LC CSF-MCNC: 1.92 MG/DL
KAPPA LC SERPL-MCNC: 2.25 MG/DL
KETONES URINE: NEGATIVE MG/DL
LDH SERPL-CCNC: 179 U/L
LEUKOCYTE ESTERASE URINE: NEGATIVE
LYMPHOCYTES # BLD AUTO: 2.15 K/UL
LYMPHOCYTES NFR BLD AUTO: 17.9 %
M PROTEIN SPEC IFE-MCNC: NORMAL
MAGNESIUM SERPL-MCNC: 1.8 MG/DL
MAN DIFF?: NORMAL
MCHC RBC-ENTMCNC: 30.3 PG
MCHC RBC-ENTMCNC: 32.5 G/DL
MCV RBC AUTO: 93.1 FL
MICROSCOPIC-UA: NORMAL
MONOCYTES # BLD AUTO: 0.37 K/UL
MONOCYTES NFR BLD AUTO: 3.1 %
NEUTROPHILS # BLD AUTO: 9.16 K/UL
NEUTROPHILS NFR BLD AUTO: 76.2 %
NITRITE URINE: NEGATIVE
PH URINE: 5.5
PHOSPHATE SERPL-MCNC: 4.3 MG/DL
PLATELET # BLD AUTO: 294 K/UL
POTASSIUM SERPL-SCNC: 3.6 MMOL/L
PROT SERPL-MCNC: 7.3 G/DL
PROTEIN URINE: NORMAL MG/DL
RBC # BLD: 4.33 M/UL
RBC # FLD: 13.7 %
RED BLOOD CELLS URINE: NORMAL /HPF
REVIEW: NORMAL
SODIUM SERPL-SCNC: 142 MMOL/L
SPECIFIC GRAVITY URINE: 1.02
TSH SERPL-ACNC: 2.22 UIU/ML
UROBILINOGEN URINE: 1 MG/DL
WBC # FLD AUTO: 12.03 K/UL
WHITE BLOOD CELLS URINE: 2 /HPF

## 2025-09-04 ENCOUNTER — NON-APPOINTMENT (OUTPATIENT)
Age: 60
End: 2025-09-04

## 2025-09-04 ENCOUNTER — APPOINTMENT (OUTPATIENT)
Dept: CARDIOLOGY | Facility: CLINIC | Age: 60
End: 2025-09-04
Payer: MEDICARE

## 2025-09-04 DIAGNOSIS — I25.10 ATHEROSCLEROTIC HEART DISEASE OF NATIVE CORONARY ARTERY W/OUT ANGINA PECTORIS: ICD-10-CM

## 2025-09-04 DIAGNOSIS — I10 ESSENTIAL (PRIMARY) HYPERTENSION: ICD-10-CM

## 2025-09-04 DIAGNOSIS — Z98.61 ATHEROSCLEROTIC HEART DISEASE OF NATIVE CORONARY ARTERY W/OUT ANGINA PECTORIS: ICD-10-CM

## 2025-09-04 DIAGNOSIS — R06.00 DYSPNEA, UNSPECIFIED: ICD-10-CM

## 2025-09-04 PROCEDURE — 99214 OFFICE O/P EST MOD 30 MIN: CPT | Mod: 2W

## 2025-09-16 ENCOUNTER — RX RENEWAL (OUTPATIENT)
Age: 60
End: 2025-09-16

## 2025-09-17 ENCOUNTER — APPOINTMENT (OUTPATIENT)
Dept: HEMATOLOGY ONCOLOGY | Facility: CLINIC | Age: 60
End: 2025-09-17

## 2025-09-17 DIAGNOSIS — E61.1 IRON DEFICIENCY: ICD-10-CM

## 2025-09-17 DIAGNOSIS — D72.829 ELEVATED WHITE BLOOD CELL COUNT, UNSPECIFIED: ICD-10-CM

## (undated) DEVICE — DRSG TELFA 3 X 8

## (undated) DEVICE — SOL IRR POUR H2O 1000ML

## (undated) DEVICE — DRAPE SPLIT SHEET 77" X 108"

## (undated) DEVICE — SUT VICRYL 0 27" CT-1

## (undated) DEVICE — SUT PDS II 2-0 27" CT-2

## (undated) DEVICE — SYR CONTROL LUER LOK 10CC

## (undated) DEVICE — NDL HYPO REGULAR BEVEL 25G X 1.5" (BLUE)

## (undated) DEVICE — DRSG STERISTRIPS 0.5 X 4"

## (undated) DEVICE — PACK MINOR WITH LAP

## (undated) DEVICE — LIGASURE SMALL JAW

## (undated) DEVICE — DRAPE 1/2 SHEET 40X57"

## (undated) DEVICE — SPONGE PEANUT AUTO COUNT

## (undated) DEVICE — SUT SILK 2-0 30" TIES

## (undated) DEVICE — SUT SILK 3-0 30" TIES

## (undated) DEVICE — GLV 7.5 PROTEXIS (WHITE)

## (undated) DEVICE — DRAPE EXTREMITY 87" X 106" X 128"

## (undated) DEVICE — ELCTR GROUNDING PAD ADULT COVIDIEN

## (undated) DEVICE — DRAPE TOWEL BLUE STICKY

## (undated) DEVICE — ELCTR BOVIE TIP BLADE INSULATED 4" EDGE

## (undated) DEVICE — DRSG MASTISOL

## (undated) DEVICE — SOL IRR POUR NS 0.9% 1000ML

## (undated) DEVICE — STAPLER SKIN PROXIMATE

## (undated) DEVICE — SUT SILK 0 30" SH

## (undated) DEVICE — SUT MONOCRYL 4-0 27" PS-2 UNDYED

## (undated) DEVICE — ELCTR ROCKER SWITCH PENCIL BLUE 10FT

## (undated) DEVICE — WARMING BLANKET UPPER ADULT

## (undated) DEVICE — VENODYNE/SCD SLEEVE CALF MEDIUM